# Patient Record
Sex: MALE | Race: BLACK OR AFRICAN AMERICAN | NOT HISPANIC OR LATINO | Employment: OTHER | URBAN - METROPOLITAN AREA
[De-identification: names, ages, dates, MRNs, and addresses within clinical notes are randomized per-mention and may not be internally consistent; named-entity substitution may affect disease eponyms.]

---

## 2017-02-22 ENCOUNTER — OFFICE VISIT (OUTPATIENT)
Dept: INTERNAL MEDICINE | Facility: CLINIC | Age: 52
End: 2017-02-22
Payer: MEDICARE

## 2017-02-22 ENCOUNTER — LAB VISIT (OUTPATIENT)
Dept: LAB | Facility: HOSPITAL | Age: 52
End: 2017-02-22
Attending: FAMILY MEDICINE
Payer: MEDICARE

## 2017-02-22 VITALS
HEART RATE: 66 BPM | SYSTOLIC BLOOD PRESSURE: 130 MMHG | WEIGHT: 222.44 LBS | DIASTOLIC BLOOD PRESSURE: 80 MMHG | HEIGHT: 78 IN | TEMPERATURE: 96 F | BODY MASS INDEX: 25.74 KG/M2

## 2017-02-22 DIAGNOSIS — Z00.00 ANNUAL PHYSICAL EXAM: ICD-10-CM

## 2017-02-22 DIAGNOSIS — I10 ESSENTIAL HYPERTENSION: ICD-10-CM

## 2017-02-22 DIAGNOSIS — G89.29 CHRONIC MIDLINE LOW BACK PAIN WITH SCIATICA, SCIATICA LATERALITY UNSPECIFIED: ICD-10-CM

## 2017-02-22 DIAGNOSIS — M54.40 CHRONIC MIDLINE LOW BACK PAIN WITH SCIATICA, SCIATICA LATERALITY UNSPECIFIED: ICD-10-CM

## 2017-02-22 DIAGNOSIS — E78.00 HYPERCHOLESTEREMIA: ICD-10-CM

## 2017-02-22 LAB
ALBUMIN SERPL BCP-MCNC: 3.8 G/DL
ALP SERPL-CCNC: 88 U/L
ALT SERPL W/O P-5'-P-CCNC: 29 U/L
ANION GAP SERPL CALC-SCNC: 7 MMOL/L
AST SERPL-CCNC: 23 U/L
BASOPHILS # BLD AUTO: 0.02 K/UL
BASOPHILS NFR BLD: 0.3 %
BILIRUB SERPL-MCNC: 0.4 MG/DL
BUN SERPL-MCNC: 20 MG/DL
CALCIUM SERPL-MCNC: 9.4 MG/DL
CHLORIDE SERPL-SCNC: 106 MMOL/L
CHOLEST/HDLC SERPL: 6.4 {RATIO}
CO2 SERPL-SCNC: 26 MMOL/L
COMPLEXED PSA SERPL-MCNC: 0.77 NG/ML
CREAT SERPL-MCNC: 1.4 MG/DL
DIFFERENTIAL METHOD: ABNORMAL
EOSINOPHIL # BLD AUTO: 0.1 K/UL
EOSINOPHIL NFR BLD: 1.9 %
ERYTHROCYTE [DISTWIDTH] IN BLOOD BY AUTOMATED COUNT: 12.7 %
EST. GFR  (AFRICAN AMERICAN): >60 ML/MIN/1.73 M^2
EST. GFR  (NON AFRICAN AMERICAN): 58.2 ML/MIN/1.73 M^2
GLUCOSE SERPL-MCNC: 106 MG/DL
HCT VFR BLD AUTO: 44.7 %
HDL/CHOLESTEROL RATIO: 15.5 %
HDLC SERPL-MCNC: 290 MG/DL
HDLC SERPL-MCNC: 45 MG/DL
HGB BLD-MCNC: 14.9 G/DL
LDLC SERPL CALC-MCNC: 205.6 MG/DL
LYMPHOCYTES # BLD AUTO: 2.3 K/UL
LYMPHOCYTES NFR BLD: 31.3 %
MCH RBC QN AUTO: 32.5 PG
MCHC RBC AUTO-ENTMCNC: 33.3 %
MCV RBC AUTO: 97 FL
MONOCYTES # BLD AUTO: 0.7 K/UL
MONOCYTES NFR BLD: 10 %
NEUTROPHILS # BLD AUTO: 4.1 K/UL
NEUTROPHILS NFR BLD: 56.4 %
NONHDLC SERPL-MCNC: 245 MG/DL
PLATELET # BLD AUTO: 181 K/UL
PMV BLD AUTO: 11 FL
POTASSIUM SERPL-SCNC: 4.9 MMOL/L
PROT SERPL-MCNC: 7.7 G/DL
RBC # BLD AUTO: 4.59 M/UL
SODIUM SERPL-SCNC: 139 MMOL/L
TRIGL SERPL-MCNC: 197 MG/DL
WBC # BLD AUTO: 7.28 K/UL

## 2017-02-22 PROCEDURE — 80061 LIPID PANEL: CPT

## 2017-02-22 PROCEDURE — 3079F DIAST BP 80-89 MM HG: CPT | Mod: S$GLB,,, | Performed by: FAMILY MEDICINE

## 2017-02-22 PROCEDURE — 36415 COLL VENOUS BLD VENIPUNCTURE: CPT | Mod: PO

## 2017-02-22 PROCEDURE — 3075F SYST BP GE 130 - 139MM HG: CPT | Mod: S$GLB,,, | Performed by: FAMILY MEDICINE

## 2017-02-22 PROCEDURE — 99999 PR PBB SHADOW E&M-EST. PATIENT-LVL III: CPT | Mod: PBBFAC,,, | Performed by: FAMILY MEDICINE

## 2017-02-22 PROCEDURE — 80053 COMPREHEN METABOLIC PANEL: CPT

## 2017-02-22 PROCEDURE — 99499 UNLISTED E&M SERVICE: CPT | Mod: S$GLB,,, | Performed by: FAMILY MEDICINE

## 2017-02-22 PROCEDURE — 99396 PREV VISIT EST AGE 40-64: CPT | Mod: S$GLB,,, | Performed by: FAMILY MEDICINE

## 2017-02-22 PROCEDURE — 85025 COMPLETE CBC W/AUTO DIFF WBC: CPT

## 2017-02-22 PROCEDURE — 84153 ASSAY OF PSA TOTAL: CPT

## 2017-02-22 RX ORDER — AMLODIPINE BESYLATE 10 MG/1
TABLET ORAL
Qty: 90 TABLET | Refills: 2 | Status: SHIPPED | OUTPATIENT
Start: 2017-02-22 | End: 2017-06-30 | Stop reason: SDUPTHER

## 2017-02-22 RX ORDER — ATORVASTATIN CALCIUM 80 MG/1
80 TABLET, FILM COATED ORAL DAILY
Qty: 90 TABLET | Refills: 2 | Status: SHIPPED | OUTPATIENT
Start: 2017-02-22 | End: 2017-06-30 | Stop reason: SDUPTHER

## 2017-02-22 RX ORDER — HYDROCHLOROTHIAZIDE 12.5 MG/1
25 CAPSULE ORAL 2 TIMES DAILY
Qty: 180 CAPSULE | Refills: 2 | Status: SHIPPED | OUTPATIENT
Start: 2017-02-22 | End: 2017-10-13 | Stop reason: SDUPTHER

## 2017-02-22 RX ORDER — METOPROLOL TARTRATE 50 MG/1
50 TABLET ORAL ONCE
Qty: 90 TABLET | Refills: 2 | Status: SHIPPED | OUTPATIENT
Start: 2017-02-22 | End: 2018-06-13 | Stop reason: SDUPTHER

## 2017-02-22 RX ORDER — TRAMADOL HYDROCHLORIDE 50 MG/1
50 TABLET ORAL EVERY 6 HOURS PRN
Qty: 120 TABLET | Refills: 0 | Status: SHIPPED | OUTPATIENT
Start: 2017-02-22 | End: 2017-06-30 | Stop reason: SDUPTHER

## 2017-02-22 NOTE — MR AVS SNAPSHOT
[facility]  [address] [facility] [address] [address] [place] [address]  Phone: [phone]  Fax: [phone]                  Papa Pope   2017 9:40 AM   Office Visit    Description:  Male : 1965   Provider:  Lenny Jernigan MD   Department:  [facility] - Internal Medicine           Reason for Visit     Medication Refill           Diagnoses this Visit        Comments    Annual physical exam     Will do CBC, CMP and lipid panel with PSA    Essential hypertension     Will Continue with the Norvasc 10 mg and HCTZ with metoprolol    Chronic midline low back pain with sciatica, sciatica laterality unspecified     Will get MRI for [facility]. Likely need to go to Pain Management    Hypercholesteremia     Will continue with the Lipitor           To Do List           Future Appointments        Provider Department Dept Phone    2017 9:40 AM Lenny Jernigan MD [facility] [phone]    2017 11:00 AM LAB, SAME DAY [place] [facility] [phone]      Goals (5 Years of Data)     None      Follow-Up and Disposition     Return in about 6 months (around 2017).       These Medications        Disp Refills Start End    amlodipine (NORVASC) 10 MG tablet 90 tablet 2 2017     TK 1 T PO D.    [facility] [address] [place] [address] & [facility] Ph #: [phone]       atorvastatin (LIPITOR) 80 MG tablet 90 tablet 2 2017     Take 1 tablet (80 mg total) by mouth once daily. - Oral    [facility] [address] [place] [address] AT [facility] Ph #: [phone]       hydrochlorothiazide (MICROZIDE) 12.5 mg capsule 180 capsule 2 2017     Take 2 capsules (25 mg total) by mouth 2 (two) times daily. - Oral    Pharmacy: [facility] [address] [place] [address]  [facility] #: [phone]       metoprolol tartrate (LOPRESSOR) 50 MG tablet 90 tablet 2 2/22/2017 2/22/2017    Take 1 tablet (50 mg total) by mouth once. - Oral    Pharmacy: [facility] [address] [id number] [facility] AT [facility] Ph #: [phone]       tramadol (ULTRAM) 50 mg tablet 120 tablet 0 2/22/2017     Take 1 tablet (50 mg total) by mouth every 6 (six) hours as needed for Pain. - Oral    Pharmacy: [facility] [address] [place] [address] AT [facility] Ph #: [phone]         [facility] On Call     [facility] On Call Nurse Care Line - 24/7 Assistance  Registered nurses in the [facility] provide clinical advisement, health education, appointment booking, and other advisory services.  Call for this free service at [phone].             Medications           CHANGE how you are taking these medications     Start Taking Instead of    atorvastatin (LIPITOR) 80 MG tablet atorvastatin (LIPITOR) 80 MG tablet    Dosage:  Take 1 tablet (80 mg total) by mouth once daily. Dosage:  Take 80 mg by mouth once daily.    Reason for Change:  Reorder     hydrochlorothiazide (MICROZIDE) 12.5 mg capsule hydrochlorothiazide (MICROZIDE) 12.5 mg capsule    Dosage:  Take 2 capsules (25 mg total) by mouth 2 (two) times daily. Dosage:  Take 25 mg by mouth.    Reason for Change:  Reorder     metoprolol tartrate (LOPRESSOR) 50 MG tablet metoprolol tartrate (LOPRESSOR) 50 MG tablet    Dosage:  Take 1 tablet (50 mg total) by mouth once. Dosage:  Take 50 mg by mouth once.     Reason for Change:  Reorder            Verify that the below list of medications is an accurate representation of the medications you are currently taking.  If none reported, the list may be blank. If incorrect, please contact your healthcare provider. Carry this list with you in case of emergency.           Current  "Medications     amlodipine (NORVASC) 10 MG tablet TK 1 T PO D.    atorvastatin (LIPITOR) 80 MG tablet Take 1 tablet (80 mg total) by mouth once daily.    gabapentin (NEURONTIN) 600 MG tablet Take 600 mg by mouth.    hydrochlorothiazide (MICROZIDE) 12.5 mg capsule Take 2 capsules (25 mg total) by mouth 2 (two) times daily.    hydrocodone-acetaminophen 7.5-325mg (NORCO) 7.5-325 mg per tablet Take 1 tablet by mouth once.    metoprolol tartrate (LOPRESSOR) 50 MG tablet Take 1 tablet (50 mg total) by mouth once.    tramadol (ULTRAM) 50 mg tablet Take 1 tablet (50 mg total) by mouth every 6 (six) hours as needed for Pain.           Clinical Reference Information           Your Vitals Were     BP Pulse Temp    130/80 (BP Location: Right arm, Patient Position: Sitting, BP Method: Manual) 66 96.4 °F (35.8 °C) (Tympanic)    Height Weight BMI    6' 6" (1.981 m) 100.9 kg (222 lb 7.1 oz) 25.71 kg/m2      Blood Pressure          Most Recent Value    BP  130/80      Allergies as of 2/22/2017     No Known Allergies      Immunizations Administered on Date of Encounter - 2/22/2017     None      Orders Placed During Today's Visit     Future Labs/Procedures Expected by Expires    CBC auto differential  2/22/2017 4/23/2018    Comprehensive metabolic panel  2/22/2017 4/23/2018    Lipid panel  2/22/2017 2/22/2018    PSA, Screening  2/22/2017 4/23/2018      [facility] Sign-Up     Activating your [facility] account is as easy as 1-2-3!     1) Visit [email or web address], select Sign Up Now, enter this activation code and your date of birth, then select Next.  [email or web address]  Expires: 4/8/2017  9:09 AM      2) Create a username and password to use when you visit [facility] in the future and select a security question in case you lose your password and select Next.    3) Enter your e-mail address and click Sign Up!    Additional Information  If you have questions, please e-mail [email or web address] or call [phone] to talk to our [facility] " staff. Remember, [facility] is NOT to be used for urgent needs. For medical emergencies, dial 911.         Smoking Cessation     If you would like to quit smoking:   You may be eligible for free services if you are a [place] resident and started smoking cigarettes before September 1, 1988.  Call the Smoking Cessation Trust ([facility]) toll free at ([phone] or [phone].   Call 1-800-QUIT-NOW if you do not meet the above criteria.            Language Assistance Services     ATTENTION: Language assistance services are available, free of charge. Please call [phone].      ATENCIÓN: Si habla español, tiene a preciado disposición servicios gratuitos de asistencia lingüística. Llame al [phone].     CHÚ Ý: N?u b?n nói Ti?ng Vi?t, có các d?ch v? h? tr? ngôn ng? mi?n phí dành cho b?n. G?i s? [phone].         [facility] - [facility] complies with applicable Federal civil rights laws and does not discriminate on the basis of race, color, national origin, age, disability, or sex.

## 2017-02-22 NOTE — PROGRESS NOTES
Subjective:       Patient ID: Papa Pope is a 51 y.o. male.    Chief Complaint: Medication Refill    HPI Comments: Annual Exam:      Pt is a 51 year old who has HTN, Hyperlipidemia and chronic pain due to back. Pt is stable BP today. Pt had MRI of his back in [place]    Medication Refill   This is a chronic problem. The current episode started more than 1 year ago. The problem has been waxing and waning. Pertinent negatives include no change in bowel habit, headaches, myalgias, nausea, urinary symptoms or vertigo. Nothing aggravates the symptoms. He has tried nothing for the symptoms. The treatment provided moderate relief.     Review of Systems   Gastrointestinal: Negative for change in bowel habit and nausea.   Musculoskeletal: Negative for myalgias.   Neurological: Negative for vertigo and headaches.       Objective:      Physical Exam   Constitutional: He is oriented to person, place, and time. He appears well-developed and well-nourished.   Cardiovascular: Normal rate and regular rhythm.    Pulmonary/Chest: Effort normal and breath sounds normal.   Abdominal: Soft. Bowel sounds are normal.   Musculoskeletal:        Lumbar back: He exhibits decreased range of motion, pain and spasm.   Neurological: He is alert and oriented to person, place, and time.   Skin: Skin is warm and dry.   Psychiatric: He has a normal mood and affect. His behavior is normal.       Assessment:       1. Annual physical exam    2. Essential hypertension    3. Chronic midline low back pain with sciatica, sciatica laterality unspecified    4. Hypercholesteremia        Plan:       Annual physical exam  Comments:  Will do CBC, CMP and lipid panel with PSA  Orders:  -     CBC auto differential; Future; Expected date: 2/22/17  -     Comprehensive metabolic panel; Future; Expected date: 2/22/17  -     Lipid panel; Future; Expected date: 2/22/17  -     PSA, Screening; Future; Expected date: 2/22/17    Essential hypertension  Comments:  Will Continue  with the Norvasc 10 mg and HCTZ with metoprolol    Chronic midline low back pain with sciatica, sciatica laterality unspecified  Comments:  Will get MRI for [facility]. Likely need to go to Pain Management    Hypercholesteremia  Comments:  Will continue with the Lipitor    Other orders  -     amlodipine (NORVASC) 10 MG tablet; TK 1 T PO D.  Dispense: 90 tablet; Refill: 2  -     atorvastatin (LIPITOR) 80 MG tablet; Take 1 tablet (80 mg total) by mouth once daily.  Dispense: 90 tablet; Refill: 2  -     hydrochlorothiazide (MICROZIDE) 12.5 mg capsule; Take 2 capsules (25 mg total) by mouth 2 (two) times daily.  Dispense: 180 capsule; Refill: 2  -     metoprolol tartrate (LOPRESSOR) 50 MG tablet; Take 1 tablet (50 mg total) by mouth once.  Dispense: 90 tablet; Refill: 2  -     tramadol (ULTRAM) 50 mg tablet; Take 1 tablet (50 mg total) by mouth every 6 (six) hours as needed for Pain.  Dispense: 120 tablet; Refill: 0

## 2017-02-24 RX ORDER — EZETIMIBE 10 MG/1
10 TABLET ORAL DAILY
Qty: 90 TABLET | Refills: 3 | Status: SHIPPED | OUTPATIENT
Start: 2017-02-24 | End: 2017-06-30 | Stop reason: SDUPTHER

## 2017-03-14 ENCOUNTER — TELEPHONE (OUTPATIENT)
Dept: INTERNAL MEDICINE | Facility: CLINIC | Age: 52
End: 2017-03-14

## 2017-03-14 NOTE — TELEPHONE ENCOUNTER
----- Message from Jennifer Blackmon sent at 3/14/2017  1:08 PM CDT -----  Contact: self [phone] or [phone]  States that he is returning call. States that he had his MRI done at the [facility] clinic on [place]. Please call back at [phone] or [phone]//thank you acc

## 2017-05-29 PROBLEM — Z00.00 ANNUAL PHYSICAL EXAM: Status: RESOLVED | Noted: 2017-02-22 | Resolved: 2017-05-29

## 2017-06-30 ENCOUNTER — LAB VISIT (OUTPATIENT)
Dept: LAB | Facility: HOSPITAL | Age: 52
End: 2017-06-30
Attending: FAMILY MEDICINE
Payer: MEDICARE

## 2017-06-30 ENCOUNTER — OFFICE VISIT (OUTPATIENT)
Dept: INTERNAL MEDICINE | Facility: CLINIC | Age: 52
End: 2017-06-30
Payer: MEDICARE

## 2017-06-30 VITALS
HEIGHT: 78 IN | BODY MASS INDEX: 25.48 KG/M2 | SYSTOLIC BLOOD PRESSURE: 130 MMHG | WEIGHT: 220.25 LBS | TEMPERATURE: 96 F | DIASTOLIC BLOOD PRESSURE: 88 MMHG | HEART RATE: 80 BPM

## 2017-06-30 DIAGNOSIS — E78.00 HYPERCHOLESTEREMIA: ICD-10-CM

## 2017-06-30 DIAGNOSIS — I10 ESSENTIAL HYPERTENSION: Primary | ICD-10-CM

## 2017-06-30 DIAGNOSIS — M54.40 CHRONIC MIDLINE LOW BACK PAIN WITH SCIATICA, SCIATICA LATERALITY UNSPECIFIED: ICD-10-CM

## 2017-06-30 DIAGNOSIS — I10 ESSENTIAL HYPERTENSION: ICD-10-CM

## 2017-06-30 DIAGNOSIS — G89.29 CHRONIC MIDLINE LOW BACK PAIN WITH SCIATICA, SCIATICA LATERALITY UNSPECIFIED: ICD-10-CM

## 2017-06-30 LAB
ALBUMIN SERPL BCP-MCNC: 3.6 G/DL
ALP SERPL-CCNC: 100 U/L
ALT SERPL W/O P-5'-P-CCNC: 14 U/L
ANION GAP SERPL CALC-SCNC: 8 MMOL/L
AST SERPL-CCNC: 15 U/L
BILIRUB SERPL-MCNC: 0.4 MG/DL
BUN SERPL-MCNC: 14 MG/DL
CALCIUM SERPL-MCNC: 9.3 MG/DL
CHLORIDE SERPL-SCNC: 104 MMOL/L
CHOLEST/HDLC SERPL: 7.2 {RATIO}
CO2 SERPL-SCNC: 26 MMOL/L
CREAT SERPL-MCNC: 1.3 MG/DL
EST. GFR  (AFRICAN AMERICAN): >60 ML/MIN/1.73 M^2
EST. GFR  (NON AFRICAN AMERICAN): >60 ML/MIN/1.73 M^2
GLUCOSE SERPL-MCNC: 111 MG/DL
HDL/CHOLESTEROL RATIO: 13.9 %
HDLC SERPL-MCNC: 267 MG/DL
HDLC SERPL-MCNC: 37 MG/DL
LDLC SERPL CALC-MCNC: 192.8 MG/DL
NONHDLC SERPL-MCNC: 230 MG/DL
POTASSIUM SERPL-SCNC: 4 MMOL/L
PROT SERPL-MCNC: 7.4 G/DL
SODIUM SERPL-SCNC: 138 MMOL/L
TRIGL SERPL-MCNC: 186 MG/DL

## 2017-06-30 PROCEDURE — 99214 OFFICE O/P EST MOD 30 MIN: CPT | Mod: S$GLB,,, | Performed by: FAMILY MEDICINE

## 2017-06-30 PROCEDURE — 36415 COLL VENOUS BLD VENIPUNCTURE: CPT | Mod: PO

## 2017-06-30 PROCEDURE — 80061 LIPID PANEL: CPT

## 2017-06-30 PROCEDURE — 99499 UNLISTED E&M SERVICE: CPT | Mod: S$GLB,,, | Performed by: FAMILY MEDICINE

## 2017-06-30 PROCEDURE — 99999 PR PBB SHADOW E&M-EST. PATIENT-LVL III: CPT | Mod: PBBFAC,,, | Performed by: FAMILY MEDICINE

## 2017-06-30 PROCEDURE — 80053 COMPREHEN METABOLIC PANEL: CPT

## 2017-06-30 RX ORDER — EZETIMIBE 10 MG/1
10 TABLET ORAL DAILY
Qty: 90 TABLET | Refills: 3 | Status: SHIPPED | OUTPATIENT
Start: 2017-06-30 | End: 2018-06-13 | Stop reason: SDUPTHER

## 2017-06-30 RX ORDER — GABAPENTIN 600 MG/1
600 TABLET ORAL 2 TIMES DAILY
Qty: 180 TABLET | Refills: 1 | Status: SHIPPED | OUTPATIENT
Start: 2017-06-30 | End: 2018-06-13 | Stop reason: SDUPTHER

## 2017-06-30 RX ORDER — TRAMADOL HYDROCHLORIDE 50 MG/1
50 TABLET ORAL EVERY 6 HOURS PRN
Qty: 120 TABLET | Refills: 0 | Status: SHIPPED | OUTPATIENT
Start: 2017-06-30 | End: 2017-07-28

## 2017-06-30 RX ORDER — ATORVASTATIN CALCIUM 80 MG/1
80 TABLET, FILM COATED ORAL DAILY
Qty: 90 TABLET | Refills: 2 | Status: SHIPPED | OUTPATIENT
Start: 2017-06-30 | End: 2017-10-13 | Stop reason: SDUPTHER

## 2017-06-30 RX ORDER — AMLODIPINE BESYLATE 10 MG/1
TABLET ORAL
Qty: 90 TABLET | Refills: 2 | Status: SHIPPED | OUTPATIENT
Start: 2017-06-30 | End: 2018-06-13 | Stop reason: SDUPTHER

## 2017-06-30 NOTE — PROGRESS NOTES
Subjective:       Patient ID: Papa Pope is a 52 y.o. male.    Chief Complaint: Follow-up and Medication Refill    HTN:      Pt is a 52 year old who has HTN and doing well. Pt is on amlodipine. Pt has no CP or SOB. Pt is reporting no visual changes or headache    Hypercholesterol:       Pt last Lipid was very high at 200. Pt is on lipitor and zetia.            Review of Systems   Constitutional: Negative.    HENT: Negative.    Respiratory: Negative.    Cardiovascular: Negative.    Gastrointestinal: Negative.    Skin: Negative.    Neurological: Negative.    Psychiatric/Behavioral: Negative.        Objective:      Physical Exam   Constitutional: He is oriented to person, place, and time. He appears well-developed and well-nourished.   Neck: Normal range of motion.   Cardiovascular: Normal rate and regular rhythm.    Pulmonary/Chest: Effort normal and breath sounds normal.   Abdominal: Soft. Bowel sounds are normal.   Neurological: He is oriented to person, place, and time.   Skin: Skin is dry.   Psychiatric: He has a normal mood and affect. His behavior is normal.       Assessment:       1. Essential hypertension    2. Hypercholesteremia    3. Chronic midline low back pain with sciatica, sciatica laterality unspecified        Plan:       Essential hypertension  Comments:  Pt BP is very well controlled and continue on the amlodipine and HCTZ  Orders:  -     Comprehensive metabolic panel; Future; Expected date: 06/30/2017    Hypercholesteremia  Comments:  Recheck his Lipid panel  Orders:  -     Lipid panel; Future; Expected date: 06/30/2017    Chronic midline low back pain with sciatica, sciatica laterality unspecified  Comments:  Will continue on the tramadol and Gabapentin but increase gabapentin to twice a day    Other orders  -     tramadol (ULTRAM) 50 mg tablet; Take 1 tablet (50 mg total) by mouth every 6 (six) hours as needed for Pain.  Dispense: 120 tablet; Refill: 0  -     amlodipine (NORVASC) 10 MG tablet;  TK 1 T PO D.  Dispense: 90 tablet; Refill: 2  -     ezetimibe (ZETIA) 10 mg tablet; Take 1 tablet (10 mg total) by mouth once daily.  Dispense: 90 tablet; Refill: 3  -     atorvastatin (LIPITOR) 80 MG tablet; Take 1 tablet (80 mg total) by mouth once daily.  Dispense: 90 tablet; Refill: 2  -     gabapentin (NEURONTIN) 600 MG tablet; Take 1 tablet (600 mg total) by mouth 2 (two) times daily.  Dispense: 180 tablet; Refill: 1

## 2017-07-26 ENCOUNTER — TELEPHONE (OUTPATIENT)
Dept: INTERNAL MEDICINE | Facility: CLINIC | Age: 52
End: 2017-07-26

## 2017-07-26 RX ORDER — SODIUM, POTASSIUM,MAG SULFATES 17.5-3.13G
SOLUTION, RECONSTITUTED, ORAL ORAL
Qty: 354 ML | Refills: 0 | Status: ON HOLD | OUTPATIENT
Start: 2017-07-26 | End: 2017-08-22 | Stop reason: HOSPADM

## 2017-07-26 NOTE — TELEPHONE ENCOUNTER
----- Message from Batsheva Glynn sent at 7/26/2017  1:49 PM CDT -----  Contact: Pt  Pt is returning the nurse call. Pls call pt back a [phone].

## 2017-07-28 ENCOUNTER — OFFICE VISIT (OUTPATIENT)
Dept: INTERNAL MEDICINE | Facility: CLINIC | Age: 52
End: 2017-07-28
Payer: MEDICARE

## 2017-07-28 ENCOUNTER — TELEPHONE (OUTPATIENT)
Dept: INTERNAL MEDICINE | Facility: CLINIC | Age: 52
End: 2017-07-28

## 2017-07-28 ENCOUNTER — HOSPITAL ENCOUNTER (OUTPATIENT)
Dept: RADIOLOGY | Facility: HOSPITAL | Age: 52
Discharge: HOME | End: 2017-07-28
Attending: FAMILY MEDICINE
Payer: MEDICARE

## 2017-07-28 VITALS
HEART RATE: 86 BPM | WEIGHT: 219.81 LBS | TEMPERATURE: 97 F | DIASTOLIC BLOOD PRESSURE: 82 MMHG | SYSTOLIC BLOOD PRESSURE: 136 MMHG | HEIGHT: 78 IN | BODY MASS INDEX: 25.43 KG/M2

## 2017-07-28 DIAGNOSIS — M54.50 CHRONIC BILATERAL LOW BACK PAIN WITHOUT SCIATICA: ICD-10-CM

## 2017-07-28 DIAGNOSIS — G89.29 CHRONIC BILATERAL LOW BACK PAIN WITHOUT SCIATICA: ICD-10-CM

## 2017-07-28 DIAGNOSIS — E78.00 HYPERCHOLESTEREMIA: Primary | ICD-10-CM

## 2017-07-28 PROCEDURE — 72040 X-RAY EXAM NECK SPINE 2-3 VW: CPT | Mod: 26,,, | Performed by: RADIOLOGY

## 2017-07-28 PROCEDURE — 99499 UNLISTED E&M SERVICE: CPT | Mod: S$GLB,,, | Performed by: FAMILY MEDICINE

## 2017-07-28 PROCEDURE — 72040 X-RAY EXAM NECK SPINE 2-3 VW: CPT | Mod: TC,PO

## 2017-07-28 PROCEDURE — 99214 OFFICE O/P EST MOD 30 MIN: CPT | Mod: S$GLB,,, | Performed by: FAMILY MEDICINE

## 2017-07-28 PROCEDURE — 72114 X-RAY EXAM L-S SPINE BENDING: CPT | Mod: 26,,, | Performed by: RADIOLOGY

## 2017-07-28 PROCEDURE — 99999 PR PBB SHADOW E&M-EST. PATIENT-LVL III: CPT | Mod: PBBFAC,,, | Performed by: FAMILY MEDICINE

## 2017-07-28 PROCEDURE — 72114 X-RAY EXAM L-S SPINE BENDING: CPT | Mod: TC,PO

## 2017-07-28 RX ORDER — CYCLOBENZAPRINE HCL 10 MG
10 TABLET ORAL NIGHTLY
Qty: 30 TABLET | Refills: 1 | Status: SHIPPED | OUTPATIENT
Start: 2017-07-28 | End: 2017-08-07

## 2017-07-28 RX ORDER — METHOCARBAMOL 750 MG/1
750 TABLET, FILM COATED ORAL 3 TIMES DAILY
Qty: 90 TABLET | Refills: 1 | Status: SHIPPED | OUTPATIENT
Start: 2017-07-28 | End: 2017-08-07

## 2017-07-28 NOTE — PROGRESS NOTES
Subjective:       Patient ID: Papa Pope is a 52 y.o. male.    Chief Complaint: Hyperlipidemia    Lower back pain:      Pt is a 52 year with lower back and shoulder pain. Pt reports this has been going on for 3 months. Pt reports that not had any trauma.       Hyperlipidemia   This is a chronic problem. The current episode started more than 1 year ago. The problem is controlled. Recent lipid tests were reviewed and are variable. Exacerbating diseases include diabetes. There are no known factors aggravating his hyperlipidemia. Associated symptoms include leg pain. Pertinent negatives include no chest pain or focal sensory loss. He is currently on no antihyperlipidemic treatment. The current treatment provides no improvement of lipids. There are no compliance problems.      Review of Systems   Constitutional: Negative.    Respiratory: Negative.    Cardiovascular: Negative.  Negative for chest pain.   Genitourinary: Negative.    Musculoskeletal: Positive for back pain.   Neurological: Negative.    Hematological: Negative.    Psychiatric/Behavioral: Negative.        Objective:      Physical Exam   Constitutional: He is oriented to person, place, and time. He appears well-developed and well-nourished.   Neck: Normal range of motion.   Cardiovascular: Normal rate and regular rhythm.  Exam reveals no friction rub.    Pulmonary/Chest: Effort normal and breath sounds normal. He has no wheezes.   Abdominal: Soft. Bowel sounds are normal.   Musculoskeletal:        Cervical back: He exhibits decreased range of motion and spasm.        Lumbar back: He exhibits decreased range of motion, pain and spasm.   Neurological: He is alert and oriented to person, place, and time. He displays normal reflexes.   Skin: Skin is warm and dry.   Psychiatric: He has a normal mood and affect. His behavior is normal.       Assessment:       1. Hypercholesteremia    2. Chronic bilateral low back pain without sciatica        Plan:        Hypercholesteremia  Comments:  Will restart on zetia.  Orders:  -     CK; Future; Expected date: 07/28/2017    Chronic bilateral low back pain without sciatica  Comments:  Will get a xray of lumbar and cervical and start on muscle relaxer.   Orders:  -     X-Ray Cervical Spine AP And Lateral; Future; Expected date: 07/28/2017  -     X-Ray Lumbar Complete With Flex And Ext; Future; Expected date: 07/28/2017    Other orders  -     methocarbamol (ROBAXIN) 750 MG Tab; Take 1 tablet (750 mg total) by mouth 3 (three) times daily.  Dispense: 90 tablet; Refill: 1  -     cyclobenzaprine (FLEXERIL) 10 MG tablet; Take 1 tablet (10 mg total) by mouth every evening.  Dispense: 30 tablet; Refill: 1

## 2017-07-30 ENCOUNTER — PATIENT MESSAGE (OUTPATIENT)
Dept: INTERNAL MEDICINE | Facility: CLINIC | Age: 52
End: 2017-07-30

## 2017-07-31 ENCOUNTER — TELEPHONE (OUTPATIENT)
Dept: PHARMACY | Facility: CLINIC | Age: 52
End: 2017-07-31

## 2017-07-31 NOTE — TELEPHONE ENCOUNTER
Spoke with patient about referral.  Patient stated in the insurance is not covering the medication.  I called the pharmacy and the medication covered and the patient co-pays 47.00.  I explained to the patient the insurance is covering the medication.

## 2017-08-04 DIAGNOSIS — M54.40 CHRONIC MIDLINE LOW BACK PAIN WITH SCIATICA, SCIATICA LATERALITY UNSPECIFIED: Primary | ICD-10-CM

## 2017-08-04 DIAGNOSIS — M54.2 NECK PAIN: ICD-10-CM

## 2017-08-04 DIAGNOSIS — G89.29 CHRONIC MIDLINE LOW BACK PAIN WITH SCIATICA, SCIATICA LATERALITY UNSPECIFIED: Primary | ICD-10-CM

## 2017-08-22 ENCOUNTER — SURGERY (OUTPATIENT)
Age: 52
End: 2017-08-22

## 2017-08-22 ENCOUNTER — ANESTHESIA (OUTPATIENT)
Dept: ENDOSCOPY | Facility: HOSPITAL | Age: 52
End: 2017-08-22
Payer: MEDICARE

## 2017-08-22 ENCOUNTER — ANESTHESIA EVENT (OUTPATIENT)
Dept: ENDOSCOPY | Facility: HOSPITAL | Age: 52
End: 2017-08-22
Payer: MEDICARE

## 2017-08-22 VITALS — RESPIRATION RATE: 10 BRPM

## 2017-08-22 PROBLEM — Z12.12 SCREENING FOR COLORECTAL CANCER: Status: ACTIVE | Noted: 2017-08-22

## 2017-08-22 PROBLEM — Z12.11 SCREENING FOR COLORECTAL CANCER: Status: ACTIVE | Noted: 2017-08-22

## 2017-08-22 PROCEDURE — 63600175 PHARM REV CODE 636 W HCPCS: Performed by: NURSE ANESTHETIST, CERTIFIED REGISTERED

## 2017-08-22 PROCEDURE — 25000003 PHARM REV CODE 250: Performed by: NURSE ANESTHETIST, CERTIFIED REGISTERED

## 2017-08-22 RX ORDER — PROPOFOL 10 MG/ML
VIAL (ML) INTRAVENOUS
Status: DISCONTINUED | OUTPATIENT
Start: 2017-08-22 | End: 2017-08-22

## 2017-08-22 RX ORDER — LIDOCAINE HYDROCHLORIDE 20 MG/ML
INJECTION, SOLUTION EPIDURAL; INFILTRATION; INTRACAUDAL; PERINEURAL
Status: DISCONTINUED | OUTPATIENT
Start: 2017-08-22 | End: 2017-08-22

## 2017-08-22 RX ORDER — SODIUM CHLORIDE, SODIUM LACTATE, POTASSIUM CHLORIDE, CALCIUM CHLORIDE 600; 310; 30; 20 MG/100ML; MG/100ML; MG/100ML; MG/100ML
INJECTION, SOLUTION INTRAVENOUS CONTINUOUS PRN
Status: DISCONTINUED | OUTPATIENT
Start: 2017-08-22 | End: 2017-08-22

## 2017-08-22 RX ADMIN — PROPOFOL 60 MG: 10 INJECTION, EMULSION INTRAVENOUS at 10:08

## 2017-08-22 RX ADMIN — LIDOCAINE HYDROCHLORIDE 40 MG: 20 INJECTION, SOLUTION EPIDURAL; INFILTRATION; INTRACAUDAL; PERINEURAL at 09:08

## 2017-08-22 RX ADMIN — PROPOFOL 60 MG: 10 INJECTION, EMULSION INTRAVENOUS at 09:08

## 2017-08-22 RX ADMIN — PROPOFOL 40 MG: 10 INJECTION, EMULSION INTRAVENOUS at 10:08

## 2017-08-22 RX ADMIN — SODIUM CHLORIDE, SODIUM LACTATE, POTASSIUM CHLORIDE, AND CALCIUM CHLORIDE: 600; 310; 30; 20 INJECTION, SOLUTION INTRAVENOUS at 09:08

## 2017-08-22 RX ADMIN — PROPOFOL 30 MG: 10 INJECTION, EMULSION INTRAVENOUS at 10:08

## 2017-08-22 NOTE — ANESTHESIA RELEASE NOTE
"Anesthesia Release from PACU Note    Patient: Papa Pope    Procedure(s) Performed: Procedure(s) (LRB):  COLONOSCOPY (N/A)    Anesthesia type: MAC    Post pain: Adequate analgesia    Post assessment: no apparent anesthetic complications and tolerated procedure well    Last Vitals:   Visit Vitals  /76 (BP Location: Left arm, Patient Position: Lying)   Pulse 76   Temp 36.8 °C (98.3 °F) (Oral)   Resp 17   Ht 6' 6" (1.981 m)   Wt 97.5 kg (215 lb)   SpO2 100%   BMI 24.85 kg/m²       Post vital signs: stable    Level of consciousness: awake, alert  and oriented    Nausea/Vomiting: no nausea/no vomiting    Complications: none    Airway Patency: patent    Respiratory: unassisted, spontaneous ventilation, room air    Cardiovascular: stable and blood pressure at baseline    Hydration: euvolemic  "

## 2017-08-22 NOTE — TRANSFER OF CARE
"Anesthesia Transfer of Care Note    Patient: Papa Pope    Procedure(s) Performed: Procedure(s) (LRB):  COLONOSCOPY (N/A)    Patient location: GI    Anesthesia Type: MAC    Transport from OR: Transported from OR on room air with adequate spontaneous ventilation    Post pain: adequate analgesia    Post assessment: no apparent anesthetic complications and tolerated procedure well    Post vital signs: stable    Level of consciousness: awake, alert and oriented    Nausea/Vomiting: no nausea/vomiting    Complications: none    Transfer of care protocol was followed      Last vitals:   Visit Vitals  /76 (BP Location: Left arm, Patient Position: Lying)   Pulse 76   Temp 36.8 °C (98.3 °F) (Oral)   Resp 17   Ht 6' 6" (1.981 m)   Wt 97.5 kg (215 lb)   SpO2 100%   BMI 24.85 kg/m²     "

## 2017-08-22 NOTE — ANESTHESIA POSTPROCEDURE EVALUATION
"Anesthesia Post Evaluation    Patient: Papa Pope    Procedure(s) Performed: Procedure(s) (LRB):  COLONOSCOPY (N/A)    Final Anesthesia Type: MAC  Patient location during evaluation: GI PACU  Patient participation: Yes- Able to Participate  Level of consciousness: awake and alert and oriented  Post-procedure vital signs: reviewed and stable  Pain management: adequate  Airway patency: patent  PONV status at discharge: No PONV  Anesthetic complications: no      Cardiovascular status: hemodynamically stable  Respiratory status: unassisted, spontaneous ventilation and room air  Hydration status: euvolemic  Follow-up not needed.        Visit Vitals  /76 (BP Location: Left arm, Patient Position: Lying)   Pulse 76   Temp 36.8 °C (98.3 °F) (Oral)   Resp 17   Ht 6' 6" (1.981 m)   Wt 97.5 kg (215 lb)   SpO2 100%   BMI 24.85 kg/m²       Pain/Laith Score: Pain Assessment Performed: Yes (8/22/2017  9:18 AM)      "
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
## 2017-10-16 RX ORDER — ATORVASTATIN CALCIUM 80 MG/1
80 TABLET, FILM COATED ORAL DAILY
Qty: 90 TABLET | Refills: 2 | Status: SHIPPED | OUTPATIENT
Start: 2017-10-16 | End: 2018-06-13 | Stop reason: SDUPTHER

## 2017-10-16 RX ORDER — HYDROCHLOROTHIAZIDE 12.5 MG/1
25 CAPSULE ORAL 2 TIMES DAILY
Qty: 180 CAPSULE | Refills: 2 | Status: SHIPPED | OUTPATIENT
Start: 2017-10-16 | End: 2017-12-23 | Stop reason: SDUPTHER

## 2017-12-25 RX ORDER — HYDROCHLOROTHIAZIDE 12.5 MG/1
CAPSULE ORAL
Qty: 360 CAPSULE | Refills: 2 | Status: SHIPPED | OUTPATIENT
Start: 2017-12-25 | End: 2018-06-13 | Stop reason: SDUPTHER

## 2018-05-31 ENCOUNTER — PES CALL (OUTPATIENT)
Dept: ADMINISTRATIVE | Facility: CLINIC | Age: 53
End: 2018-05-31

## 2018-06-13 ENCOUNTER — OFFICE VISIT (OUTPATIENT)
Dept: INTERNAL MEDICINE | Facility: CLINIC | Age: 53
End: 2018-06-13
Payer: MEDICARE

## 2018-06-13 ENCOUNTER — LAB VISIT (OUTPATIENT)
Dept: LAB | Facility: HOSPITAL | Age: 53
End: 2018-06-13
Attending: FAMILY MEDICINE
Payer: MEDICARE

## 2018-06-13 DIAGNOSIS — Z00.00 ANNUAL PHYSICAL EXAM: Primary | ICD-10-CM

## 2018-06-13 DIAGNOSIS — Z00.00 ANNUAL PHYSICAL EXAM: ICD-10-CM

## 2018-06-13 PROCEDURE — 80061 LIPID PANEL: CPT

## 2018-06-13 PROCEDURE — 84153 ASSAY OF PSA TOTAL: CPT

## 2018-06-13 PROCEDURE — 85025 COMPLETE CBC W/AUTO DIFF WBC: CPT

## 2018-06-13 PROCEDURE — 80053 COMPREHEN METABOLIC PANEL: CPT

## 2018-06-13 PROCEDURE — 36415 COLL VENOUS BLD VENIPUNCTURE: CPT | Mod: PO

## 2018-06-13 PROCEDURE — 99999 PR PBB SHADOW E&M-EST. PATIENT-LVL III: CPT | Mod: PBBFAC,,, | Performed by: FAMILY MEDICINE

## 2018-06-13 PROCEDURE — 3080F DIAST BP >= 90 MM HG: CPT | Mod: CPTII,S$GLB,, | Performed by: FAMILY MEDICINE

## 2018-06-13 PROCEDURE — 3077F SYST BP >= 140 MM HG: CPT | Mod: CPTII,S$GLB,, | Performed by: FAMILY MEDICINE

## 2018-06-13 PROCEDURE — 99396 PREV VISIT EST AGE 40-64: CPT | Mod: S$GLB,,, | Performed by: FAMILY MEDICINE

## 2018-06-13 PROCEDURE — 99499 UNLISTED E&M SERVICE: CPT | Mod: HCNC,S$GLB,, | Performed by: FAMILY MEDICINE

## 2018-06-13 RX ORDER — ATORVASTATIN CALCIUM 80 MG/1
80 TABLET, FILM COATED ORAL DAILY
Qty: 90 TABLET | Refills: 2 | Status: SHIPPED | OUTPATIENT
Start: 2018-06-13 | End: 2018-06-28 | Stop reason: SDUPTHER

## 2018-06-13 RX ORDER — AMLODIPINE BESYLATE 10 MG/1
TABLET ORAL
Qty: 90 TABLET | Refills: 2 | Status: SHIPPED | OUTPATIENT
Start: 2018-06-13 | End: 2018-06-28 | Stop reason: SDUPTHER

## 2018-06-13 RX ORDER — MONTELUKAST SODIUM 10 MG/1
10 TABLET ORAL NIGHTLY
Qty: 90 TABLET | Refills: 1 | Status: SHIPPED | OUTPATIENT
Start: 2018-06-13 | End: 2018-06-28 | Stop reason: SDUPTHER

## 2018-06-13 RX ORDER — GABAPENTIN 600 MG/1
600 TABLET ORAL 2 TIMES DAILY
Qty: 180 TABLET | Refills: 1 | Status: SHIPPED | OUTPATIENT
Start: 2018-06-13 | End: 2018-06-28 | Stop reason: SDUPTHER

## 2018-06-13 RX ORDER — EZETIMIBE 10 MG/1
10 TABLET ORAL DAILY
Qty: 90 TABLET | Refills: 3 | Status: SHIPPED | OUTPATIENT
Start: 2018-06-13 | End: 2018-06-28 | Stop reason: SDUPTHER

## 2018-06-13 RX ORDER — METOPROLOL TARTRATE 50 MG/1
50 TABLET ORAL ONCE
Qty: 90 TABLET | Refills: 2 | Status: SHIPPED | OUTPATIENT
Start: 2018-06-13 | End: 2018-06-28 | Stop reason: SDUPTHER

## 2018-06-13 RX ORDER — HYDROCHLOROTHIAZIDE 12.5 MG/1
25 CAPSULE ORAL 2 TIMES DAILY
Qty: 360 CAPSULE | Refills: 2 | Status: SHIPPED | OUTPATIENT
Start: 2018-06-13 | End: 2018-06-28 | Stop reason: SDUPTHER

## 2018-06-13 NOTE — PROGRESS NOTES
Subjective:       Patient ID: Papa Pope is a 53 y.o. male.    Chief Complaint: Annual Exam    Annual Exam:      Pt is a 53 year old with HTN and Hyperlipidema. Conditions are controlled. Pt is up-to-date on his wellness exams      Review of Systems   Constitutional: Negative.    HENT: Negative.    Respiratory: Negative.    Cardiovascular: Negative.    Gastrointestinal: Negative.    Genitourinary: Negative.    Musculoskeletal: Negative.    Skin: Negative.    Neurological: Negative.    Psychiatric/Behavioral: Negative.        Objective:      Physical Exam   Constitutional: He is oriented to person, place, and time. He appears well-developed and well-nourished.   HENT:   Head: Normocephalic.   Eyes: EOM are normal. Pupils are equal, round, and reactive to light.   Neck: Normal range of motion. Neck supple. No JVD present. No thyromegaly present.   Cardiovascular: Normal rate and regular rhythm.    Pulmonary/Chest: Effort normal and breath sounds normal.   Abdominal: Soft. Bowel sounds are normal.   Musculoskeletal: Normal range of motion.   Lymphadenopathy:     He has no cervical adenopathy.   Neurological: He is alert and oriented to person, place, and time. He has normal reflexes.   Skin: Skin is warm and dry.   Psychiatric: He has a normal mood and affect. His behavior is normal.       Assessment:       1. Annual physical exam        Plan:       Annual physical exam  Comments:  Will do CBC, CMP, lipid panel and PSA  Orders:  -     PSA, Screening; Future; Expected date: 06/13/2018  -     CBC auto differential; Future; Expected date: 06/13/2018  -     Comprehensive metabolic panel; Future; Expected date: 06/13/2018  -     Lipid panel; Future; Expected date: 06/13/2018    Other orders  -     amLODIPine (NORVASC) 10 MG tablet; TK 1 T PO D.  Dispense: 90 tablet; Refill: 2  -     atorvastatin (LIPITOR) 80 MG tablet; Take 1 tablet (80 mg total) by mouth once daily.  Dispense: 90 tablet; Refill: 2  -     ezetimibe  (ZETIA) 10 mg tablet; Take 1 tablet (10 mg total) by mouth once daily.  Dispense: 90 tablet; Refill: 3  -     gabapentin (NEURONTIN) 600 MG tablet; Take 1 tablet (600 mg total) by mouth 2 (two) times daily.  Dispense: 180 tablet; Refill: 1  -     hydroCHLOROthiazide (MICROZIDE) 12.5 mg capsule; Take 2 capsules (25 mg total) by mouth 2 (two) times daily.  Dispense: 360 capsule; Refill: 2  -     metoprolol tartrate (LOPRESSOR) 50 MG tablet; Take 1 tablet (50 mg total) by mouth once.  Dispense: 90 tablet; Refill: 2  -     montelukast (SINGULAIR) 10 mg tablet; Take 1 tablet (10 mg total) by mouth every evening.  Dispense: 90 tablet; Refill: 1

## 2018-06-14 LAB
ALBUMIN SERPL BCP-MCNC: 4.2 G/DL
ALP SERPL-CCNC: 116 U/L
ALT SERPL W/O P-5'-P-CCNC: 26 U/L
ANION GAP SERPL CALC-SCNC: 12 MMOL/L
AST SERPL-CCNC: 22 U/L
BASOPHILS # BLD AUTO: 0.03 K/UL
BASOPHILS NFR BLD: 0.5 %
BILIRUB SERPL-MCNC: 0.7 MG/DL
BUN SERPL-MCNC: 15 MG/DL
CALCIUM SERPL-MCNC: 10.3 MG/DL
CHLORIDE SERPL-SCNC: 103 MMOL/L
CHOLEST SERPL-MCNC: 245 MG/DL
CHOLEST/HDLC SERPL: 5.3 {RATIO}
CO2 SERPL-SCNC: 26 MMOL/L
COMPLEXED PSA SERPL-MCNC: 0.66 NG/ML
CREAT SERPL-MCNC: 1.2 MG/DL
DIFFERENTIAL METHOD: ABNORMAL
EOSINOPHIL # BLD AUTO: 0.1 K/UL
EOSINOPHIL NFR BLD: 2 %
ERYTHROCYTE [DISTWIDTH] IN BLOOD BY AUTOMATED COUNT: 12.6 %
EST. GFR  (AFRICAN AMERICAN): >60 ML/MIN/1.73 M^2
EST. GFR  (NON AFRICAN AMERICAN): >60 ML/MIN/1.73 M^2
GLUCOSE SERPL-MCNC: 66 MG/DL
HCT VFR BLD AUTO: 46.5 %
HDLC SERPL-MCNC: 46 MG/DL
HDLC SERPL: 18.8 %
HGB BLD-MCNC: 15.4 G/DL
IMM GRANULOCYTES # BLD AUTO: 0.01 K/UL
IMM GRANULOCYTES NFR BLD AUTO: 0.2 %
LDLC SERPL CALC-MCNC: 169.8 MG/DL
LYMPHOCYTES # BLD AUTO: 2.3 K/UL
LYMPHOCYTES NFR BLD: 36.1 %
MCH RBC QN AUTO: 32.4 PG
MCHC RBC AUTO-ENTMCNC: 33.1 G/DL
MCV RBC AUTO: 98 FL
MONOCYTES # BLD AUTO: 0.7 K/UL
MONOCYTES NFR BLD: 10.1 %
NEUTROPHILS # BLD AUTO: 3.3 K/UL
NEUTROPHILS NFR BLD: 51.1 %
NONHDLC SERPL-MCNC: 199 MG/DL
NRBC BLD-RTO: 0 /100 WBC
PLATELET # BLD AUTO: 154 K/UL
PMV BLD AUTO: 11.5 FL
POTASSIUM SERPL-SCNC: 4.6 MMOL/L
PROT SERPL-MCNC: 8.1 G/DL
RBC # BLD AUTO: 4.76 M/UL
SODIUM SERPL-SCNC: 141 MMOL/L
TRIGL SERPL-MCNC: 146 MG/DL
WBC # BLD AUTO: 6.46 K/UL

## 2018-06-27 ENCOUNTER — PATIENT MESSAGE (OUTPATIENT)
Dept: INTERNAL MEDICINE | Facility: CLINIC | Age: 53
End: 2018-06-27

## 2018-06-28 VITALS
WEIGHT: 222.25 LBS | DIASTOLIC BLOOD PRESSURE: 89 MMHG | TEMPERATURE: 98 F | HEART RATE: 78 BPM | SYSTOLIC BLOOD PRESSURE: 139 MMHG | BODY MASS INDEX: 25.71 KG/M2 | HEIGHT: 78 IN

## 2018-06-28 RX ORDER — MONTELUKAST SODIUM 10 MG/1
10 TABLET ORAL NIGHTLY
Qty: 90 TABLET | Refills: 1 | Status: SHIPPED | OUTPATIENT
Start: 2018-06-28 | End: 2018-07-28

## 2018-06-28 RX ORDER — AMLODIPINE BESYLATE 10 MG/1
TABLET ORAL
Qty: 90 TABLET | Refills: 2 | Status: SHIPPED | OUTPATIENT
Start: 2018-06-28 | End: 2019-06-12 | Stop reason: SDUPTHER

## 2018-06-28 RX ORDER — GABAPENTIN 600 MG/1
600 TABLET ORAL 2 TIMES DAILY
Qty: 180 TABLET | Refills: 1 | Status: SHIPPED | OUTPATIENT
Start: 2018-06-28 | End: 2019-06-12 | Stop reason: SDUPTHER

## 2018-06-28 RX ORDER — ATORVASTATIN CALCIUM 80 MG/1
80 TABLET, FILM COATED ORAL DAILY
Qty: 90 TABLET | Refills: 2 | Status: SHIPPED | OUTPATIENT
Start: 2018-06-28 | End: 2019-06-12 | Stop reason: SDUPTHER

## 2018-06-28 RX ORDER — HYDROCHLOROTHIAZIDE 12.5 MG/1
25 CAPSULE ORAL 2 TIMES DAILY
Qty: 360 CAPSULE | Refills: 2 | Status: SHIPPED | OUTPATIENT
Start: 2018-06-28 | End: 2019-06-12 | Stop reason: SDUPTHER

## 2018-06-28 RX ORDER — METOPROLOL TARTRATE 50 MG/1
50 TABLET ORAL ONCE
Qty: 90 TABLET | Refills: 2 | Status: SHIPPED | OUTPATIENT
Start: 2018-06-28 | End: 2019-06-12 | Stop reason: SDUPTHER

## 2018-06-28 RX ORDER — EZETIMIBE 10 MG/1
10 TABLET ORAL DAILY
Qty: 90 TABLET | Refills: 3 | Status: SHIPPED | OUTPATIENT
Start: 2018-06-28 | End: 2021-02-03 | Stop reason: SDUPTHER

## 2018-09-17 PROBLEM — Z00.00 ANNUAL PHYSICAL EXAM: Status: RESOLVED | Noted: 2017-02-22 | Resolved: 2018-09-17

## 2018-12-12 ENCOUNTER — OFFICE VISIT (OUTPATIENT)
Dept: INTERNAL MEDICINE | Facility: CLINIC | Age: 53
End: 2018-12-12
Payer: MEDICARE

## 2018-12-12 ENCOUNTER — LAB VISIT (OUTPATIENT)
Dept: LAB | Facility: HOSPITAL | Age: 53
End: 2018-12-12
Attending: FAMILY MEDICINE
Payer: MEDICARE

## 2018-12-12 ENCOUNTER — PATIENT MESSAGE (OUTPATIENT)
Dept: INTERNAL MEDICINE | Facility: CLINIC | Age: 53
End: 2018-12-12

## 2018-12-12 VITALS
WEIGHT: 224.88 LBS | HEART RATE: 95 BPM | TEMPERATURE: 98 F | BODY MASS INDEX: 26.02 KG/M2 | DIASTOLIC BLOOD PRESSURE: 89 MMHG | HEIGHT: 78 IN | SYSTOLIC BLOOD PRESSURE: 136 MMHG

## 2018-12-12 DIAGNOSIS — E78.00 HYPERCHOLESTEREMIA: ICD-10-CM

## 2018-12-12 DIAGNOSIS — M54.40 CHRONIC MIDLINE LOW BACK PAIN WITH SCIATICA, SCIATICA LATERALITY UNSPECIFIED: ICD-10-CM

## 2018-12-12 DIAGNOSIS — I10 ESSENTIAL HYPERTENSION: ICD-10-CM

## 2018-12-12 DIAGNOSIS — I10 ESSENTIAL HYPERTENSION: Primary | ICD-10-CM

## 2018-12-12 DIAGNOSIS — G89.29 CHRONIC MIDLINE LOW BACK PAIN WITH SCIATICA, SCIATICA LATERALITY UNSPECIFIED: ICD-10-CM

## 2018-12-12 LAB
ALBUMIN SERPL BCP-MCNC: 4 G/DL
ALP SERPL-CCNC: 97 U/L
ALT SERPL W/O P-5'-P-CCNC: 31 U/L
ANION GAP SERPL CALC-SCNC: 11 MMOL/L
AST SERPL-CCNC: 20 U/L
BILIRUB SERPL-MCNC: 0.6 MG/DL
BUN SERPL-MCNC: 14 MG/DL
CALCIUM SERPL-MCNC: 9.9 MG/DL
CHLORIDE SERPL-SCNC: 105 MMOL/L
CHOLEST SERPL-MCNC: 207 MG/DL
CHOLEST/HDLC SERPL: 4.2 {RATIO}
CO2 SERPL-SCNC: 25 MMOL/L
CREAT SERPL-MCNC: 1.1 MG/DL
EST. GFR  (AFRICAN AMERICAN): >60 ML/MIN/1.73 M^2
EST. GFR  (NON AFRICAN AMERICAN): >60 ML/MIN/1.73 M^2
GLUCOSE SERPL-MCNC: 86 MG/DL
HDLC SERPL-MCNC: 49 MG/DL
HDLC SERPL: 23.7 %
LDLC SERPL CALC-MCNC: 133.4 MG/DL
NONHDLC SERPL-MCNC: 158 MG/DL
POTASSIUM SERPL-SCNC: 4.2 MMOL/L
PROT SERPL-MCNC: 7.8 G/DL
SODIUM SERPL-SCNC: 141 MMOL/L
TRIGL SERPL-MCNC: 123 MG/DL

## 2018-12-12 PROCEDURE — 3075F SYST BP GE 130 - 139MM HG: CPT | Mod: CPTII,HCNC,S$GLB, | Performed by: FAMILY MEDICINE

## 2018-12-12 PROCEDURE — 36415 COLL VENOUS BLD VENIPUNCTURE: CPT | Mod: HCNC,PO

## 2018-12-12 PROCEDURE — 99214 OFFICE O/P EST MOD 30 MIN: CPT | Mod: HCNC,S$GLB,, | Performed by: FAMILY MEDICINE

## 2018-12-12 PROCEDURE — 80053 COMPREHEN METABOLIC PANEL: CPT | Mod: HCNC

## 2018-12-12 PROCEDURE — 3079F DIAST BP 80-89 MM HG: CPT | Mod: CPTII,HCNC,S$GLB, | Performed by: FAMILY MEDICINE

## 2018-12-12 PROCEDURE — 99999 PR PBB SHADOW E&M-EST. PATIENT-LVL III: CPT | Mod: PBBFAC,HCNC,, | Performed by: FAMILY MEDICINE

## 2018-12-12 PROCEDURE — 80061 LIPID PANEL: CPT | Mod: HCNC

## 2018-12-12 PROCEDURE — 3008F BODY MASS INDEX DOCD: CPT | Mod: CPTII,HCNC,S$GLB, | Performed by: FAMILY MEDICINE

## 2018-12-12 RX ORDER — PREDNISONE 5 MG/1
TABLET ORAL
Qty: 20 TABLET | Refills: 0 | Status: SHIPPED | OUTPATIENT
Start: 2018-12-12 | End: 2019-12-12 | Stop reason: SDUPTHER

## 2018-12-12 NOTE — PROGRESS NOTES
Subjective:       Patient ID: Papa Pope is a 53 y.o. male.    Chief Complaint: Follow-up    F/U:      Pt is a 53 year old who is here for follow-up on his cholesterol. Pt is already on lipitor and zetia. Last LDL was 169. Pt also complaining of some stiff joints with the could. He already had lower back issues but since the on set of cold weather has trip more stiff      Review of Systems   Constitutional: Negative.    Respiratory: Negative.    Cardiovascular: Negative.    Genitourinary: Negative.    Neurological: Negative.    Hematological: Negative.    Psychiatric/Behavioral: Negative.        Objective:      Physical Exam   Constitutional: He is oriented to person, place, and time. He appears well-developed and well-nourished.   Cardiovascular: Normal rate and regular rhythm.   Pulmonary/Chest: Effort normal and breath sounds normal. He has no wheezes. He has no rales.   Abdominal: Soft. Bowel sounds are normal.   Neurological: He is alert and oriented to person, place, and time.       Assessment:       1. Essential hypertension    2. Hypercholesteremia    3. Chronic midline low back pain with sciatica, sciatica laterality unspecified        Plan:       Essential hypertension  Comments:  Pt BP is well controlled  Orders:  -     Comprehensive metabolic panel; Future; Expected date: 12/12/2018    Hypercholesteremia  Comments:  Will recheck his lipid panel  Orders:  -     Lipid panel; Future; Expected date: 12/12/2018    Chronic midline low back pain with sciatica, sciatica laterality unspecified  Comments:  acute episode; will start on prednisone etienne    Other orders  -     predniSONE (DELTASONE) 5 MG tablet; Day 1-2: take 1 tablet every 6 hours  Day 3-4: take 1 tablet every 8 hours  Day 5-6: take 1 tablet every 12 hours  Day 7-8: take 1 table tin am  Dispense: 20 tablet; Refill: 0

## 2018-12-29 ENCOUNTER — PATIENT MESSAGE (OUTPATIENT)
Dept: INTERNAL MEDICINE | Facility: CLINIC | Age: 53
End: 2018-12-29

## 2019-04-30 RX ORDER — PREDNISONE 5 MG/1
TABLET ORAL
Qty: 20 TABLET | Refills: 0 | OUTPATIENT
Start: 2019-04-30

## 2019-06-12 ENCOUNTER — HOSPITAL ENCOUNTER (OUTPATIENT)
Dept: RADIOLOGY | Facility: HOSPITAL | Age: 54
Discharge: HOME | End: 2019-06-12
Attending: FAMILY MEDICINE
Payer: MEDICARE

## 2019-06-12 ENCOUNTER — OFFICE VISIT (OUTPATIENT)
Dept: INTERNAL MEDICINE | Facility: CLINIC | Age: 54
End: 2019-06-12
Payer: MEDICARE

## 2019-06-12 VITALS
DIASTOLIC BLOOD PRESSURE: 82 MMHG | SYSTOLIC BLOOD PRESSURE: 136 MMHG | HEART RATE: 93 BPM | OXYGEN SATURATION: 97 % | BODY MASS INDEX: 25.35 KG/M2 | TEMPERATURE: 98 F | WEIGHT: 219.38 LBS

## 2019-06-12 DIAGNOSIS — M54.40 CHRONIC MIDLINE LOW BACK PAIN WITH SCIATICA, SCIATICA LATERALITY UNSPECIFIED: Primary | ICD-10-CM

## 2019-06-12 DIAGNOSIS — G89.29 CHRONIC MIDLINE LOW BACK PAIN WITH SCIATICA, SCIATICA LATERALITY UNSPECIFIED: ICD-10-CM

## 2019-06-12 DIAGNOSIS — M50.30 DDD (DEGENERATIVE DISC DISEASE), CERVICAL: ICD-10-CM

## 2019-06-12 DIAGNOSIS — E78.00 HYPERCHOLESTEREMIA: ICD-10-CM

## 2019-06-12 DIAGNOSIS — I10 ESSENTIAL HYPERTENSION: ICD-10-CM

## 2019-06-12 DIAGNOSIS — G89.29 CHRONIC MIDLINE LOW BACK PAIN WITH SCIATICA, SCIATICA LATERALITY UNSPECIFIED: Primary | ICD-10-CM

## 2019-06-12 DIAGNOSIS — M54.40 CHRONIC MIDLINE LOW BACK PAIN WITH SCIATICA, SCIATICA LATERALITY UNSPECIFIED: ICD-10-CM

## 2019-06-12 PROCEDURE — 3079F DIAST BP 80-89 MM HG: CPT | Mod: HCNC,CPTII,S$GLB, | Performed by: FAMILY MEDICINE

## 2019-06-12 PROCEDURE — 3079F PR MOST RECENT DIASTOLIC BLOOD PRESSURE 80-89 MM HG: ICD-10-PCS | Mod: HCNC,CPTII,S$GLB, | Performed by: FAMILY MEDICINE

## 2019-06-12 PROCEDURE — 99214 OFFICE O/P EST MOD 30 MIN: CPT | Mod: HCNC,S$GLB,, | Performed by: FAMILY MEDICINE

## 2019-06-12 PROCEDURE — 99999 PR PBB SHADOW E&M-EST. PATIENT-LVL IV: CPT | Mod: PBBFAC,HCNC,, | Performed by: FAMILY MEDICINE

## 2019-06-12 PROCEDURE — 72040 X-RAY EXAM NECK SPINE 2-3 VW: CPT | Mod: TC,HCNC

## 2019-06-12 PROCEDURE — 3008F BODY MASS INDEX DOCD: CPT | Mod: HCNC,CPTII,S$GLB, | Performed by: FAMILY MEDICINE

## 2019-06-12 PROCEDURE — 72040 XR CERVICAL SPINE AP LATERAL: ICD-10-PCS | Mod: 26,HCNC,, | Performed by: RADIOLOGY

## 2019-06-12 PROCEDURE — 3075F SYST BP GE 130 - 139MM HG: CPT | Mod: HCNC,CPTII,S$GLB, | Performed by: FAMILY MEDICINE

## 2019-06-12 PROCEDURE — 3008F PR BODY MASS INDEX (BMI) DOCUMENTED: ICD-10-PCS | Mod: HCNC,CPTII,S$GLB, | Performed by: FAMILY MEDICINE

## 2019-06-12 PROCEDURE — 72110 XR LUMBAR SPINE COMPLETE 5 VIEW: ICD-10-PCS | Mod: 26,HCNC,, | Performed by: RADIOLOGY

## 2019-06-12 PROCEDURE — 72110 X-RAY EXAM L-2 SPINE 4/>VWS: CPT | Mod: TC,HCNC

## 2019-06-12 PROCEDURE — 72040 X-RAY EXAM NECK SPINE 2-3 VW: CPT | Mod: 26,HCNC,, | Performed by: RADIOLOGY

## 2019-06-12 PROCEDURE — 99999 PR PBB SHADOW E&M-EST. PATIENT-LVL IV: ICD-10-PCS | Mod: PBBFAC,HCNC,, | Performed by: FAMILY MEDICINE

## 2019-06-12 PROCEDURE — 72110 X-RAY EXAM L-2 SPINE 4/>VWS: CPT | Mod: 26,HCNC,, | Performed by: RADIOLOGY

## 2019-06-12 PROCEDURE — 3075F PR MOST RECENT SYSTOLIC BLOOD PRESS GE 130-139MM HG: ICD-10-PCS | Mod: HCNC,CPTII,S$GLB, | Performed by: FAMILY MEDICINE

## 2019-06-12 PROCEDURE — 99214 PR OFFICE/OUTPT VISIT, EST, LEVL IV, 30-39 MIN: ICD-10-PCS | Mod: HCNC,S$GLB,, | Performed by: FAMILY MEDICINE

## 2019-06-12 RX ORDER — GABAPENTIN 600 MG/1
600 TABLET ORAL 2 TIMES DAILY
Qty: 180 TABLET | Refills: 1 | Status: SHIPPED | OUTPATIENT
Start: 2019-06-12 | End: 2019-08-08

## 2019-06-12 RX ORDER — ATORVASTATIN CALCIUM 80 MG/1
80 TABLET, FILM COATED ORAL DAILY
Qty: 90 TABLET | Refills: 2 | Status: SHIPPED | OUTPATIENT
Start: 2019-06-12 | End: 2021-02-03 | Stop reason: SDUPTHER

## 2019-06-12 RX ORDER — METOPROLOL TARTRATE 50 MG/1
50 TABLET ORAL ONCE
Qty: 90 TABLET | Refills: 2 | Status: SHIPPED | OUTPATIENT
Start: 2019-06-12 | End: 2019-06-21

## 2019-06-12 RX ORDER — HYDROCHLOROTHIAZIDE 12.5 MG/1
25 CAPSULE ORAL 2 TIMES DAILY
Qty: 360 CAPSULE | Refills: 2 | Status: SHIPPED | OUTPATIENT
Start: 2019-06-12 | End: 2021-02-03 | Stop reason: SDUPTHER

## 2019-06-12 RX ORDER — AMLODIPINE BESYLATE 10 MG/1
TABLET ORAL
Qty: 90 TABLET | Refills: 2 | Status: SHIPPED | OUTPATIENT
Start: 2019-06-12 | End: 2020-06-30 | Stop reason: SDUPTHER

## 2019-06-12 NOTE — PROGRESS NOTES
Subjective:       Patient ID: Papa Pope is a 54 y.o. male.    Chief Complaint: Follow-up (6 month)    Pt is a 54 year who has chronic neck and lumbar pain.  Pt last xray done in 2017 showed     Review of Systems   Constitutional: Negative.    Respiratory: Negative.    Musculoskeletal: Positive for back pain and gait problem.   Psychiatric/Behavioral: Negative.        Objective:      Physical Exam   Constitutional: He is oriented to person, place, and time. He appears well-developed and well-nourished.   Eyes: Pupils are equal, round, and reactive to light.   Cardiovascular: Normal rate and regular rhythm. Exam reveals no friction rub.   No murmur heard.  Pulmonary/Chest: Effort normal and breath sounds normal. No stridor. He has no wheezes.   Abdominal: Soft. Bowel sounds are normal. There is no tenderness. There is no guarding.   Neurological: He is alert and oriented to person, place, and time. He displays normal reflexes. Coordination normal.   Skin: Skin is warm and dry.       Assessment:       1. Chronic midline low back pain with sciatica, sciatica laterality unspecified    2. DDD (degenerative disc disease), cervical    3. Hypercholesteremia    4. Essential hypertension        Plan:       Chronic midline low back pain with sciatica, sciatica laterality unspecified  -     X-Ray Lumbar Spine Complete 5 View; Future; Expected date: 06/12/2019  -     Ambulatory consult to Pain Clinic    DDD (degenerative disc disease), cervical  -     X-Ray Cervical Spine AP And Lateral; Future; Expected date: 06/12/2019  -     Ambulatory consult to Pain Clinic    Hypercholesteremia  -     Lipid panel; Future; Expected date: 06/12/2019    Essential hypertension    Other orders  -     amLODIPine (NORVASC) 10 MG tablet; TK 1 T PO D.  Dispense: 90 tablet; Refill: 2  -     atorvastatin (LIPITOR) 80 MG tablet; Take 1 tablet (80 mg total) by mouth once daily.  Dispense: 90 tablet; Refill: 2  -     gabapentin (NEURONTIN) 600 MG  tablet; Take 1 tablet (600 mg total) by mouth 2 (two) times daily.  Dispense: 180 tablet; Refill: 1  -     hydroCHLOROthiazide (MICROZIDE) 12.5 mg capsule; Take 2 capsules (25 mg total) by mouth 2 (two) times daily.  Dispense: 360 capsule; Refill: 2  -     metoprolol tartrate (LOPRESSOR) 50 MG tablet; Take 1 tablet (50 mg total) by mouth once. for 1 dose  Dispense: 90 tablet; Refill: 2

## 2019-06-14 ENCOUNTER — TELEPHONE (OUTPATIENT)
Dept: INTERNAL MEDICINE | Facility: CLINIC | Age: 54
End: 2019-06-14

## 2019-06-14 NOTE — TELEPHONE ENCOUNTER
----- Message from Jac Devries sent at 6/14/2019  1:53 PM CDT -----  Contact: self  Type:  Patient Returning Call    Who Called:pt  Who Left Message for Patient:reese  Does the patient know what this is regarding?:no  Would the patient rather a call back or a response via [facility]? Call back  Best Call Back Number:[phone]  Additional Information: none    Thanks,  Jac Devries

## 2019-06-14 NOTE — TELEPHONE ENCOUNTER
----- Message from Mainor Downs sent at 6/14/2019  9:30 AM CDT -----  Contact: Nikkie Pope (wife)-[phone]  .Type:  Patient Returning Call    Who Called:Nikkie Pope (wife)  Who Left Message for Patient:unsure   Does the patient know what this is regarding?:unsure   Would the patient rather a call back or a response via [facility]? Call back  Best Call Back Number:[phone]  Additional Information:    .

## 2019-06-14 NOTE — TELEPHONE ENCOUNTER
Patient's spouse informed me someone called her or 's phone, and they were confused about who called them and what call was about.    Scheduled patient's IPM appointment as per below:    Future Appointments   Date Time Provider Department Center   7/3/2019  1:20 PM Donnell Jordan MD [facility]  [place]   12/12/2019  9:00 AM Lenny Jernigan MD [facility]     Patient's spouse verbalized understanding of appointment date, time, and clinic location. She thanked me and ended call.

## 2019-06-17 ENCOUNTER — TELEPHONE (OUTPATIENT)
Dept: INTERNAL MEDICINE | Facility: CLINIC | Age: 54
End: 2019-06-17

## 2019-06-17 NOTE — TELEPHONE ENCOUNTER
I s/w in regards to finding out if he had outside cardiologist. Pt states he does not have a outside cardiologist. //BJ

## 2019-06-17 NOTE — TELEPHONE ENCOUNTER
----- Message from Janine Vogt sent at 6/17/2019  3:36 PM CDT -----  Contact: pt wife  Type:  Patient Returning Call    Who Called:pt wife  Who Left Message for Patient: nurse  Does the patient know what this is regarding?:   Would the patient rather a call back or a response via [facility]? call  Best Call Back Number: [phone] (home)   Additional Information:

## 2019-06-17 NOTE — TELEPHONE ENCOUNTER
----- Message from Lenny Jernigan MD sent at 6/17/2019  4:40 AM CDT -----  Can we find out of pt has outside cardiology

## 2019-06-20 ENCOUNTER — TELEPHONE (OUTPATIENT)
Dept: INTERNAL MEDICINE | Facility: CLINIC | Age: 54
End: 2019-06-20

## 2019-06-20 NOTE — TELEPHONE ENCOUNTER
I called pharmacy to inform them that we did receive their message and would send it over to Dr. Jernigan for review. //BJ

## 2019-06-20 NOTE — TELEPHONE ENCOUNTER
----- Message from Ginger Michel sent at 6/20/2019  4:18 PM CDT -----  Contact: Batsheva/Johnathan charlton/Obie Herman called to speak with the nurse; she is checking on the request for prescription clarification on Metoprolol. She can be contacted at [phone]. Order # [id number]    Thanks,  Ginger

## 2019-06-21 RX ORDER — METOPROLOL TARTRATE 50 MG/1
50 TABLET ORAL DAILY
Qty: 90 TABLET | Refills: 2 | Status: SHIPPED | OUTPATIENT
Start: 2019-06-21 | End: 2021-02-03 | Stop reason: SDUPTHER

## 2019-06-21 NOTE — TELEPHONE ENCOUNTER
I s/w [facility] in regards to metoprolol medication refill. I clarified the dose of the medication to the pharmacist. Pharmacist thanked me for calling to verify and ended call. //BJ

## 2019-06-26 ENCOUNTER — TELEPHONE (OUTPATIENT)
Dept: PAIN MEDICINE | Facility: CLINIC | Age: 54
End: 2019-06-26

## 2019-07-02 ENCOUNTER — PES CALL (OUTPATIENT)
Dept: ADMINISTRATIVE | Facility: CLINIC | Age: 54
End: 2019-07-02

## 2019-07-03 DIAGNOSIS — E78.00 HYPERCHOLESTEREMIA: Primary | ICD-10-CM

## 2019-07-03 NOTE — TELEPHONE ENCOUNTER
I s/w pt informing him that Dr. Jernigan would like for him to see some one in Endocrinology due to elevated lipid levels. I was able to assist pt with scheduling an appointment. Pt verbalized understanding of appointment date and time. Pt thanked me for the call and ended call. //BJ

## 2019-08-07 ENCOUNTER — TELEPHONE (OUTPATIENT)
Dept: PAIN MEDICINE | Facility: CLINIC | Age: 54
End: 2019-08-07

## 2019-08-08 ENCOUNTER — OFFICE VISIT (OUTPATIENT)
Dept: PAIN MEDICINE | Facility: CLINIC | Age: 54
End: 2019-08-08
Payer: MEDICARE

## 2019-08-08 VITALS
WEIGHT: 232 LBS | BODY MASS INDEX: 26.84 KG/M2 | DIASTOLIC BLOOD PRESSURE: 81 MMHG | SYSTOLIC BLOOD PRESSURE: 126 MMHG | HEART RATE: 78 BPM | RESPIRATION RATE: 18 BRPM | HEIGHT: 78 IN

## 2019-08-08 DIAGNOSIS — M47.816 LUMBAR SPONDYLOSIS: Primary | ICD-10-CM

## 2019-08-08 DIAGNOSIS — M54.16 LUMBAR RADICULOPATHY: ICD-10-CM

## 2019-08-08 DIAGNOSIS — M47.22 OSTEOARTHRITIS OF SPINE WITH RADICULOPATHY, CERVICAL REGION: ICD-10-CM

## 2019-08-08 PROCEDURE — 3008F PR BODY MASS INDEX (BMI) DOCUMENTED: ICD-10-PCS | Mod: HCNC,CPTII,S$GLB, | Performed by: PAIN MEDICINE

## 2019-08-08 PROCEDURE — 99999 PR PBB SHADOW E&M-EST. PATIENT-LVL IV: CPT | Mod: PBBFAC,HCNC,, | Performed by: PAIN MEDICINE

## 2019-08-08 PROCEDURE — 3079F DIAST BP 80-89 MM HG: CPT | Mod: HCNC,CPTII,S$GLB, | Performed by: PAIN MEDICINE

## 2019-08-08 PROCEDURE — 3008F BODY MASS INDEX DOCD: CPT | Mod: HCNC,CPTII,S$GLB, | Performed by: PAIN MEDICINE

## 2019-08-08 PROCEDURE — 99999 PR PBB SHADOW E&M-EST. PATIENT-LVL IV: ICD-10-PCS | Mod: PBBFAC,HCNC,, | Performed by: PAIN MEDICINE

## 2019-08-08 PROCEDURE — 3079F PR MOST RECENT DIASTOLIC BLOOD PRESSURE 80-89 MM HG: ICD-10-PCS | Mod: HCNC,CPTII,S$GLB, | Performed by: PAIN MEDICINE

## 2019-08-08 PROCEDURE — 3074F SYST BP LT 130 MM HG: CPT | Mod: HCNC,CPTII,S$GLB, | Performed by: PAIN MEDICINE

## 2019-08-08 PROCEDURE — 99204 OFFICE O/P NEW MOD 45 MIN: CPT | Mod: HCNC,S$GLB,, | Performed by: PAIN MEDICINE

## 2019-08-08 PROCEDURE — 3074F PR MOST RECENT SYSTOLIC BLOOD PRESSURE < 130 MM HG: ICD-10-PCS | Mod: HCNC,CPTII,S$GLB, | Performed by: PAIN MEDICINE

## 2019-08-08 PROCEDURE — 99204 PR OFFICE/OUTPT VISIT, NEW, LEVL IV, 45-59 MIN: ICD-10-PCS | Mod: HCNC,S$GLB,, | Performed by: PAIN MEDICINE

## 2019-08-08 RX ORDER — GABAPENTIN 600 MG/1
600 TABLET ORAL 3 TIMES DAILY
Qty: 90 TABLET | Refills: 0 | Status: SHIPPED | OUTPATIENT
Start: 2019-08-08 | End: 2019-08-08

## 2019-08-08 RX ORDER — GABAPENTIN 600 MG/1
600 TABLET ORAL 3 TIMES DAILY
Qty: 90 TABLET | Refills: 3 | Status: SHIPPED | OUTPATIENT
Start: 2019-08-08 | End: 2022-03-11 | Stop reason: SDUPTHER

## 2019-08-08 NOTE — PROGRESS NOTES
Chief Pain Complaint:  Chief Complaint   Patient presents with    Low-back Pain    Shoulder Pain     bilateral     History of Present Illness:   This patient is a 54 y.o. male who presents today complaining of the above noted pain/s. The patient describes the pain as follows.  Mr. Pope is a new patient to clinic with complaints of low back pain which radiates in his bilateral lower extremities in addition to neck pain which radiates in his bilateral upper extremities.  Currently he rates his pain as 10/10 which is constant throughout the day reports having symptoms that radiate down the posterior right leg into the bottom of the foot in the lateral aspect of foot in the S1 distribution in the same pattern in the left leg.  He reports having some weakness in his right lower extremity and reports having left arm numbness.  He finds that his symptoms are worse when he sits for long periods and is improved with activity.  He denies having had surgery, injections, physical therapy for his cervical and lumbar spines.  He reports mostly thing a throbbing sensation in his lower extremities and upper extremities. He denies having bowel and bladder difficulties.  He has been started on gabapentin taking 600 mg twice daily which he has found to be helpful.  There was no inciting event for these complaints.    Previous Therapy:  Medications:gabapentin  Injections:  None  Surgeries:  None   Physical Therapy:  None    Past Surgical History:   Procedure Laterality Date    COLONOSCOPY N/A 8/22/2017    Performed by Keo Cruz MD at [facility]    STOMACH SURGERY         Imaging / Labs / Studies (reviewed on 8/8/2019):    Results for orders placed during the hospital encounter of 06/12/19   X-Ray Lumbar Spine Complete 5 View    Narrative EXAMINATION:  XR LUMBAR SPINE COMPLETE 5 VIEW    CLINICAL HISTORY:  Low back pain, >6wks conservative tx, persistent-progressive sx, surgical candidate;Lumbago with sciatica, unspecified  side    TECHNIQUE:  AP, lateral, spot and bilateral oblique views of the lumbar spine were preformed.    COMPARISON:  07/28/2017    FINDINGS:  Vertebral body heights and alignment are within normal limits.  There is mild disc height loss at L4-5. posterior elements appear grossly intact.  No pars defects visualized.  No acute fractures or subluxations are demonstrated.  Metallic density again noted projecting within the soft tissues posteriorly at the level of L3-4, as seen on the lateral projection only.  No appreciable change from prior.      Impression Unchanged appearance of the lumbar spine as above.  No acute findings.     Results for orders placed during the hospital encounter of 07/28/17   X-Ray Lumbar Complete With Flex And Ext    Narrative Comparison: None     5 views with flexion and extension, total 7 views    Findings:    Vertebral height and alignment well maintained with multilevel marginal spurring and facet arthropathy.  Uniform loss of disc height at the L4-5 level.  No acute fracture or subluxation.  No pars defect.  Straightening of the normal curvature on the neutral view noted with no instability or subluxation on the flexion and extension views.  Pedicles and SI joints appear intact.  Numerous calcifications within the lower pelvis bilaterally, greater on the LEFT.  Vascular calcifications noted.    Metallic densities project over the posterior margin of the lower L-spine.  Nonvisualization on the AP view consistent with lateral location.    Impression        Mild spondylosis with most of prominent findings at L4-5 level.    No acute fracture or subluxation.    No instability on flexion and extension views.     Metallic foreign bodies consistent with the shrapnel noted as above.     Results for orders placed during the hospital encounter of 06/12/19   X-Ray Cervical Spine AP And Lateral    Narrative EXAMINATION:  XR CERVICAL SPINE AP LATERAL    CLINICAL HISTORY:  Other cervical disc  degeneration, unspecified cervical region    TECHNIQUE:  AP, lateral, and open mouth views of the cervical spine were performed.    COMPARISON:  07/28/2017    FINDINGS:  Vertebral body heights and alignment are within normal limits.  Multilevel spondylosis noted with mild disc height loss at C4-5 and C5-6. posterior elements appear intact without acute fractures or subluxations demonstrated.  Odontoid process appears intact.  Atlantoaxial articulations appear normal.  Prevertebral soft tissues are within normal limits.  Vascular calcifications again noted in the region of the left carotid.  Further evaluation could be obtained with Doppler ultrasound as clinically warranted.  No appreciable change from prior.      Impression As above.  No acute findings.     Review of Systems:  Review of Systems   Constitutional: Negative for fever.   Eyes: Negative for blurred vision.   Respiratory: Negative for cough and wheezing.    Cardiovascular: Negative for chest pain and orthopnea.   Gastrointestinal: Negative for constipation, diarrhea, nausea and vomiting.   Genitourinary: Negative for dysuria.   Musculoskeletal: Positive for back pain, joint pain and neck pain.   Skin: Negative for itching and rash.   Neurological: Positive for weakness.   Endo/Heme/Allergies: Does not bruise/bleed easily.       Physical Exam:  There were no vitals taken for this visit. (reviewed on 8/8/2019)\  General    Constitutional: He is oriented to person, place, and time. He appears well-developed and well-nourished.   HENT:   Head: Normocephalic and atraumatic.   Eyes: EOM are normal.   Neck: Neck supple.   Pulmonary/Chest: Effort normal.   Abdominal: He exhibits no distension.   Neurological: He is alert and oriented to person, place, and time. No cranial nerve deficit.   Psychiatric: He has a normal mood and affect.     General Musculoskeletal Exam   Gait: antalgic (Uses single-point cane for ambulation)     Back (L-Spine & T-Spine) / Neck  (C-Spine) Exam     Tenderness Right paramedian tenderness of the Lower L-Spine and Lower C-Spine. Left paramedian tenderness of the Lower L-Spine and Lower C-Spine.     Back (L-Spine & T-Spine) Range of Motion   Lateral bend right: normal   Lateral bend left: normal   Rotation right: normal   Rotation left: normal     Neck (C-Spine) Range of Motion   Flexion:     Normal  Extension: Normal  Right Lateral Bend: normal  Left Lateral Bend: normal  Right Rotation: normal  Left Rotation: normal    Spinal Sensation   Right Side Sensation  C-Spine Level: normal   L-Spine Level: normal  Left Side Sensation  C-Spine Level: normal  L-Spine Level: normal    Neck (C-Spine) Tests   Spurling's Test   Left:  positive  Right: negative    Comments:  Negative straight leg raise bilaterally; sensation intact to light touch in bilateral upper and lower extremities; minimal tenderness palpation over bilateral lower lumbar facets; negative tenderness to PSIS palpation      Muscle Strength   Right Upper Extremity   Biceps: 5/5/5   Deltoid:  5/5  Triceps:  5/5  Wrist extension: 5/5/5   Wrist flexion: 5/5/5   Left Upper Extremity  Biceps: 5/5/5   Deltoid:  5/5  Triceps:  5/5  Wrist extension: 5/5/5   Wrist flexion: 5/5/5   Right Lower Extremity   Hip Flexion: 5/5   Quadriceps:  5/5   Hamstrin/5   Anterior tibial:  5/5/5  Left Lower Extremity   Hip Flexion: 5/5   Quadriceps:  5/5   Hamstrin/5   Anterior tibial:  5/5/5     Reflexes     Left Side  Biceps:  2+  Triceps:  2+  Brachioradialis:  2+  Quadriceps:  2+  Achilles:  2+  Ankle Clonus:  absent    Right Side   Biceps:  2+  Triceps:  2+  Brachioradialis:  2+  Quadriceps:  2+  Achilles:  2+  Ankle Clonus:  absent      Assessment  Lumbar Radiculopathy  Cervical Radiculopathy    1. 54 y.o. year old patient with PMH of   Past Medical History:   Diagnosis Date    Hyperlipidemia     Hypertension       presenting with pain located cervical spine, bilateral shoulders, lumbar spine,  bilateral lower extremities  2. Pain Generators / Etiology: Cervical Radiculopathy, Cervical Spondylosis, Lumbar Radiculopathy and Lumbar Spondylosis  3. Failed Meds (E- Effective, NE- Not Effective):  Gabapentin-effective  4. Physical Therapy - None  5. Psychological comorbidities - None  6. Anticoagulants / Antiplatelets: None     PLAN:  1. Medications:  Will increase gabapentin 600 mg from twice daily dosing to 3 times daily dosing; prescription sent  2. PT - provide a referral for physical therapy  3. Psychological - none  4. Labs - obtain  none  5. Imaging - obtain  none; reviewed cervical and lumbar x-rays the patient today in clinic  6. Interventions - schedule bilateral S1 transforaminal epidural steroid injections; consider C7/T1 interlaminar epidural steroid injection in the future  7. Referrals - none  8. Records - none  9. Follow up visit - follow up in clinic 4 weeks after the injection  10. Patient Questions - answered all of the patient's questions regarding diagnosis, therapy, and treatment  11.  This condition does not require this patient to take time off of work    TONIE Jordan MD  Interventional Pain  [facility] [place]

## 2019-08-08 NOTE — H&P (VIEW-ONLY)
Chief Pain Complaint:  Chief Complaint   Patient presents with    Low-back Pain    Shoulder Pain     bilateral     History of Present Illness:   This patient is a 54 y.o. male who presents today complaining of the above noted pain/s. The patient describes the pain as follows.  Mr. Pope is a new patient to clinic with complaints of low back pain which radiates in his bilateral lower extremities in addition to neck pain which radiates in his bilateral upper extremities.  Currently he rates his pain as 10/10 which is constant throughout the day reports having symptoms that radiate down the posterior right leg into the bottom of the foot in the lateral aspect of foot in the S1 distribution in the same pattern in the left leg.  He reports having some weakness in his right lower extremity and reports having left arm numbness.  He finds that his symptoms are worse when he sits for long periods and is improved with activity.  He denies having had surgery, injections, physical therapy for his cervical and lumbar spines.  He reports mostly thing a throbbing sensation in his lower extremities and upper extremities. He denies having bowel and bladder difficulties.  He has been started on gabapentin taking 600 mg twice daily which he has found to be helpful.  There was no inciting event for these complaints.    Previous Therapy:  Medications:gabapentin  Injections:  None  Surgeries:  None   Physical Therapy:  None    Past Surgical History:   Procedure Laterality Date    COLONOSCOPY N/A 8/22/2017    Performed by Keo Cruz MD at [facility]O    STOMACH SURGERY         Imaging / Labs / Studies (reviewed on 8/8/2019):    Results for orders placed during the hospital encounter of 06/12/19   X-Ray Lumbar Spine Complete 5 View    Narrative EXAMINATION:  XR LUMBAR SPINE COMPLETE 5 VIEW    CLINICAL HISTORY:  Low back pain, >6wks conservative tx, persistent-progressive sx, surgical candidate;Lumbago with sciatica, unspecified  side    TECHNIQUE:  AP, lateral, spot and bilateral oblique views of the lumbar spine were preformed.    COMPARISON:  07/28/2017    FINDINGS:  Vertebral body heights and alignment are within normal limits.  There is mild disc height loss at L4-5. posterior elements appear grossly intact.  No pars defects visualized.  No acute fractures or subluxations are demonstrated.  Metallic density again noted projecting within the soft tissues posteriorly at the level of L3-4, as seen on the lateral projection only.  No appreciable change from prior.      Impression Unchanged appearance of the lumbar spine as above.  No acute findings.     Results for orders placed during the hospital encounter of 07/28/17   X-Ray Lumbar Complete With Flex And Ext    Narrative Comparison: None     5 views with flexion and extension, total 7 views    Findings:    Vertebral height and alignment well maintained with multilevel marginal spurring and facet arthropathy.  Uniform loss of disc height at the L4-5 level.  No acute fracture or subluxation.  No pars defect.  Straightening of the normal curvature on the neutral view noted with no instability or subluxation on the flexion and extension views.  Pedicles and SI joints appear intact.  Numerous calcifications within the lower pelvis bilaterally, greater on the LEFT.  Vascular calcifications noted.    Metallic densities project over the posterior margin of the lower L-spine.  Nonvisualization on the AP view consistent with lateral location.    Impression        Mild spondylosis with most of prominent findings at L4-5 level.    No acute fracture or subluxation.    No instability on flexion and extension views.     Metallic foreign bodies consistent with the shrapnel noted as above.     Results for orders placed during the hospital encounter of 06/12/19   X-Ray Cervical Spine AP And Lateral    Narrative EXAMINATION:  XR CERVICAL SPINE AP LATERAL    CLINICAL HISTORY:  Other cervical disc  degeneration, unspecified cervical region    TECHNIQUE:  AP, lateral, and open mouth views of the cervical spine were performed.    COMPARISON:  07/28/2017    FINDINGS:  Vertebral body heights and alignment are within normal limits.  Multilevel spondylosis noted with mild disc height loss at C4-5 and C5-6. posterior elements appear intact without acute fractures or subluxations demonstrated.  Odontoid process appears intact.  Atlantoaxial articulations appear normal.  Prevertebral soft tissues are within normal limits.  Vascular calcifications again noted in the region of the left carotid.  Further evaluation could be obtained with Doppler ultrasound as clinically warranted.  No appreciable change from prior.      Impression As above.  No acute findings.     Review of Systems:  Review of Systems   Constitutional: Negative for fever.   Eyes: Negative for blurred vision.   Respiratory: Negative for cough and wheezing.    Cardiovascular: Negative for chest pain and orthopnea.   Gastrointestinal: Negative for constipation, diarrhea, nausea and vomiting.   Genitourinary: Negative for dysuria.   Musculoskeletal: Positive for back pain, joint pain and neck pain.   Skin: Negative for itching and rash.   Neurological: Positive for weakness.   Endo/Heme/Allergies: Does not bruise/bleed easily.       Physical Exam:  There were no vitals taken for this visit. (reviewed on 8/8/2019)\  General    Constitutional: He is oriented to person, place, and time. He appears well-developed and well-nourished.   HENT:   Head: Normocephalic and atraumatic.   Eyes: EOM are normal.   Neck: Neck supple.   Pulmonary/Chest: Effort normal.   Abdominal: He exhibits no distension.   Neurological: He is alert and oriented to person, place, and time. No cranial nerve deficit.   Psychiatric: He has a normal mood and affect.     General Musculoskeletal Exam   Gait: antalgic (Uses single-point cane for ambulation)     Back (L-Spine & T-Spine) / Neck  (C-Spine) Exam     Tenderness Right paramedian tenderness of the Lower L-Spine and Lower C-Spine. Left paramedian tenderness of the Lower L-Spine and Lower C-Spine.     Back (L-Spine & T-Spine) Range of Motion   Lateral bend right: normal   Lateral bend left: normal   Rotation right: normal   Rotation left: normal     Neck (C-Spine) Range of Motion   Flexion:     Normal  Extension: Normal  Right Lateral Bend: normal  Left Lateral Bend: normal  Right Rotation: normal  Left Rotation: normal    Spinal Sensation   Right Side Sensation  C-Spine Level: normal   L-Spine Level: normal  Left Side Sensation  C-Spine Level: normal  L-Spine Level: normal    Neck (C-Spine) Tests   Spurling's Test   Left:  positive  Right: negative    Comments:  Negative straight leg raise bilaterally; sensation intact to light touch in bilateral upper and lower extremities; minimal tenderness palpation over bilateral lower lumbar facets; negative tenderness to PSIS palpation      Muscle Strength   Right Upper Extremity   Biceps: 5/5/5   Deltoid:  5/5  Triceps:  5/5  Wrist extension: 5/5/5   Wrist flexion: 5/5/5   Left Upper Extremity  Biceps: 5/5/5   Deltoid:  5/5  Triceps:  5/5  Wrist extension: 5/5/5   Wrist flexion: 5/5/5   Right Lower Extremity   Hip Flexion: 5/5   Quadriceps:  5/5   Hamstrin/5   Anterior tibial:  5/5/5  Left Lower Extremity   Hip Flexion: 5/5   Quadriceps:  5/5   Hamstrin/5   Anterior tibial:  5/5/5     Reflexes     Left Side  Biceps:  2+  Triceps:  2+  Brachioradialis:  2+  Quadriceps:  2+  Achilles:  2+  Ankle Clonus:  absent    Right Side   Biceps:  2+  Triceps:  2+  Brachioradialis:  2+  Quadriceps:  2+  Achilles:  2+  Ankle Clonus:  absent      Assessment  Lumbar Radiculopathy  Cervical Radiculopathy    1. 54 y.o. year old patient with PMH of   Past Medical History:   Diagnosis Date    Hyperlipidemia     Hypertension       presenting with pain located cervical spine, bilateral shoulders, lumbar spine,  bilateral lower extremities  2. Pain Generators / Etiology: Cervical Radiculopathy, Cervical Spondylosis, Lumbar Radiculopathy and Lumbar Spondylosis  3. Failed Meds (E- Effective, NE- Not Effective):  Gabapentin-effective  4. Physical Therapy - None  5. Psychological comorbidities - None  6. Anticoagulants / Antiplatelets: None     PLAN:  1. Medications:  Will increase gabapentin 600 mg from twice daily dosing to 3 times daily dosing; prescription sent  2. PT - provide a referral for physical therapy  3. Psychological - none  4. Labs - obtain  none  5. Imaging - obtain  none; reviewed cervical and lumbar x-rays the patient today in clinic  6. Interventions - schedule bilateral S1 transforaminal epidural steroid injections; consider C7/T1 interlaminar epidural steroid injection in the future  7. Referrals - none  8. Records - none  9. Follow up visit - follow up in clinic 4 weeks after the injection  10. Patient Questions - answered all of the patient's questions regarding diagnosis, therapy, and treatment  11.  This condition does not require this patient to take time off of work    TONIE Jordan MD  Interventional Pain  [facility] [place]

## 2019-08-08 NOTE — LETTER
August 8, 2019      Lenny Jernigan MD  [address]  [place] [address]           [facility]  [address] [place] [id number]  Phone: [phone]  Fax: [phone]          Patient: Papa Pope   MR Number: [id number]   YOB: 1965   Date of Visit: 8/8/2019       Dear Dr. Lenny Jernigan:    Thank you for referring Papa Pope to me for evaluation. Attached you will find relevant portions of my assessment and plan of care.    If you have questions, please do not hesitate to call me. I look forward to following Papa Pope along with you.    Sincerely,    Donnell Jordan MD    Enclosure  CC:  No Recipients    If you would like to receive this communication electronically, please contact [email or web address] or [phone] to request more information on "[employer] Link access.    For providers and/or their staff who would like to refer a patient to [facility], please contact us through our one-stop-shop provider referral line, [facility] , at [phone].    If you feel you have received this communication in error or would no longer like to receive these types of communications, please e-mail [email or web address]

## 2019-08-08 NOTE — PATIENT INSTRUCTIONS
-will increase gabapentin to 600 mg 3 times daily  -provide a referral physical therapy  -will schedule for bilateral S1 transforaminal epidural steroid injections  -follow up in clinic 4 weeks after the injections

## 2019-08-13 ENCOUNTER — HOSPITAL ENCOUNTER (OUTPATIENT)
Facility: HOSPITAL | Age: 54
Discharge: HOME | End: 2019-08-13
Attending: PAIN MEDICINE | Admitting: PAIN MEDICINE
Payer: MEDICARE

## 2019-08-13 VITALS
DIASTOLIC BLOOD PRESSURE: 83 MMHG | HEART RATE: 63 BPM | OXYGEN SATURATION: 100 % | SYSTOLIC BLOOD PRESSURE: 138 MMHG | BODY MASS INDEX: 25.15 KG/M2 | RESPIRATION RATE: 17 BRPM | TEMPERATURE: 98 F | HEIGHT: 78 IN | WEIGHT: 217.38 LBS

## 2019-08-13 DIAGNOSIS — M54.16 LUMBAR RADICULOPATHY: Primary | ICD-10-CM

## 2019-08-13 PROCEDURE — 64483 PR EPIDURAL INJ, ANES/STEROID, TRANSFORAMINAL, LUMB/SACR, SNGL LEVL: ICD-10-PCS | Mod: 50,HCNC,, | Performed by: PAIN MEDICINE

## 2019-08-13 PROCEDURE — 99152 MOD SED SAME PHYS/QHP 5/>YRS: CPT | Mod: HCNC,,, | Performed by: PAIN MEDICINE

## 2019-08-13 PROCEDURE — 64483 NJX AA&/STRD TFRM EPI L/S 1: CPT | Mod: 50,HCNC | Performed by: PAIN MEDICINE

## 2019-08-13 PROCEDURE — 64483 NJX AA&/STRD TFRM EPI L/S 1: CPT | Mod: 50,HCNC,, | Performed by: PAIN MEDICINE

## 2019-08-13 PROCEDURE — 63600175 PHARM REV CODE 636 W HCPCS: Mod: HCNC | Performed by: PAIN MEDICINE

## 2019-08-13 PROCEDURE — 99152 PR MOD CONSCIOUS SEDATION, SAME PHYS, 5+ YRS, FIRST 15 MIN: ICD-10-PCS | Mod: HCNC,,, | Performed by: PAIN MEDICINE

## 2019-08-13 PROCEDURE — 25000003 PHARM REV CODE 250: Mod: HCNC | Performed by: PAIN MEDICINE

## 2019-08-13 PROCEDURE — 25500020 PHARM REV CODE 255: Mod: HCNC | Performed by: PAIN MEDICINE

## 2019-08-13 RX ORDER — MIDAZOLAM HYDROCHLORIDE 1 MG/ML
INJECTION, SOLUTION INTRAMUSCULAR; INTRAVENOUS
Status: DISCONTINUED | OUTPATIENT
Start: 2019-08-13 | End: 2019-08-13 | Stop reason: HOSPADM

## 2019-08-13 RX ORDER — DEXAMETHASONE SODIUM PHOSPHATE 10 MG/ML
INJECTION INTRAMUSCULAR; INTRAVENOUS
Status: DISCONTINUED | OUTPATIENT
Start: 2019-08-13 | End: 2019-08-13 | Stop reason: HOSPADM

## 2019-08-13 RX ORDER — LIDOCAINE HYDROCHLORIDE 10 MG/ML
INJECTION, SOLUTION EPIDURAL; INFILTRATION; INTRACAUDAL; PERINEURAL
Status: DISCONTINUED | OUTPATIENT
Start: 2019-08-13 | End: 2019-08-13 | Stop reason: HOSPADM

## 2019-08-13 RX ORDER — FENTANYL CITRATE 50 UG/ML
INJECTION, SOLUTION INTRAMUSCULAR; INTRAVENOUS
Status: DISCONTINUED | OUTPATIENT
Start: 2019-08-13 | End: 2019-08-13 | Stop reason: HOSPADM

## 2019-08-13 NOTE — OP NOTE
Procedure: Sacral Transforaminal Epidural Steroid Injection under Fluorsocopic Guidance    Level: S1     Side: Bilateral    PROCEDURE DATE: 8/13/2019    Pre-operative Diagnosis: Sacral Radiculopathy  Post-operative Diagnosis: Sacral Radiculopathy    Provider: TONIE Jordan MD  Assistant(s): None    Anesthesia: Local, Sedation     >> 1 mg of VERSED    >> 25 mcg of FENTANYL     Indication: Low back pain with radiculopathy consistent with distribution of targeted nerve. Symptoms unresponsive to conservative treatments. Fluoroscopy was used to optimize visualization of needle placement and to maximize safety.     Procedure Description / Technique:  The patient was seen and identified in the preoperative area. Risks, benefits, complications, and alternatives were discussed with the patient. The patient agreed to proceed with the procedure and signed the consent. The site and side of the procedure was identified and marked. An IV was not placed for this procedure. The patient was taken to the procedural suite.    The patient was positioned in prone orientation on procedure table and a pillow was placed under the abdomen to reduce lumbar lordosis. A time out was performed prior to any intervention. The procedure, site, side, and allergies were stated and agreed to by all present. The lumbosacral area was widely prepped with ChloraPrep. The procedural site was draped in usual sterile fashion. Vital signs were closely monitored throughout this procedure. Conscious sedation was not used for this procedure.    The target area was visualized under fluoroscopy. The cephalocaudal angle of the fluoroscope was adjusted as to align the sacral end plate. The fluoroscopic arm was tilted and rotated cephalad and ipsilateral to bring the S1 dorsal foramen into view. A 25 gauge 3 inch spinal needle was directed towards the base of the foramen and advanced until OS was met. The needle was directed cephalad and the fluoroscope was  positioned in lateral view. The needle tip was advanced past the dorsal sacral border.  The fluoroscope was then positioned into AP orientation, gentle aspiration was negative for blood and CSF. Omnipaque 240 (1 to 2 mL) was injected under live fluoroscopy. Microbore tubing was used for injection. There was no pain or paresthesia on injection. The contrast clearly delineated the targeted nerve root on AP fluoroscopy. No vascular uptake was seen. A solution containing 2 mL of 1% PF Lidocaine and 2 mL of Dexamethasone (10 mg/mL) was mixed and 2 mL was injected slowly at each level targeted. There was minimal resistance on injection. No pain or paresthesia was elicited on injection. The stylet was replaced and the needle was withdrawn intact. This procedure was performed for each of the above indicated levels.     Description of Findings: Not applicable    Prosthetic devices, grafts, tissues, or devices implanted: None    Specimen Removed: No    Estimated Blood Loss: minimal    COMPLICATIONS: None    DISPOSITION / PLANS: The patient was transferred to the recovery area in a stable condition for observation. The patient was reexamined prior to discharge. There was no evidence of acute neurologic injury following the procedure.  Patient was discharged from the recovery room after meeting discharge criteria. Home discharge instructions were given to the patient by the staff.

## 2019-08-13 NOTE — DISCHARGE SUMMARY
[facility]  Short Stay  Discharge Summary    Admit Date: 8/13/2019    Discharge Date and Time: 8/13/2019 10:45 AM      Discharge Attending Physician: TONIE Jordan MD     Hospital Course (synopsis of major diagnoses, care, treatment, and services provided during the course of the hospital stay): Patient was admitted to Pre-op where informed consent was signed.  The patient was then taken to the procedure suite where the procedure was performed.  The patient was then return to the Pre-Op area and discharge was performed.     Final Diagnoses:    Principal Problem: <principal problem not specified>   Secondary Diagnoses:   Active Hospital Problems    Diagnosis  POA    Lumbar radiculopathy [M54.16]  Yes      Resolved Hospital Problems   No resolved problems to display.       Discharged Condition: good    Disposition: Home or Self Care    Follow up/Patient Instructions:    Medications:  Reconciled Home Medications:      Medication List      CONTINUE taking these medications    amLODIPine 10 MG tablet  Commonly known as:  NORVASC  TK 1 T PO D.     atorvastatin 80 MG tablet  Commonly known as:  LIPITOR  Take 1 tablet (80 mg total) by mouth once daily.     ezetimibe 10 mg tablet  Commonly known as:  ZETIA  Take 1 tablet (10 mg total) by mouth once daily.     gabapentin 600 MG tablet  Commonly known as:  NEURONTIN  Take 1 tablet (600 mg total) by mouth 3 (three) times daily. may cause drowsiness     hydroCHLOROthiazide 12.5 mg capsule  Commonly known as:  MICROZIDE  Take 2 capsules (25 mg total) by mouth 2 (two) times daily.     metoprolol tartrate 50 MG tablet  Commonly known as:  LOPRESSOR  Take 1 tablet (50 mg total) by mouth once daily.     predniSONE 5 MG tablet  Commonly known as:  DELTASONE  Day 1-2: take 1 tablet every 6 hours  Day 3-4: take 1 tablet every 8 hours  Day 5-6: take 1 tablet every 12 hours  Day 7-8: take 1 table tin am          Discharge Procedure Orders   Diet general     Call MD  for:  severe uncontrolled pain     Call MD for:  difficulty breathing, headache or visual disturbances     Call MD for:  redness, tenderness, or signs of infection (pain, swelling, redness, odor or green/yellow discharge around incision site)     Activity as tolerated

## 2019-08-13 NOTE — DISCHARGE INSTRUCTIONS

## 2019-08-13 NOTE — PLAN OF CARE
Discharge instructions reviewed, verbalized understanding, tolerating fluids well, no nausea or vomiting

## 2019-09-12 ENCOUNTER — OFFICE VISIT (OUTPATIENT)
Dept: PAIN MEDICINE | Facility: CLINIC | Age: 54
End: 2019-09-12
Payer: MEDICARE

## 2019-09-12 VITALS
SYSTOLIC BLOOD PRESSURE: 149 MMHG | HEIGHT: 78 IN | DIASTOLIC BLOOD PRESSURE: 85 MMHG | BODY MASS INDEX: 26.61 KG/M2 | HEART RATE: 78 BPM | RESPIRATION RATE: 18 BRPM | WEIGHT: 230 LBS

## 2019-09-12 DIAGNOSIS — M51.36 DDD (DEGENERATIVE DISC DISEASE), LUMBAR: ICD-10-CM

## 2019-09-12 DIAGNOSIS — M54.12 LEFT CERVICAL RADICULOPATHY: ICD-10-CM

## 2019-09-12 DIAGNOSIS — M47.816 LUMBAR SPONDYLOSIS: Primary | ICD-10-CM

## 2019-09-12 DIAGNOSIS — M54.16 LUMBAR RADICULOPATHY: ICD-10-CM

## 2019-09-12 PROCEDURE — 3077F SYST BP >= 140 MM HG: CPT | Mod: HCNC,CPTII,S$GLB, | Performed by: PHYSICIAN ASSISTANT

## 2019-09-12 PROCEDURE — 99214 OFFICE O/P EST MOD 30 MIN: CPT | Mod: HCNC,S$GLB,, | Performed by: PHYSICIAN ASSISTANT

## 2019-09-12 PROCEDURE — 99999 PR PBB SHADOW E&M-EST. PATIENT-LVL IV: ICD-10-PCS | Mod: PBBFAC,HCNC,, | Performed by: PHYSICIAN ASSISTANT

## 2019-09-12 PROCEDURE — 3079F PR MOST RECENT DIASTOLIC BLOOD PRESSURE 80-89 MM HG: ICD-10-PCS | Mod: HCNC,CPTII,S$GLB, | Performed by: PHYSICIAN ASSISTANT

## 2019-09-12 PROCEDURE — 3008F PR BODY MASS INDEX (BMI) DOCUMENTED: ICD-10-PCS | Mod: HCNC,CPTII,S$GLB, | Performed by: PHYSICIAN ASSISTANT

## 2019-09-12 PROCEDURE — 3079F DIAST BP 80-89 MM HG: CPT | Mod: HCNC,CPTII,S$GLB, | Performed by: PHYSICIAN ASSISTANT

## 2019-09-12 PROCEDURE — 3008F BODY MASS INDEX DOCD: CPT | Mod: HCNC,CPTII,S$GLB, | Performed by: PHYSICIAN ASSISTANT

## 2019-09-12 PROCEDURE — 3077F PR MOST RECENT SYSTOLIC BLOOD PRESSURE >= 140 MM HG: ICD-10-PCS | Mod: HCNC,CPTII,S$GLB, | Performed by: PHYSICIAN ASSISTANT

## 2019-09-12 PROCEDURE — 99999 PR PBB SHADOW E&M-EST. PATIENT-LVL IV: CPT | Mod: PBBFAC,HCNC,, | Performed by: PHYSICIAN ASSISTANT

## 2019-09-12 PROCEDURE — 99214 PR OFFICE/OUTPT VISIT, EST, LEVL IV, 30-39 MIN: ICD-10-PCS | Mod: HCNC,S$GLB,, | Performed by: PHYSICIAN ASSISTANT

## 2019-09-12 NOTE — PROGRESS NOTES
Chief Pain Complaint:  Low back pain with BLE radicular pain  Neck pain with left arm pain     Interval History: Patient was seen on 8/13/19. At that time he underwent bilateral S1 transforaminal epidural steroid injection.  The patient reports that he is/was better following the procedure.  he reports 30% pain relief.  The changes lasted 4 weeks so far.  The changes have continued through this visit.  He is mainly complaining today of neck pain going into left shoulder and proximal arm.     Initial History of Present Illness:   This patient is a 54 y.o. male who presents today complaining of the above noted pain/s. The patient describes the pain as follows.  Mr. Pope is a new patient to clinic with complaints of low back pain which radiates in his bilateral lower extremities in addition to neck pain which radiates in his bilateral upper extremities.  Currently he rates his pain as 10/10 which is constant throughout the day reports having symptoms that radiate down the posterior right leg into the bottom of the foot in the lateral aspect of foot in the S1 distribution in the same pattern in the left leg.  He reports having some weakness in his right lower extremity and reports having left arm numbness.  He finds that his symptoms are worse when he sits for long periods and is improved with activity.  He denies having had surgery, injections, physical therapy for his cervical and lumbar spines.  He reports mostly thing a throbbing sensation in his lower extremities and upper extremities. He denies having bowel and bladder difficulties.  He has been started on gabapentin taking 600 mg twice daily which he has found to be helpful.  There was no inciting event for these complaints.    Previous Therapy:  Medications:gabapentin  Injections:  bilateral S1 transforaminal epidural steroid injections on 8/13/19 with 30% pain relief  Surgeries:  None   Physical Therapy:  None    Past Surgical History:   Procedure Laterality  Date    COLONOSCOPY N/A 8/22/2017    Performed [facility] Keo Cruz [facility] at [facility]    Injection,steroid,epidural,transforaminal approach Bilateral 8/13/2019    Performed by Donnell Jordan [facility] at [facility]    STOMACH SURGERY         Imaging / Labs / Studies (reviewed on 9/12/2019):    Results for orders placed during the hospital encounter of 06/12/19   X-Ray Lumbar Spine Complete 5 View    Narrative TECHNIQUE:  AP, lateral, spot and bilateral oblique views of the lumbar spine were preformed.  COMPARISON:  07/28/2017  FINDINGS:  Vertebral body heights and alignment are within normal limits.  There is mild disc height loss at L4-5. posterior elements appear grossly intact.  No pars defects visualized.  No acute fractures or subluxations are demonstrated.  Metallic density again noted projecting within the soft tissues posteriorly at the level of L3-4, as seen on the lateral projection only.  No appreciable change from prior.    Impression Unchanged appearance of the lumbar spine as above.  No acute findings.     Results for orders placed during the hospital encounter of 07/28/17   X-Ray Lumbar Complete With Flex And Ext    Narrative Findings:  Vertebral height and alignment well maintained with multilevel marginal spurring and facet arthropathy.  Uniform loss of disc height at the L4-5 level.  No acute fracture or subluxation.  No pars defect.  Straightening of the normal curvature on the neutral view noted with no instability or subluxation on the flexion and extension views.  Pedicles and SI joints appear intact.  Numerous calcifications within the lower pelvis bilaterally, greater on the LEFT.  Vascular calcifications noted.  Metallic densities project over the posterior margin of the lower L-spine.  Nonvisualization on the AP view consistent with lateral location.    Impression Mild spondylosis with most of prominent findings at L4-5 level.  No acute fracture or subluxation.  No instability on flexion  "and extension views.   Metallic foreign bodies consistent with the shrapnel noted as above.     Results for orders placed during the hospital encounter of 06/12/19   X-Ray Cervical Spine AP And Lateral    Narrative TECHNIQUE:  AP, lateral, and open mouth views of the cervical spine were performed.  COMPARISON:  07/28/2017  FINDINGS:  Vertebral body heights and alignment are within normal limits.  Multilevel spondylosis noted with mild disc height loss at C4-5 and C5-6. posterior elements appear intact without acute fractures or subluxations demonstrated.  Odontoid process appears intact.  Atlantoaxial articulations appear normal.  Prevertebral soft tissues are within normal limits.  Vascular calcifications again noted in the region of the left carotid.  Further evaluation could be obtained with Doppler ultrasound as clinically warranted.  No appreciable change from prior.      Impression As above.  No acute findings.       Review of Systems:  Constitutional: Negative for fever.   Eyes: Negative for blurred vision.   Respiratory: Negative for cough and wheezing.    Cardiovascular: Negative for chest pain and orthopnea.   Gastrointestinal: Negative for constipation, diarrhea, nausea and vomiting.   Genitourinary: Negative for dysuria.   Musculoskeletal: Positive for back pain, joint pain and neck pain.   Skin: Negative for itching and rash.   Neurological: Positive for weakness.   Endo/Heme/Allergies: Does not bruise/bleed easily.         Physical Exam:  Vitals:  BP (!) 149/85 (BP Location: Right arm, Patient Position: Sitting, BP Method: Medium (Automatic))   Pulse 78   Resp 18   Ht 6' 6" (1.981 m)   Wt 104.3 kg (230 lb)   BMI 26.58 kg/m²   (reviewed on 9/12/2019)    General: alert and oriented, in no apparent distress.  Gait: antalgic gait.   Skin: no rashes, no discoloration, no obvious lesions  HEENT: normocephalic, atraumatic. Pupils equal and round.  Cardiovascular: no significant peripheral edema " present.  Respiratory: without use of accessory muscles of respiration.    Musculoskeletal - Lumbar Spine:  - Pain on flexion of lumbar spine: Present  - Pain on extension of lumbar spine: Present  - Lumbar facet loading: Absent   - TTP over the lumbar facet joints: Present  - TTP over the lumbar paraspinals: Present  - TTP over PSIS: Absent   - Straight Leg Raise: Negative    Musculoskeletal - Cervical Spine:  - Pain on flexion of cervical spine: Present  - Pain on extension of cervical spine: Present  - Cervical facet loading: Present on left  - TTP over the cervical facet joints: Present on left  - TTP over the cervical paraspinals: Present on left  - Spurling's: Negative    Neuro - Upper Extremities:  - BUE Strength:R/L: D: 5/5; B: 5/5; T: 5/5; WF: 5/5; WE: 5/5; IO: 5/5  - Extremity Reflexes: Brisk and symmetric throughout  - Sensory: Sensation to light touch intact bilaterally    Neuro - Lower Extremities:  - RLE Strength:     >> 5/5 strength with right hip flexion/ extension    >> 5/5 strength with right knee flexion/ extension    >> 5/5 strength in right ankle with plantar and dorsiflexion  - LLE Strength:     >> 5/5 strength with left hip flexion/ extension    >> 5/5 strength with knee flexion extension on the left     >> 5/5 strength in left ankle with plantar and dorsiflexion  - Extremity Reflexes: Brisk and symmetric throughout  - Sensory: Sensation to light touch intact bilaterally      Psych:  Mood and affect is appropriate        Assessment  1. 54 y.o. year old patient with PMH of   Past Medical History:   Diagnosis Date    Hyperlipidemia     Hypertension       presenting with pain located cervical spine, bilateral shoulders, lumbar spine, bilateral lower extremities  2. Pain Generators / Etiology: Cervical Radiculopathy, Cervical Spondylosis, Lumbar Radiculopathy and Lumbar Spondylosis  3. Failed Meds (E- Effective, NE- Not Effective):  Gabapentin-effective  4. Physical Therapy - None  5.  Psychological comorbidities - None  6. Anticoagulants / Antiplatelets: None     Plan:  1. Interventional:   - S/p bilateral S1 transforaminal epidural steroid injections on 8/13/19 with 30% pain relief.    - Schedule C7/T1 interlaminar SUKHJINDER. Patient is not taking prescription blood thinners or ASA. Stop all ASA products and fish oil.   - Consider 2nd lumbar SUKHJINDER.     2. Pharmacologic: Continue gabapentin 600 mg TID.     3. Rehabilitative: Encouraged regular exercise.  He will start physical therapy next week.     4. Diagnostic: consider ordering cervical and lumbar MRIs if limited relief from next injection.     5. Follow up: 3-4 weeks post-injection    - This condition does not require this patient to take time off of work.  - I discussed the risks, benefits, and alternatives to potential treatment options. All questions and concerns were fully addressed today in clinic. Dr. Jordan was consulted regarding the patient plan and agrees.

## 2019-09-12 NOTE — PATIENT INSTRUCTIONS
Pain Management Pre-Procedure Instructions  (also available in your [employer] account)    Patient Name:___Papa Pope____MRN: [id number] you are scheduled to have the following procedure:__ Epidural Steroid Injection  _with______L. Sagar Jordan MD on: September 24th at: [facility]    You will be contacted the day before your procedure to be given an arrival and procedure time                                                                                                            Day of Procedure   Ensure you have obtained arrival time from the Pain Management department  o We will call 48 hours in advance with your arrival time. Please check any voicemails you may have  o If you arrive past your scheduled procedure time, you may be asked to reschedule your procedure.   For your safety, ensure you have a  with you to remain present throughout your procedure   o If you arrive without a responsible adult to stay with you and drive you home, you may be asked to reschedule your procedure   Take all of your prescribed medications (exceptions noted below) with a small amount of water  o [] Nothing by mouth two (2) hours before your procedure.  It is ok to take your regular medications with a small sip of water.  o [x] Nothing by mouth after midnight the night before your procedure.  It is ok to take your regular medications with a small sip of water.     Wear loose, comfortable clothing    You may wear glasses, dentures, contact lenses and/or hearing aids. Please leave all valuable items at home.   Contact the Pain Management department at [phone] or via "[employer] if you are:  o Running a fever above 100 degrees  o Feel ill, have any type of infection, or are taking antibiotics now or have in the past 2 weeks  o Have had any outpatient procedures in the past 2 weeks (colonoscopy, major dental work, etc.)  o If you are allergic to iodine, IVP dye or shellfish.      Patients taking  anticoagulants:  Dr TONIE Jordan MD would like for you to stop Aspirin products 1 week prior to your procedure      Contact Information: [phone], ask to speak to the pain management department with any questions or concerns or send a message via [employer]

## 2019-10-28 ENCOUNTER — TELEPHONE (OUTPATIENT)
Dept: PAIN MEDICINE | Facility: CLINIC | Age: 54
End: 2019-10-28

## 2019-10-28 NOTE — TELEPHONE ENCOUNTER
----- Message from Olga Cotton MA sent at 10/25/2019 10:42 AM CDT -----  Will need to reschedule the procedure and f/u would like a call next week or two ... Thanks trena

## 2019-11-01 ENCOUNTER — HOSPITAL ENCOUNTER (OUTPATIENT)
Facility: HOSPITAL | Age: 54
Discharge: HOME | End: 2019-11-01
Attending: PAIN MEDICINE | Admitting: PAIN MEDICINE
Payer: MEDICARE

## 2019-11-01 VITALS
TEMPERATURE: 99 F | HEIGHT: 78 IN | OXYGEN SATURATION: 100 % | BODY MASS INDEX: 24.83 KG/M2 | HEART RATE: 59 BPM | SYSTOLIC BLOOD PRESSURE: 132 MMHG | WEIGHT: 214.63 LBS | RESPIRATION RATE: 18 BRPM | DIASTOLIC BLOOD PRESSURE: 88 MMHG

## 2019-11-01 DIAGNOSIS — M54.12 CERVICAL RADICULOPATHY: Primary | ICD-10-CM

## 2019-11-01 PROCEDURE — 99152 PR MOD CONSCIOUS SEDATION, SAME PHYS, 5+ YRS, FIRST 15 MIN: ICD-10-PCS | Mod: HCNC,,, | Performed by: PAIN MEDICINE

## 2019-11-01 PROCEDURE — 63600175 PHARM REV CODE 636 W HCPCS: Mod: HCNC | Performed by: PAIN MEDICINE

## 2019-11-01 PROCEDURE — 62321 NJX INTERLAMINAR CRV/THRC: CPT | Mod: HCNC,,, | Performed by: PAIN MEDICINE

## 2019-11-01 PROCEDURE — 62321 NJX INTERLAMINAR CRV/THRC: CPT | Mod: HCNC | Performed by: PAIN MEDICINE

## 2019-11-01 PROCEDURE — 99152 MOD SED SAME PHYS/QHP 5/>YRS: CPT | Mod: HCNC,,, | Performed by: PAIN MEDICINE

## 2019-11-01 PROCEDURE — 25500020 PHARM REV CODE 255: Mod: HCNC | Performed by: PAIN MEDICINE

## 2019-11-01 PROCEDURE — 62321 PR INJ CERV/THORAC, W/GUIDANCE: ICD-10-PCS | Mod: HCNC,,, | Performed by: PAIN MEDICINE

## 2019-11-01 PROCEDURE — 25000003 PHARM REV CODE 250: Mod: HCNC | Performed by: PAIN MEDICINE

## 2019-11-01 PROCEDURE — 62320 NJX INTERLAMINAR CRV/THRC: CPT | Mod: HCNC | Performed by: PAIN MEDICINE

## 2019-11-01 RX ORDER — DEXAMETHASONE SODIUM PHOSPHATE 10 MG/ML
INJECTION INTRAMUSCULAR; INTRAVENOUS
Status: DISCONTINUED | OUTPATIENT
Start: 2019-11-01 | End: 2019-11-01 | Stop reason: HOSPADM

## 2019-11-01 RX ORDER — FENTANYL CITRATE 50 UG/ML
INJECTION, SOLUTION INTRAMUSCULAR; INTRAVENOUS
Status: DISCONTINUED | OUTPATIENT
Start: 2019-11-01 | End: 2019-11-01 | Stop reason: HOSPADM

## 2019-11-01 RX ORDER — MIDAZOLAM HYDROCHLORIDE 1 MG/ML
INJECTION, SOLUTION INTRAMUSCULAR; INTRAVENOUS
Status: DISCONTINUED | OUTPATIENT
Start: 2019-11-01 | End: 2019-11-01 | Stop reason: HOSPADM

## 2019-11-01 RX ORDER — LIDOCAINE HYDROCHLORIDE 10 MG/ML
INJECTION, SOLUTION EPIDURAL; INFILTRATION; INTRACAUDAL; PERINEURAL
Status: DISCONTINUED | OUTPATIENT
Start: 2019-11-01 | End: 2019-11-01 | Stop reason: HOSPADM

## 2019-11-01 NOTE — DISCHARGE SUMMARY
[facility]  Short Stay  Discharge Summary    Admit Date: 11/1/2019    Discharge Date and Time: 11/1/2019 11:30 AM      Discharge Attending Physician: TONIE Jordan MD     Hospital Course (synopsis of major diagnoses, care, treatment, and services provided during the course of the hospital stay): Patient was admitted to Pre-op where informed consent was signed.  The patient was then taken to the procedure suite where the procedure was performed.  The patient was then return to the Pre-Op area and discharge was performed.     Final Diagnoses:    Principal Problem: <principal problem not specified>   Secondary Diagnoses: There are no hospital problems to display for this patient.      Discharged Condition: good    Disposition: Home or Self Care    Follow up/Patient Instructions:    Medications:  Reconciled Home Medications:      Medication List      CONTINUE taking these medications    amLODIPine 10 MG tablet  Commonly known as:  NORVASC  TK 1 T PO D.     atorvastatin 80 MG tablet  Commonly known as:  LIPITOR  Take 1 tablet (80 mg total) by mouth once daily.     ezetimibe 10 mg tablet  Commonly known as:  ZETIA  Take 1 tablet (10 mg total) by mouth once daily.     gabapentin 600 MG tablet  Commonly known as:  NEURONTIN  Take 1 tablet (600 mg total) by mouth 3 (three) times daily. may cause drowsiness     hydroCHLOROthiazide 12.5 mg capsule  Commonly known as:  MICROZIDE  Take 2 capsules (25 mg total) by mouth 2 (two) times daily.     metoprolol tartrate 50 MG tablet  Commonly known as:  LOPRESSOR  Take 1 tablet (50 mg total) by mouth once daily.        ASK your doctor about these medications    predniSONE 5 MG tablet  Commonly known as:  DELTASONE  Day 1-2: take 1 tablet every 6 hours  Day 3-4: take 1 tablet every 8 hours  Day 5-6: take 1 tablet every 12 hours  Day 7-8: take 1 table tin am          Discharge Procedure Orders   Diet general     Call MD for:  severe uncontrolled pain     Call MD for:   difficulty breathing, headache or visual disturbances     Call MD for:  redness, tenderness, or signs of infection (pain, swelling, redness, odor or green/yellow discharge around incision site)     Activity as tolerated

## 2019-11-01 NOTE — OP NOTE
PROCEDURE: Cervical epidural steroid injection under fluoroscopic guidance    LEVEL: C7/T1   SIDE: Midline     PROCEDURE DATE: 11/1/2019    PREOPERATIVE DIAGNOSIS: Cervical radiculopathy  POSTOPERATIVE DIAGNOSIS: Cervical radiculopathy    PROVIDER: TONIE Jordan MD  Assistant(s): None    ANESTHESIA: Local, IV Sedation    >> 1 mg of VERSED    >> 50 mcg of FENTANYL     INDICATION: The patient has neck pain and radiculopathy symptoms unresponsive to conservative treatments. Fluoroscopy was used to optimize visualization of needle placement and to maximize safety.        PROCEDURE DESCRIPTION / TECHNIQUE:   The patient was seen and identified in the preoperative area. Risks, benefits, complications, and alternatives were discussed with the patient. The patient agreed to proceed with the procedure and signed the consent. The site and side of the procedure was identified and marked. An IV was started.    The patient was taken to the procedural suite. The patient was positioned in prone orientation on procedure table. A time out was performed prior to any intervention. The procedure, site, side, and allergies were stated and agreed to by all present. The posterior cervical area was widely prepped with ChloraPrep. The procedural site was draped in usual sterile fashion. Vital signs were closely monitored throughout this procedure. Conscious sedation was used for this procedure to decrease patient anxiety.    Using anterior-posterior fluoroscopy, the above noted INTERLAMINAR SPACE was identified and the overlying subcutaneous tissues were infiltrated with 3-4 mL of PF 1% LIDOCAINE using a 1.5 inch 27 gauge needle. A 20-gauge Tuohy epidural needle was then introduced through the same puncture site and advanced toward the epidural space incrementally under fluoroscopic guidance. A Loss of Resistance technique was used as the epidural needle was advanced. Contralateral oblique imaging was used to help gauge needle depth as  the Tuohy was advanced. Once loss of resistance was realized and after negative aspiration of blood and spinal fluid, correct needle placement within the epidural space was verified with the injection of 0.5 to 1 mL of water soluble contrast dye (Omnipaque 240). Appropriate epidural contrast spread was seen on imaging. Again, after negative aspiration of blood and spinal fluid a 3 mL mixture containing 1 mL of Dexamethasone (10 mg/mL) and 1 mL of preservative free Normal Saline and 1ml of 1% preservative free lidocaine was injected. No pain or paresthesias were noted with injection. There was low resistance during the injection. Washout of epidurogram was seen on fluoroscopy following injection. The stylet was replaced and the Tuohy needle was withdrawn intact. The skin was cleansed, and bandages were applied.    Description of Findings: Not applicable    Prosthetic devices, grafts, tissues, or devices implanted: None    Specimen Removed: No    Estimated Blood Loss: minimal    COMPLICATIONS: None    DISPOSITION / PLANS: The patient was transferred to the recovery area in a stable condition for observation. The patient was reexamined prior to discharge. There was no evidence of acute neurologic injury following the procedure.  Patient was discharged from the recovery room after meeting discharge criteria. Home discharge instructions were given to the patient by the staff.

## 2019-11-01 NOTE — PLAN OF CARE
Pt aaa o x's 4, denies any pain/discomfort, what to expect during recovery discussed with pt and family at bedside. Pt and family verbalized understanding of all post op instructions. All questions and concerns addressed and answered, will continue to monitor pt until discharged.

## 2019-11-01 NOTE — H&P
Progress Notes        Chief Pain Complaint:  Low back pain with BLE radicular pain  Neck pain with left arm pain      Interval History: Patient was seen on 8/13/19. At that time he underwent bilateral S1 transforaminal epidural steroid injection.  The patient reports that he is/was better following the procedure.  he reports 30% pain relief.  The changes lasted 4 weeks so far.  The changes have continued through this visit.  He is mainly complaining today of neck pain going into left shoulder and proximal arm.      Initial History of Present Illness:   This patient is a 54 y.o. male who presents today complaining of the above noted pain/s. The patient describes the pain as follows.  Mr. Pope is a new patient to clinic with complaints of low back pain which radiates in his bilateral lower extremities in addition to neck pain which radiates in his bilateral upper extremities.  Currently he rates his pain as 10/10 which is constant throughout the day reports having symptoms that radiate down the posterior right leg into the bottom of the foot in the lateral aspect of foot in the S1 distribution in the same pattern in the left leg.  He reports having some weakness in his right lower extremity and reports having left arm numbness.  He finds that his symptoms are worse when he sits for long periods and is improved with activity.  He denies having had surgery, injections, physical therapy for his cervical and lumbar spines.  He reports mostly thing a throbbing sensation in his lower extremities and upper extremities. He denies having bowel and bladder difficulties.  He has been started on gabapentin taking 600 mg twice daily which he has found to be helpful.  There was no inciting event for these complaints.     Previous Therapy:  Medications:gabapentin  Injections:  bilateral S1 transforaminal epidural steroid injections on 8/13/19 with 30% pain relief  Surgeries:  None   Physical Therapy:  None           Past Surgical  History:   Procedure Laterality Date    COLONOSCOPY N/A 8/22/2017     Performed [facility] Keo [facility] at [facility]    Injection,steroid,epidural,transforaminal approach Bilateral 8/13/2019     Performed by Donnell Jordan [facility] at [facility]    STOMACH SURGERY             Imaging / Labs / Studies (reviewed on 9/12/2019):          Results for orders placed during the hospital encounter of 06/12/19   X-Ray Lumbar Spine Complete 5 View     Narrative TECHNIQUE:  AP, lateral, spot and bilateral oblique views of the lumbar spine were preformed.  COMPARISON:  07/28/2017  FINDINGS:  Vertebral body heights and alignment are within normal limits.  There is mild disc height loss at L4-5. posterior elements appear grossly intact.  No pars defects visualized.  No acute fractures or subluxations are demonstrated.  Metallic density again noted projecting within the soft tissues posteriorly at the level of L3-4, as seen on the lateral projection only.  No appreciable change from prior.     Impression Unchanged appearance of the lumbar spine as above.  No acute findings.           Results for orders placed during the hospital encounter of 07/28/17   X-Ray Lumbar Complete With Flex And Ext     Narrative Findings:  Vertebral height and alignment well maintained with multilevel marginal spurring and facet arthropathy.  Uniform loss of disc height at the L4-5 level.  No acute fracture or subluxation.  No pars defect.  Straightening of the normal curvature on the neutral view noted with no instability or subluxation on the flexion and extension views.  Pedicles and SI joints appear intact.  Numerous calcifications within the lower pelvis bilaterally, greater on the LEFT.  Vascular calcifications noted.  Metallic densities project over the posterior margin of the lower L-spine.  Nonvisualization on the AP view consistent with lateral location.     Impression Mild spondylosis with most of prominent findings at L4-5 level.  No acute  "fracture or subluxation.  No instability on flexion and extension views.   Metallic foreign bodies consistent with the shrapnel noted as above.           Results for orders placed during the hospital encounter of 06/12/19   X-Ray Cervical Spine AP And Lateral     Narrative TECHNIQUE:  AP, lateral, and open mouth views of the cervical spine were performed.  COMPARISON:  07/28/2017  FINDINGS:  Vertebral body heights and alignment are within normal limits.  Multilevel spondylosis noted with mild disc height loss at C4-5 and C5-6. posterior elements appear intact without acute fractures or subluxations demonstrated.  Odontoid process appears intact.  Atlantoaxial articulations appear normal.  Prevertebral soft tissues are within normal limits.  Vascular calcifications again noted in the region of the left carotid.  Further evaluation could be obtained with Doppler ultrasound as clinically warranted.  No appreciable change from prior.        Impression As above.  No acute findings.         Review of Systems:  Constitutional: Negative for fever.   Eyes: Negative for blurred vision.   Respiratory: Negative for cough and wheezing.    Cardiovascular: Negative for chest pain and orthopnea.   Gastrointestinal: Negative for constipation, diarrhea, nausea and vomiting.   Genitourinary: Negative for dysuria.   Musculoskeletal: Positive for back pain, joint pain and neck pain.   Skin: Negative for itching and rash.   Neurological: Positive for weakness.   Endo/Heme/Allergies: Does not bruise/bleed easily.            Physical Exam:  Vitals:  BP (!) 149/85 (BP Location: Right arm, Patient Position: Sitting, BP Method: Medium (Automatic))   Pulse 78   Resp 18   Ht 6' 6" (1.981 m)   Wt 104.3 kg (230 lb)   BMI 26.58 kg/m²   (reviewed on 9/12/2019)     General: alert and oriented, in no apparent distress.  Gait: antalgic gait.   Skin: no rashes, no discoloration, no obvious lesions  HEENT: normocephalic, atraumatic. Pupils equal " and round.  Cardiovascular: no significant peripheral edema present.  Respiratory: without use of accessory muscles of respiration.     Musculoskeletal - Lumbar Spine:  - Pain on flexion of lumbar spine: Present  - Pain on extension of lumbar spine: Present  - Lumbar facet loading: Absent   - TTP over the lumbar facet joints: Present  - TTP over the lumbar paraspinals: Present  - TTP over PSIS: Absent   - Straight Leg Raise: Negative     Musculoskeletal - Cervical Spine:  - Pain on flexion of cervical spine: Present  - Pain on extension of cervical spine: Present  - Cervical facet loading: Present on left  - TTP over the cervical facet joints: Present on left  - TTP over the cervical paraspinals: Present on left  - Spurling's: Negative     Neuro - Upper Extremities:  - BUE Strength:R/L: D: 5/5; B: 5/5; T: 5/5; WF: 5/5; WE: 5/5; IO: 5/5  - Extremity Reflexes: Brisk and symmetric throughout  - Sensory: Sensation to light touch intact bilaterally     Neuro - Lower Extremities:  - RLE Strength:     >> 5/5 strength with right hip flexion/ extension    >> 5/5 strength with right knee flexion/ extension    >> 5/5 strength in right ankle with plantar and dorsiflexion  - LLE Strength:     >> 5/5 strength with left hip flexion/ extension    >> 5/5 strength with knee flexion extension on the left     >> 5/5 strength in left ankle with plantar and dorsiflexion  - Extremity Reflexes: Brisk and symmetric throughout  - Sensory: Sensation to light touch intact bilaterally      Psych:  Mood and affect is appropriate           Assessment  1. 54 y.o. year old patient with PMH of        Past Medical History:   Diagnosis Date    Hyperlipidemia      Hypertension         presenting with pain located cervical spine, bilateral shoulders, lumbar spine, bilateral lower extremities  2. Pain Generators / Etiology: Cervical Radiculopathy, Cervical Spondylosis, Lumbar Radiculopathy and Lumbar Spondylosis  3. Failed Meds (E- Effective, NE- Not  Effective):  Gabapentin-effective  4. Physical Therapy - None  5. Psychological comorbidities - None  6. Anticoagulants / Antiplatelets: None     Plan:  1. Interventional:   - Schedule C7/T1 interlaminar SUKHJINDER. Patient is not taking prescription blood thinners or ASA. Stop all ASA products and fish oil.           TONIE Jordan MD  Interventional Pain Medicine  [facility] [place]

## 2019-11-01 NOTE — DISCHARGE INSTRUCTIONS

## 2019-11-01 NOTE — PLAN OF CARE
Pt discharged home, awake, alert, oriented x's 4, daughter's at bedside, pt denies any pain, no apparent distress noted. All questions and concerns addressed and answered, pt and daughters verbalizes understanding. Pt meets discharge criteria and is now being discharged to car via wheelchair.

## 2019-12-12 ENCOUNTER — OFFICE VISIT (OUTPATIENT)
Dept: INTERNAL MEDICINE | Facility: CLINIC | Age: 54
End: 2019-12-12
Payer: MEDICARE

## 2019-12-12 ENCOUNTER — LAB VISIT (OUTPATIENT)
Dept: LAB | Facility: HOSPITAL | Age: 54
End: 2019-12-12
Attending: FAMILY MEDICINE
Payer: MEDICARE

## 2019-12-12 VITALS
DIASTOLIC BLOOD PRESSURE: 84 MMHG | HEIGHT: 78 IN | SYSTOLIC BLOOD PRESSURE: 136 MMHG | BODY MASS INDEX: 25.25 KG/M2 | HEART RATE: 86 BPM | TEMPERATURE: 98 F | OXYGEN SATURATION: 98 % | WEIGHT: 218.25 LBS

## 2019-12-12 DIAGNOSIS — E78.00 HYPERCHOLESTEREMIA: ICD-10-CM

## 2019-12-12 DIAGNOSIS — I10 ESSENTIAL HYPERTENSION: ICD-10-CM

## 2019-12-12 DIAGNOSIS — I10 ESSENTIAL HYPERTENSION: Primary | ICD-10-CM

## 2019-12-12 DIAGNOSIS — M54.40 CHRONIC MIDLINE LOW BACK PAIN WITH SCIATICA, SCIATICA LATERALITY UNSPECIFIED: ICD-10-CM

## 2019-12-12 DIAGNOSIS — G89.29 CHRONIC MIDLINE LOW BACK PAIN WITH SCIATICA, SCIATICA LATERALITY UNSPECIFIED: ICD-10-CM

## 2019-12-12 LAB
ALBUMIN SERPL BCP-MCNC: 4.1 G/DL (ref 3.5–5.2)
ALP SERPL-CCNC: 94 U/L (ref 55–135)
ALT SERPL W/O P-5'-P-CCNC: 24 U/L (ref 10–44)
ANION GAP SERPL CALC-SCNC: 9 MMOL/L (ref 8–16)
AST SERPL-CCNC: 20 U/L (ref 10–40)
BILIRUB SERPL-MCNC: 0.4 MG/DL (ref 0.1–1)
BUN SERPL-MCNC: 18 MG/DL (ref 6–20)
CALCIUM SERPL-MCNC: 9.9 MG/DL (ref 8.7–10.5)
CHLORIDE SERPL-SCNC: 106 MMOL/L (ref 95–110)
CHOLEST SERPL-MCNC: 215 MG/DL (ref 120–199)
CHOLEST/HDLC SERPL: 3.9 {RATIO} (ref 2–5)
CO2 SERPL-SCNC: 26 MMOL/L (ref 23–29)
CREAT SERPL-MCNC: 1.5 MG/DL (ref 0.5–1.4)
EST. GFR  (AFRICAN AMERICAN): >60 ML/MIN/1.73 M^2
EST. GFR  (NON AFRICAN AMERICAN): 52.4 ML/MIN/1.73 M^2
GLUCOSE SERPL-MCNC: 99 MG/DL (ref 70–110)
HDLC SERPL-MCNC: 55 MG/DL (ref 40–75)
HDLC SERPL: 25.6 % (ref 20–50)
LDLC SERPL CALC-MCNC: 143 MG/DL (ref 63–159)
NONHDLC SERPL-MCNC: 160 MG/DL
POTASSIUM SERPL-SCNC: 5 MMOL/L (ref 3.5–5.1)
PROT SERPL-MCNC: 7.6 G/DL (ref 6–8.4)
SODIUM SERPL-SCNC: 141 MMOL/L (ref 136–145)
TRIGL SERPL-MCNC: 85 MG/DL (ref 30–150)

## 2019-12-12 PROCEDURE — 3075F SYST BP GE 130 - 139MM HG: CPT | Mod: HCNC,CPTII,S$GLB, | Performed by: FAMILY MEDICINE

## 2019-12-12 PROCEDURE — 80061 LIPID PANEL: CPT | Mod: HCNC

## 2019-12-12 PROCEDURE — 36415 COLL VENOUS BLD VENIPUNCTURE: CPT | Mod: HCNC

## 2019-12-12 PROCEDURE — 3008F BODY MASS INDEX DOCD: CPT | Mod: HCNC,CPTII,S$GLB, | Performed by: FAMILY MEDICINE

## 2019-12-12 PROCEDURE — 99499 UNLISTED E&M SERVICE: CPT | Mod: S$GLB,,, | Performed by: FAMILY MEDICINE

## 2019-12-12 PROCEDURE — 3008F PR BODY MASS INDEX (BMI) DOCUMENTED: ICD-10-PCS | Mod: HCNC,CPTII,S$GLB, | Performed by: FAMILY MEDICINE

## 2019-12-12 PROCEDURE — 99214 PR OFFICE/OUTPT VISIT, EST, LEVL IV, 30-39 MIN: ICD-10-PCS | Mod: HCNC,S$GLB,, | Performed by: FAMILY MEDICINE

## 2019-12-12 PROCEDURE — 99999 PR PBB SHADOW E&M-EST. PATIENT-LVL III: CPT | Mod: PBBFAC,HCNC,, | Performed by: FAMILY MEDICINE

## 2019-12-12 PROCEDURE — 3075F PR MOST RECENT SYSTOLIC BLOOD PRESS GE 130-139MM HG: ICD-10-PCS | Mod: HCNC,CPTII,S$GLB, | Performed by: FAMILY MEDICINE

## 2019-12-12 PROCEDURE — 80053 COMPREHEN METABOLIC PANEL: CPT | Mod: HCNC

## 2019-12-12 PROCEDURE — 99214 OFFICE O/P EST MOD 30 MIN: CPT | Mod: HCNC,S$GLB,, | Performed by: FAMILY MEDICINE

## 2019-12-12 PROCEDURE — 99999 PR PBB SHADOW E&M-EST. PATIENT-LVL III: ICD-10-PCS | Mod: PBBFAC,HCNC,, | Performed by: FAMILY MEDICINE

## 2019-12-12 PROCEDURE — 3079F PR MOST RECENT DIASTOLIC BLOOD PRESSURE 80-89 MM HG: ICD-10-PCS | Mod: HCNC,CPTII,S$GLB, | Performed by: FAMILY MEDICINE

## 2019-12-12 PROCEDURE — 99499 RISK ADDL DX/OHS AUDIT: ICD-10-PCS | Mod: S$GLB,,, | Performed by: FAMILY MEDICINE

## 2019-12-12 PROCEDURE — 3079F DIAST BP 80-89 MM HG: CPT | Mod: HCNC,CPTII,S$GLB, | Performed by: FAMILY MEDICINE

## 2019-12-12 RX ORDER — MELOXICAM 15 MG/1
15 TABLET ORAL DAILY
Qty: 90 TABLET | Refills: 1 | Status: SHIPPED | OUTPATIENT
Start: 2019-12-12 | End: 2019-12-12 | Stop reason: SDUPTHER

## 2019-12-12 RX ORDER — PREDNISONE 5 MG/1
TABLET ORAL
Qty: 20 TABLET | Refills: 0 | Status: SHIPPED | OUTPATIENT
Start: 2019-12-12 | End: 2021-08-04

## 2019-12-12 RX ORDER — MELOXICAM 15 MG/1
15 TABLET ORAL DAILY
Qty: 90 TABLET | Refills: 1 | Status: SHIPPED | OUTPATIENT
Start: 2019-12-12 | End: 2020-06-30 | Stop reason: SDUPTHER

## 2019-12-12 NOTE — PROGRESS NOTES
Subjective:       Patient ID: Papa Pope is a 54 y.o. male.    Chief Complaint: Follow-up (6 mon)    Pt is a 54 year old here for 6 month recheck. Pt cholesterol last time was out of control. Continue to have lower back pain but is seeing Interventional pain.    Review of Systems   Constitutional: Negative.    HENT: Negative.    Respiratory: Negative.    Cardiovascular: Negative.    Gastrointestinal: Negative.    Skin: Negative.    Neurological: Negative.    Psychiatric/Behavioral: Negative.        Objective:      Physical Exam   Constitutional: He is oriented to person, place, and time. He appears well-developed and well-nourished.   HENT:   Head: Normocephalic.   Eyes: Pupils are equal, round, and reactive to light. EOM are normal.   Neck: Normal range of motion. Neck supple. No JVD present. No thyromegaly present.   Cardiovascular: Normal rate and regular rhythm.   Pulmonary/Chest: Effort normal and breath sounds normal.   Abdominal: Soft. Bowel sounds are normal.   Musculoskeletal: Normal range of motion.   Lymphadenopathy:     He has no cervical adenopathy.   Neurological: He is alert and oriented to person, place, and time. He has normal reflexes.   Skin: Skin is warm and dry.   Psychiatric: He has a normal mood and affect. His behavior is normal.       Assessment:       1. Essential hypertension    2. Hypercholesteremia    3. Chronic midline low back pain with sciatica, sciatica laterality unspecified        Plan:       Essential hypertension  Comments:  Pt BP is well controlled  Orders:  -     Comprehensive metabolic panel; Future; Expected date: 12/12/2019  -     Lipid panel; Future; Expected date: 12/12/2019    Hypercholesteremia  Comments:  Will get a lipid panel as last lipid was elevated due to ran out of meds    Chronic midline low back pain with sciatica, sciatica laterality unspecified  Comments:  Will start pt on mobic    Other orders  -     predniSONE (DELTASONE) 5 MG tablet; Day 1-2: take 1  tablet every 6 hours  Day 3-4: take 1 tablet every 8 hours  Day 5-6: take 1 tablet every 12 hours  Day 7-8: take 1 table tin am  Dispense: 20 tablet; Refill: 0  -     Discontinue: meloxicam (MOBIC) 15 MG tablet; Take 1 tablet (15 mg total) by mouth once daily.  Dispense: 90 tablet; Refill: 1  -     meloxicam (MOBIC) 15 MG tablet; Take 1 tablet (15 mg total) by mouth once daily.  Dispense: 90 tablet; Refill: 1

## 2019-12-20 ENCOUNTER — TELEPHONE (OUTPATIENT)
Dept: INTERNAL MEDICINE | Facility: CLINIC | Age: 54
End: 2019-12-20

## 2019-12-20 NOTE — TELEPHONE ENCOUNTER
----- Message from Kim Bernardo sent at 12/19/2019  3:33 PM CST -----  PT IS REQUESTING A CALL BACK. PLEASE CALL AND ADVISE      THANK YOU

## 2020-06-25 ENCOUNTER — OFFICE VISIT (OUTPATIENT)
Dept: INTERNAL MEDICINE | Facility: CLINIC | Age: 55
End: 2020-06-25
Payer: MEDICARE

## 2020-06-25 VITALS
DIASTOLIC BLOOD PRESSURE: 74 MMHG | WEIGHT: 212.06 LBS | SYSTOLIC BLOOD PRESSURE: 132 MMHG | OXYGEN SATURATION: 98 % | HEIGHT: 78 IN | BODY MASS INDEX: 24.54 KG/M2 | TEMPERATURE: 97 F | HEART RATE: 83 BPM

## 2020-06-25 DIAGNOSIS — M54.16 LUMBAR RADICULOPATHY: ICD-10-CM

## 2020-06-25 DIAGNOSIS — G89.29 CHRONIC MIDLINE LOW BACK PAIN WITH RIGHT-SIDED SCIATICA: ICD-10-CM

## 2020-06-25 DIAGNOSIS — Z99.89 DEPENDENCE ON OTHER ENABLING MACHINES AND DEVICES: ICD-10-CM

## 2020-06-25 DIAGNOSIS — M54.41 CHRONIC MIDLINE LOW BACK PAIN WITH RIGHT-SIDED SCIATICA: ICD-10-CM

## 2020-06-25 DIAGNOSIS — Z86.010 HISTORY OF COLON POLYPS: ICD-10-CM

## 2020-06-25 DIAGNOSIS — R26.9 ABNORMALITY OF GAIT AND MOBILITY: ICD-10-CM

## 2020-06-25 DIAGNOSIS — I10 ESSENTIAL HYPERTENSION: ICD-10-CM

## 2020-06-25 DIAGNOSIS — Z00.00 ENCOUNTER FOR PREVENTIVE HEALTH EXAMINATION: Primary | ICD-10-CM

## 2020-06-25 DIAGNOSIS — M51.36 DDD (DEGENERATIVE DISC DISEASE), LUMBAR: ICD-10-CM

## 2020-06-25 DIAGNOSIS — E78.00 HYPERCHOLESTEREMIA: ICD-10-CM

## 2020-06-25 DIAGNOSIS — F17.200 SMOKER: ICD-10-CM

## 2020-06-25 DIAGNOSIS — I65.22 STENOSIS OF LEFT CAROTID ARTERY: ICD-10-CM

## 2020-06-25 PROBLEM — Z86.0100 HISTORY OF COLON POLYPS: Status: ACTIVE | Noted: 2020-06-25

## 2020-06-25 PROBLEM — Z12.11 SCREENING FOR COLORECTAL CANCER: Status: RESOLVED | Noted: 2017-08-22 | Resolved: 2020-06-25

## 2020-06-25 PROBLEM — Z12.12 SCREENING FOR COLORECTAL CANCER: Status: RESOLVED | Noted: 2017-08-22 | Resolved: 2020-06-25

## 2020-06-25 PROCEDURE — 3075F PR MOST RECENT SYSTOLIC BLOOD PRESS GE 130-139MM HG: ICD-10-PCS | Mod: HCNC,CPTII,S$GLB, | Performed by: PHYSICIAN ASSISTANT

## 2020-06-25 PROCEDURE — G0439 PPPS, SUBSEQ VISIT: HCPCS | Mod: HCNC,S$GLB,, | Performed by: PHYSICIAN ASSISTANT

## 2020-06-25 PROCEDURE — 99499 UNLISTED E&M SERVICE: CPT | Mod: S$GLB,,, | Performed by: PHYSICIAN ASSISTANT

## 2020-06-25 PROCEDURE — G0439 PR MEDICARE ANNUAL WELLNESS SUBSEQUENT VISIT: ICD-10-PCS | Mod: HCNC,S$GLB,, | Performed by: PHYSICIAN ASSISTANT

## 2020-06-25 PROCEDURE — 3078F DIAST BP <80 MM HG: CPT | Mod: HCNC,CPTII,S$GLB, | Performed by: PHYSICIAN ASSISTANT

## 2020-06-25 PROCEDURE — 3078F PR MOST RECENT DIASTOLIC BLOOD PRESSURE < 80 MM HG: ICD-10-PCS | Mod: HCNC,CPTII,S$GLB, | Performed by: PHYSICIAN ASSISTANT

## 2020-06-25 PROCEDURE — 99999 PR PBB SHADOW E&M-EST. PATIENT-LVL IV: ICD-10-PCS | Mod: PBBFAC,HCNC,, | Performed by: PHYSICIAN ASSISTANT

## 2020-06-25 PROCEDURE — 3075F SYST BP GE 130 - 139MM HG: CPT | Mod: HCNC,CPTII,S$GLB, | Performed by: PHYSICIAN ASSISTANT

## 2020-06-25 PROCEDURE — 99999 PR PBB SHADOW E&M-EST. PATIENT-LVL IV: CPT | Mod: PBBFAC,HCNC,, | Performed by: PHYSICIAN ASSISTANT

## 2020-06-25 PROCEDURE — 99499 RISK ADDL DX/OHS AUDIT: ICD-10-PCS | Mod: S$GLB,,, | Performed by: PHYSICIAN ASSISTANT

## 2020-06-25 NOTE — PROGRESS NOTES
"    I offered to discuss end of life issues, including information on how to make advance directives that the patient could use to name someone who would make medical decisions on their behalf if they became too ill to make themselves.    ___Patient declined  _X_Patient is interested, I provided paper work and offered to discuss.  Papa Pope presented for a  Medicare AWV and comprehensive Health Risk Assessment today. The following components were reviewed and updated:    · Medical history  · Family History  · Social history  · Allergies and Current Medications  · Health Risk Assessment  · Health Maintenance  · Care Team     ** See Completed Assessments for Annual Wellness Visit within the encounter summary.**       The following assessments were completed:  · Living Situation  · CAGE  · Depression Screening  · Timed Get Up and Go  · Whisper Test  · Cognitive Function Screening  · Nutrition Screening  · ADL Screening  · PAQ Screening    Vitals:    06/25/20 1400   BP: 132/74   BP Location: Left arm   Patient Position: Sitting   BP Method: Large (Manual)   Pulse: 83   Temp: 96.8 °F (36 °C)   TempSrc: Tympanic   SpO2: 98%   Weight: 96.2 kg (212 lb 1.3 oz)   Height: 6' 6" (1.981 m)     Body mass index is 24.51 kg/m².  Physical Exam  Vitals signs and nursing note reviewed.   Constitutional:       General: He is not in acute distress.     Appearance: He is well-developed. He is not diaphoretic.   HENT:      Head: Normocephalic and atraumatic.   Cardiovascular:      Rate and Rhythm: Normal rate and regular rhythm.      Heart sounds: Normal heart sounds. No murmur. No friction rub. No gallop.    Pulmonary:      Effort: Pulmonary effort is normal. No respiratory distress.      Breath sounds: Normal breath sounds. No wheezing or rales.   Skin:     General: Skin is warm.   Neurological:      Mental Status: He is alert and oriented to person, place, and time.   Psychiatric:         Behavior: Behavior normal.         Thought " Content: Thought content normal.         Judgment: Judgment normal.           Diagnoses and health risks identified today and associated recommendations/orders:    1. Encounter for preventive health examination  Health Maintenance Due   Topic Date Due    HIV Screening  05/04/1981    Aspirin/Antiplatelet Therapy  05/04/1984    Shingles Vaccine (1 of 2) 05/04/2016   -advised to discuss aspirin therapy and HIV screening at his upcoming appointment with PCP    2. Stenosis of left carotid artery  -noted when reviewing his records on x-ray cervical spine 6/12/2019  -not on aspirin bc patient stated that someone told him not to take it.   -advised to discuss with his pcp.   -stable on lipitor    3. History of colon polyps  -up to date on colonoscopy.   -colonoscopy 8/22/2017    4. Dependence on other enabling machines and devices  -Stable and controlled. Continue current treatment plan as previously prescribed with your PCP.     5. Abnormality of gait and mobility  -Stable and controlled. Continue current treatment plan as previously prescribed with your PCP.     6. Hypercholesteremia  Lab Results   Component Value Date    CHOL 215 (H) 12/12/2019    CHOL 272 (H) 06/12/2019    CHOL 207 (H) 12/12/2018     Lab Results   Component Value Date    HDL 55 12/12/2019    HDL 51 06/12/2019    HDL 49 12/12/2018     Lab Results   Component Value Date    LDLCALC 143.0 12/12/2019    LDLCALC 175.0 (H) 06/12/2019    LDLCALC 133.4 12/12/2018     Lab Results   Component Value Date    TRIG 85 12/12/2019    TRIG 230 (H) 06/12/2019    TRIG 123 12/12/2018     Lab Results   Component Value Date    CHOLHDL 25.6 12/12/2019    CHOLHDL 18.8 (L) 06/12/2019    CHOLHDL 23.7 12/12/2018   -Stable and controlled on lipitor and zetia. Continue current treatment plan as previously prescribed with your PCP.   -scheduled for follow up with pcp next month.       7. Essential hypertension  -Stable and controlled on amlodipine and hctz. Continue current  treatment plan as previously prescribed with your PCP.   -check on his own at home.     8. Chronic midline low back pain with right-sided sciatica  -Stable and controlled. Continue current treatment plan as previously prescribed with your pain management specialist  -x-ray lumbar spine 6/12/2019    9. Lumbar radiculopathy  -Stable and controlled on gabapentin. Continue current treatment plan as previously prescribed with your pain management specialist    10. DDD (degenerative disc disease), lumbar  -Stable and controlled with meloxicam. Continue current treatment plan as previously prescribed with your pain management specialist    11. Smoker  -advised to quit. Offered smoking cessation. Pt wishes to quit on his own.    Provided Papa with a 5-10 year written screening schedule and personal prevention plan. Recommendations were developed using the USPSTF age appropriate recommendations. Education, counseling, and referrals were provided as needed. After Visit Summary printed and given to patient which includes a list of additional screenings\tests needed.    Follow up if symptoms worsen or fail to improve.    Hina Mari PA-C

## 2020-06-25 NOTE — Clinical Note
Carotid artery plaques seen on xray years ago, added to diagnosis list  Pt reports pain with elevating his leg, which gets better with walking noted on his assessment today

## 2020-06-25 NOTE — PATIENT INSTRUCTIONS
Counseling and Referral of Other Preventative  (Italic type indicates deductible and co-insurance are waived)    Patient Name: Papa Pope  Today's Date: 6/25/2020    Health Maintenance       Date Due Completion Date    HIV Screening 05/04/1981 ---    Aspirin/Antiplatelet Therapy 05/04/1984 ---    Shingles Vaccine (1 of 2) 05/04/2016 ---    Influenza Vaccine (Season Ended) 09/01/2020 ---    Lipid Panel 12/12/2020 12/12/2019    High Dose Statin 06/25/2021 6/25/2020    TETANUS VACCINE 11/09/2021 11/9/2011    Colorectal Cancer Screening 08/22/2022 8/22/2017        No orders of the defined types were placed in this encounter.    The following information is provided to all patients.  This information is to help you find resources for any of the problems found today that may be affecting your health:                Living healthy guide: www.[facility].[place].gov      Understanding Diabetes: www.diabetes.org      Eating healthy: www.cdc.gov/healthyweight      [facility] home safety checklist: www.cdc.gov/steadi/patient.html      Agency on Aging: www.goea.[place].[facility]      Alcoholics anonymous (AA): www.aa.org      Physical Activity: www.jalil.nih.gov/fw2kcbr      Tobacco use: www.quitwithusla.org

## 2020-09-29 ENCOUNTER — PATIENT MESSAGE (OUTPATIENT)
Dept: OTHER | Facility: OTHER | Age: 55
End: 2020-09-29

## 2020-12-11 ENCOUNTER — PATIENT MESSAGE (OUTPATIENT)
Dept: OTHER | Facility: OTHER | Age: 55
End: 2020-12-11

## 2021-02-03 ENCOUNTER — OFFICE VISIT (OUTPATIENT)
Dept: INTERNAL MEDICINE | Facility: CLINIC | Age: 56
End: 2021-02-03
Payer: MEDICARE

## 2021-02-03 ENCOUNTER — HOSPITAL ENCOUNTER (OUTPATIENT)
Dept: RADIOLOGY | Facility: HOSPITAL | Age: 56
Discharge: HOME | End: 2021-02-03
Attending: FAMILY MEDICINE
Payer: MEDICARE

## 2021-02-03 VITALS
OXYGEN SATURATION: 98 % | BODY MASS INDEX: 25.32 KG/M2 | WEIGHT: 219.13 LBS | HEART RATE: 77 BPM | SYSTOLIC BLOOD PRESSURE: 110 MMHG | DIASTOLIC BLOOD PRESSURE: 72 MMHG | TEMPERATURE: 99 F

## 2021-02-03 DIAGNOSIS — E78.2 MIXED HYPERLIPIDEMIA: ICD-10-CM

## 2021-02-03 DIAGNOSIS — M54.9 DORSALGIA, UNSPECIFIED: ICD-10-CM

## 2021-02-03 DIAGNOSIS — Z12.5 ENCOUNTER FOR SCREENING FOR MALIGNANT NEOPLASM OF PROSTATE: ICD-10-CM

## 2021-02-03 DIAGNOSIS — M47.22 OSTEOARTHRITIS OF SPINE WITH RADICULOPATHY, CERVICAL REGION: ICD-10-CM

## 2021-02-03 DIAGNOSIS — M47.26 OSTEOARTHRITIS OF SPINE WITH RADICULOPATHY, LUMBAR REGION: ICD-10-CM

## 2021-02-03 DIAGNOSIS — F17.200 SMOKER: ICD-10-CM

## 2021-02-03 DIAGNOSIS — I65.22 STENOSIS OF LEFT CAROTID ARTERY: ICD-10-CM

## 2021-02-03 DIAGNOSIS — I10 ESSENTIAL HYPERTENSION: ICD-10-CM

## 2021-02-03 DIAGNOSIS — Z86.010 HISTORY OF COLON POLYPS: ICD-10-CM

## 2021-02-03 DIAGNOSIS — Z00.00 ROUTINE GENERAL MEDICAL EXAMINATION AT A HEALTH CARE FACILITY: Primary | ICD-10-CM

## 2021-02-03 PROCEDURE — 90686 IIV4 VACC NO PRSV 0.5 ML IM: CPT | Mod: S$GLB,,, | Performed by: FAMILY MEDICINE

## 2021-02-03 PROCEDURE — 99396 PR PREVENTIVE VISIT,EST,40-64: ICD-10-PCS | Mod: 25,S$GLB,, | Performed by: FAMILY MEDICINE

## 2021-02-03 PROCEDURE — 99999 PR PBB SHADOW E&M-EST. PATIENT-LVL V: ICD-10-PCS | Mod: PBBFAC,,, | Performed by: FAMILY MEDICINE

## 2021-02-03 PROCEDURE — 72110 XR LUMBAR SPINE COMPLETE 5 VIEW: ICD-10-PCS | Mod: 26,,, | Performed by: RADIOLOGY

## 2021-02-03 PROCEDURE — 72110 X-RAY EXAM L-2 SPINE 4/>VWS: CPT | Mod: 26,,, | Performed by: RADIOLOGY

## 2021-02-03 PROCEDURE — 1125F PR PAIN SEVERITY QUANTIFIED, PAIN PRESENT: ICD-10-PCS | Mod: S$GLB,,, | Performed by: FAMILY MEDICINE

## 2021-02-03 PROCEDURE — 3078F DIAST BP <80 MM HG: CPT | Mod: CPTII,S$GLB,, | Performed by: FAMILY MEDICINE

## 2021-02-03 PROCEDURE — 3078F PR MOST RECENT DIASTOLIC BLOOD PRESSURE < 80 MM HG: ICD-10-PCS | Mod: CPTII,S$GLB,, | Performed by: FAMILY MEDICINE

## 2021-02-03 PROCEDURE — 3008F BODY MASS INDEX DOCD: CPT | Mod: CPTII,S$GLB,, | Performed by: FAMILY MEDICINE

## 2021-02-03 PROCEDURE — 72050 X-RAY EXAM NECK SPINE 4/5VWS: CPT | Mod: 26,,, | Performed by: RADIOLOGY

## 2021-02-03 PROCEDURE — 72050 XR CERVICAL SPINE COMPLETE 5 VIEW: ICD-10-PCS | Mod: 26,,, | Performed by: RADIOLOGY

## 2021-02-03 PROCEDURE — 3008F PR BODY MASS INDEX (BMI) DOCUMENTED: ICD-10-PCS | Mod: CPTII,S$GLB,, | Performed by: FAMILY MEDICINE

## 2021-02-03 PROCEDURE — 99396 PREV VISIT EST AGE 40-64: CPT | Mod: 25,S$GLB,, | Performed by: FAMILY MEDICINE

## 2021-02-03 PROCEDURE — 1125F AMNT PAIN NOTED PAIN PRSNT: CPT | Mod: S$GLB,,, | Performed by: FAMILY MEDICINE

## 2021-02-03 PROCEDURE — 72050 X-RAY EXAM NECK SPINE 4/5VWS: CPT | Mod: TC

## 2021-02-03 PROCEDURE — 72110 X-RAY EXAM L-2 SPINE 4/>VWS: CPT | Mod: TC

## 2021-02-03 PROCEDURE — 99999 PR PBB SHADOW E&M-EST. PATIENT-LVL V: CPT | Mod: PBBFAC,,, | Performed by: FAMILY MEDICINE

## 2021-02-03 PROCEDURE — 90686 FLU VACCINE (QUAD) GREATER THAN OR EQUAL TO 3YO PRESERVATIVE FREE IM: ICD-10-PCS | Mod: S$GLB,,, | Performed by: FAMILY MEDICINE

## 2021-02-03 PROCEDURE — G0008 ADMIN INFLUENZA VIRUS VAC: HCPCS | Mod: S$GLB,,, | Performed by: FAMILY MEDICINE

## 2021-02-03 PROCEDURE — 3074F SYST BP LT 130 MM HG: CPT | Mod: CPTII,S$GLB,, | Performed by: FAMILY MEDICINE

## 2021-02-03 PROCEDURE — G0008 FLU VACCINE (QUAD) GREATER THAN OR EQUAL TO 3YO PRESERVATIVE FREE IM: ICD-10-PCS | Mod: S$GLB,,, | Performed by: FAMILY MEDICINE

## 2021-02-03 PROCEDURE — 3074F PR MOST RECENT SYSTOLIC BLOOD PRESSURE < 130 MM HG: ICD-10-PCS | Mod: CPTII,S$GLB,, | Performed by: FAMILY MEDICINE

## 2021-02-03 RX ORDER — AMLODIPINE BESYLATE 10 MG/1
TABLET ORAL
Qty: 90 TABLET | Refills: 2 | Status: SHIPPED | OUTPATIENT
Start: 2021-02-03 | End: 2022-01-24 | Stop reason: SDUPTHER

## 2021-02-03 RX ORDER — EZETIMIBE 10 MG/1
10 TABLET ORAL DAILY
Qty: 90 TABLET | Refills: 3 | Status: SHIPPED | OUTPATIENT
Start: 2021-02-03 | End: 2022-01-24 | Stop reason: SDUPTHER

## 2021-02-03 RX ORDER — HYDROCHLOROTHIAZIDE 12.5 MG/1
25 CAPSULE ORAL 2 TIMES DAILY
Qty: 360 CAPSULE | Refills: 2 | Status: SHIPPED | OUTPATIENT
Start: 2021-02-03 | End: 2021-02-03 | Stop reason: DRUGHIGH

## 2021-02-03 RX ORDER — ATORVASTATIN CALCIUM 80 MG/1
80 TABLET, FILM COATED ORAL DAILY
Qty: 90 TABLET | Refills: 2 | Status: SHIPPED | OUTPATIENT
Start: 2021-02-03 | End: 2021-02-05 | Stop reason: ALTCHOICE

## 2021-02-03 RX ORDER — METOPROLOL TARTRATE 50 MG/1
50 TABLET ORAL DAILY
Qty: 90 TABLET | Refills: 2 | Status: SHIPPED | OUTPATIENT
Start: 2021-02-03 | End: 2022-01-24 | Stop reason: SDUPTHER

## 2021-02-03 RX ORDER — MELOXICAM 15 MG/1
15 TABLET ORAL DAILY
Qty: 90 TABLET | Refills: 1 | Status: SHIPPED | OUTPATIENT
Start: 2021-02-03 | End: 2021-08-04 | Stop reason: SDUPTHER

## 2021-02-03 RX ORDER — HYDROCHLOROTHIAZIDE 25 MG/1
25 TABLET ORAL DAILY
Qty: 90 TABLET | Refills: 1 | Status: SHIPPED | OUTPATIENT
Start: 2021-02-03 | End: 2022-01-24 | Stop reason: SDUPTHER

## 2021-02-03 RX ORDER — METHOCARBAMOL 750 MG/1
750 TABLET, FILM COATED ORAL 3 TIMES DAILY PRN
Qty: 30 TABLET | Refills: 0 | Status: SHIPPED | OUTPATIENT
Start: 2021-02-03 | End: 2021-02-13

## 2021-02-04 ENCOUNTER — OFFICE VISIT (OUTPATIENT)
Dept: PAIN MEDICINE | Facility: CLINIC | Age: 56
End: 2021-02-04
Payer: MEDICARE

## 2021-02-04 VITALS
HEIGHT: 78 IN | SYSTOLIC BLOOD PRESSURE: 144 MMHG | RESPIRATION RATE: 17 BRPM | HEART RATE: 83 BPM | WEIGHT: 216 LBS | BODY MASS INDEX: 24.99 KG/M2 | DIASTOLIC BLOOD PRESSURE: 92 MMHG

## 2021-02-04 DIAGNOSIS — M47.26 OSTEOARTHRITIS OF SPINE WITH RADICULOPATHY, LUMBAR REGION: ICD-10-CM

## 2021-02-04 DIAGNOSIS — M48.00 CENTRAL STENOSIS OF SPINAL CANAL: ICD-10-CM

## 2021-02-04 DIAGNOSIS — M47.22 OSTEOARTHRITIS OF SPINE WITH RADICULOPATHY, CERVICAL REGION: ICD-10-CM

## 2021-02-04 DIAGNOSIS — M54.16 LUMBAR RADICULOPATHY: ICD-10-CM

## 2021-02-04 DIAGNOSIS — M47.816 LUMBAR SPONDYLOSIS: Primary | ICD-10-CM

## 2021-02-04 PROCEDURE — 99999 PR PBB SHADOW E&M-EST. PATIENT-LVL IV: ICD-10-PCS | Mod: PBBFAC,,, | Performed by: ANESTHESIOLOGY

## 2021-02-04 PROCEDURE — 99999 PR PBB SHADOW E&M-EST. PATIENT-LVL IV: CPT | Mod: PBBFAC,,, | Performed by: ANESTHESIOLOGY

## 2021-02-04 PROCEDURE — 3008F BODY MASS INDEX DOCD: CPT | Mod: CPTII,S$GLB,, | Performed by: ANESTHESIOLOGY

## 2021-02-04 PROCEDURE — 3080F PR MOST RECENT DIASTOLIC BLOOD PRESSURE >= 90 MM HG: ICD-10-PCS | Mod: CPTII,S$GLB,, | Performed by: ANESTHESIOLOGY

## 2021-02-04 PROCEDURE — 3077F SYST BP >= 140 MM HG: CPT | Mod: CPTII,S$GLB,, | Performed by: ANESTHESIOLOGY

## 2021-02-04 PROCEDURE — 1125F PR PAIN SEVERITY QUANTIFIED, PAIN PRESENT: ICD-10-PCS | Mod: S$GLB,,, | Performed by: ANESTHESIOLOGY

## 2021-02-04 PROCEDURE — 3080F DIAST BP >= 90 MM HG: CPT | Mod: CPTII,S$GLB,, | Performed by: ANESTHESIOLOGY

## 2021-02-04 PROCEDURE — 99213 PR OFFICE/OUTPT VISIT, EST, LEVL III, 20-29 MIN: ICD-10-PCS | Mod: S$GLB,,, | Performed by: ANESTHESIOLOGY

## 2021-02-04 PROCEDURE — 3077F PR MOST RECENT SYSTOLIC BLOOD PRESSURE >= 140 MM HG: ICD-10-PCS | Mod: CPTII,S$GLB,, | Performed by: ANESTHESIOLOGY

## 2021-02-04 PROCEDURE — 99213 OFFICE O/P EST LOW 20 MIN: CPT | Mod: S$GLB,,, | Performed by: ANESTHESIOLOGY

## 2021-02-04 PROCEDURE — 1125F AMNT PAIN NOTED PAIN PRSNT: CPT | Mod: S$GLB,,, | Performed by: ANESTHESIOLOGY

## 2021-02-04 PROCEDURE — 3008F PR BODY MASS INDEX (BMI) DOCUMENTED: ICD-10-PCS | Mod: CPTII,S$GLB,, | Performed by: ANESTHESIOLOGY

## 2021-02-05 ENCOUNTER — TELEPHONE (OUTPATIENT)
Dept: INTERNAL MEDICINE | Facility: CLINIC | Age: 56
End: 2021-02-05

## 2021-02-05 DIAGNOSIS — R73.01 ELEVATED FASTING GLUCOSE: ICD-10-CM

## 2021-02-05 DIAGNOSIS — E78.2 MIXED HYPERLIPIDEMIA: Primary | ICD-10-CM

## 2021-02-05 RX ORDER — ROSUVASTATIN CALCIUM 40 MG/1
40 TABLET, COATED ORAL NIGHTLY
Qty: 90 TABLET | Refills: 3 | Status: SHIPPED | OUTPATIENT
Start: 2021-02-05 | End: 2022-01-24 | Stop reason: SDUPTHER

## 2021-02-07 ENCOUNTER — PATIENT MESSAGE (OUTPATIENT)
Dept: INTERNAL MEDICINE | Facility: CLINIC | Age: 56
End: 2021-02-07

## 2021-02-08 ENCOUNTER — TELEPHONE (OUTPATIENT)
Dept: RADIOLOGY | Facility: HOSPITAL | Age: 56
End: 2021-02-08

## 2021-02-09 ENCOUNTER — HOSPITAL ENCOUNTER (OUTPATIENT)
Dept: RADIOLOGY | Facility: HOSPITAL | Age: 56
Discharge: HOME | End: 2021-02-09
Attending: FAMILY MEDICINE
Payer: MEDICARE

## 2021-02-09 DIAGNOSIS — I65.22 STENOSIS OF LEFT CAROTID ARTERY: ICD-10-CM

## 2021-02-09 PROCEDURE — 93880 EXTRACRANIAL BILAT STUDY: CPT | Mod: TC

## 2021-02-09 PROCEDURE — 93880 US CAROTID BILATERAL: ICD-10-PCS | Mod: 26,,, | Performed by: RADIOLOGY

## 2021-02-09 PROCEDURE — 93880 EXTRACRANIAL BILAT STUDY: CPT | Mod: 26,,, | Performed by: RADIOLOGY

## 2021-02-12 ENCOUNTER — TELEPHONE (OUTPATIENT)
Dept: INTERNAL MEDICINE | Facility: CLINIC | Age: 56
End: 2021-02-12

## 2021-02-17 ENCOUNTER — TELEPHONE (OUTPATIENT)
Dept: INTERNAL MEDICINE | Facility: CLINIC | Age: 56
End: 2021-02-17

## 2021-03-18 ENCOUNTER — IMMUNIZATION (OUTPATIENT)
Dept: PHARMACY | Facility: CLINIC | Age: 56
End: 2021-03-18
Payer: MEDICARE

## 2021-03-18 DIAGNOSIS — Z23 NEED FOR VACCINATION: Primary | ICD-10-CM

## 2021-04-15 ENCOUNTER — IMMUNIZATION (OUTPATIENT)
Dept: PHARMACY | Facility: CLINIC | Age: 56
End: 2021-04-15
Payer: MEDICARE

## 2021-04-15 DIAGNOSIS — Z23 NEED FOR VACCINATION: Primary | ICD-10-CM

## 2021-05-12 ENCOUNTER — PATIENT OUTREACH (OUTPATIENT)
Dept: DIABETES | Facility: CLINIC | Age: 56
End: 2021-05-12

## 2021-08-04 ENCOUNTER — OFFICE VISIT (OUTPATIENT)
Dept: INTERNAL MEDICINE | Facility: CLINIC | Age: 56
End: 2021-08-04
Payer: MEDICARE

## 2021-08-04 VITALS
BODY MASS INDEX: 24.51 KG/M2 | DIASTOLIC BLOOD PRESSURE: 80 MMHG | WEIGHT: 212.06 LBS | TEMPERATURE: 98 F | OXYGEN SATURATION: 98 % | SYSTOLIC BLOOD PRESSURE: 130 MMHG | HEART RATE: 65 BPM

## 2021-08-04 DIAGNOSIS — E78.5 HYPERLIPIDEMIA DUE TO TYPE 2 DIABETES MELLITUS: ICD-10-CM

## 2021-08-04 DIAGNOSIS — M62.838 CERVICAL PARASPINAL MUSCLE SPASM: Primary | ICD-10-CM

## 2021-08-04 DIAGNOSIS — E11.9 TYPE 2 DIABETES MELLITUS WITHOUT COMPLICATION, WITHOUT LONG-TERM CURRENT USE OF INSULIN: ICD-10-CM

## 2021-08-04 DIAGNOSIS — I15.2 HYPERTENSION ASSOCIATED WITH DIABETES: ICD-10-CM

## 2021-08-04 DIAGNOSIS — B35.1 ONYCHOMYCOSIS OF MULTIPLE TOENAILS WITH TYPE 2 DIABETES MELLITUS AND PERIPHERAL NEUROPATHY: ICD-10-CM

## 2021-08-04 DIAGNOSIS — M47.22 OSTEOARTHRITIS OF SPINE WITH RADICULOPATHY, CERVICAL REGION: ICD-10-CM

## 2021-08-04 DIAGNOSIS — E11.42 ONYCHOMYCOSIS OF MULTIPLE TOENAILS WITH TYPE 2 DIABETES MELLITUS AND PERIPHERAL NEUROPATHY: ICD-10-CM

## 2021-08-04 DIAGNOSIS — E11.69 HYPERLIPIDEMIA DUE TO TYPE 2 DIABETES MELLITUS: ICD-10-CM

## 2021-08-04 DIAGNOSIS — M51.36 DDD (DEGENERATIVE DISC DISEASE), LUMBAR: ICD-10-CM

## 2021-08-04 DIAGNOSIS — E11.69 ONYCHOMYCOSIS OF MULTIPLE TOENAILS WITH TYPE 2 DIABETES MELLITUS AND PERIPHERAL NEUROPATHY: ICD-10-CM

## 2021-08-04 DIAGNOSIS — E11.59 HYPERTENSION ASSOCIATED WITH DIABETES: ICD-10-CM

## 2021-08-04 DIAGNOSIS — Z86.010 HISTORY OF COLON POLYPS: ICD-10-CM

## 2021-08-04 DIAGNOSIS — F17.200 SMOKER: ICD-10-CM

## 2021-08-04 DIAGNOSIS — M47.26 OSTEOARTHRITIS OF SPINE WITH RADICULOPATHY, LUMBAR REGION: ICD-10-CM

## 2021-08-04 PROBLEM — I65.22 STENOSIS OF LEFT CAROTID ARTERY: Status: RESOLVED | Noted: 2020-06-25 | Resolved: 2021-08-04

## 2021-08-04 PROBLEM — M54.16 LUMBAR RADICULOPATHY: Status: RESOLVED | Noted: 2019-08-13 | Resolved: 2021-08-04

## 2021-08-04 LAB
ALBUMIN SERPL BCP-MCNC: 4.2 G/DL (ref 3.5–5.2)
ALBUMIN/CREAT UR: 45 UG/MG (ref 0–30)
ALP SERPL-CCNC: 81 U/L (ref 55–135)
ALT SERPL W/O P-5'-P-CCNC: 29 U/L (ref 10–44)
ANION GAP SERPL CALC-SCNC: 9 MMOL/L (ref 8–16)
AST SERPL-CCNC: 21 U/L (ref 10–40)
BILIRUB SERPL-MCNC: 0.6 MG/DL (ref 0.1–1)
BUN SERPL-MCNC: 16 MG/DL (ref 6–20)
CALCIUM SERPL-MCNC: 9.8 MG/DL (ref 8.7–10.5)
CHLORIDE SERPL-SCNC: 104 MMOL/L (ref 95–110)
CHOLEST SERPL-MCNC: 155 MG/DL (ref 120–199)
CHOLEST/HDLC SERPL: 3.4 {RATIO} (ref 2–5)
CO2 SERPL-SCNC: 25 MMOL/L (ref 23–29)
CREAT SERPL-MCNC: 1.3 MG/DL (ref 0.5–1.4)
CREAT UR-MCNC: 80 MG/DL (ref 23–375)
EST. GFR  (AFRICAN AMERICAN): >60 ML/MIN/1.73 M^2
EST. GFR  (NON AFRICAN AMERICAN): >60 ML/MIN/1.73 M^2
GLUCOSE SERPL-MCNC: 138 MG/DL (ref 70–110)
HDLC SERPL-MCNC: 45 MG/DL (ref 40–75)
HDLC SERPL: 29 % (ref 20–50)
LDLC SERPL CALC-MCNC: 89.4 MG/DL (ref 63–159)
MICROALBUMIN UR DL<=1MG/L-MCNC: 36 UG/ML
NONHDLC SERPL-MCNC: 110 MG/DL
POTASSIUM SERPL-SCNC: 4.1 MMOL/L (ref 3.5–5.1)
PROT SERPL-MCNC: 7.6 G/DL (ref 6–8.4)
SODIUM SERPL-SCNC: 138 MMOL/L (ref 136–145)
TRIGL SERPL-MCNC: 103 MG/DL (ref 30–150)
TSH SERPL DL<=0.005 MIU/L-ACNC: 2.19 UIU/ML (ref 0.4–4)

## 2021-08-04 PROCEDURE — 80053 COMPREHEN METABOLIC PANEL: CPT | Performed by: FAMILY MEDICINE

## 2021-08-04 PROCEDURE — 3079F DIAST BP 80-89 MM HG: CPT | Mod: CPTII,S$GLB,, | Performed by: FAMILY MEDICINE

## 2021-08-04 PROCEDURE — 1125F AMNT PAIN NOTED PAIN PRSNT: CPT | Mod: CPTII,S$GLB,, | Performed by: FAMILY MEDICINE

## 2021-08-04 PROCEDURE — 1159F PR MEDICATION LIST DOCUMENTED IN MEDICAL RECORD: ICD-10-PCS | Mod: CPTII,S$GLB,, | Performed by: FAMILY MEDICINE

## 2021-08-04 PROCEDURE — 99214 PR OFFICE/OUTPT VISIT, EST, LEVL IV, 30-39 MIN: ICD-10-PCS | Mod: S$GLB,,, | Performed by: FAMILY MEDICINE

## 2021-08-04 PROCEDURE — 1160F PR REVIEW ALL MEDS BY PRESCRIBER/CLIN PHARMACIST DOCUMENTED: ICD-10-PCS | Mod: CPTII,S$GLB,, | Performed by: FAMILY MEDICINE

## 2021-08-04 PROCEDURE — 3008F PR BODY MASS INDEX (BMI) DOCUMENTED: ICD-10-PCS | Mod: CPTII,S$GLB,, | Performed by: FAMILY MEDICINE

## 2021-08-04 PROCEDURE — 1160F RVW MEDS BY RX/DR IN RCRD: CPT | Mod: CPTII,S$GLB,, | Performed by: FAMILY MEDICINE

## 2021-08-04 PROCEDURE — 1125F PR PAIN SEVERITY QUANTIFIED, PAIN PRESENT: ICD-10-PCS | Mod: CPTII,S$GLB,, | Performed by: FAMILY MEDICINE

## 2021-08-04 PROCEDURE — 84443 ASSAY THYROID STIM HORMONE: CPT | Performed by: FAMILY MEDICINE

## 2021-08-04 PROCEDURE — 3075F PR MOST RECENT SYSTOLIC BLOOD PRESS GE 130-139MM HG: ICD-10-PCS | Mod: CPTII,S$GLB,, | Performed by: FAMILY MEDICINE

## 2021-08-04 PROCEDURE — 3051F HG A1C>EQUAL 7.0%<8.0%: CPT | Mod: CPTII,S$GLB,, | Performed by: FAMILY MEDICINE

## 2021-08-04 PROCEDURE — 99214 OFFICE O/P EST MOD 30 MIN: CPT | Mod: S$GLB,,, | Performed by: FAMILY MEDICINE

## 2021-08-04 PROCEDURE — 99999 PR PBB SHADOW E&M-EST. PATIENT-LVL IV: ICD-10-PCS | Mod: PBBFAC,,, | Performed by: FAMILY MEDICINE

## 2021-08-04 PROCEDURE — 3075F SYST BP GE 130 - 139MM HG: CPT | Mod: CPTII,S$GLB,, | Performed by: FAMILY MEDICINE

## 2021-08-04 PROCEDURE — 83036 HEMOGLOBIN GLYCOSYLATED A1C: CPT | Performed by: FAMILY MEDICINE

## 2021-08-04 PROCEDURE — 3079F PR MOST RECENT DIASTOLIC BLOOD PRESSURE 80-89 MM HG: ICD-10-PCS | Mod: CPTII,S$GLB,, | Performed by: FAMILY MEDICINE

## 2021-08-04 PROCEDURE — 1159F MED LIST DOCD IN RCRD: CPT | Mod: CPTII,S$GLB,, | Performed by: FAMILY MEDICINE

## 2021-08-04 PROCEDURE — 82043 UR ALBUMIN QUANTITATIVE: CPT | Performed by: FAMILY MEDICINE

## 2021-08-04 PROCEDURE — 3008F BODY MASS INDEX DOCD: CPT | Mod: CPTII,S$GLB,, | Performed by: FAMILY MEDICINE

## 2021-08-04 PROCEDURE — 99999 PR PBB SHADOW E&M-EST. PATIENT-LVL IV: CPT | Mod: PBBFAC,,, | Performed by: FAMILY MEDICINE

## 2021-08-04 PROCEDURE — 99499 UNLISTED E&M SERVICE: CPT | Mod: S$GLB,,, | Performed by: FAMILY MEDICINE

## 2021-08-04 PROCEDURE — 80061 LIPID PANEL: CPT | Performed by: FAMILY MEDICINE

## 2021-08-04 PROCEDURE — 3051F PR MOST RECENT HEMOGLOBIN A1C LEVEL 7.0 - < 8.0%: ICD-10-PCS | Mod: CPTII,S$GLB,, | Performed by: FAMILY MEDICINE

## 2021-08-04 PROCEDURE — 82570 ASSAY OF URINE CREATININE: CPT | Performed by: FAMILY MEDICINE

## 2021-08-04 PROCEDURE — 99499 RISK ADDL DX/OHS AUDIT: ICD-10-PCS | Mod: S$GLB,,, | Performed by: FAMILY MEDICINE

## 2021-08-04 RX ORDER — METHOCARBAMOL 750 MG/1
750 TABLET, FILM COATED ORAL 3 TIMES DAILY PRN
Qty: 30 TABLET | Refills: 0 | Status: SHIPPED | OUTPATIENT
Start: 2021-08-04 | End: 2021-08-14

## 2021-08-04 RX ORDER — NAPROXEN SODIUM 220 MG/1
81 TABLET, FILM COATED ORAL DAILY
Qty: 90 TABLET | Refills: 3 | Status: ON HOLD | OUTPATIENT
Start: 2021-08-04 | End: 2022-01-27 | Stop reason: HOSPADM

## 2021-08-04 RX ORDER — CICLOPIROX 80 MG/ML
SOLUTION TOPICAL NIGHTLY
Qty: 1 BOTTLE | Refills: 6 | Status: SHIPPED | OUTPATIENT
Start: 2021-08-04 | End: 2022-03-10

## 2021-08-04 RX ORDER — MELOXICAM 15 MG/1
15 TABLET ORAL DAILY
Qty: 90 TABLET | Refills: 1 | Status: SHIPPED | OUTPATIENT
Start: 2021-08-04 | End: 2022-01-26

## 2021-08-05 LAB
ESTIMATED AVG GLUCOSE: 157 MG/DL (ref 68–131)
HBA1C MFR BLD: 7.1 % (ref 4–5.6)

## 2021-08-09 ENCOUNTER — CLINICAL SUPPORT (OUTPATIENT)
Dept: REHABILITATION | Facility: HOSPITAL | Age: 56
End: 2021-08-09
Payer: MEDICARE

## 2021-08-09 DIAGNOSIS — M51.36 DDD (DEGENERATIVE DISC DISEASE), LUMBAR: ICD-10-CM

## 2021-08-09 DIAGNOSIS — M47.22 OSTEOARTHRITIS OF SPINE WITH RADICULOPATHY, CERVICAL REGION: ICD-10-CM

## 2021-08-09 DIAGNOSIS — R29.898 DECREASED STRENGTH OF TRUNK AND BACK: ICD-10-CM

## 2021-08-09 DIAGNOSIS — M62.838 CERVICAL PARASPINAL MUSCLE SPASM: ICD-10-CM

## 2021-08-09 PROCEDURE — 97110 THERAPEUTIC EXERCISES: CPT

## 2021-08-09 PROCEDURE — 97162 PT EVAL MOD COMPLEX 30 MIN: CPT

## 2021-08-17 ENCOUNTER — CLINICAL SUPPORT (OUTPATIENT)
Dept: REHABILITATION | Facility: HOSPITAL | Age: 56
End: 2021-08-17
Payer: MEDICARE

## 2021-08-17 DIAGNOSIS — R29.898 DECREASED STRENGTH OF TRUNK AND BACK: ICD-10-CM

## 2021-08-17 PROCEDURE — 97110 THERAPEUTIC EXERCISES: CPT

## 2021-08-24 ENCOUNTER — CLINICAL SUPPORT (OUTPATIENT)
Dept: REHABILITATION | Facility: HOSPITAL | Age: 56
End: 2021-08-24
Payer: MEDICARE

## 2021-08-24 DIAGNOSIS — R29.898 DECREASED STRENGTH OF TRUNK AND BACK: ICD-10-CM

## 2021-08-24 PROCEDURE — 97110 THERAPEUTIC EXERCISES: CPT

## 2021-09-07 ENCOUNTER — TELEPHONE (OUTPATIENT)
Dept: REHABILITATION | Facility: HOSPITAL | Age: 56
End: 2021-09-07

## 2021-09-10 ENCOUNTER — CLINICAL SUPPORT (OUTPATIENT)
Dept: REHABILITATION | Facility: HOSPITAL | Age: 56
End: 2021-09-10
Payer: MEDICARE

## 2021-09-10 DIAGNOSIS — R29.898 DECREASED STRENGTH OF TRUNK AND BACK: ICD-10-CM

## 2021-09-10 PROCEDURE — 97110 THERAPEUTIC EXERCISES: CPT

## 2021-12-30 ENCOUNTER — LAB VISIT (OUTPATIENT)
Dept: PRIMARY CARE CLINIC | Facility: OTHER | Age: 56
End: 2021-12-30
Attending: INTERNAL MEDICINE
Payer: MEDICARE

## 2021-12-30 DIAGNOSIS — Z20.822 ENCOUNTER FOR LABORATORY TESTING FOR COVID-19 VIRUS: ICD-10-CM

## 2021-12-30 PROCEDURE — U0003 INFECTIOUS AGENT DETECTION BY NUCLEIC ACID (DNA OR RNA); SEVERE ACUTE RESPIRATORY SYNDROME CORONAVIRUS 2 (SARS-COV-2) (CORONAVIRUS DISEASE [COVID-19]), AMPLIFIED PROBE TECHNIQUE, MAKING USE OF HIGH THROUGHPUT TECHNOLOGIES AS DESCRIBED BY CMS-2020-01-R: HCPCS | Mod: HCNC | Performed by: INTERNAL MEDICINE

## 2022-01-01 LAB
SARS-COV-2 RNA RESP QL NAA+PROBE: NOT DETECTED
SARS-COV-2- CYCLE NUMBER: NORMAL

## 2022-01-24 ENCOUNTER — PATIENT OUTREACH (OUTPATIENT)
Dept: ADMINISTRATIVE | Facility: OTHER | Age: 57
End: 2022-01-24
Payer: MEDICARE

## 2022-01-24 ENCOUNTER — PATIENT MESSAGE (OUTPATIENT)
Dept: ADMINISTRATIVE | Facility: OTHER | Age: 57
End: 2022-01-24
Payer: MEDICARE

## 2022-01-24 ENCOUNTER — OFFICE VISIT (OUTPATIENT)
Dept: INTERNAL MEDICINE | Facility: CLINIC | Age: 57
End: 2022-01-24
Payer: MEDICARE

## 2022-01-24 VITALS
BODY MASS INDEX: 22.78 KG/M2 | SYSTOLIC BLOOD PRESSURE: 122 MMHG | DIASTOLIC BLOOD PRESSURE: 76 MMHG | WEIGHT: 197.06 LBS | TEMPERATURE: 98 F | HEART RATE: 100 BPM | OXYGEN SATURATION: 98 %

## 2022-01-24 DIAGNOSIS — M51.36 DDD (DEGENERATIVE DISC DISEASE), LUMBAR: ICD-10-CM

## 2022-01-24 DIAGNOSIS — Z00.00 ROUTINE GENERAL MEDICAL EXAMINATION AT A HEALTH CARE FACILITY: Primary | ICD-10-CM

## 2022-01-24 DIAGNOSIS — Z12.5 ENCOUNTER FOR SCREENING FOR MALIGNANT NEOPLASM OF PROSTATE: ICD-10-CM

## 2022-01-24 DIAGNOSIS — E11.59 HYPERTENSION ASSOCIATED WITH DIABETES: ICD-10-CM

## 2022-01-24 DIAGNOSIS — F17.200 SMOKER: ICD-10-CM

## 2022-01-24 DIAGNOSIS — E11.69 HYPERLIPIDEMIA DUE TO TYPE 2 DIABETES MELLITUS: ICD-10-CM

## 2022-01-24 DIAGNOSIS — Z86.010 HISTORY OF COLON POLYPS: ICD-10-CM

## 2022-01-24 DIAGNOSIS — B35.1 ONYCHOMYCOSIS: ICD-10-CM

## 2022-01-24 DIAGNOSIS — E78.5 HYPERLIPIDEMIA DUE TO TYPE 2 DIABETES MELLITUS: ICD-10-CM

## 2022-01-24 DIAGNOSIS — I15.2 HYPERTENSION ASSOCIATED WITH DIABETES: ICD-10-CM

## 2022-01-24 DIAGNOSIS — L84 CALLUS OF FOOT: ICD-10-CM

## 2022-01-24 DIAGNOSIS — E11.9 TYPE 2 DIABETES MELLITUS WITHOUT COMPLICATION, WITHOUT LONG-TERM CURRENT USE OF INSULIN: ICD-10-CM

## 2022-01-24 LAB
ALBUMIN SERPL BCP-MCNC: 4 G/DL (ref 3.5–5.2)
ALP SERPL-CCNC: 124 U/L (ref 55–135)
ALT SERPL W/O P-5'-P-CCNC: 16 U/L (ref 10–44)
ANION GAP SERPL CALC-SCNC: 21 MMOL/L (ref 8–16)
AST SERPL-CCNC: 13 U/L (ref 10–40)
BASOPHILS # BLD AUTO: 0.05 K/UL (ref 0–0.2)
BASOPHILS NFR BLD: 0.6 % (ref 0–1.9)
BILIRUB SERPL-MCNC: 0.9 MG/DL (ref 0.1–1)
BUN SERPL-MCNC: 41 MG/DL (ref 6–20)
CALCIUM SERPL-MCNC: 10 MG/DL (ref 8.7–10.5)
CHLORIDE SERPL-SCNC: 90 MMOL/L (ref 95–110)
CHOLEST SERPL-MCNC: 151 MG/DL (ref 120–199)
CHOLEST/HDLC SERPL: 4.9 {RATIO} (ref 2–5)
CO2 SERPL-SCNC: 18 MMOL/L (ref 23–29)
COMPLEXED PSA SERPL-MCNC: 0.6 NG/ML (ref 0–4)
CREAT SERPL-MCNC: 2.2 MG/DL (ref 0.5–1.4)
DIFFERENTIAL METHOD: ABNORMAL
EOSINOPHIL # BLD AUTO: 0.1 K/UL (ref 0–0.5)
EOSINOPHIL NFR BLD: 0.9 % (ref 0–8)
ERYTHROCYTE [DISTWIDTH] IN BLOOD BY AUTOMATED COUNT: 11.4 % (ref 11.5–14.5)
EST. GFR  (AFRICAN AMERICAN): 37.6 ML/MIN/1.73 M^2
EST. GFR  (NON AFRICAN AMERICAN): 32.5 ML/MIN/1.73 M^2
GLUCOSE SERPL-MCNC: 763 MG/DL (ref 70–110)
HCT VFR BLD AUTO: 46.4 % (ref 40–54)
HDLC SERPL-MCNC: 31 MG/DL (ref 40–75)
HDLC SERPL: 20.5 % (ref 20–50)
HGB BLD-MCNC: 15.4 G/DL (ref 14–18)
IMM GRANULOCYTES # BLD AUTO: 0.02 K/UL (ref 0–0.04)
IMM GRANULOCYTES NFR BLD AUTO: 0.2 % (ref 0–0.5)
LDLC SERPL CALC-MCNC: 80.4 MG/DL (ref 63–159)
LYMPHOCYTES # BLD AUTO: 1.8 K/UL (ref 1–4.8)
LYMPHOCYTES NFR BLD: 20.5 % (ref 18–48)
MCH RBC QN AUTO: 31.6 PG (ref 27–31)
MCHC RBC AUTO-ENTMCNC: 33.2 G/DL (ref 32–36)
MCV RBC AUTO: 95 FL (ref 82–98)
MONOCYTES # BLD AUTO: 0.7 K/UL (ref 0.3–1)
MONOCYTES NFR BLD: 7.3 % (ref 4–15)
NEUTROPHILS # BLD AUTO: 6.3 K/UL (ref 1.8–7.7)
NEUTROPHILS NFR BLD: 70.5 % (ref 38–73)
NONHDLC SERPL-MCNC: 120 MG/DL
NRBC BLD-RTO: 0 /100 WBC
PLATELET # BLD AUTO: 190 K/UL (ref 150–450)
PMV BLD AUTO: 12.2 FL (ref 9.2–12.9)
POTASSIUM SERPL-SCNC: 4.7 MMOL/L (ref 3.5–5.1)
PROT SERPL-MCNC: 8 G/DL (ref 6–8.4)
RBC # BLD AUTO: 4.87 M/UL (ref 4.6–6.2)
SODIUM SERPL-SCNC: 129 MMOL/L (ref 136–145)
TRIGL SERPL-MCNC: 198 MG/DL (ref 30–150)
TSH SERPL DL<=0.005 MIU/L-ACNC: 2.17 UIU/ML (ref 0.4–4)
WBC # BLD AUTO: 8.93 K/UL (ref 3.9–12.7)

## 2022-01-24 PROCEDURE — 3008F PR BODY MASS INDEX (BMI) DOCUMENTED: ICD-10-PCS | Mod: HCNC,CPTII,S$GLB, | Performed by: FAMILY MEDICINE

## 2022-01-24 PROCEDURE — 84153 ASSAY OF PSA TOTAL: CPT | Mod: GA,HCNC | Performed by: FAMILY MEDICINE

## 2022-01-24 PROCEDURE — 3074F PR MOST RECENT SYSTOLIC BLOOD PRESSURE < 130 MM HG: ICD-10-PCS | Mod: HCNC,CPTII,S$GLB, | Performed by: FAMILY MEDICINE

## 2022-01-24 PROCEDURE — 85025 COMPLETE CBC W/AUTO DIFF WBC: CPT | Mod: HCNC | Performed by: FAMILY MEDICINE

## 2022-01-24 PROCEDURE — 1159F MED LIST DOCD IN RCRD: CPT | Mod: HCNC,CPTII,S$GLB, | Performed by: FAMILY MEDICINE

## 2022-01-24 PROCEDURE — 99999 PR PBB SHADOW E&M-EST. PATIENT-LVL III: CPT | Mod: PBBFAC,HCNC,, | Performed by: FAMILY MEDICINE

## 2022-01-24 PROCEDURE — 3051F PR MOST RECENT HEMOGLOBIN A1C LEVEL 7.0 - < 8.0%: ICD-10-PCS | Mod: HCNC,CPTII,S$GLB, | Performed by: FAMILY MEDICINE

## 2022-01-24 PROCEDURE — 84443 ASSAY THYROID STIM HORMONE: CPT | Mod: HCNC | Performed by: FAMILY MEDICINE

## 2022-01-24 PROCEDURE — 3008F BODY MASS INDEX DOCD: CPT | Mod: HCNC,CPTII,S$GLB, | Performed by: FAMILY MEDICINE

## 2022-01-24 PROCEDURE — 80061 LIPID PANEL: CPT | Mod: HCNC | Performed by: FAMILY MEDICINE

## 2022-01-24 PROCEDURE — 83036 HEMOGLOBIN GLYCOSYLATED A1C: CPT | Mod: HCNC | Performed by: FAMILY MEDICINE

## 2022-01-24 PROCEDURE — 99396 PR PREVENTIVE VISIT,EST,40-64: ICD-10-PCS | Mod: 25,HCNC,S$GLB, | Performed by: FAMILY MEDICINE

## 2022-01-24 PROCEDURE — 1159F PR MEDICATION LIST DOCUMENTED IN MEDICAL RECORD: ICD-10-PCS | Mod: HCNC,CPTII,S$GLB, | Performed by: FAMILY MEDICINE

## 2022-01-24 PROCEDURE — 1160F PR REVIEW ALL MEDS BY PRESCRIBER/CLIN PHARMACIST DOCUMENTED: ICD-10-PCS | Mod: HCNC,CPTII,S$GLB, | Performed by: FAMILY MEDICINE

## 2022-01-24 PROCEDURE — 99396 PREV VISIT EST AGE 40-64: CPT | Mod: 25,HCNC,S$GLB, | Performed by: FAMILY MEDICINE

## 2022-01-24 PROCEDURE — 3074F SYST BP LT 130 MM HG: CPT | Mod: HCNC,CPTII,S$GLB, | Performed by: FAMILY MEDICINE

## 2022-01-24 PROCEDURE — 3078F DIAST BP <80 MM HG: CPT | Mod: HCNC,CPTII,S$GLB, | Performed by: FAMILY MEDICINE

## 2022-01-24 PROCEDURE — 80053 COMPREHEN METABOLIC PANEL: CPT | Mod: HCNC | Performed by: FAMILY MEDICINE

## 2022-01-24 PROCEDURE — 3078F PR MOST RECENT DIASTOLIC BLOOD PRESSURE < 80 MM HG: ICD-10-PCS | Mod: HCNC,CPTII,S$GLB, | Performed by: FAMILY MEDICINE

## 2022-01-24 PROCEDURE — 3051F HG A1C>EQUAL 7.0%<8.0%: CPT | Mod: HCNC,CPTII,S$GLB, | Performed by: FAMILY MEDICINE

## 2022-01-24 PROCEDURE — 1160F RVW MEDS BY RX/DR IN RCRD: CPT | Mod: HCNC,CPTII,S$GLB, | Performed by: FAMILY MEDICINE

## 2022-01-24 PROCEDURE — 99999 PR PBB SHADOW E&M-EST. PATIENT-LVL III: ICD-10-PCS | Mod: PBBFAC,HCNC,, | Performed by: FAMILY MEDICINE

## 2022-01-24 RX ORDER — AMLODIPINE BESYLATE 10 MG/1
TABLET ORAL
Qty: 90 TABLET | Refills: 3 | Status: ON HOLD | OUTPATIENT
Start: 2022-01-24 | End: 2022-01-27 | Stop reason: HOSPADM

## 2022-01-24 RX ORDER — EZETIMIBE 10 MG/1
10 TABLET ORAL DAILY
Qty: 90 TABLET | Refills: 3 | Status: SHIPPED | OUTPATIENT
Start: 2022-01-24 | End: 2022-01-24 | Stop reason: SDUPTHER

## 2022-01-24 RX ORDER — ROSUVASTATIN CALCIUM 40 MG/1
40 TABLET, COATED ORAL NIGHTLY
Qty: 90 TABLET | Refills: 3 | Status: SHIPPED | OUTPATIENT
Start: 2022-01-24 | End: 2022-11-13

## 2022-01-24 RX ORDER — HYDROCHLOROTHIAZIDE 25 MG/1
25 TABLET ORAL DAILY
Qty: 90 TABLET | Refills: 3 | Status: ON HOLD | OUTPATIENT
Start: 2022-01-24 | End: 2022-01-27 | Stop reason: HOSPADM

## 2022-01-24 RX ORDER — EZETIMIBE 10 MG/1
10 TABLET ORAL DAILY
Qty: 90 TABLET | Refills: 3 | Status: SHIPPED | OUTPATIENT
Start: 2022-01-24 | End: 2022-11-23

## 2022-01-24 RX ORDER — AMLODIPINE BESYLATE 10 MG/1
TABLET ORAL
Qty: 90 TABLET | Refills: 3 | Status: SHIPPED | OUTPATIENT
Start: 2022-01-24 | End: 2022-01-24 | Stop reason: SDUPTHER

## 2022-01-24 RX ORDER — METFORMIN HYDROCHLORIDE 500 MG/1
500 TABLET ORAL 2 TIMES DAILY WITH MEALS
Qty: 180 TABLET | Refills: 3 | Status: SHIPPED | OUTPATIENT
Start: 2022-01-24 | End: 2022-01-25 | Stop reason: DRUGHIGH

## 2022-01-24 RX ORDER — METOPROLOL TARTRATE 50 MG/1
50 TABLET ORAL DAILY
Qty: 90 TABLET | Refills: 3 | Status: SHIPPED | OUTPATIENT
Start: 2022-01-24 | End: 2022-06-29 | Stop reason: SDUPTHER

## 2022-01-24 NOTE — PROGRESS NOTES
Subjective:       Patient ID: Papa Pope is a 56 y.o. male.    Chief Complaint: Annual Exam    56-year-old  male patient accompanied by his wife with Patient Active Problem List:     Hyperlipidemia due to type 2 diabetes mellitus     Hypertension associated with diabetes     DDD (degenerative disc disease), lumbar     History of colon polyps     Smoker     Type 2 diabetes mellitus without complication, without long-term current use of insulin     Decreased strength of trunk and back  Here for routine annual physicals.  Patient reports that he has been taking his medications regularly but has not been monitoring his blood glucose levels, has been having nocturia.  Denies any burning sensation with urination or urgency, chest pain or difficulty breathing with palpitations, tingling or numbness sensation to extremities.  Patient reports that he would like to quit smoking on his ownand has not smoked since past week.     Review of Systems   Constitutional: Negative for appetite change and fatigue.   Eyes: Negative for visual disturbance.   Respiratory: Negative for shortness of breath.    Cardiovascular: Negative for chest pain, palpitations and leg swelling.   Gastrointestinal: Negative for abdominal pain, nausea and vomiting.   Endocrine: Negative for polydipsia, polyphagia and polyuria.   Musculoskeletal: Negative for myalgias.   Skin: Negative for rash.   Neurological: Negative for weakness, numbness and headaches.   Psychiatric/Behavioral: Negative for sleep disturbance.         /76 (BP Location: Right arm, Patient Position: Sitting, BP Method: Large (Manual))   Pulse 100   Temp 97.6 °F (36.4 °C) (Tympanic)   Wt 89.4 kg (197 lb 1.5 oz)   SpO2 98%   BMI 22.78 kg/m²   Objective:      Physical Exam  Constitutional:       Appearance: He is well-developed.   HENT:      Head: Normocephalic and atraumatic.   Cardiovascular:      Rate and Rhythm: Normal rate and regular rhythm.      Pulses:            Dorsalis pedis pulses are 2+ on the right side and 2+ on the left side.      Heart sounds: Normal heart sounds. No murmur heard.      Pulmonary:      Effort: Pulmonary effort is normal.      Breath sounds: Normal breath sounds. No wheezing.   Abdominal:      General: Bowel sounds are normal.      Palpations: Abdomen is soft.      Tenderness: There is no abdominal tenderness.   Feet:      Right foot:      Protective Sensation: 10 sites tested. 10 sites sensed.      Skin integrity: Callus and dry skin present.      Toenail Condition: Right toenails are abnormally thick. Fungal disease present.     Left foot:      Protective Sensation: 10 sites tested. 10 sites sensed.      Skin integrity: Callus and dry skin present.      Toenail Condition: Left toenails are abnormally thick. Fungal disease present.  Skin:     General: Skin is warm and dry.      Findings: No rash.   Neurological:      Mental Status: He is alert and oriented to person, place, and time.   Psychiatric:         Mood and Affect: Mood normal.           Assessment/Plan:   1. Routine general medical examination at a health care facility  - CBC Auto Differential  - Comprehensive Metabolic Panel  - Lipid Panel  - TSH  - Hemoglobin A1C  - Microalbumin/Creatinine Ratio, Urine  - Urinalysis, Reflex to Urine Culture Urine, Clean Catch  - PSA, Screening  Vital signs stable today.  Clinical exam stable  Continue lifestyle modifications with low-fat and low-cholesterol diet and exercise 30 minutes daily      2. Hypertension associated with diabetes  - Comprehensive Metabolic Panel  - Lipid Panel  - TSH  - Urinalysis, Reflex to Urine Culture Urine, Clean Catch  - Hypertension Digital Medicine (HDMP) Enrollment Order  - hydroCHLOROthiazide (HYDRODIURIL) 25 MG tablet; Take 1 tablet (25 mg total) by mouth once daily.  Dispense: 90 tablet; Refill: 3  - metoprolol tartrate (LOPRESSOR) 50 MG tablet; Take 1 tablet (50 mg total) by mouth once daily.  Dispense: 90  tablet; Refill: 3  - amLODIPine (NORVASC) 10 MG tablet; TK 1 T PO D.  Dispense: 90 tablet; Refill: 3  Blood pressure is stable currently on amlodipine 10 mg, metoprolol 50 mg and hydrochlorothiazide 25 mg daily.   Will enroll in hypertension digital program  Restrict salt intake and eat low-fat and low-cholesterol diet and exercise 30 minutes daily    3. Hyperlipidemia due to type 2 diabetes mellitus  - Lipid Panel  - Lipids Digital Medicine (LDMP) Enrollment Order  - Lipids Digital Medicine (LDMP): Assign Onboarding Questionnaires  - rosuvastatin (CRESTOR) 40 MG Tab; Take 1 tablet (40 mg total) by mouth every evening.  Dispense: 90 tablet; Refill: 3  - ezetimibe (ZETIA) 10 mg tablet; Take 1 tablet (10 mg total) by mouth once daily.  Dispense: 90 tablet; Refill: 3  Currently taking Crestor 40 mg and Zetia 10 mg daily  Will enroll in hyperlipidemia digital program    4. Type 2 diabetes mellitus without complication, without long-term current use of insulin  - CBC Auto Differential  - Comprehensive Metabolic Panel  - Lipid Panel  - Hemoglobin A1C  - Microalbumin/Creatinine Ratio, Urine  - Diabetes Digital Medicine (DDMP) Enrollment Order  - Ambulatory referral/consult to Optometry; Future  - metFORMIN (GLUCOPHAGE) 500 MG tablet; Take 1 tablet (500 mg total) by mouth 2 (two) times daily with meals.  Dispense: 180 tablet; Refill: 3  Currently taking metformin 500 mg twice daily  Will check fasting labs  Patient due for eye and foot exam  Strict lifestyle changes recommended with 1800 ADA low-fat and low-cholesterol diet and exercise 30 minutes daily      5. DDD (degenerative disc disease), lumbar  Stable and asymptomatic    6. History of colon polyps    7. Smoker  Encouraged to quit smoking    8. Encounter for screening for malignant neoplasm of prostate   - PSA, Screening    9. Onychomycosis  - Ambulatory referral/consult to Podiatry; Future  10. Callus of foot  - Ambulatory referral/consult to Podiatry; Future   Will  refer to Podiatry secondary to fungal infection and bilateral foot callus    Flu shot given today

## 2022-01-25 ENCOUNTER — HOSPITAL ENCOUNTER (OUTPATIENT)
Facility: HOSPITAL | Age: 57
Discharge: HOME | End: 2022-01-27
Attending: EMERGENCY MEDICINE | Admitting: INTERNAL MEDICINE
Payer: MEDICARE

## 2022-01-25 ENCOUNTER — TELEPHONE (OUTPATIENT)
Dept: FAMILY MEDICINE | Facility: CLINIC | Age: 57
End: 2022-01-25
Payer: MEDICARE

## 2022-01-25 ENCOUNTER — TELEPHONE (OUTPATIENT)
Dept: DIABETES | Facility: CLINIC | Age: 57
End: 2022-01-25
Payer: MEDICARE

## 2022-01-25 ENCOUNTER — PATIENT MESSAGE (OUTPATIENT)
Dept: ADMINISTRATIVE | Facility: OTHER | Age: 57
End: 2022-01-25
Payer: MEDICARE

## 2022-01-25 ENCOUNTER — OFFICE VISIT (OUTPATIENT)
Dept: PODIATRY | Facility: CLINIC | Age: 57
End: 2022-01-25
Payer: MEDICARE

## 2022-01-25 VITALS
BODY MASS INDEX: 22.8 KG/M2 | HEIGHT: 78 IN | SYSTOLIC BLOOD PRESSURE: 122 MMHG | WEIGHT: 197.06 LBS | DIASTOLIC BLOOD PRESSURE: 76 MMHG | HEART RATE: 100 BPM

## 2022-01-25 DIAGNOSIS — E87.5 HYPERKALEMIA: ICD-10-CM

## 2022-01-25 DIAGNOSIS — B37.9 YEAST INFECTION: ICD-10-CM

## 2022-01-25 DIAGNOSIS — E11.9 TYPE 2 DIABETES MELLITUS WITHOUT COMPLICATION, WITHOUT LONG-TERM CURRENT USE OF INSULIN: ICD-10-CM

## 2022-01-25 DIAGNOSIS — R73.9 HYPERGLYCEMIA: ICD-10-CM

## 2022-01-25 DIAGNOSIS — I48.91 ATRIAL FIBRILLATION WITH RAPID VENTRICULAR RESPONSE: ICD-10-CM

## 2022-01-25 DIAGNOSIS — I48.91 ATRIAL FIBRILLATION: ICD-10-CM

## 2022-01-25 DIAGNOSIS — I48.91 ATRIAL FIBRILLATION WITH RVR: ICD-10-CM

## 2022-01-25 DIAGNOSIS — E11.9 COMPREHENSIVE DIABETIC FOOT EXAMINATION, TYPE 2 DM, ENCOUNTER FOR: Primary | ICD-10-CM

## 2022-01-25 DIAGNOSIS — E11.65 HYPERGLYCEMIA DUE TO DIABETES MELLITUS: Primary | ICD-10-CM

## 2022-01-25 DIAGNOSIS — B35.1 ONYCHOMYCOSIS: ICD-10-CM

## 2022-01-25 DIAGNOSIS — L84 CALLUS OF FOOT: ICD-10-CM

## 2022-01-25 DIAGNOSIS — B37.9 YEAST INFECTION: Primary | ICD-10-CM

## 2022-01-25 DIAGNOSIS — N17.9 AKI (ACUTE KIDNEY INJURY): ICD-10-CM

## 2022-01-25 PROBLEM — E11.01 HHNC (HYPERGLYCEMIC HYPEROSMOLAR NONKETOTIC COMA): Status: ACTIVE | Noted: 2022-01-25

## 2022-01-25 PROBLEM — E11.00 HYPEROSMOLAR HYPERGLYCEMIC STATE (HHS): Status: ACTIVE | Noted: 2022-01-25

## 2022-01-25 LAB
ALBUMIN SERPL BCP-MCNC: 4.5 G/DL (ref 3.5–5.2)
ALBUMIN/CREAT UR: 148.8 UG/MG (ref 0–30)
ALLENS TEST: ABNORMAL
ALP SERPL-CCNC: 139 U/L (ref 55–135)
ALT SERPL W/O P-5'-P-CCNC: 21 U/L (ref 10–44)
ANION GAP SERPL CALC-SCNC: 15 MMOL/L (ref 8–16)
ANION GAP SERPL CALC-SCNC: 18 MMOL/L (ref 8–16)
APTT BLDCRRT: 25.6 SEC (ref 21–32)
AST SERPL-CCNC: 14 U/L (ref 10–40)
B-OH-BUTYR BLD STRIP-SCNC: 1.7 MMOL/L (ref 0–0.5)
BACTERIA #/AREA URNS HPF: ABNORMAL /HPF
BACTERIA #/AREA URNS HPF: ABNORMAL /HPF
BASOPHILS # BLD AUTO: 0.04 K/UL (ref 0–0.2)
BASOPHILS NFR BLD: 0.5 % (ref 0–1.9)
BILIRUB SERPL-MCNC: 0.7 MG/DL (ref 0.1–1)
BILIRUB UR QL STRIP: NEGATIVE
BILIRUB UR QL STRIP: NEGATIVE
BNP SERPL-MCNC: 47 PG/ML (ref 0–99)
BUN SERPL-MCNC: 43 MG/DL (ref 6–20)
BUN SERPL-MCNC: 49 MG/DL (ref 6–20)
CALCIUM SERPL-MCNC: 10.7 MG/DL (ref 8.7–10.5)
CALCIUM SERPL-MCNC: 8.5 MG/DL (ref 8.7–10.5)
CHLORIDE SERPL-SCNC: 100 MMOL/L (ref 95–110)
CHLORIDE SERPL-SCNC: 88 MMOL/L (ref 95–110)
CLARITY UR: CLEAR
CLARITY UR: CLEAR
CO2 SERPL-SCNC: 23 MMOL/L (ref 23–29)
CO2 SERPL-SCNC: 25 MMOL/L (ref 23–29)
COLOR UR: YELLOW
COLOR UR: YELLOW
CREAT SERPL-MCNC: 1.9 MG/DL (ref 0.5–1.4)
CREAT SERPL-MCNC: 2.7 MG/DL (ref 0.5–1.4)
CREAT UR-MCNC: 43 MG/DL (ref 23–375)
DELSYS: ABNORMAL
DIFFERENTIAL METHOD: ABNORMAL
EOSINOPHIL # BLD AUTO: 0.1 K/UL (ref 0–0.5)
EOSINOPHIL NFR BLD: 1 % (ref 0–8)
ERYTHROCYTE [DISTWIDTH] IN BLOOD BY AUTOMATED COUNT: 11.3 % (ref 11.5–14.5)
EST. GFR  (AFRICAN AMERICAN): 29 ML/MIN/1.73 M^2
EST. GFR  (AFRICAN AMERICAN): 45 ML/MIN/1.73 M^2
EST. GFR  (NON AFRICAN AMERICAN): 25 ML/MIN/1.73 M^2
EST. GFR  (NON AFRICAN AMERICAN): 39 ML/MIN/1.73 M^2
ESTIMATED AVG GLUCOSE: 280 MG/DL (ref 68–131)
GLUCOSE SERPL-MCNC: 493 MG/DL (ref 70–110)
GLUCOSE SERPL-MCNC: 739 MG/DL (ref 70–110)
GLUCOSE UR QL STRIP: ABNORMAL
GLUCOSE UR QL STRIP: ABNORMAL
HBA1C MFR BLD: 11.4 % (ref 4–5.6)
HCO3 UR-SCNC: 28.8 MMOL/L (ref 24–28)
HCT VFR BLD AUTO: 48.7 % (ref 40–54)
HGB BLD-MCNC: 16.8 G/DL (ref 14–18)
HGB UR QL STRIP: ABNORMAL
HGB UR QL STRIP: ABNORMAL
IMM GRANULOCYTES # BLD AUTO: 0.02 K/UL (ref 0–0.04)
IMM GRANULOCYTES NFR BLD AUTO: 0.3 % (ref 0–0.5)
INR PPP: 1 (ref 0.8–1.2)
KETONES UR QL STRIP: ABNORMAL
KETONES UR QL STRIP: ABNORMAL
LEUKOCYTE ESTERASE UR QL STRIP: NEGATIVE
LEUKOCYTE ESTERASE UR QL STRIP: NEGATIVE
LYMPHOCYTES # BLD AUTO: 2 K/UL (ref 1–4.8)
LYMPHOCYTES NFR BLD: 27.6 % (ref 18–48)
MCH RBC QN AUTO: 31.9 PG (ref 27–31)
MCHC RBC AUTO-ENTMCNC: 34.5 G/DL (ref 32–36)
MCV RBC AUTO: 92 FL (ref 82–98)
MICROALBUMIN UR DL<=1MG/L-MCNC: 64 UG/ML
MICROSCOPIC COMMENT: ABNORMAL
MICROSCOPIC COMMENT: ABNORMAL
MODE: ABNORMAL
MONOCYTES # BLD AUTO: 0.6 K/UL (ref 0.3–1)
MONOCYTES NFR BLD: 8.3 % (ref 4–15)
NEUTROPHILS # BLD AUTO: 4.6 K/UL (ref 1.8–7.7)
NEUTROPHILS NFR BLD: 62.3 % (ref 38–73)
NITRITE UR QL STRIP: NEGATIVE
NITRITE UR QL STRIP: POSITIVE
NRBC BLD-RTO: 0 /100 WBC
PCO2 BLDA: 49.3 MMHG (ref 35–45)
PH SMN: 7.37 [PH] (ref 7.35–7.45)
PH UR STRIP: 6 [PH] (ref 5–8)
PH UR STRIP: 6 [PH] (ref 5–8)
PLATELET # BLD AUTO: 205 K/UL (ref 150–450)
PMV BLD AUTO: 11.7 FL (ref 9.2–12.9)
PO2 BLDA: 35 MMHG (ref 40–60)
POC BE: 4 MMOL/L
POC SATURATED O2: 65 % (ref 95–100)
POCT GLUCOSE: 482 MG/DL (ref 70–110)
POCT GLUCOSE: >500 MG/DL (ref 70–110)
POTASSIUM SERPL-SCNC: 4.2 MMOL/L (ref 3.5–5.1)
POTASSIUM SERPL-SCNC: 6.1 MMOL/L (ref 3.5–5.1)
PROT SERPL-MCNC: 8.2 G/DL (ref 6–8.4)
PROT UR QL STRIP: NEGATIVE
PROT UR QL STRIP: NEGATIVE
PROTHROMBIN TIME: 11 SEC (ref 9–12.5)
RBC # BLD AUTO: 5.27 M/UL (ref 4.6–6.2)
RBC #/AREA URNS HPF: 2 /HPF (ref 0–4)
RBC #/AREA URNS HPF: 5 /HPF (ref 0–4)
SAMPLE: ABNORMAL
SARS-COV-2 RDRP RESP QL NAA+PROBE: NEGATIVE
SITE: ABNORMAL
SODIUM SERPL-SCNC: 131 MMOL/L (ref 136–145)
SODIUM SERPL-SCNC: 138 MMOL/L (ref 136–145)
SP GR UR STRIP: 1.01 (ref 1–1.03)
SP GR UR STRIP: 1.01 (ref 1–1.03)
TROPONIN I SERPL DL<=0.01 NG/ML-MCNC: 0.01 NG/ML (ref 0–0.03)
URN SPEC COLLECT METH UR: ABNORMAL
URN SPEC COLLECT METH UR: ABNORMAL
UROBILINOGEN UR STRIP-ACNC: NEGATIVE EU/DL
WBC # BLD AUTO: 7.36 K/UL (ref 3.9–12.7)
WBC #/AREA URNS HPF: 2 /HPF (ref 0–5)
WBC #/AREA URNS HPF: 5 /HPF (ref 0–5)
YEAST URNS QL MICRO: ABNORMAL
YEAST URNS QL MICRO: ABNORMAL

## 2022-01-25 PROCEDURE — 63600175 PHARM REV CODE 636 W HCPCS: Mod: HCNC | Performed by: NURSE PRACTITIONER

## 2022-01-25 PROCEDURE — 3008F BODY MASS INDEX DOCD: CPT | Mod: HCNC,CPTII,S$GLB, | Performed by: PODIATRIST

## 2022-01-25 PROCEDURE — 93010 EKG 12-LEAD: ICD-10-PCS | Mod: HCNC,,, | Performed by: INTERNAL MEDICINE

## 2022-01-25 PROCEDURE — 99900035 HC TECH TIME PER 15 MIN (STAT): Mod: HCNC

## 2022-01-25 PROCEDURE — 82803 BLOOD GASES ANY COMBINATION: CPT | Mod: HCNC

## 2022-01-25 PROCEDURE — G0378 HOSPITAL OBSERVATION PER HR: HCPCS | Mod: HCNC

## 2022-01-25 PROCEDURE — 25000003 PHARM REV CODE 250: Mod: HCNC | Performed by: NURSE PRACTITIONER

## 2022-01-25 PROCEDURE — 99291 CRITICAL CARE FIRST HOUR: CPT | Mod: 25,HCNC

## 2022-01-25 PROCEDURE — C9399 UNCLASSIFIED DRUGS OR BIOLOG: HCPCS | Mod: HCNC | Performed by: NURSE PRACTITIONER

## 2022-01-25 PROCEDURE — 99999 PR PBB SHADOW E&M-EST. PATIENT-LVL III: CPT | Mod: PBBFAC,HCNC,, | Performed by: PODIATRIST

## 2022-01-25 PROCEDURE — 82570 ASSAY OF URINE CREATININE: CPT | Mod: HCNC | Performed by: FAMILY MEDICINE

## 2022-01-25 PROCEDURE — 96372 THER/PROPH/DIAG INJ SC/IM: CPT | Mod: HCNC | Performed by: NURSE PRACTITIONER

## 2022-01-25 PROCEDURE — 63600175 PHARM REV CODE 636 W HCPCS: Mod: HCNC | Performed by: EMERGENCY MEDICINE

## 2022-01-25 PROCEDURE — 84484 ASSAY OF TROPONIN QUANT: CPT | Mod: HCNC | Performed by: EMERGENCY MEDICINE

## 2022-01-25 PROCEDURE — 3046F HEMOGLOBIN A1C LEVEL >9.0%: CPT | Mod: HCNC,CPTII,S$GLB, | Performed by: PODIATRIST

## 2022-01-25 PROCEDURE — U0002 COVID-19 LAB TEST NON-CDC: HCPCS | Mod: HCNC | Performed by: EMERGENCY MEDICINE

## 2022-01-25 PROCEDURE — 93010 ELECTROCARDIOGRAM REPORT: CPT | Mod: HCNC,,, | Performed by: INTERNAL MEDICINE

## 2022-01-25 PROCEDURE — 85610 PROTHROMBIN TIME: CPT | Mod: HCNC | Performed by: EMERGENCY MEDICINE

## 2022-01-25 PROCEDURE — 1159F PR MEDICATION LIST DOCUMENTED IN MEDICAL RECORD: ICD-10-PCS | Mod: HCNC,CPTII,S$GLB, | Performed by: PODIATRIST

## 2022-01-25 PROCEDURE — 25000003 PHARM REV CODE 250: Mod: HCNC | Performed by: EMERGENCY MEDICINE

## 2022-01-25 PROCEDURE — 99999 PR PBB SHADOW E&M-EST. PATIENT-LVL III: ICD-10-PCS | Mod: PBBFAC,HCNC,, | Performed by: PODIATRIST

## 2022-01-25 PROCEDURE — 85025 COMPLETE CBC W/AUTO DIFF WBC: CPT | Mod: HCNC | Performed by: REGISTERED NURSE

## 2022-01-25 PROCEDURE — 96376 TX/PRO/DX INJ SAME DRUG ADON: CPT | Mod: HCNC

## 2022-01-25 PROCEDURE — 1160F RVW MEDS BY RX/DR IN RCRD: CPT | Mod: HCNC,CPTII,S$GLB, | Performed by: PODIATRIST

## 2022-01-25 PROCEDURE — 82962 GLUCOSE BLOOD TEST: CPT | Mod: HCNC

## 2022-01-25 PROCEDURE — 96372 THER/PROPH/DIAG INJ SC/IM: CPT | Mod: 59,HCNC

## 2022-01-25 PROCEDURE — 96361 HYDRATE IV INFUSION ADD-ON: CPT | Mod: HCNC

## 2022-01-25 PROCEDURE — 96366 THER/PROPH/DIAG IV INF ADDON: CPT | Mod: HCNC

## 2022-01-25 PROCEDURE — 3008F PR BODY MASS INDEX (BMI) DOCUMENTED: ICD-10-PCS | Mod: HCNC,CPTII,S$GLB, | Performed by: PODIATRIST

## 2022-01-25 PROCEDURE — 3078F DIAST BP <80 MM HG: CPT | Mod: HCNC,CPTII,S$GLB, | Performed by: PODIATRIST

## 2022-01-25 PROCEDURE — 82043 UR ALBUMIN QUANTITATIVE: CPT | Mod: HCNC | Performed by: FAMILY MEDICINE

## 2022-01-25 PROCEDURE — 3074F SYST BP LT 130 MM HG: CPT | Mod: HCNC,CPTII,S$GLB, | Performed by: PODIATRIST

## 2022-01-25 PROCEDURE — 3078F PR MOST RECENT DIASTOLIC BLOOD PRESSURE < 80 MM HG: ICD-10-PCS | Mod: HCNC,CPTII,S$GLB, | Performed by: PODIATRIST

## 2022-01-25 PROCEDURE — 3046F PR MOST RECENT HEMOGLOBIN A1C LEVEL > 9.0%: ICD-10-PCS | Mod: HCNC,CPTII,S$GLB, | Performed by: PODIATRIST

## 2022-01-25 PROCEDURE — 93005 ELECTROCARDIOGRAM TRACING: CPT | Mod: HCNC

## 2022-01-25 PROCEDURE — 1160F PR REVIEW ALL MEDS BY PRESCRIBER/CLIN PHARMACIST DOCUMENTED: ICD-10-PCS | Mod: HCNC,CPTII,S$GLB, | Performed by: PODIATRIST

## 2022-01-25 PROCEDURE — 96375 TX/PRO/DX INJ NEW DRUG ADDON: CPT | Mod: HCNC

## 2022-01-25 PROCEDURE — 81000 URINALYSIS NONAUTO W/SCOPE: CPT | Mod: 91,HCNC | Performed by: EMERGENCY MEDICINE

## 2022-01-25 PROCEDURE — 1159F MED LIST DOCD IN RCRD: CPT | Mod: HCNC,CPTII,S$GLB, | Performed by: PODIATRIST

## 2022-01-25 PROCEDURE — 99203 OFFICE O/P NEW LOW 30 MIN: CPT | Mod: HCNC,S$GLB,, | Performed by: PODIATRIST

## 2022-01-25 PROCEDURE — 3074F PR MOST RECENT SYSTOLIC BLOOD PRESSURE < 130 MM HG: ICD-10-PCS | Mod: HCNC,CPTII,S$GLB, | Performed by: PODIATRIST

## 2022-01-25 PROCEDURE — 96365 THER/PROPH/DIAG IV INF INIT: CPT | Mod: HCNC

## 2022-01-25 PROCEDURE — 81000 URINALYSIS NONAUTO W/SCOPE: CPT | Mod: HCNC | Performed by: FAMILY MEDICINE

## 2022-01-25 PROCEDURE — 83880 ASSAY OF NATRIURETIC PEPTIDE: CPT | Mod: HCNC | Performed by: EMERGENCY MEDICINE

## 2022-01-25 PROCEDURE — 82010 KETONE BODYS QUAN: CPT | Mod: HCNC | Performed by: REGISTERED NURSE

## 2022-01-25 PROCEDURE — 85730 THROMBOPLASTIN TIME PARTIAL: CPT | Mod: HCNC | Performed by: EMERGENCY MEDICINE

## 2022-01-25 PROCEDURE — 80053 COMPREHEN METABOLIC PANEL: CPT | Mod: HCNC | Performed by: REGISTERED NURSE

## 2022-01-25 PROCEDURE — 80048 BASIC METABOLIC PNL TOTAL CA: CPT | Mod: HCNC | Performed by: EMERGENCY MEDICINE

## 2022-01-25 PROCEDURE — 99203 PR OFFICE/OUTPT VISIT, NEW, LEVL III, 30-44 MIN: ICD-10-PCS | Mod: HCNC,S$GLB,, | Performed by: PODIATRIST

## 2022-01-25 RX ORDER — DILTIAZEM HCL 1 MG/ML
INJECTION, SOLUTION INTRAVENOUS
Status: DISPENSED
Start: 2022-01-25 | End: 2022-01-26

## 2022-01-25 RX ORDER — DILTIAZEM HYDROCHLORIDE 5 MG/ML
10 INJECTION INTRAVENOUS
Status: COMPLETED | OUTPATIENT
Start: 2022-01-25 | End: 2022-01-25

## 2022-01-25 RX ORDER — METFORMIN HYDROCHLORIDE 1000 MG/1
1000 TABLET ORAL 2 TIMES DAILY WITH MEALS
Qty: 180 TABLET | Refills: 3 | Status: ON HOLD | OUTPATIENT
Start: 2022-01-25 | End: 2022-01-27 | Stop reason: HOSPADM

## 2022-01-25 RX ORDER — NAPROXEN SODIUM 220 MG/1
81 TABLET, FILM COATED ORAL DAILY
Status: DISCONTINUED | OUTPATIENT
Start: 2022-01-26 | End: 2022-01-27

## 2022-01-25 RX ORDER — GLUCAGON 1 MG
1 KIT INJECTION
Status: DISCONTINUED | OUTPATIENT
Start: 2022-01-25 | End: 2022-01-27 | Stop reason: HOSPADM

## 2022-01-25 RX ORDER — EZETIMIBE 10 MG/1
10 TABLET ORAL DAILY
Status: DISCONTINUED | OUTPATIENT
Start: 2022-01-26 | End: 2022-01-27 | Stop reason: HOSPADM

## 2022-01-25 RX ORDER — DILTIAZEM HCL/D5W 125 MG/125
10 PLASTIC BAG, INJECTION (ML) INTRAVENOUS CONTINUOUS
Status: DISCONTINUED | OUTPATIENT
Start: 2022-01-25 | End: 2022-01-25

## 2022-01-25 RX ORDER — DILTIAZEM HCL/D5W 125 MG/125
5 PLASTIC BAG, INJECTION (ML) INTRAVENOUS CONTINUOUS
Status: DISCONTINUED | OUTPATIENT
Start: 2022-01-25 | End: 2022-01-26

## 2022-01-25 RX ORDER — METOPROLOL TARTRATE 1 MG/ML
5 INJECTION, SOLUTION INTRAVENOUS
Status: COMPLETED | OUTPATIENT
Start: 2022-01-25 | End: 2022-01-25

## 2022-01-25 RX ORDER — HEPARIN SODIUM,PORCINE/D5W 25000/250
0-40 INTRAVENOUS SOLUTION INTRAVENOUS CONTINUOUS
Status: DISCONTINUED | OUTPATIENT
Start: 2022-01-25 | End: 2022-01-26

## 2022-01-25 RX ORDER — DILTIAZEM HYDROCHLORIDE 5 MG/ML
10 INJECTION INTRAVENOUS
Status: ACTIVE | OUTPATIENT
Start: 2022-01-25 | End: 2022-01-26

## 2022-01-25 RX ORDER — DILTIAZEM HYDROCHLORIDE 30 MG/1
90 TABLET, FILM COATED ORAL EVERY 12 HOURS
Status: DISCONTINUED | OUTPATIENT
Start: 2022-01-25 | End: 2022-01-25

## 2022-01-25 RX ORDER — ATORVASTATIN CALCIUM 10 MG/1
20 TABLET, FILM COATED ORAL NIGHTLY
Status: DISCONTINUED | OUTPATIENT
Start: 2022-01-25 | End: 2022-01-27 | Stop reason: HOSPADM

## 2022-01-25 RX ORDER — IBUPROFEN 200 MG
24 TABLET ORAL
Status: DISCONTINUED | OUTPATIENT
Start: 2022-01-25 | End: 2022-01-27 | Stop reason: HOSPADM

## 2022-01-25 RX ORDER — HEPARIN SODIUM,PORCINE/D5W 25000/250
0-40 INTRAVENOUS SOLUTION INTRAVENOUS CONTINUOUS
Status: DISCONTINUED | OUTPATIENT
Start: 2022-01-25 | End: 2022-01-25

## 2022-01-25 RX ORDER — IBUPROFEN 200 MG
16 TABLET ORAL
Status: DISCONTINUED | OUTPATIENT
Start: 2022-01-25 | End: 2022-01-27 | Stop reason: HOSPADM

## 2022-01-25 RX ORDER — SODIUM CHLORIDE 9 MG/ML
INJECTION, SOLUTION INTRAVENOUS CONTINUOUS
Status: DISCONTINUED | OUTPATIENT
Start: 2022-01-25 | End: 2022-01-27 | Stop reason: HOSPADM

## 2022-01-25 RX ORDER — FLUCONAZOLE 150 MG/1
150 TABLET ORAL DAILY
Qty: 1 TABLET | Refills: 0 | Status: ON HOLD | OUTPATIENT
Start: 2022-01-25 | End: 2022-01-27

## 2022-01-25 RX ORDER — INSULIN ASPART 100 [IU]/ML
1-10 INJECTION, SOLUTION INTRAVENOUS; SUBCUTANEOUS
Status: DISCONTINUED | OUTPATIENT
Start: 2022-01-25 | End: 2022-01-27 | Stop reason: HOSPADM

## 2022-01-25 RX ORDER — INSULIN ASPART 100 [IU]/ML
10 INJECTION, SOLUTION INTRAVENOUS; SUBCUTANEOUS ONCE
Status: COMPLETED | OUTPATIENT
Start: 2022-01-25 | End: 2022-01-25

## 2022-01-25 RX ADMIN — SODIUM CHLORIDE: 0.9 INJECTION, SOLUTION INTRAVENOUS at 10:01

## 2022-01-25 RX ADMIN — INSULIN ASPART 5 UNITS: 100 INJECTION, SOLUTION INTRAVENOUS; SUBCUTANEOUS at 10:01

## 2022-01-25 RX ADMIN — SODIUM CHLORIDE: 0.9 INJECTION, SOLUTION INTRAVENOUS at 05:01

## 2022-01-25 RX ADMIN — DILTIAZEM HYDROCHLORIDE 10 MG: 5 INJECTION INTRAVENOUS at 05:01

## 2022-01-25 RX ADMIN — Medication 5 MG/HR: at 06:01

## 2022-01-25 RX ADMIN — METOROPROLOL TARTRATE 5 MG: 5 INJECTION, SOLUTION INTRAVENOUS at 06:01

## 2022-01-25 RX ADMIN — HEPARIN SODIUM 12 UNITS/KG/HR: 5000 INJECTION, SOLUTION INTRAVENOUS; SUBCUTANEOUS at 06:01

## 2022-01-25 RX ADMIN — SODIUM ZIRCONIUM CYCLOSILICATE 10 G: 5 POWDER, FOR SUSPENSION ORAL at 05:01

## 2022-01-25 RX ADMIN — INSULIN ASPART 10 UNITS: 100 INJECTION, SOLUTION INTRAVENOUS; SUBCUTANEOUS at 10:01

## 2022-01-25 RX ADMIN — INSULIN HUMAN 10 UNITS: 100 INJECTION, SOLUTION PARENTERAL at 03:01

## 2022-01-25 RX ADMIN — SODIUM CHLORIDE 1000 ML: 0.9 INJECTION, SOLUTION INTRAVENOUS at 05:01

## 2022-01-25 RX ADMIN — INSULIN DETEMIR 10 UNITS: 100 INJECTION, SOLUTION SUBCUTANEOUS at 08:01

## 2022-01-25 RX ADMIN — SODIUM CHLORIDE 1000 ML: 0.9 INJECTION, SOLUTION INTRAVENOUS at 02:01

## 2022-01-25 RX ADMIN — ATORVASTATIN CALCIUM 20 MG: 10 TABLET, FILM COATED ORAL at 08:01

## 2022-01-25 NOTE — SUBJECTIVE & OBJECTIVE
Past Medical History:   Diagnosis Date    Acute renal failure 1/25/2022    Arthritis     Atrial fibrillation with RVR 1/25/2022    Back pain     Hyperlipidemia     Hypertension     Pneumonia     Polyneuropathy     Tobacco dependence     Type 2 diabetes mellitus without complication, without long-term current use of insulin 8/4/2021       Past Surgical History:   Procedure Laterality Date    COLONOSCOPY N/A 8/22/2017    Procedure: COLONOSCOPY;  Surgeon: Keo Cruz MD;  Location: [facility] ENDO;  Service: Endoscopy;  Laterality: N/A;    EPIDURAL STEROID INJECTION INTO CERVICAL SPINE N/A 11/1/2019    Procedure: C7/T1 IL SUKHJINDER;  Surgeon: Donnell Jordan MD;  Location: [facility] PAIN MGT;  Service: Pain Management;  Laterality: N/A;    STOMACH SURGERY      TRANSFORAMINAL EPIDURAL INJECTION OF STEROID Bilateral 8/13/2019    Procedure: Injection,steroid,epidural,transforaminal approach;  Surgeon: Donnell Jordan MD;  Location: [facility] PAIN MGT;  Service: Pain Management;  Laterality: Bilateral;       Review of patient's allergies indicates:  No Known Allergies    No current facility-administered medications on file prior to encounter.     Current Outpatient Medications on File Prior to Encounter   Medication Sig    amLODIPine (NORVASC) 10 MG tablet TK 1 T PO D.    aspirin 81 MG Chew Take 1 tablet (81 mg total) by mouth once daily.    ciclopirox (PENLAC) 8 % Soln Apply topically nightly.    ezetimibe (ZETIA) 10 mg tablet Take 1 tablet (10 mg total) by mouth once daily.    fluconazole (DIFLUCAN) 150 MG Tab Take 1 tablet (150 mg total) by mouth once daily. for 1 day    gabapentin (NEURONTIN) 600 MG tablet Take 1 tablet (600 mg total) by mouth 3 (three) times daily. may cause drowsiness    hydroCHLOROthiazide (HYDRODIURIL) 25 MG tablet Take 1 tablet (25 mg total) by mouth once daily.    meloxicam (MOBIC) 15 MG tablet Take 1 tablet (15 mg total) by mouth once daily.    metFORMIN (GLUCOPHAGE) 1000 MG tablet  Take 1 tablet (1,000 mg total) by mouth 2 (two) times daily with meals.    metoprolol tartrate (LOPRESSOR) 50 MG tablet Take 1 tablet (50 mg total) by mouth once daily.    rosuvastatin (CRESTOR) 40 MG Tab Take 1 tablet (40 mg total) by mouth every evening.    [DISCONTINUED] metFORMIN (GLUCOPHAGE) 500 MG tablet Take 1 tablet (500 mg total) by mouth 2 (two) times daily with meals.     Family History     Problem Relation (Age of Onset)    Hypertension Father        Tobacco Use    Smoking status: Current Some Day Smoker     Packs/day: 0.50     Types: Cigarettes    Smokeless tobacco: Never Used   Substance and Sexual Activity    Alcohol use: Yes     Alcohol/week: 12.0 standard drinks     Types: 12 Cans of beer per week     Comment: weekends only    Drug use: No    Sexual activity: Yes     Review of Systems   Constitutional: Negative for chills, diaphoresis, fatigue and fever.   HENT: Negative for drooling, ear pain, rhinorrhea and sore throat.    Eyes: Negative.    Respiratory: Negative for cough, shortness of breath and wheezing.    Cardiovascular: Negative for palpitations and leg swelling.   Gastrointestinal: Negative for abdominal pain, constipation, diarrhea and nausea.   Endocrine: Negative.         Elevated glucose     Genitourinary: Negative for dysuria, hematuria and urgency.   Musculoskeletal: Negative.    Skin: Negative for color change and wound.   Allergic/Immunologic: Negative.    Neurological: Negative for dizziness, syncope and speech difficulty.   Hematological: Negative.    Psychiatric/Behavioral: Negative.      Objective:     Vital Signs (Most Recent):  Temp: 97.9 °F (36.6 °C) (01/25/22 1240)  Pulse: 90 (01/25/22 1533)  Resp: 17 (01/25/22 1533)  BP: 136/87 (01/25/22 1533)  SpO2: 100 % (01/25/22 1533) Vital Signs (24h Range):  Temp:  [97.9 °F (36.6 °C)] 97.9 °F (36.6 °C)  Pulse:  [] 90  Resp:  [17-18] 17  SpO2:  [96 %-100 %] 100 %  BP: (109-136)/(74-87) 136/87     Weight: 88.5 kg (195  lb)  Body mass index is 22.53 kg/m².    Physical Exam  Vitals and nursing note reviewed.   Constitutional:       General: He is not in acute distress.     Appearance: He is well-developed. He is ill-appearing.   HENT:      Head: Normocephalic and atraumatic.   Cardiovascular:      Rate and Rhythm: Normal rate and regular rhythm.      Heart sounds: Normal heart sounds.   Pulmonary:      Effort: Pulmonary effort is normal. No respiratory distress.      Breath sounds: Normal breath sounds.   Abdominal:      General: Bowel sounds are normal. There is no distension.      Palpations: Abdomen is soft.      Tenderness: There is no abdominal tenderness.   Musculoskeletal:         General: Normal range of motion.      Cervical back: Normal range of motion and neck supple. No edema.   Skin:     General: Skin is dry.   Neurological:      Mental Status: He is alert and oriented to person, place, and time.             Significant Labs:   All pertinent labs within the past 24 hours have been reviewed.  CBC:   Recent Labs   Lab 01/24/22  0948 01/25/22  1345   WBC 8.93 7.36   HGB 15.4 16.8   HCT 46.4 48.7    205     CMP:   Recent Labs   Lab 01/24/22  0948 01/25/22  1345   * 131*   K 4.7 6.1*   CL 90* 88*   CO2 18* 25   * 739*   BUN 41* 49*   CREATININE 2.2* 2.7*   CALCIUM 10.0 10.7*   PROT 8.0 8.2   ALBUMIN 4.0 4.5   BILITOT 0.9 0.7   ALKPHOS 124 139*   AST 13 14   ALT 16 21   ANIONGAP 21* 18*   EGFRNONAA 32.5* 25*       Significant Imaging:  Imaging Results          X-Ray Chest 1 View (Final result)  Result time 01/25/22 15:19:19    Final result by CATHI Menendez Sr., MD (01/25/22 15:19:19)                 Impression:      Normal study.      Electronically signed by: Jorge Menendez MD  Date:    01/25/2022  Time:    15:19             Narrative:    EXAMINATION:  XR CHEST 1 VIEW    CLINICAL HISTORY:  Hyperglycemia, unspecified    COMPARISON:  None    FINDINGS:  The size of the heart is normal. The lungs are  clear. There is no pneumothorax.  The costophrenic angles are sharp.

## 2022-01-25 NOTE — ED PROVIDER NOTES
SCRIBE #1 NOTE: I, Eren Zheng, am scribing for, and in the presence of, Norris Benitez MD. I have scribed the entire note.      History      Chief Complaint   Patient presents with    Hyperglycemia     Cbg >500 for 1 week. Not insulin dependent . Takes metformin        Review of patient's allergies indicates:  No Known Allergies     HPI   HPI    1/25/2022, 2:52 PM   History obtained from the patient      History of Present Illness: Papa Pope is a 56 y.o. male patient with a PMHx of HTN, HLD, DM who presents to the Emergency Department for hyperglycemia, onset 1 week PTA. Pt states that his CBG has been >500 for the past week. Symptoms are constant and moderate in severity. No mitigating or exacerbating factors reported. Associated sxs include palpitations and fatigue. Patient denies any fever, chills, n/v/d, SOB, CP, weakness, numbness, dizziness, headache, and all other sxs at this time. Pt is currently on Metformin, and states that he is compliant with his medication regimen. No further complaints or concerns at this time.     Arrival mode: Personal vehicle    PCP: Prachi Castano MD       Past Medical History:  Past Medical History:   Diagnosis Date    Acute renal failure 1/25/2022    Arthritis     Atrial fibrillation with RVR 1/25/2022    Back pain     Hyperlipidemia     Hypertension     Pneumonia     Polyneuropathy     Tobacco dependence     Type 2 diabetes mellitus without complication, without long-term current use of insulin 8/4/2021       Past Surgical History:  Past Surgical History:   Procedure Laterality Date    COLONOSCOPY N/A 8/22/2017    Procedure: COLONOSCOPY;  Surgeon: Keo Cruz MD;  Location: [facility] ENDO;  Service: Endoscopy;  Laterality: N/A;    EPIDURAL STEROID INJECTION INTO CERVICAL SPINE N/A 11/1/2019    Procedure: C7/T1 IL SUKHJINDER;  Surgeon: Donnell Jordan MD;  Location: [facility] PAIN [facility];  Service: Pain Management;  Laterality: N/A;    STOMACH SURGERY       TRANSFORAMINAL EPIDURAL INJECTION OF STEROID Bilateral 8/13/2019    Procedure: Injection,steroid,epidural,transforaminal approach;  Surgeon: Donnell Jordan MD;  Location: [facility];  Service: Pain Management;  Laterality: Bilateral;         Family History:  Family History   Problem Relation Age of Onset    Hypertension Father        Social History:  Social History     Tobacco Use    Smoking status: Current Some Day Smoker     Packs/day: 0.50     Types: Cigarettes    Smokeless tobacco: Never Used   Substance and Sexual Activity    Alcohol use: Yes     Alcohol/week: 12.0 standard drinks     Types: 12 Cans of beer per week     Comment: weekends only    Drug use: No    Sexual activity: Yes       ROS   Review of Systems   Constitutional: Positive for fatigue. Negative for chills and fever.   HENT: Negative for sore throat.    Respiratory: Negative for shortness of breath.    Cardiovascular: Positive for palpitations. Negative for chest pain.   Gastrointestinal: Negative for diarrhea, nausea and vomiting.   Genitourinary: Negative for dysuria.   Musculoskeletal: Negative for back pain.   Skin: Negative for rash.   Neurological: Negative for dizziness, weakness, light-headedness, numbness and headaches.   Hematological: Does not bruise/bleed easily.   All other systems reviewed and are negative.    Physical Exam      Initial Vitals [01/25/22 1240]   BP Pulse Resp Temp SpO2   109/74 83 18 97.9 °F (36.6 °C) 96 %      MAP       --          Physical Exam  Nursing Notes and Vital Signs Reviewed.  Constitutional: Patient is in no acute distress. Well-developed and well-nourished.  Head: Atraumatic. Normocephalic.  Eyes: PERRL. EOM intact. Conjunctivae are not pale. No scleral icterus.  ENT: Mucous membranes are moist. Oropharynx is clear and symmetric.    Neck: Supple. Full ROM. No lymphadenopathy.  Cardiovascular: Tachycardic. Irregularly irregular rhythm. No murmurs, rubs, or gallops. Distal pulses are 2+ and  symmetric.  Pulmonary/Chest: No respiratory distress. Clear to auscultation bilaterally. No wheezing or rales.  Abdominal: Soft and non-distended.  There is no tenderness.  No rebound, guarding, or rigidity.   Musculoskeletal: Moves all extremities. No obvious deformities. No edema.  Skin: Warm and dry.  Neurological:  Alert, awake, and appropriate.  Normal speech.  No acute focal neurological deficits are appreciated.  Psychiatric: Normal affect. Good eye contact. Appropriate in content.    ED Course    Critical Care    Date/Time: 1/25/2022 4:25 PM  Performed by: Norris Benitez MD  Authorized by: Norris Benitez MD   Direct patient critical care time: 15 minutes  Additional history critical care time: 5 minutes  Ordering / reviewing critical care time: 10 minutes  Documentation critical care time: 10 minutes  Consulting other physicians critical care time: 5 minutes  Total critical care time (exclusive of procedural time) : 45 minutes  Critical care time was exclusive of separately billable procedures and treating other patients and teaching time.  Critical care was necessary to treat or prevent imminent or life-threatening deterioration of the following conditions: cardiac failure and metabolic crisis.  Critical care was time spent personally by me on the following activities: blood draw for specimens, development of treatment plan with patient or surrogate, discussions with consultants, interpretation of cardiac output measurements, evaluation of patient's response to treatment, examination of patient, obtaining history from patient or surrogate, ordering and performing treatments and interventions, ordering and review of laboratory studies, ordering and review of radiographic studies, pulse oximetry, re-evaluation of patient's condition and review of old charts.        ED Vital Signs:  Vitals:    01/25/22 1240 01/25/22 1533 01/25/22 1734 01/25/22 1754   BP: 109/74 136/87 (!) 153/87    Pulse:  83 90 88 (!) 164   Resp: 18 17 20    Temp: 97.9 °F (36.6 °C)      TempSrc: Oral      SpO2: 96% 100% 100%    Weight: 88.5 kg (195 lb)       01/25/22 1829 01/25/22 1839 01/25/22 1937   BP: 132/76 124/85    Pulse:  (!) 148 (!) 133   Resp:  17    Temp:   98.1 °F (36.7 °C)   TempSrc:   Oral   SpO2:  98%    Weight:          Abnormal Lab Results:  Labs Reviewed   CBC W/ AUTO DIFFERENTIAL - Abnormal; Notable for the following components:       Result Value    MCH 31.9 (*)     RDW 11.3 (*)     All other components within normal limits   COMPREHENSIVE METABOLIC PANEL - Abnormal; Notable for the following components:    Sodium 131 (*)     Potassium 6.1 (*)     Chloride 88 (*)     Glucose 739 (*)     BUN 49 (*)     Creatinine 2.7 (*)     Calcium 10.7 (*)     Alkaline Phosphatase 139 (*)     Anion Gap 18 (*)     eGFR if  29 (*)     eGFR if non  25 (*)     All other components within normal limits    Narrative:      GLUCOSE  critical result(s) called and verbal readback obtained from   CHRIS VILLEGAS RN@[email or web address] by [facility] 01/25/2022 15:05   BETA - HYDROXYBUTYRATE, SERUM - Abnormal; Notable for the following components:    Beta-Hydroxybutyrate 1.7 (*)     All other components within normal limits   URINALYSIS, REFLEX TO URINE CULTURE - Abnormal; Notable for the following components:    Glucose, UA 3+ (*)     Ketones, UA Trace (*)     Occult Blood UA Trace (*)     Nitrite, UA Positive (*)     All other components within normal limits    Narrative:     Specimen Source->Urine   BASIC METABOLIC PANEL - Abnormal; Notable for the following components:    Glucose 493 (*)     BUN 43 (*)     Creatinine 1.9 (*)     Calcium 8.5 (*)     eGFR if  45 (*)     eGFR if non  39 (*)     All other components within normal limits    Narrative:       glu critical result(s) called and verbal readback obtained from   miguel nicholas rn by ALINA 01/25/2022 18:19   URINALYSIS MICROSCOPIC - Abnormal;  Notable for the following components:    Yeast, UA Rare (*)     All other components within normal limits    Narrative:     Specimen Source->Urine   POCT GLUCOSE - Abnormal; Notable for the following components:    POCT Glucose >500 (*)     All other components within normal limits   ISTAT PROCEDURE - Abnormal; Notable for the following components:    POC PCO2 49.3 (*)     POC PO2 35 (*)     POC HCO3 28.8 (*)     POC SATURATED O2 65 (*)     All other components within normal limits   POCT GLUCOSE - Abnormal; Notable for the following components:    POCT Glucose 482 (*)     All other components within normal limits   B-TYPE NATRIURETIC PEPTIDE   TROPONIN I   PROTIME-INR   APTT   SARS-COV-2 RNA AMPLIFICATION, QUAL   POCT GLUCOSE MONITORING CONTINUOUS   POCT GLUCOSE MONITORING CONTINUOUS        All Lab Results:  Results for orders placed or performed during the hospital encounter of 01/25/22   CBC auto differential   Result Value Ref Range    WBC 7.36 3.90 - 12.70 K/uL    RBC 5.27 4.60 - 6.20 M/uL    Hemoglobin 16.8 14.0 - 18.0 g/dL    Hematocrit 48.7 40.0 - 54.0 %    MCV 92 82 - 98 fL    MCH 31.9 (H) 27.0 - 31.0 pg    MCHC 34.5 32.0 - 36.0 g/dL    RDW 11.3 (L) 11.5 - 14.5 %    Platelets 205 150 - 450 K/uL    MPV 11.7 9.2 - 12.9 fL    Immature Granulocytes 0.3 0.0 - 0.5 %    Gran # (ANC) 4.6 1.8 - 7.7 K/uL    Immature Grans (Abs) 0.02 0.00 - 0.04 K/uL    Lymph # 2.0 1.0 - 4.8 K/uL    Mono # 0.6 0.3 - 1.0 K/uL    Eos # 0.1 0.0 - 0.5 K/uL    Baso # 0.04 0.00 - 0.20 K/uL    nRBC 0 0 /100 WBC    Gran % 62.3 38.0 - 73.0 %    Lymph % 27.6 18.0 - 48.0 %    Mono % 8.3 4.0 - 15.0 %    Eosinophil % 1.0 0.0 - 8.0 %    Basophil % 0.5 0.0 - 1.9 %    Differential Method Automated    Comprehensive metabolic panel   Result Value Ref Range    Sodium 131 (L) 136 - 145 mmol/L    Potassium 6.1 (H) 3.5 - 5.1 mmol/L    Chloride 88 (L) 95 - 110 mmol/L    CO2 25 23 - 29 mmol/L    Glucose 739 (HH) 70 - 110 mg/dL    BUN 49 (H) 6 - 20 mg/dL     Creatinine 2.7 (H) 0.5 - 1.4 mg/dL    Calcium 10.7 (H) 8.7 - 10.5 mg/dL    Total Protein 8.2 6.0 - 8.4 g/dL    Albumin 4.5 3.5 - 5.2 g/dL    Total Bilirubin 0.7 0.1 - 1.0 mg/dL    Alkaline Phosphatase 139 (H) 55 - 135 U/L    AST 14 10 - 40 U/L    ALT 21 10 - 44 U/L    Anion Gap 18 (H) 8 - 16 mmol/L    eGFR if African American 29 (A) >60 mL/min/1.73 m^2    eGFR if non African American 25 (A) >60 mL/min/1.73 m^2   Beta - Hydroxybutyrate, Serum   Result Value Ref Range    Beta-Hydroxybutyrate 1.7 (H) 0.0 - 0.5 mmol/L   Brain natriuretic peptide   Result Value Ref Range    BNP 47 0 - 99 pg/mL   Troponin I   Result Value Ref Range    Troponin I 0.008 0.000 - 0.026 ng/mL   Urinalysis, Reflex to Urine Culture Urine, Clean Catch    Specimen: Urine   Result Value Ref Range    Specimen UA Urine, Clean Catch     Color, UA Yellow Yellow, Straw, Annette    Appearance, UA Clear Clear    pH, UA 6.0 5.0 - 8.0    Specific Gravity, UA 1.010 1.005 - 1.030    Protein, UA Negative Negative    Glucose, UA 3+ (A) Negative    Ketones, UA Trace (A) Negative    Bilirubin (UA) Negative Negative    Occult Blood UA Trace (A) Negative    Nitrite, UA Positive (A) Negative    Urobilinogen, UA Negative <2.0 EU/dL    Leukocytes, UA Negative Negative   Protime-INR   Result Value Ref Range    Prothrombin Time 11.0 9.0 - 12.5 sec    INR 1.0 0.8 - 1.2   APTT   Result Value Ref Range    aPTT 25.6 21.0 - 32.0 sec   COVID-19 Rapid Screening   Result Value Ref Range    SARS-CoV-2 RNA, Amplification, Qual Negative Negative   Basic metabolic panel   Result Value Ref Range    Sodium 138 136 - 145 mmol/L    Potassium 4.2 3.5 - 5.1 mmol/L    Chloride 100 95 - 110 mmol/L    CO2 23 23 - 29 mmol/L    Glucose 493 (HH) 70 - 110 mg/dL    BUN 43 (H) 6 - 20 mg/dL    Creatinine 1.9 (H) 0.5 - 1.4 mg/dL    Calcium 8.5 (L) 8.7 - 10.5 mg/dL    Anion Gap 15 8 - 16 mmol/L    eGFR if African American 45 (A) >60 mL/min/1.73 m^2    eGFR if non  39 (A) >60  mL/min/1.73 m^2   Urinalysis Microscopic   Result Value Ref Range    RBC, UA 2 0 - 4 /hpf    WBC, UA 2 0 - 5 /hpf    Bacteria Rare None-Occ /hpf    Yeast, UA Rare (A) None    Microscopic Comment SEE COMMENT    POCT glucose   Result Value Ref Range    POCT Glucose >500 (H) 70 - 110 mg/dL   ISTAT PROCEDURE   Result Value Ref Range    POC PH 7.374 7.35 - 7.45    POC PCO2 49.3 (H) 35 - 45 mmHg    POC PO2 35 (L) 40 - 60 mmHg    POC HCO3 28.8 (H) 24 - 28 mmol/L    POC BE 4 -2 to 2 mmol/L    POC SATURATED O2 65 (L) 95 - 100 %    Sample VENOUS     Site Other     Allens Test N/A     DelSys Room Air     Mode SPONT    POCT glucose   Result Value Ref Range    POCT Glucose 482 (HH) 70 - 110 mg/dL     Imaging Results:  Imaging Results          X-Ray Chest 1 View (Final result)  Result time 01/25/22 15:19:19    Final result by CATHI Menendez Sr., MD (01/25/22 15:19:19)                 Impression:      Normal study.      Electronically signed by: Jorge Menendez MD  Date:    01/25/2022  Time:    15:19             Narrative:    EXAMINATION:  XR CHEST 1 VIEW    CLINICAL HISTORY:  Hyperglycemia, unspecified    COMPARISON:  None    FINDINGS:  The size of the heart is normal. The lungs are clear. There is no pneumothorax.  The costophrenic angles are sharp.                               The EKG was ordered, reviewed, and independently interpreted by the ED provider.  Interpretation time: 14:54  Rate: 153 BPM  Rhythm: Atrial fibrillation with rapid ventricular response  Interpretation: Nonspecific T wave abnormality. No STEMI.           The Emergency Provider reviewed the vital signs and test results, which are outlined above.    ED Discussion     3:12 PM: Discussed pt's case with Dr. Almaguer (Cardiology) who recommends starting the pt on Eliquis and PO Cardizem 80 mg BID, as well as outpatient Cardiology follow up.    4:23 PM: Discussed case with Shannan Reynolds NP (Hospital Medicine). Dr. Lassiter agrees with current care and  management of pt and accepts admission.   Admitting Service: Hospital Medicine   Admitting Physician: Dr. Lassiter  Admit to: Obs Tele    4:24 PM: Re-evaluated pt. I have discussed test results, shared treatment plan, and the need for admission with patient and family at bedside. Pt and family express understanding at this time and agree with all information. All questions answered. Pt and family have no further questions or concerns at this time. Pt is ready for admit.         ED Medication(s):  Medications   diltiaZEM injection 10 mg (10 mg Intravenous Not Given 1/25/22 1459)   0.9%  NaCl infusion ( Intravenous New Bag 1/25/22 1729)   glucose chewable tablet 16 g (has no administration in time range)   glucose chewable tablet 24 g (has no administration in time range)   dextrose 50% injection 12.5 g (has no administration in time range)   dextrose 50% injection 25 g (has no administration in time range)   glucagon (human recombinant) injection 1 mg (has no administration in time range)   insulin aspart U-100 pen 1-10 Units (has no administration in time range)   insulin detemir U-100 pen 10 Units (has no administration in time range)   heparin 25,000 units in dextrose 5% 250 mL (100 units/mL) infusion LOW INTENSITY nomogram - [facility] (12 Units/kg/hr × 88.5 kg (Adjusted) Intravenous Handoff 1/25/22 1859)   heparin 25,000 units in dextrose 5% (100 units/ml) IV bolus from bag - ADDITIONAL PRN BOLUS - 60 units/kg (has no administration in time range)   heparin 25,000 units in dextrose 5% (100 units/ml) IV bolus from bag - ADDITIONAL PRN BOLUS - 30 units/kg (has no administration in time range)   aspirin chewable tablet 81 mg (has no administration in time range)   ezetimibe tablet 10 mg (has no administration in time range)   atorvastatin tablet 20 mg (has no administration in time range)   diltiaZEM 125 mg in dextrose 5% 125 mL infusion (non-titrating) (10 mg/hr Intravenous Rate/Dose Change 1/25/22 1840)   sodium chloride  0.9% bolus 1,000 mL (0 mLs Intravenous Stopped 1/25/22 1652)   insulin regular injection 10 Units (10 Units Intravenous Given 1/25/22 1500)   sodium zirconium cyclosilicate packet 10 g (10 g Oral Given 1/25/22 1728)   sodium chloride 0.9% bolus 1,000 mL (0 mLs Intravenous Stopped 1/25/22 1829)   diltiaZEM injection 10 mg (10 mg Intravenous Given 1/25/22 1742)   heparin 25,000 units in dextrose 5% (100 units/ml) IV bolus from bag INITIAL BOLUS (5,310 Units Intravenous Bolus from Bag 1/25/22 1842)   metoprolol injection 5 mg (5 mg Intravenous Given 1/25/22 1829)     New Prescriptions    No medications on file           Medical Decision Making    Medical Decision Making:   Clinical Tests:   Lab Tests: Ordered and Reviewed  Radiological Study: Ordered and Reviewed  Medical Tests: Ordered and Reviewed           Scribe Attestation:   Scribe #1: I performed the above scribed service and the documentation accurately describes the services I performed. I attest to the accuracy of the note.    Attending:   Physician Attestation Statement for Scribe #1: I, Norris Benitez MD, personally performed the services described in this documentation, as scribed by Eren Zheng, in my presence, and it is both accurate and complete.          Clinical Impression       ICD-10-CM ICD-9-CM   1. Hyperglycemia due to diabetes mellitus  E11.65 250.02   2. Hyperglycemia  R73.9 790.29   3. Atrial fibrillation with rapid ventricular response  I48.91 427.31   4. JESSICA (acute kidney injury)  N17.9 584.9   5. Hyperkalemia  E87.5 276.7   6. Atrial fibrillation  I48.91 427.31       Disposition:   Disposition: Placed in Observation  Condition: Fair         Norris Benitez MD  01/25/22 1731       Norris Benitez MD  01/25/22 1950

## 2022-01-25 NOTE — ASSESSMENT & PLAN NOTE
Likely related to stress from above and renal failure  Cardizem IV and converted to NSR  Cardiology recommends 90mg every 12 hours  Echocardiogram has been ordered  Recent TSH wnl

## 2022-01-25 NOTE — HPI
Papa Pope is a 56 year old male with history of DM, tobacco abuse, hypertension, and hyperlipidemia who presents to ED for further for elevated glucose that was noted on routine labs by PCP. Patient states his glucose has been <500 for almost a week. Admits to associated fatigue and weakness. He only takes metformin for his glucose. Tries to monitor his intake of carbohydrates and sugary foods. HgbA1c 8/2021 was 7.1.     Labs in ED reflect marked hypoglycemia with renal insufficiency and electrolyte abnormality. Serum bicarbonate wnl but has anion gap. S/p insulin IV and fluid resuscitation. Repeat BMP pending. EKG revealed atrial fibrillation with RVR converted to NSR with Cardizem IV x 1. Hospital medicine has been consulted for admission.

## 2022-01-25 NOTE — H&P
[facility] - Emergency Dept.  [facility]  History & Physical    Patient Name: Papa Pope  MRN: [id number]  Patient Class: OP- Observation  Admission Date: 1/25/2022  Attending Physician: Norris Benitez,*   Primary Care Provider: Prachi Castano MD      Patient information was obtained from patient and ER records.     Subjective:     Principal Problem:Hyperosmolar hyperglycemic state (HHS)    Chief Complaint:   Chief Complaint   Patient presents with    Hyperglycemia     Cbg >500 for 1 week. Not insulin dependent . Takes metformin         HPI: Papa Pope is a 56 year old male with history of DM, tobacco abuse, hypertension, and hyperlipidemia who presents to ED for further for elevated glucose that was noted on routine labs by PCP. Patient states his glucose has been <500 for almost a week. Admits to associated fatigue and weakness. He only takes metformin for his glucose. Tries to monitor his intake of carbohydrates and sugary foods. HgbA1c 8/2021 was 7.1.     Labs in ED reflect marked hypoglycemia with renal insufficiency and electrolyte abnormality. Serum bicarbonate wnl but has anion gap. S/p insulin IV and fluid resuscitation. Repeat BMP pending. EKG revealed atrial fibrillation with RVR converted to NSR with Cardizem IV x 1. Hospital medicine has been consulted for admission.       Past Medical History:   Diagnosis Date    Acute renal failure 1/25/2022    Arthritis     Atrial fibrillation with RVR 1/25/2022    Back pain     Hyperlipidemia     Hypertension     Pneumonia     Polyneuropathy     Tobacco dependence     Type 2 diabetes mellitus without complication, without long-term current use of insulin 8/4/2021       Past Surgical History:   Procedure Laterality Date    COLONOSCOPY N/A 8/22/2017    Procedure: COLONOSCOPY;  Surgeon: Keo Cruz MD;  Location: [facility];  Service: Endoscopy;  Laterality: N/A;    EPIDURAL STEROID INJECTION INTO CERVICAL SPINE N/A 11/1/2019     Procedure: C7/T1 IL SUKHJINDER;  Surgeon: Donnell Jordna MD;  Location: [facility];  Service: Pain Management;  Laterality: N/A;    STOMACH SURGERY      TRANSFORAMINAL EPIDURAL INJECTION OF STEROID Bilateral 8/13/2019    Procedure: Injection,steroid,epidural,transforaminal approach;  Surgeon: Donnell Jordan MD;  Location: [facility];  Service: Pain Management;  Laterality: Bilateral;       Review of patient's allergies indicates:  No Known Allergies    No current facility-administered medications on file prior to encounter.     Current Outpatient Medications on File Prior to Encounter   Medication Sig    amLODIPine (NORVASC) 10 MG tablet TK 1 T PO D.    aspirin 81 MG Chew Take 1 tablet (81 mg total) by mouth once daily.    ciclopirox (PENLAC) 8 % Soln Apply topically nightly.    ezetimibe (ZETIA) 10 mg tablet Take 1 tablet (10 mg total) by mouth once daily.    fluconazole (DIFLUCAN) 150 MG Tab Take 1 tablet (150 mg total) by mouth once daily. for 1 day    gabapentin (NEURONTIN) 600 MG tablet Take 1 tablet (600 mg total) by mouth 3 (three) times daily. may cause drowsiness    hydroCHLOROthiazide (HYDRODIURIL) 25 MG tablet Take 1 tablet (25 mg total) by mouth once daily.    meloxicam (MOBIC) 15 MG tablet Take 1 tablet (15 mg total) by mouth once daily.    metFORMIN (GLUCOPHAGE) 1000 MG tablet Take 1 tablet (1,000 mg total) by mouth 2 (two) times daily with meals.    metoprolol tartrate (LOPRESSOR) 50 MG tablet Take 1 tablet (50 mg total) by mouth once daily.    rosuvastatin (CRESTOR) 40 MG Tab Take 1 tablet (40 mg total) by mouth every evening.    [DISCONTINUED] metFORMIN (GLUCOPHAGE) 500 MG tablet Take 1 tablet (500 mg total) by mouth 2 (two) times daily with meals.     Family History     Problem Relation (Age of Onset)    Hypertension Father        Tobacco Use    Smoking status: Current Some Day Smoker     Packs/day: 0.50     Types: Cigarettes    Smokeless tobacco: Never Used   Substance and  Sexual Activity    Alcohol use: Yes     Alcohol/week: 12.0 standard drinks     Types: 12 Cans of beer per week     Comment: weekends only    Drug use: No    Sexual activity: Yes     Review of Systems   Constitutional: Negative for chills, diaphoresis, fatigue and fever.   HENT: Negative for drooling, ear pain, rhinorrhea and sore throat.    Eyes: Negative.    Respiratory: Negative for cough, shortness of breath and wheezing.    Cardiovascular: Negative for palpitations and leg swelling.   Gastrointestinal: Negative for abdominal pain, constipation, diarrhea and nausea.   Endocrine: Negative.         Elevated glucose     Genitourinary: Negative for dysuria, hematuria and urgency.   Musculoskeletal: Negative.    Skin: Negative for color change and wound.   Allergic/Immunologic: Negative.    Neurological: Negative for dizziness, syncope and speech difficulty.   Hematological: Negative.    Psychiatric/Behavioral: Negative.      Objective:     Vital Signs (Most Recent):  Temp: 97.9 °F (36.6 °C) (01/25/22 1240)  Pulse: 90 (01/25/22 1533)  Resp: 17 (01/25/22 1533)  BP: 136/87 (01/25/22 1533)  SpO2: 100 % (01/25/22 1533) Vital Signs (24h Range):  Temp:  [97.9 °F (36.6 °C)] 97.9 °F (36.6 °C)  Pulse:  [] 90  Resp:  [17-18] 17  SpO2:  [96 %-100 %] 100 %  BP: (109-136)/(74-87) 136/87     Weight: 88.5 kg (195 lb)  Body mass index is 22.53 kg/m².    Physical Exam  Vitals and nursing note reviewed.   Constitutional:       General: He is not in acute distress.     Appearance: He is well-developed. He is ill-appearing.   HENT:      Head: Normocephalic and atraumatic.   Cardiovascular:      Rate and Rhythm: Normal rate and regular rhythm.      Heart sounds: Normal heart sounds.   Pulmonary:      Effort: Pulmonary effort is normal. No respiratory distress.      Breath sounds: Normal breath sounds.   Abdominal:      General: Bowel sounds are normal. There is no distension.      Palpations: Abdomen is soft.      Tenderness:  There is no abdominal tenderness.   Musculoskeletal:         General: Normal range of motion.      Cervical back: Normal range of motion and neck supple. No edema.   Skin:     General: Skin is dry.   Neurological:      Mental Status: He is alert and oriented to person, place, and time.             Significant Labs:   All pertinent labs within the past 24 hours have been reviewed.  CBC:   Recent Labs   Lab 01/24/22  0948 01/25/22  1345   WBC 8.93 7.36   HGB 15.4 16.8   HCT 46.4 48.7    205     CMP:   Recent Labs   Lab 01/24/22  0948 01/25/22  1345   * 131*   K 4.7 6.1*   CL 90* 88*   CO2 18* 25   * 739*   BUN 41* 49*   CREATININE 2.2* 2.7*   CALCIUM 10.0 10.7*   PROT 8.0 8.2   ALBUMIN 4.0 4.5   BILITOT 0.9 0.7   ALKPHOS 124 139*   AST 13 14   ALT 16 21   ANIONGAP 21* 18*   EGFRNONAA 32.5* 25*       Significant Imaging:  Imaging Results          X-Ray Chest 1 View (Final result)  Result time 01/25/22 15:19:19    Final result by CATHI Menendez Sr., MD (01/25/22 15:19:19)                 Impression:      Normal study.      Electronically signed by: Jorge Menendez MD  Date:    01/25/2022  Time:    15:19             Narrative:    EXAMINATION:  XR CHEST 1 VIEW    CLINICAL HISTORY:  Hyperglycemia, unspecified    COMPARISON:  None    FINDINGS:  The size of the heart is normal. The lungs are clear. There is no pneumothorax.  The costophrenic angles are sharp.                                  Assessment/Plan:     * Hyperosmolar hyperglycemic state (HHS)  Hemoglobin A1C   Date Value Ref Range Status   01/24/2022 11.4 (H) 4.0 - 5.6 % Final     Comment:     ADA Screening Guidelines:  5.7-6.4%  Consistent with prediabetes  >or=6.5%  Consistent with diabetes    High levels of fetal hemoglobin interfere with the HbA1C  assay. Heterozygous hemoglobin variants (HbS, HgC, etc)do  not significantly interfere with this assay.   However, presence of multiple variants may affect accuracy.     08/04/2021 7.1 (H) 4.0 -  5.6 % Final     Comment:     ADA Screening Guidelines:  5.7-6.4%  Consistent with prediabetes  >or=6.5%  Consistent with diabetes    High levels of fetal hemoglobin interfere with the HbA1C  assay. Heterozygous hemoglobin variants (HbS, HgC, etc)do  not significantly interfere with this assay.   However, presence of multiple variants may affect accuracy.     02/09/2021 7.6 (H) 4.0 - 5.6 % Final     Comment:     ADA Screening Guidelines:  5.7-6.4%  Consistent with prediabetes  >or=6.5%  Consistent with diabetes    High levels of fetal hemoglobin interfere with the HbA1C  assay. Heterozygous hemoglobin variants (HbS, HgC, etc)do  not significantly interfere with this assay.   However, presence of multiple variants may affect accuracy.         Glucose >700. Serum bicarbonate wnl. S/p 10 units insulin and fluid resuscitation. Glucose now <500.   Start moderate insulin sliding scale  Levemir 10 units night  Diabetic educator and dietary consult  IVFs  Pseudohyponatremia noted  Repeat BMP now.     Atrial fibrillation with RVR  Likely related to stress from above and renal failure  Cardizem IV and converted to NSR  Cardiology recommends 90mg every 12 hours  Echocardiogram has been ordered  Recent TSH wnl    Hyperkalemia  Expected to improve with insulin IV  lokelma also given  Repeat BMP pending      Acute renal failure  --related to IVVD from hyperglycemia  --IVF's  --monitor response    Smoker  --counseled on tobacco cessation 3-4 minutes    Hypertension associated with diabetes  --hold amlopidine since patient is now on Cardizem  --HCTZ on hold due to renal failure      Hyperlipidemia due to type 2 diabetes mellitus  Resume STATIN      VTE Risk Mitigation (From admission, onward)         Ordered     heparin 25,000 units in dextrose 5% (100 units/ml) IV bolus from bag - ADDITIONAL PRN BOLUS - 60 units/kg  As needed (PRN)        Question:  Heparin Infusion Adjustment (DO NOT MODIFY ANSWER)  Answer:   [email or web address]\epic\Images\Pharmacy\HeparinInfusions\heparin LOW INTENSITY nomogram for [facility] [id number]    01/25/22 1730     heparin 25,000 units in dextrose 5% (100 units/ml) IV bolus from bag - ADDITIONAL PRN BOLUS - 30 units/kg  As needed (PRN)        Question:  Heparin Infusion Adjustment (DO NOT MODIFY ANSWER)  Answer:  [email or web address]\epic\Images\Pharmacy\HeparinInfusions\heparin LOW INTENSITY nomogram for [facility] [id number]    01/25/22 1730     heparin 25,000 units in dextrose 5% (100 units/ml) IV bolus from bag INITIAL BOLUS  Once        Question:  Heparin Infusion Adjustment (DO NOT MODIFY ANSWER)  Answer:  [email or web address]\epic\Images\Pharmacy\HeparinInfusions\heparin LOW INTENSITY nomogram for [facility] [id number]    01/25/22 1730     heparin 25,000 units in dextrose 5% 250 mL (100 units/mL) infusion LOW INTENSITY nomogram - [facility]  Continuous        Question Answer Comment   Heparin Infusion Adjustment (DO NOT MODIFY ANSWER) \\[email or web address]\epic\Images\Pharmacy\HeparinInfusions\heparin LOW INTENSITY nomogram for [facility] [id number]    Begin at (in units/kg/hr) 12        01/25/22 1730              Shannan Reynolds NP  Department of Hospital Medicine   [facility] - Emergency Dept.    Addendum: Converted back to atrial fibrillation with RVR. Will give additional dose of Cardizem IV and monitor response.

## 2022-01-25 NOTE — PROGRESS NOTES
Subjective:     Patient ID: Papa Pope is a 56 y.o. male.    Chief Complaint: Callouses and Diabetic Foot Exam (Pt c/o BL callous pain 7/10, diabetic pt wears tennis shoes w/ socks, PCP Dr. Castano  last seen 1-)    Papa is a 56 y.o. male who presents to the clinic upon referral from Dr. Castano  for evaluation and treatment of diabetic feet. Papa has a past medical history of Arthritis, Back pain, Hyperlipidemia, Hypertension, Pneumonia, Polyneuropathy, Tobacco dependence, and Type 2 diabetes mellitus without complication, without long-term current use of insulin (8/4/2021). Patient relates no major problem with feet. Only complaints today consist of dry skin and callus.    PCP: Prachi Castano MD    Date Last Seen by PCP: 01/24/2022    Current shoe gear: Tennis shoes    Hemoglobin A1C   Date Value Ref Range Status   01/24/2022 11.4 (H) 4.0 - 5.6 % Final     Comment:     ADA Screening Guidelines:  5.7-6.4%  Consistent with prediabetes  >or=6.5%  Consistent with diabetes    High levels of fetal hemoglobin interfere with the HbA1C  assay. Heterozygous hemoglobin variants (HbS, HgC, etc)do  not significantly interfere with this assay.   However, presence of multiple variants may affect accuracy.     08/04/2021 7.1 (H) 4.0 - 5.6 % Final     Comment:     ADA Screening Guidelines:  5.7-6.4%  Consistent with prediabetes  >or=6.5%  Consistent with diabetes    High levels of fetal hemoglobin interfere with the HbA1C  assay. Heterozygous hemoglobin variants (HbS, HgC, etc)do  not significantly interfere with this assay.   However, presence of multiple variants may affect accuracy.     02/09/2021 7.6 (H) 4.0 - 5.6 % Final     Comment:     ADA Screening Guidelines:  5.7-6.4%  Consistent with prediabetes  >or=6.5%  Consistent with diabetes    High levels of fetal hemoglobin interfere with the HbA1C  assay. Heterozygous hemoglobin variants (HbS, HgC, etc)do  not significantly interfere with this assay.   However, presence  of multiple variants may affect accuracy.                  Patient Active Problem List   Diagnosis    Hyperlipidemia due to type 2 diabetes mellitus    Hypertension associated with diabetes    DDD (degenerative disc disease), lumbar    History of colon polyps    Smoker    Type 2 diabetes mellitus without complication, without long-term current use of insulin    Decreased strength of trunk and back       Medication List with Changes/Refills   Current Medications    AMLODIPINE (NORVASC) 10 MG TABLET    TK 1 T PO D.    ASPIRIN 81 MG CHEW    Take 1 tablet (81 mg total) by mouth once daily.    CICLOPIROX (PENLAC) 8 % SOLN    Apply topically nightly.    EZETIMIBE (ZETIA) 10 MG TABLET    Take 1 tablet (10 mg total) by mouth once daily.    GABAPENTIN (NEURONTIN) 600 MG TABLET    Take 1 tablet (600 mg total) by mouth 3 (three) times daily. may cause drowsiness    HYDROCHLOROTHIAZIDE (HYDRODIURIL) 25 MG TABLET    Take 1 tablet (25 mg total) by mouth once daily.    MELOXICAM (MOBIC) 15 MG TABLET    Take 1 tablet (15 mg total) by mouth once daily.    METFORMIN (GLUCOPHAGE) 500 MG TABLET    Take 1 tablet (500 mg total) by mouth 2 (two) times daily with meals.    METOPROLOL TARTRATE (LOPRESSOR) 50 MG TABLET    Take 1 tablet (50 mg total) by mouth once daily.    ROSUVASTATIN (CRESTOR) 40 MG TAB    Take 1 tablet (40 mg total) by mouth every evening.       Review of patient's allergies indicates:  No Known Allergies    Past Surgical History:   Procedure Laterality Date    COLONOSCOPY N/A 8/22/2017    Procedure: COLONOSCOPY;  Surgeon: Keo Cruz MD;  Location: [facility];  Service: Endoscopy;  Laterality: N/A;    EPIDURAL STEROID INJECTION INTO CERVICAL SPINE N/A 11/1/2019    Procedure: C7/T1 IL SUKHJINDER;  Surgeon: Donnell Jordan MD;  Location: [facility];  Service: Pain Management;  Laterality: N/A;    STOMACH SURGERY      TRANSFORAMINAL EPIDURAL INJECTION OF STEROID Bilateral 8/13/2019    Procedure:  Injection,steroid,epidural,transforaminal approach;  Surgeon: Donnell Jordan MD;  Location: [facility];  Service: Pain Management;  Laterality: Bilateral;       Family History   Problem Relation Age of Onset    Hypertension Father        Social History     Socioeconomic History    Marital status:    Tobacco Use    Smoking status: Current Some Day Smoker     Packs/day: 0.50     Types: Cigarettes    Smokeless tobacco: Never Used   Substance and Sexual Activity    Alcohol use: Yes     Alcohol/week: 12.0 standard drinks     Types: 12 Cans of beer per week     Comment: weekends only    Drug use: No    Sexual activity: Yes       There were no vitals filed for this visit.    Hemoglobin A1C   Date Value Ref Range Status   01/24/2022 11.4 (H) 4.0 - 5.6 % Final     Comment:     ADA Screening Guidelines:  5.7-6.4%  Consistent with prediabetes  >or=6.5%  Consistent with diabetes    High levels of fetal hemoglobin interfere with the HbA1C  assay. Heterozygous hemoglobin variants (HbS, HgC, etc)do  not significantly interfere with this assay.   However, presence of multiple variants may affect accuracy.     08/04/2021 7.1 (H) 4.0 - 5.6 % Final     Comment:     ADA Screening Guidelines:  5.7-6.4%  Consistent with prediabetes  >or=6.5%  Consistent with diabetes    High levels of fetal hemoglobin interfere with the HbA1C  assay. Heterozygous hemoglobin variants (HbS, HgC, etc)do  not significantly interfere with this assay.   However, presence of multiple variants may affect accuracy.     02/09/2021 7.6 (H) 4.0 - 5.6 % Final     Comment:     ADA Screening Guidelines:  5.7-6.4%  Consistent with prediabetes  >or=6.5%  Consistent with diabetes    High levels of fetal hemoglobin interfere with the HbA1C  assay. Heterozygous hemoglobin variants (HbS, HgC, etc)do  not significantly interfere with this assay.   However, presence of multiple variants may affect accuracy.         Review of Systems   Constitutional:  Negative for chills and fever.   Respiratory: Negative for shortness of breath.    Cardiovascular: Negative for chest pain, palpitations, orthopnea, claudication and leg swelling.   Gastrointestinal: Negative for diarrhea, nausea and vomiting.   Musculoskeletal: Negative for joint pain.   Skin: Negative for rash.   Neurological: Positive for tingling.   Psychiatric/Behavioral: Negative.              Objective:   PHYSICAL EXAM: Apperance: Alert and orient in no distress,well developed, and with good attention to grooming and body habits  Patient presents ambulating in tennis shoes.   LOWER EXTREMITY EXAM:  VASCULAR: Dorsalis pedis pulses 2/4 bilateral and Posterior Tibial pulses 2/4 bilateral. Capillary fill time <4 seconds bilateral. No edema observed bilateral. Varicosities absent bilateral. Skin temperature of the lower extremities is warm to warm, proximal to distal. Hair growth WNL bilateral.  DERMATOLOGICAL: No skin rashes, subcutaneous nodules, lesions, or ulcers observed bilateral. Nails 1,2,3,4,5 bilateral elongated, thickened, and discolored with subungual debris. Mild hyperkeratotic tissue noted to bilateral medial hallux.  Webspaces 1,2,3,4 bilateral clean, dry and without evidence of break in skin integrity.   NEUROLOGICAL: Light touch, sharp-dull, proprioception all present and equal bilaterally.  Vibratory sensation diminished at left hallux, intact at right hallux. Protective sensation intact at all 10 sites as tested with a [place]-Ronald 5.07 monofilament.   MUSCULOSKELETAL: Muscle strength is 5/5 for foot inverters, everters, plantarflexors, and dorsiflexors. Muscle tone is normal. Mild bunions noted bilateral.       Assessment:   Comprehensive diabetic foot examination, type 2 DM, encounter for    Onychomycosis  -     Ambulatory referral/consult to Podiatry    Callus of foot  -     Ambulatory referral/consult to Podiatry          Plan:   Comprehensive diabetic foot examination, type 2 DM,  encounter for    Onychomycosis  -     Ambulatory referral/consult to Podiatry    Callus of foot  -     Ambulatory referral/consult to Podiatry      I counseled the patient on his conditions, regarding findings of my examination, my impressions, and usual treatment plan.   Greater than 50% of this visit spent on counseling and coordination of care.  Greater than 15 minutes of a 20 minute appointment spent on education about the diabetic foot, neuropathy, and prevention of limb loss.  Shoe inspection. Diabetic Foot Education. Patient reminded of the importance of good nutrition and blood sugar control to help prevent podiatric complications of diabetes. Patient instructed on proper foot hygeine. We discussed wearing proper shoe gear, daily foot inspections, never walking without protective shoe gear, never putting sharp instruments to feet.    The patient and I reviewed the types of shoes he should be wearing, my recommendation includes generally the best time of the day for a shoe fitting is the afternoon, shoes with a wide toe box, very good cushion, and tennis shoes with removable inner soles.The patient and I reviewed my recommendations for over-the-counter orthotic inserts.   Patient  will continue to monitor the areas daily, inspect feet, wear protective shoe gear when ambulatory, moisturizer to maintain skin integrity. Patient reminded of the importance of good nutrition and blood sugar control to help prevent podiatric complications of diabetes.  Patient to return 12 months or sooner if needed.             Kali Zambrano DPM  [facility]

## 2022-01-25 NOTE — FIRST PROVIDER EVALUATION
Medical screening exam completed.  I have conducted a focused provider triage encounter, findings are as follows:    Brief history of present illness:  Hyperglycemia    There were no vitals filed for this visit.    Pertinent physical exam:  NAD    Brief workup plan:  Labs and further eval    Preliminary workup initiated; this workup will be continued and followed by the physician or advanced practice provider that is assigned to the patient when roomed.

## 2022-01-25 NOTE — TELEPHONE ENCOUNTER
CANDIE. Yesterday evening I received call from lab yesterday pm regarding glucose 763. I called patient without answer and left message for patient to call back through answer service regarding abnormal lab. Thanks.

## 2022-01-25 NOTE — ASSESSMENT & PLAN NOTE
Hemoglobin A1C   Date Value Ref Range Status   01/24/2022 11.4 (H) 4.0 - 5.6 % Final     Comment:     ADA Screening Guidelines:  5.7-6.4%  Consistent with prediabetes  >or=6.5%  Consistent with diabetes    High levels of fetal hemoglobin interfere with the HbA1C  assay. Heterozygous hemoglobin variants (HbS, HgC, etc)do  not significantly interfere with this assay.   However, presence of multiple variants may affect accuracy.     08/04/2021 7.1 (H) 4.0 - 5.6 % Final     Comment:     ADA Screening Guidelines:  5.7-6.4%  Consistent with prediabetes  >or=6.5%  Consistent with diabetes    High levels of fetal hemoglobin interfere with the HbA1C  assay. Heterozygous hemoglobin variants (HbS, HgC, etc)do  not significantly interfere with this assay.   However, presence of multiple variants may affect accuracy.     02/09/2021 7.6 (H) 4.0 - 5.6 % Final     Comment:     ADA Screening Guidelines:  5.7-6.4%  Consistent with prediabetes  >or=6.5%  Consistent with diabetes    High levels of fetal hemoglobin interfere with the HbA1C  assay. Heterozygous hemoglobin variants (HbS, HgC, etc)do  not significantly interfere with this assay.   However, presence of multiple variants may affect accuracy.         Glucose >700. Serum bicarbonate wnl. S/p 10 units insulin and fluid resuscitation. Glucose now <500.   Start moderate insulin sliding scale  Levemir 10 units night  Diabetic educator and dietary consult  IVFs  Pseudohyponatremia noted  Repeat BMP now.

## 2022-01-26 LAB
AORTIC ROOT ANNULUS: 3.57 CM
APTT BLDCRRT: 42.4 SEC (ref 21–32)
APTT BLDCRRT: 83.7 SEC (ref 21–32)
ASCENDING AORTA: 3.07 CM
AV INDEX (PROSTH): 0.64
AV MEAN GRADIENT: 5 MMHG
AV PEAK GRADIENT: 8 MMHG
AV VALVE AREA: 3.77 CM2
AV VELOCITY RATIO: 0.7
BASOPHILS # BLD AUTO: 0.03 K/UL (ref 0–0.2)
BASOPHILS NFR BLD: 0.4 % (ref 0–1.9)
BSA FOR ECHO PROCEDURE: 2.21 M2
CV ECHO LV RWT: 0.66 CM
DIFFERENTIAL METHOD: ABNORMAL
DOP CALC AO PEAK VEL: 1.37 M/S
DOP CALC AO VTI: 27.4 CM
DOP CALC LVOT AREA: 5.9 CM2
DOP CALC LVOT DIAMETER: 2.75 CM
DOP CALC LVOT PEAK VEL: 0.96 M/S
DOP CALC LVOT STROKE VOLUME: 103.3 CM3
DOP CALC MV VTI: 16.3 CM
DOP CALC RVOT PEAK VEL: 0.73 M/S
DOP CALC RVOT VTI: 13.4 CM
DOP CALCLVOT PEAK VEL VTI: 17.4 CM
E WAVE DECELERATION TIME: 448.2 MSEC
E/A RATIO: 0.91
E/E' RATIO: 4.67 M/S
ECHO EF ESTIMATED: 50 %
ECHO LV POSTERIOR WALL: 1.5 CM (ref 0.6–1.1)
EJECTION FRACTION: 50 %
EOSINOPHIL # BLD AUTO: 0.1 K/UL (ref 0–0.5)
EOSINOPHIL NFR BLD: 1.9 % (ref 0–8)
ERYTHROCYTE [DISTWIDTH] IN BLOOD BY AUTOMATED COUNT: 11.2 % (ref 11.5–14.5)
FRACTIONAL SHORTENING: 25 % (ref 28–44)
HCT VFR BLD AUTO: 40.9 % (ref 40–54)
HGB BLD-MCNC: 14.2 G/DL (ref 14–18)
IMM GRANULOCYTES # BLD AUTO: 0.02 K/UL (ref 0–0.04)
IMM GRANULOCYTES NFR BLD AUTO: 0.3 % (ref 0–0.5)
INTERVENTRICULAR SEPTUM: 1.62 CM (ref 0.6–1.1)
IVC DIAMETER: 1.25 CM
IVRT: 69.2 MSEC
LA MAJOR: 3.58 CM
LA MINOR: 3.78 CM
LA WIDTH: 3.9 CM
LEFT ATRIUM SIZE: 3.06 CM
LEFT ATRIUM VOLUME INDEX: 16.7 ML/M2
LEFT ATRIUM VOLUME: 37.3 CM3
LEFT INTERNAL DIMENSION IN SYSTOLE: 3.39 CM (ref 2.1–4)
LEFT VENTRICLE DIASTOLIC VOLUME INDEX: 42.09 ML/M2
LEFT VENTRICLE DIASTOLIC VOLUME: 94.29 ML
LEFT VENTRICLE MASS INDEX: 132 G/M2
LEFT VENTRICLE SYSTOLIC VOLUME INDEX: 21 ML/M2
LEFT VENTRICLE SYSTOLIC VOLUME: 47.1 ML
LEFT VENTRICULAR INTERNAL DIMENSION IN DIASTOLE: 4.54 CM (ref 3.5–6)
LEFT VENTRICULAR MASS: 296.69 G
LV LATERAL E/E' RATIO: 3.5 M/S
LV SEPTAL E/E' RATIO: 7 M/S
LVOT MG: 2.15 MMHG
LVOT MV: 0.69 CM/S
LYMPHOCYTES # BLD AUTO: 2.9 K/UL (ref 1–4.8)
LYMPHOCYTES NFR BLD: 40.2 % (ref 18–48)
MCH RBC QN AUTO: 32.1 PG (ref 27–31)
MCHC RBC AUTO-ENTMCNC: 34.7 G/DL (ref 32–36)
MCV RBC AUTO: 92 FL (ref 82–98)
MONOCYTES # BLD AUTO: 0.7 K/UL (ref 0.3–1)
MONOCYTES NFR BLD: 9.9 % (ref 4–15)
MV MEAN GRADIENT: 0 MMHG
MV PEAK A VEL: 0.46 M/S
MV PEAK E VEL: 0.42 M/S
MV PEAK GRADIENT: 1 MMHG
MV STENOSIS PRESSURE HALF TIME: 129.98 MS
MV VALVE AREA BY CONTINUITY EQUATION: 6.34 CM2
MV VALVE AREA P 1/2 METHOD: 1.69 CM2
NEUTROPHILS # BLD AUTO: 3.4 K/UL (ref 1.8–7.7)
NEUTROPHILS NFR BLD: 47.3 % (ref 38–73)
NRBC BLD-RTO: 0 /100 WBC
PISA TR MAX VEL: 2.37 M/S
PLATELET # BLD AUTO: 146 K/UL (ref 150–450)
PMV BLD AUTO: 12.2 FL (ref 9.2–12.9)
POCT GLUCOSE: 172 MG/DL (ref 70–110)
POCT GLUCOSE: 228 MG/DL (ref 70–110)
POCT GLUCOSE: 324 MG/DL (ref 70–110)
POCT GLUCOSE: 355 MG/DL (ref 70–110)
POCT GLUCOSE: 357 MG/DL (ref 70–110)
POCT GLUCOSE: 481 MG/DL (ref 70–110)
PV MEAN GRADIENT: 1.44 MMHG
RA MAJOR: 4.03 CM
RBC # BLD AUTO: 4.43 M/UL (ref 4.6–6.2)
TDI LATERAL: 0.12 M/S
TDI SEPTAL: 0.06 M/S
TDI: 0.09 M/S
TR MAX PG: 22 MMHG
TRICUSPID ANNULAR PLANE SYSTOLIC EXCURSION: 2.3 CM
WBC # BLD AUTO: 7.19 K/UL (ref 3.9–12.7)

## 2022-01-26 PROCEDURE — 63600175 PHARM REV CODE 636 W HCPCS: Mod: HCNC | Performed by: NURSE PRACTITIONER

## 2022-01-26 PROCEDURE — 96366 THER/PROPH/DIAG IV INF ADDON: CPT | Mod: HCNC

## 2022-01-26 PROCEDURE — 36415 COLL VENOUS BLD VENIPUNCTURE: CPT | Mod: HCNC | Performed by: EMERGENCY MEDICINE

## 2022-01-26 PROCEDURE — G0378 HOSPITAL OBSERVATION PER HR: HCPCS | Mod: HCNC

## 2022-01-26 PROCEDURE — 82962 GLUCOSE BLOOD TEST: CPT | Mod: HCNC

## 2022-01-26 PROCEDURE — 85025 COMPLETE CBC W/AUTO DIFF WBC: CPT | Mod: HCNC | Performed by: EMERGENCY MEDICINE

## 2022-01-26 PROCEDURE — 85730 THROMBOPLASTIN TIME PARTIAL: CPT | Mod: HCNC | Performed by: INTERNAL MEDICINE

## 2022-01-26 PROCEDURE — 25000003 PHARM REV CODE 250: Mod: HCNC | Performed by: NURSE PRACTITIONER

## 2022-01-26 PROCEDURE — 96372 THER/PROPH/DIAG INJ SC/IM: CPT | Mod: 59,HCNC

## 2022-01-26 PROCEDURE — 36415 COLL VENOUS BLD VENIPUNCTURE: CPT | Mod: HCNC | Performed by: INTERNAL MEDICINE

## 2022-01-26 PROCEDURE — 25000003 PHARM REV CODE 250: Mod: HCNC | Performed by: EMERGENCY MEDICINE

## 2022-01-26 PROCEDURE — 96372 THER/PROPH/DIAG INJ SC/IM: CPT | Mod: 59

## 2022-01-26 PROCEDURE — 85730 THROMBOPLASTIN TIME PARTIAL: CPT | Mod: 91,HCNC | Performed by: INTERNAL MEDICINE

## 2022-01-26 PROCEDURE — 25000003 PHARM REV CODE 250: Mod: HCNC | Performed by: INTERNAL MEDICINE

## 2022-01-26 PROCEDURE — 96376 TX/PRO/DX INJ SAME DRUG ADON: CPT | Mod: HCNC,59

## 2022-01-26 PROCEDURE — 96372 THER/PROPH/DIAG INJ SC/IM: CPT | Mod: HCNC | Performed by: NURSE PRACTITIONER

## 2022-01-26 PROCEDURE — 96361 HYDRATE IV INFUSION ADD-ON: CPT

## 2022-01-26 RX ORDER — DILTIAZEM HYDROCHLORIDE 30 MG/1
30 TABLET, FILM COATED ORAL EVERY 6 HOURS
Status: DISCONTINUED | OUTPATIENT
Start: 2022-01-26 | End: 2022-01-27

## 2022-01-26 RX ORDER — INSULIN ASPART 100 [IU]/ML
10 INJECTION, SOLUTION INTRAVENOUS; SUBCUTANEOUS ONCE
Status: COMPLETED | OUTPATIENT
Start: 2022-01-26 | End: 2022-01-26

## 2022-01-26 RX ADMIN — SODIUM CHLORIDE: 0.9 INJECTION, SOLUTION INTRAVENOUS at 09:01

## 2022-01-26 RX ADMIN — ATORVASTATIN CALCIUM 20 MG: 10 TABLET, FILM COATED ORAL at 09:01

## 2022-01-26 RX ADMIN — SODIUM CHLORIDE: 0.9 INJECTION, SOLUTION INTRAVENOUS at 01:01

## 2022-01-26 RX ADMIN — INSULIN ASPART 10 UNITS: 100 INJECTION, SOLUTION INTRAVENOUS; SUBCUTANEOUS at 04:01

## 2022-01-26 RX ADMIN — INSULIN ASPART 4 UNITS: 100 INJECTION, SOLUTION INTRAVENOUS; SUBCUTANEOUS at 09:01

## 2022-01-26 RX ADMIN — Medication 10 MG/HR: at 04:01

## 2022-01-26 RX ADMIN — EZETIMIBE 10 MG: 10 TABLET ORAL at 08:01

## 2022-01-26 RX ADMIN — DILTIAZEM HYDROCHLORIDE 30 MG: 30 TABLET, FILM COATED ORAL at 11:01

## 2022-01-26 RX ADMIN — DILTIAZEM HYDROCHLORIDE 30 MG: 30 TABLET, FILM COATED ORAL at 02:01

## 2022-01-26 RX ADMIN — APIXABAN 5 MG: 2.5 TABLET, FILM COATED ORAL at 03:01

## 2022-01-26 RX ADMIN — SODIUM CHLORIDE: 0.9 INJECTION, SOLUTION INTRAVENOUS at 04:01

## 2022-01-26 RX ADMIN — INSULIN ASPART 10 UNITS: 100 INJECTION, SOLUTION INTRAVENOUS; SUBCUTANEOUS at 12:01

## 2022-01-26 RX ADMIN — INSULIN ASPART 4 UNITS: 100 INJECTION, SOLUTION INTRAVENOUS; SUBCUTANEOUS at 05:01

## 2022-01-26 RX ADMIN — ASPIRIN 81 MG CHEWABLE TABLET 81 MG: 81 TABLET CHEWABLE at 08:01

## 2022-01-26 NOTE — ASSESSMENT & PLAN NOTE
Hemoglobin A1C   Date Value Ref Range Status   01/24/2022 11.4 (H) 4.0 - 5.6 % Final     Comment:     ADA Screening Guidelines:  5.7-6.4%  Consistent with prediabetes  >or=6.5%  Consistent with diabetes    High levels of fetal hemoglobin interfere with the HbA1C  assay. Heterozygous hemoglobin variants (HbS, HgC, etc)do  not significantly interfere with this assay.   However, presence of multiple variants may affect accuracy.     08/04/2021 7.1 (H) 4.0 - 5.6 % Final     Comment:     ADA Screening Guidelines:  5.7-6.4%  Consistent with prediabetes  >or=6.5%  Consistent with diabetes    High levels of fetal hemoglobin interfere with the HbA1C  assay. Heterozygous hemoglobin variants (HbS, HgC, etc)do  not significantly interfere with this assay.   However, presence of multiple variants may affect accuracy.     02/09/2021 7.6 (H) 4.0 - 5.6 % Final     Comment:     ADA Screening Guidelines:  5.7-6.4%  Consistent with prediabetes  >or=6.5%  Consistent with diabetes    High levels of fetal hemoglobin interfere with the HbA1C  assay. Heterozygous hemoglobin variants (HbS, HgC, etc)do  not significantly interfere with this assay.   However, presence of multiple variants may affect accuracy.         Glucose >700. Serum bicarbonate wnl. S/p 10 units insulin and fluid resuscitation. Glucose now <500.   Start moderate insulin sliding scale  Levemir 10 units night  Diabetic educator and dietary consult  IVFs  Pseudohyponatremia noted  Repeat BMP now.     1/26  Cont IVF

## 2022-01-26 NOTE — ASSESSMENT & PLAN NOTE
A1c 5 months ago 7.1  This admission 11.2  Insulin therapy from now  RN to teach insulin injection

## 2022-01-26 NOTE — PHARMACY MED REC
"Admission Medication History     The home medication history was taken by Horacio Quijano.    You may go to "Admission" then "Reconcile Home Medications" tabs to review and/or act upon these items.      The home medication list has been updated by the Pharmacy department.    Please read ALL comments highlighted in yellow.    Please address this information as you see fit.     Feel free to contact us if you have any questions or require assistance.        Medications listed below were obtained from: Patient/family, Analytic software- [employer] and Medical records  (Not in a hospital admission)      Potential issues to be addressed PRIOR TO DISCHARGE  Prescriptions could not be filled prior to admission: (fluconazole 150 mg tablet)    Horacio Quijano, CPhT  [facility] [phone]      Current Outpatient Medications on File Prior to Encounter   Medication Sig Dispense Refill Last Dose    ciclopirox (PENLAC) 8 % Soln Apply topically nightly. 1 Bottle 6 Past Month at Unknown time    amLODIPine (NORVASC) 10 MG tablet TK 1 T PO D. (Patient taking differently: Take 10 mg by mouth once daily.) 90 tablet 3 1/23/2022    aspirin 81 MG Chew Take 1 tablet (81 mg total) by mouth once daily. 90 tablet 3 1/23/2022    ezetimibe (ZETIA) 10 mg tablet Take 1 tablet (10 mg total) by mouth once daily. 90 tablet 3 1/23/2022    fluconazole (DIFLUCAN) 150 MG Tab Take 1 tablet (150 mg total) by mouth once daily. for 1 day 1 tablet 0     gabapentin (NEURONTIN) 600 MG tablet Take 1 tablet (600 mg total) by mouth 3 (three) times daily. may cause drowsiness 90 tablet 3 1/23/2022    hydroCHLOROthiazide (HYDRODIURIL) 25 MG tablet Take 1 tablet (25 mg total) by mouth once daily. 90 tablet 3 1/23/2022    metFORMIN (GLUCOPHAGE) 1000 MG tablet Take 1 tablet (1,000 mg total) by mouth 2 (two) times daily with meals. 180 tablet 3 1/23/2022    metoprolol tartrate (LOPRESSOR) 50 MG tablet Take 1 tablet (50 mg total) by mouth once daily. 90 tablet 3 " 1/23/2022    rosuvastatin (CRESTOR) 40 MG Tab Take 1 tablet (40 mg total) by mouth every evening. 90 tablet 3 1/23/2022    [DISCONTINUED] meloxicam (MOBIC) 15 MG tablet Take 1 tablet (15 mg total) by mouth once daily. 90 tablet 1                            .

## 2022-01-26 NOTE — CONSULTS
Diabetes Education Consult     Pt. In ED at this time, with wife at bedside.Pt. Dx with type 2 DM recently and prescribed metformin 1000 mg BID. Pt reports presented to hospital with high blood sugars. Spoke with pt about what it means to be a type 2 diabetic, s&s of hypo and hyperglycemia and tx for both. Pt was able to identify sx of hyper that he experienced leading up to hospitalization. We discussed pts current diet and modifications to current diet that will help with managing his diabetes, such as switching to a diet soft drink and portion control.  Informed pt that the dietitian will come and go over diabetic diet as well. We discussed how metformin works and how insulin works. Went over how to use the insulin pen and pt and wife performed return demonstration. We discussed injection sites and  the importance of site rotation. Pt has a strong support system and seemed to have a good understanding of information provided today. Pt left with a packet with information discussed.

## 2022-01-26 NOTE — PLAN OF CARE
AAOx4. Pt spouse @ bedside. Pt remained free from fall and injury. No sign of any distress noted. Safety precautions alert. Pt asked to call for assistance if needed. IV access clean dry and intact infusing IV fuids. Chart checked reviewed. VSS. Will continue to monitor on going.

## 2022-01-26 NOTE — PROGRESS NOTES
[facility] - Emergency Dept.  Hospital Medicine  Progress Note    Patient Name: Papa Pope  MRN: [id number]  Patient Class: OP- Observation   Admission Date: 1/25/2022  Length of Stay: 0 days  Attending Physician: Farhan Lassiter MD  Primary Care Provider: Prachi Castano MD        Subjective:     Principal Problem:Hyperosmolar hyperglycemic state (HHS)        HPI:  Papa Pope is a 56 year old male with history of DM, tobacco abuse, hypertension, and hyperlipidemia who presents to ED for further for elevated glucose that was noted on routine labs by PCP. Patient states his glucose has been <500 for almost a week. Admits to associated fatigue and weakness. He only takes metformin for his glucose. Tries to monitor his intake of carbohydrates and sugary foods. HgbA1c 8/2021 was 7.1.     Labs in ED reflect marked hypoglycemia with renal insufficiency and electrolyte abnormality. Serum bicarbonate wnl but has anion gap. S/p insulin IV and fluid resuscitation. Repeat BMP pending. EKG revealed atrial fibrillation with RVR converted to NSR with Cardizem IV x 1. Hospital medicine has been consulted for admission.       Overview/Hospital Course:  1/26 seen in the ER still on Cardizem gtt 5, Heparin gtt, HR 61-65 whilst there. Patient comfortable on room air      Interval History: no overnight events    Review of Systems   Constitutional: Negative for fatigue and fever.   HENT: Negative for nosebleeds and sinus pressure.    Eyes: Negative for photophobia.   Respiratory: Negative for cough, shortness of breath, wheezing and stridor.    Cardiovascular: Negative for chest pain and leg swelling.   Genitourinary: Negative for frequency, hematuria and urgency.   Musculoskeletal: Negative for arthralgias, back pain and joint swelling.   Allergic/Immunologic: Negative for immunocompromised state.   Neurological: Negative for tremors, syncope, speech difficulty, light-headedness and headaches.   Hematological: Negative for adenopathy.    Psychiatric/Behavioral: Negative for agitation, behavioral problems and dysphoric mood.     Objective:     Vital Signs (Most Recent):  Temp: 98.1 °F (36.7 °C) (01/26/22 0300)  Pulse: 60 (01/26/22 0700)  Resp: 16 (01/26/22 0700)  BP: 120/74 (01/26/22 0700)  SpO2: 99 % (01/26/22 0700) Vital Signs (24h Range):  Temp:  [98.1 °F (36.7 °C)-98.2 °F (36.8 °C)] 98.1 °F (36.7 °C)  Pulse:  [] 60  Resp:  [16-22] 16  SpO2:  [98 %-100 %] 99 %  BP: (118-153)/(74-87) 120/74     Weight: 88.9 kg (196 lb)  Body mass index is 22.65 kg/m².    Intake/Output Summary (Last 24 hours) at 1/26/2022 1402  Last data filed at 1/26/2022 0827  Gross per 24 hour   Intake 3640.8 ml   Output 1150 ml   Net 2490.8 ml      Physical Exam  Vitals reviewed.   Constitutional:       Appearance: Normal appearance.   HENT:      Head: Normocephalic and atraumatic.   Eyes:      Extraocular Movements: Extraocular movements intact.      Pupils: Pupils are equal, round, and reactive to light.   Cardiovascular:      Rate and Rhythm: Normal rate. Rhythm irregular.      Pulses: Normal pulses.      Heart sounds: No murmur heard.      Pulmonary:      Effort: Pulmonary effort is normal. No respiratory distress.      Breath sounds: No wheezing.   Abdominal:      General: Abdomen is flat. Bowel sounds are normal.      Palpations: Abdomen is soft.   Musculoskeletal:         General: Normal range of motion.      Cervical back: Normal range of motion.      Right lower leg: No edema.      Left lower leg: No edema.   Skin:     General: Skin is warm and dry.      Capillary Refill: Capillary refill takes less than 2 seconds.   Neurological:      General: No focal deficit present.      Mental Status: He is alert and oriented to person, place, and time.   Psychiatric:         Mood and Affect: Mood normal.         Behavior: Behavior normal.         Significant Labs:   BMP:   Recent Labs   Lab 01/25/22  1736   *      K 4.2      CO2 23   BUN 43*   CREATININE  1.9*   CALCIUM 8.5*     CBC:   Recent Labs   Lab 01/25/22  1345 01/26/22  0507   WBC 7.36 7.19   HGB 16.8 14.2   HCT 48.7 40.9    146*     CMP:   Recent Labs   Lab 01/25/22  1345 01/25/22  1736   * 138   K 6.1* 4.2   CL 88* 100   CO2 25 23   * 493*   BUN 49* 43*   CREATININE 2.7* 1.9*   CALCIUM 10.7* 8.5*   PROT 8.2  --    ALBUMIN 4.5  --    BILITOT 0.7  --    ALKPHOS 139*  --    AST 14  --    ALT 21  --    ANIONGAP 18* 15   EGFRNONAA 25* 39*     Coagulation:   Recent Labs   Lab 01/25/22  1506 01/26/22  0050 01/26/22  0933   INR 1.0  --   --    APTT 25.6   < > 42.4*    < > = values in this interval not displayed.       Significant Imaging: I have reviewed all pertinent imaging results/findings within the past 24 hours.      Assessment/Plan:      * Hyperosmolar hyperglycemic state (HHS)  Hemoglobin A1C   Date Value Ref Range Status   01/24/2022 11.4 (H) 4.0 - 5.6 % Final     Comment:     ADA Screening Guidelines:  5.7-6.4%  Consistent with prediabetes  >or=6.5%  Consistent with diabetes    High levels of fetal hemoglobin interfere with the HbA1C  assay. Heterozygous hemoglobin variants (HbS, HgC, etc)do  not significantly interfere with this assay.   However, presence of multiple variants may affect accuracy.     08/04/2021 7.1 (H) 4.0 - 5.6 % Final     Comment:     ADA Screening Guidelines:  5.7-6.4%  Consistent with prediabetes  >or=6.5%  Consistent with diabetes    High levels of fetal hemoglobin interfere with the HbA1C  assay. Heterozygous hemoglobin variants (HbS, HgC, etc)do  not significantly interfere with this assay.   However, presence of multiple variants may affect accuracy.     02/09/2021 7.6 (H) 4.0 - 5.6 % Final     Comment:     ADA Screening Guidelines:  5.7-6.4%  Consistent with prediabetes  >or=6.5%  Consistent with diabetes    High levels of fetal hemoglobin interfere with the HbA1C  assay. Heterozygous hemoglobin variants (HbS, HgC, etc)do  not significantly interfere with  this assay.   However, presence of multiple variants may affect accuracy.         Glucose >700. Serum bicarbonate wnl. S/p 10 units insulin and fluid resuscitation. Glucose now <500.   Start moderate insulin sliding scale  Levemir 10 units night  Diabetic educator and dietary consult  IVFs  Pseudohyponatremia noted  Repeat BMP now.     1/26  Cont IVF    Hyperkalemia  Expected to improve with insulin IV  lokelma also given  Repeat BMP pending    1/26  Resolved      Acute renal failure  --related to IVVD from hyperglycemia  --IVF's  --monitor response    1/26  Last know sCr 5 months ago 1.3  Cont IVF  Avoid nephrotoxins    Atrial fibrillation with RVR  Likely related to stress from above and renal failure  Cardizem IV and converted to NSR  Cardiology recommends 90mg every 12 hours  Echocardiogram has been ordered  Recent TSH wnl      1/26  Cardizem 30 mg PO Q6hr  D/C Cardizem gtt  Eliquis 5 mg PO BID  CHADVASc 2  Can d/c Aspirin      Type 2 diabetes mellitus, without long-term current use of insulin    A1c 5 months ago 7.1  This admission 11.2  Insulin therapy from now  RN to teach insulin injection    Smoker  --counseled on tobacco cessation 3-4 minutes    Hypertension associated with diabetes  --hold amlopidine since patient is now on Cardizem  --HCTZ on hold due to renal failure      1/26  Cont Cardizem for now  Patient on MTP at home (may resume this for rate and BP control instead)    Hyperlipidemia due to type 2 diabetes mellitus  Resume STATIN      VTE Risk Mitigation (From admission, onward)         Ordered     apixaban tablet 5 mg  2 times daily         01/26/22 1400                Discharge Planning   MELISSA:      Code Status: Not on file   Is the patient medically ready for discharge?:     Reason for patient still in hospital (select all that apply): Laboratory test and Treatment                     Farhan Lassiter MD  Department of Hospital Medicine   [facility] - Emergency Dept.

## 2022-01-26 NOTE — ASSESSMENT & PLAN NOTE
Likely related to stress from above and renal failure  Cardizem IV and converted to NSR  Cardiology recommends 90mg every 12 hours  Echocardiogram has been ordered  Recent TSH wnl      1/26  Cardizem 30 mg PO Q6hr  D/C Cardizem gtt  Eliquis 5 mg PO BID  CHADVASc 2  Can d/c Aspirin

## 2022-01-26 NOTE — ASSESSMENT & PLAN NOTE
--related to IVVD from hyperglycemia  --IVF's  --monitor response    1/26  Last know sCr 5 months ago 1.3  Cont IVF  Avoid nephrotoxins

## 2022-01-26 NOTE — SUBJECTIVE & OBJECTIVE
Interval History: no overnight events    Review of Systems   Constitutional: Negative for fatigue and fever.   HENT: Negative for nosebleeds and sinus pressure.    Eyes: Negative for photophobia.   Respiratory: Negative for cough, shortness of breath, wheezing and stridor.    Cardiovascular: Negative for chest pain and leg swelling.   Genitourinary: Negative for frequency, hematuria and urgency.   Musculoskeletal: Negative for arthralgias, back pain and joint swelling.   Allergic/Immunologic: Negative for immunocompromised state.   Neurological: Negative for tremors, syncope, speech difficulty, light-headedness and headaches.   Hematological: Negative for adenopathy.   Psychiatric/Behavioral: Negative for agitation, behavioral problems and dysphoric mood.     Objective:     Vital Signs (Most Recent):  Temp: 98.1 °F (36.7 °C) (01/26/22 0300)  Pulse: 60 (01/26/22 0700)  Resp: 16 (01/26/22 0700)  BP: 120/74 (01/26/22 0700)  SpO2: 99 % (01/26/22 0700) Vital Signs (24h Range):  Temp:  [98.1 °F (36.7 °C)-98.2 °F (36.8 °C)] 98.1 °F (36.7 °C)  Pulse:  [] 60  Resp:  [16-22] 16  SpO2:  [98 %-100 %] 99 %  BP: (118-153)/(74-87) 120/74     Weight: 88.9 kg (196 lb)  Body mass index is 22.65 kg/m².    Intake/Output Summary (Last 24 hours) at 1/26/2022 1402  Last data filed at 1/26/2022 0827  Gross per 24 hour   Intake 3640.8 ml   Output 1150 ml   Net 2490.8 ml      Physical Exam  Vitals reviewed.   Constitutional:       Appearance: Normal appearance.   HENT:      Head: Normocephalic and atraumatic.   Eyes:      Extraocular Movements: Extraocular movements intact.      Pupils: Pupils are equal, round, and reactive to light.   Cardiovascular:      Rate and Rhythm: Normal rate. Rhythm irregular.      Pulses: Normal pulses.      Heart sounds: No murmur heard.      Pulmonary:      Effort: Pulmonary effort is normal. No respiratory distress.      Breath sounds: No wheezing.   Abdominal:      General: Abdomen is flat. Bowel sounds  are normal.      Palpations: Abdomen is soft.   Musculoskeletal:         General: Normal range of motion.      Cervical back: Normal range of motion.      Right lower leg: No edema.      Left lower leg: No edema.   Skin:     General: Skin is warm and dry.      Capillary Refill: Capillary refill takes less than 2 seconds.   Neurological:      General: No focal deficit present.      Mental Status: He is alert and oriented to person, place, and time.   Psychiatric:         Mood and Affect: Mood normal.         Behavior: Behavior normal.         Significant Labs:   BMP:   Recent Labs   Lab 01/25/22  1736   *      K 4.2      CO2 23   BUN 43*   CREATININE 1.9*   CALCIUM 8.5*     CBC:   Recent Labs   Lab 01/25/22  1345 01/26/22  0507   WBC 7.36 7.19   HGB 16.8 14.2   HCT 48.7 40.9    146*     CMP:   Recent Labs   Lab 01/25/22  1345 01/25/22  1736   * 138   K 6.1* 4.2   CL 88* 100   CO2 25 23   * 493*   BUN 49* 43*   CREATININE 2.7* 1.9*   CALCIUM 10.7* 8.5*   PROT 8.2  --    ALBUMIN 4.5  --    BILITOT 0.7  --    ALKPHOS 139*  --    AST 14  --    ALT 21  --    ANIONGAP 18* 15   EGFRNONAA 25* 39*     Coagulation:   Recent Labs   Lab 01/25/22  1506 01/26/22  0050 01/26/22  0933   INR 1.0  --   --    APTT 25.6   < > 42.4*    < > = values in this interval not displayed.       Significant Imaging: I have reviewed all pertinent imaging results/findings within the past 24 hours.

## 2022-01-26 NOTE — HOSPITAL COURSE
1/26 seen in the ER still on Cardizem gtt 5, Heparin gtt, HR 61-65 whilst there. Patient comfortable on room air    1/27 Patient resumed on his MTP 50 mg PO BID, tolerated Eliquis, got diabetic teaching, has demonstrated back insulin injection, This MD spent about 20 mins in room with him and daughter explaining the reason for Insulin on discharge, the types of insulin, what to look out for.  We will send on Levemir 18 U BID, Moderate SSI TID PRN (no nightly humalog for now), instructed to have short follow up with PCP. Daughter explains that his finger sticks transmit to PCP office automatically, this is good. He says he has Glucometer, strips, lancets at home (was given in his PCP this past Friday)  Patient examined and is stable for discharge.

## 2022-01-26 NOTE — CONSULTS
Food & Nutrition  Education    Diet Education: Diabetes Nutrition Education   Learners: Patient       Nutrition Education provided with handouts:   Diabetes and Diet (English) View Edit Remove   Diabetes Exchange Diet (English) View Edit Remove   Diabetic Meal Planning (English) View Edit Remove     Comments:  Patient in ER. Education will print upon d/c. Will continue to monitor.     Please Re-consult as needed    Thanks!    Alysha Whiet RD,LDN

## 2022-01-26 NOTE — PLAN OF CARE
Problem: Adult Inpatient Plan of Care  Goal: Plan of Care Review  Outcome: Ongoing, Progressing  Goal: Patient-Specific Goal (Individualized)  Outcome: Ongoing, Progressing  Goal: Absence of Hospital-Acquired Illness or Injury  Outcome: Ongoing, Progressing  Goal: Optimal Comfort and Wellbeing  Outcome: Ongoing, Progressing  Goal: Readiness for Transition of Care  Outcome: Ongoing, Progressing     Problem: Fall Injury Risk  Goal: Absence of Fall and Fall-Related Injury  Outcome: Ongoing, Progressing     Problem: Hyperglycemia  Goal: Blood Glucose Level Within Targeted Range  Outcome: Ongoing, Progressing     Problem: Dysrhythmia  Goal: Normalized Cardiac Rhythm  Outcome: Ongoing, Progressing    Pt's BG tredning down after additonal SQ insulin given, converted over to NSR but continued on cardizem gtt at this time. Heparin gtt adjusted per nomogram. Will continued to monitor.

## 2022-01-26 NOTE — ASSESSMENT & PLAN NOTE
--hold amlopidine since patient is now on Cardizem  --HCTZ on hold due to renal failure      1/26  Cont Cardizem for now  Patient on MTP at home (may resume this for rate and BP control instead)

## 2022-01-27 ENCOUNTER — TELEPHONE (OUTPATIENT)
Dept: INTERNAL MEDICINE | Facility: CLINIC | Age: 57
End: 2022-01-27
Payer: MEDICARE

## 2022-01-27 ENCOUNTER — TELEPHONE (OUTPATIENT)
Dept: CARDIOLOGY | Facility: CLINIC | Age: 57
End: 2022-01-27
Payer: MEDICARE

## 2022-01-27 VITALS
WEIGHT: 208.13 LBS | DIASTOLIC BLOOD PRESSURE: 79 MMHG | OXYGEN SATURATION: 100 % | SYSTOLIC BLOOD PRESSURE: 127 MMHG | TEMPERATURE: 97 F | BODY MASS INDEX: 24.08 KG/M2 | RESPIRATION RATE: 18 BRPM | HEIGHT: 78 IN | HEART RATE: 65 BPM

## 2022-01-27 LAB
ANION GAP SERPL CALC-SCNC: 10 MMOL/L (ref 8–16)
BUN SERPL-MCNC: 16 MG/DL (ref 6–20)
CALCIUM SERPL-MCNC: 7.8 MG/DL (ref 8.7–10.5)
CHLORIDE SERPL-SCNC: 103 MMOL/L (ref 95–110)
CO2 SERPL-SCNC: 22 MMOL/L (ref 23–29)
CREAT SERPL-MCNC: 1.1 MG/DL (ref 0.5–1.4)
EST. GFR  (AFRICAN AMERICAN): >60 ML/MIN/1.73 M^2
EST. GFR  (NON AFRICAN AMERICAN): >60 ML/MIN/1.73 M^2
GLUCOSE SERPL-MCNC: 246 MG/DL (ref 70–110)
POCT GLUCOSE: 232 MG/DL (ref 70–110)
POCT GLUCOSE: 354 MG/DL (ref 70–110)
POTASSIUM SERPL-SCNC: 3.7 MMOL/L (ref 3.5–5.1)
SODIUM SERPL-SCNC: 135 MMOL/L (ref 136–145)

## 2022-01-27 PROCEDURE — 96361 HYDRATE IV INFUSION ADD-ON: CPT

## 2022-01-27 PROCEDURE — 25000003 PHARM REV CODE 250: Mod: HCNC | Performed by: INTERNAL MEDICINE

## 2022-01-27 PROCEDURE — 96372 THER/PROPH/DIAG INJ SC/IM: CPT | Mod: 59

## 2022-01-27 PROCEDURE — 25000003 PHARM REV CODE 250: Mod: HCNC | Performed by: NURSE PRACTITIONER

## 2022-01-27 PROCEDURE — 36415 COLL VENOUS BLD VENIPUNCTURE: CPT | Mod: HCNC | Performed by: INTERNAL MEDICINE

## 2022-01-27 PROCEDURE — 80048 BASIC METABOLIC PNL TOTAL CA: CPT | Mod: HCNC | Performed by: INTERNAL MEDICINE

## 2022-01-27 PROCEDURE — G0378 HOSPITAL OBSERVATION PER HR: HCPCS | Mod: HCNC

## 2022-01-27 RX ORDER — PEN NEEDLE, DIABETIC 30 GX3/16"
1 NEEDLE, DISPOSABLE MISCELLANEOUS 4 TIMES DAILY
Qty: 100 EACH | Refills: 0 | Status: SHIPPED | OUTPATIENT
Start: 2022-01-27

## 2022-01-27 RX ORDER — METOPROLOL TARTRATE 50 MG/1
50 TABLET ORAL DAILY
Status: DISCONTINUED | OUTPATIENT
Start: 2022-01-27 | End: 2022-01-27 | Stop reason: HOSPADM

## 2022-01-27 RX ORDER — INSULIN ASPART 100 [IU]/ML
1-10 INJECTION, SOLUTION INTRAVENOUS; SUBCUTANEOUS
Qty: 15 ML | Refills: 0 | Status: SHIPPED | OUTPATIENT
Start: 2022-01-27 | End: 2022-03-30 | Stop reason: SDUPTHER

## 2022-01-27 RX ORDER — FLUCONAZOLE 150 MG/1
150 TABLET ORAL DAILY
Qty: 1 TABLET | Refills: 0 | Status: SHIPPED | OUTPATIENT
Start: 2022-01-27 | End: 2022-01-28

## 2022-01-27 RX ADMIN — APIXABAN 5 MG: 2.5 TABLET, FILM COATED ORAL at 09:01

## 2022-01-27 RX ADMIN — INSULIN ASPART 10 UNITS: 100 INJECTION, SOLUTION INTRAVENOUS; SUBCUTANEOUS at 11:01

## 2022-01-27 RX ADMIN — SODIUM CHLORIDE: 0.9 INJECTION, SOLUTION INTRAVENOUS at 06:01

## 2022-01-27 RX ADMIN — INSULIN ASPART 4 UNITS: 100 INJECTION, SOLUTION INTRAVENOUS; SUBCUTANEOUS at 06:01

## 2022-01-27 RX ADMIN — ASPIRIN 81 MG CHEWABLE TABLET 81 MG: 81 TABLET CHEWABLE at 09:01

## 2022-01-27 RX ADMIN — DILTIAZEM HYDROCHLORIDE 30 MG: 30 TABLET, FILM COATED ORAL at 06:01

## 2022-01-27 RX ADMIN — EZETIMIBE 10 MG: 10 TABLET ORAL at 09:01

## 2022-01-27 RX ADMIN — METOPROLOL TARTRATE 50 MG: 50 TABLET, FILM COATED ORAL at 11:01

## 2022-01-27 NOTE — PLAN OF CARE
AAOx4. Pt family @ bedside. Blood glucose monitoring. Pt remained free from fall and injury. No sign of any distress noted. Safety precautions alert. Pt asked to call for assistance if needed. IV access clean dry and intact infusing . Chart checked reviewed. VSS. Will continue to monitor on going.

## 2022-01-27 NOTE — DISCHARGE INSTRUCTIONS
1. Check your sugar at least 3 times daily before each meal, this will determine how much of the short acting insulin you are to inject.  You MUST always have your food ready to eat before injecting the short acting insulin (Novolog or Humalog)    Use this scale for the short acting insulin:    Blood Glucose                             Pre-meal                  151-200                                        2 units                      201-250                                        4 units                       251-300                                        6 units                        301-350                                        8 units                       >350                                             10 units                         2. The Levemir insulin is a Long acting insulin and is two times daily      3. Your blood pressure has been normal on just your Metoprolol, therefore hold your Hydrochlorothiazide and Amlodipine for now.  Check your pressure at least once daily around the same time of the day, keep records of time and date and show your PCP, they'll decide if you are to resume the other blood pressure medicines. However if your systolic blood pressure (top number) is greater than 140 mmHG even after taking your Metoprolol dose, you may resume your Amlodipine      4. You have Atrial Fibrillation, irregular heart rhythm that can cause fast heart rate and strokes, we are sending you on blood thinner called Eliquis, he careful of falls, head trauma, or banging your joints on any surface. If you every bang your head come to the ER to get checked out

## 2022-01-27 NOTE — TELEPHONE ENCOUNTER
appt made 2/2        ----- Message from Tonio Jean RN sent at 1/27/2022  1:58 PM CST -----  Regarding: Appointment Needed  Hello,    Can you please schedule patient a hospital f/u appointment within 1 week for new onset AFIB and call patient with appointment date and time?    Thanks

## 2022-01-27 NOTE — PLAN OF CARE
Problem: Adult Inpatient Plan of Care  Goal: Plan of Care Review  Outcome: Ongoing, Progressing  Goal: Patient-Specific Goal (Individualized)  Outcome: Ongoing, Progressing  Goal: Absence of Hospital-Acquired Illness or Injury  Outcome: Ongoing, Progressing  Goal: Optimal Comfort and Wellbeing  Outcome: Ongoing, Progressing  Goal: Readiness for Transition of Care  Outcome: Ongoing, Progressing     Problem: Fall Injury Risk  Goal: Absence of Fall and Fall-Related Injury  Outcome: Ongoing, Progressing     Problem: Hyperglycemia  Goal: Blood Glucose Level Within Targeted Range  Outcome: Ongoing, Progressing     Problem: Dysrhythmia  Goal: Normalized Cardiac Rhythm  Outcome: Ongoing, Progressing     Problem: Diabetes Comorbidity  Goal: Blood Glucose Level Within Targeted Range  Outcome: Ongoing, Progressing     Problem: Fluid and Electrolyte Imbalance (Acute Kidney Injury/Impairment)  Goal: Fluid and Electrolyte Balance  Outcome: Ongoing, Progressing     Problem: Oral Intake Inadequate (Acute Kidney Injury/Impairment)  Goal: Optimal Nutrition Intake  Outcome: Ongoing, Progressing     Problem: Renal Function Impairment (Acute Kidney Injury/Impairment)  Goal: Effective Renal Function  Outcome: Ongoing, Progressing

## 2022-01-27 NOTE — DISCHARGE SUMMARY
[facility]  Discharge Summary      Patient Name: Papa Pope  MRN: [id number]  Patient Class: OP- Observation  Admission Date: 1/25/2022  Hospital Length of Stay: 0 days  Discharge Date and Time:  01/27/2022 12:55 PM  Attending Physician: Farhan Lassiter MD   Discharging Provider: Farhan Lassiter MD  Primary Care Provider: Prachi Castano MD      HPI:   Papa Pope is a 56 year old male with history of DM, tobacco abuse, hypertension, and hyperlipidemia who presents to ED for further for elevated glucose that was noted on routine labs by PCP. Patient states his glucose has been <500 for almost a week. Admits to associated fatigue and weakness. He only takes metformin for his glucose. Tries to monitor his intake of carbohydrates and sugary foods. HgbA1c 8/2021 was 7.1.     Labs in ED reflect marked hypoglycemia with renal insufficiency and electrolyte abnormality. Serum bicarbonate wnl but has anion gap. S/p insulin IV and fluid resuscitation. Repeat BMP pending. EKG revealed atrial fibrillation with RVR converted to NSR with Cardizem IV x 1. Hospital medicine has been consulted for admission.       * No surgery found *      Hospital Course:   1/26 seen in the ER still on Cardizem gtt 5, Heparin gtt, HR 61-65 whilst there. Patient comfortable on room air    1/27 Patient resumed on his MTP 50 mg PO BID, tolerated Eliquis, got diabetic teaching, has demonstrated back insulin injection, This MD spent about 20 mins in room with him and daughter explaining the reason for Insulin on discharge, the types of insulin, what to look out for.  We will send on Levemir 18 U BID, Moderate SSI TID PRN (no nightly humalog for now), instructed to have short follow up with PCP. Daughter explains that his finger sticks transmit to PCP office automatically, this is good. He says he has Glucometer, strips, lancets at home (was given in his PCP this past Friday)  Patient examined and is stable for  discharge.       Goals of Care Treatment Preferences:         Consults:   Consults (From admission, onward)        Status Ordering Provider     Inpatient consult to Diabetes educator  Once        Provider:  (Not yet assigned)    Completed MARY RAINEY     Inpatient consult to Registered Dietitian/Nutritionist  Once        Provider:  (Not yet assigned)    Completed MARY RAINEY          No new Assessment & Plan notes have been filed under this hospital service since the last note was generated.  Service: Hospital Medicine    Final Active Diagnoses:    Diagnosis Date Noted POA    PRINCIPAL PROBLEM:  Hyperosmolar hyperglycemic state (HHS) [E11.00, E11.65] 01/25/2022 Unknown    Atrial fibrillation with RVR [I48.91] 01/25/2022 Yes    Acute renal failure [N17.9] 01/25/2022 Unknown    Hyperkalemia [E87.5] 01/25/2022 Yes    Type 2 diabetes mellitus, without long-term current use of insulin [E11.9] 08/04/2021 Unknown    Smoker [F17.200] 06/25/2020 Yes    Hyperlipidemia due to type 2 diabetes mellitus [E11.69, E78.5] 10/14/2016 Yes    Hypertension associated with diabetes [E11.59, I15.2] 10/14/2016 Yes      Problems Resolved During this Admission:       Discharged Condition: stable    Disposition: Home or Self Care    Follow Up:   Follow-up Information     Prachi Castano MD In 5 days.    Specialty: Family Medicine  Contact information:  [address]  [place] [address]  [phone]             Denny Cunningham Md, MD In 1 week.    Specialties: Cardiology, Internal Medicine  Why: New Onset Afib  Contact information:  [address]  [place] [address]  [phone]                       Patient Instructions:      Diet diabetic     Diet Cardiac       Significant Diagnostic Studies: n/a    Pending Diagnostic Studies:     None         Medications:  Reconciled Home Medications:      Medication List      START taking these medications    apixaban 5 mg Tab  Commonly known as: ELIQUIS  Take 1 tablet  "(5 mg total) by mouth 2 (two) times daily.     insulin aspart U-100 100 unit/mL (3 mL) Inpn pen  Commonly known as: NovoLOG  Inject 1-10 Units into the skin before meals as needed (Hyperglycemia).     insulin detemir U-100 100 unit/mL (3 mL) Inpn pen  Commonly known as: Levemir FLEXTOUCH  Inject 18 Units into the skin 2 (two) times daily.     pen needle, diabetic 32 gauge x 5/32" Ndle  Commonly known as: BD CHRISTIANO 2ND GEN PEN NEEDLE  1 each by Misc.(Non-Drug; Combo Route) route 4 (four) times daily.        CONTINUE taking these medications    ciclopirox 8 % Soln  Commonly known as: PENLAC  Apply topically nightly.     ezetimibe 10 mg tablet  Commonly known as: ZETIA  Take 1 tablet (10 mg total) by mouth once daily.     fluconazole 150 MG Tab  Commonly known as: DIFLUCAN  Take 1 tablet (150 mg total) by mouth once daily. for 1 day     gabapentin 600 MG tablet  Commonly known as: NEURONTIN  Take 1 tablet (600 mg total) by mouth 3 (three) times daily. may cause drowsiness     metoprolol tartrate 50 MG tablet  Commonly known as: LOPRESSOR  Take 1 tablet (50 mg total) by mouth once daily.     rosuvastatin 40 MG Tab  Commonly known as: CRESTOR  Take 1 tablet (40 mg total) by mouth every evening.        STOP taking these medications    amLODIPine 10 MG tablet  Commonly known as: NORVASC     aspirin 81 MG Chew     hydroCHLOROthiazide 25 MG tablet  Commonly known as: HYDRODIURIL     metFORMIN 1000 MG tablet  Commonly known as: GLUCOPHAGE            Indwelling Lines/Drains at time of discharge:   Lines/Drains/Airways     None                 Time spent on the discharge of patient: 30 minutes         Farhan Lassiter MD  Department of Hospital Medicine  [facility]  "

## 2022-01-27 NOTE — NURSING
Diabetes follow up  Met with patient and wife at bedside  Reviewed info on Type 2 diabetes care and management  Reviewed info on target glucose, A1C info, hyper/hypoglycemia  They feel confident with insulin pen use.  Teaching completed unless further educational needs are identified

## 2022-01-27 NOTE — TELEPHONE ENCOUNTER
----- Message from Tonio Jean RN sent at 1/27/2022  1:58 PM CST -----  Regarding: Appointment Needed  Hello,    Can you please schedule patient a hospital f/u appointment within 5 days and call patient with appointment date and time?    Thanks

## 2022-01-27 NOTE — PLAN OF CARE
Patient remains free of injury. AAOx4. Discharge instructions reviewed, patient verbalizes understanding. Denies pain or discomfort at this time. Messages sent to Dora for f/u appointments- staff to call patient with appointment date and time. Patient states no further questions regarding diabetes education. Bedside rx delivered. Chart check complete. Pt to discharge home with family.

## 2022-01-27 NOTE — PLAN OF CARE
[facility]  Discharge Final Note    Primary Care Provider: Prachi Castano MD    Expected Discharge Date: 1/27/2022    Final Discharge Note (most recent)     Final Note - 01/27/22 1438        Final Note    Assessment Type Final Discharge Note     Anticipated Discharge Disposition Home or Self Care     Hospital Resources/Appts/Education Provided Provided patient/caregiver with written discharge plan information;Appointments scheduled and added to AVS        Post-Acute Status    Discharge Delays None known at this time                 Important Message from Medicare             Contact Info     Prachi Castano MD   Specialty: Family Medicine   Relationship: PCP - General    [address]  [place] [address]   Phone: [phone]       Next Steps: Follow up in 5 day(s)    Denny Cunningham Md, MD   Specialty: Cardiology, Internal Medicine    [address]  [place] [address]   Phone: [phone]       Next Steps: Follow up in 1 week(s)    Instructions: New Onset Afib

## 2022-01-27 NOTE — PLAN OF CARE
[facility]  Initial Discharge Assessment       Primary Care Provider: Prachi Castano MD    Admission Diagnosis: Atrial fibrillation [I48.91]  Hyperkalemia [E87.5]  Hyperglycemia [R73.9]  Atrial fibrillation with rapid ventricular response [I48.91]  JESSICA (acute kidney injury) [N17.9]  Hyperglycemia due to diabetes mellitus [E11.65]    Admission Date: 1/25/2022  Expected Discharge Date:     Discharge Barriers Identified: None    Payor: HUMANA MANAGED MEDICARE / Plan: HUMANA TOTAL CARE ADVANTAGE / Product Type: Medicare Advantage /     Extended Emergency Contact Information  Primary Emergency Contact: Nikkie Pope  Address: [address] [address] [place]  Mobile Phone: [phone]  Relation: Spouse    Discharge Plan A: Home with family  Discharge Plan B: Home with family      [facility] [place] [address]  [address] [facility] [address]  Phone: [phone] Fax: [phone]  My chart; Active  Swer spoke with pt's spouse for initial assessment. Swer introduced self and explained role of discharge planner. Pt lives with spouse and needed assistance with ADLs prior to admission. Pt's spouse/family is help at home and will provide transportation at discharge. Pt uses a cane. Pt does not have a problem obtaining medication and spouse/family provides transportation to medical appointments. Pt does not have an advanced directive at this time. SWer provided a transitional care folder, information on advanced directives, information on pharmacy bedside delivery, and discharge planning begins on admission with contact information for any needs/questions.  Needs to be determined based on hospital progress.     Initial Assessment (most recent)     Adult Discharge Assessment - 01/27/22 1000        Discharge Assessment    Assessment Type Discharge Planning Assessment     Confirmed/corrected address, phone number and insurance Yes      Confirmed Demographics Correct on Facesheet     Source of Information family     If unable to respond/provide information was family/caregiver contacted? Yes     Contact Name/Number Nikkie Pope (spouse) [phone]     Does patient/caregiver understand observation status Yes     Communicated MELISSA with patient/caregiver Date not available/Unable to determine     Reason For Admission Hyperosmolar Hyperglycemic state (HHS)     Lives With spouse     Facility Arrived From: home     Do you expect to return to your current living situation? Yes     Do you have help at home or someone to help you manage your care at home? Yes     Who are your caregiver(s) and their phone number(s)? family     Prior to hospitilization cognitive status: Alert/Oriented     Current cognitive status: Alert/Oriented     Walking or Climbing Stairs Difficulty ambulation difficulty, requires equipment     Dressing/Bathing Difficulty none     Home Accessibility wheelchair accessible     Home Layout Able to live on 1st floor     Equipment Currently Used at Home cane, straight     Readmission within 30 days? No     Patient currently being followed by outpatient case management? No     Do you currently have service(s) that help you manage your care at home? No     Do you take prescription medications? Yes     Do you have prescription coverage? Yes     Do you have any problems affording any of your prescribed medications? No     Is the patient taking medications as prescribed? yes     Who is going to help you get home at discharge? spouse     How do you get to doctors appointments? family or friend will provide     Are you on dialysis? No     Discharge Plan A Home with family     Discharge Plan B Home with family     DME Needed Upon Discharge  none     Discharge Plan discussed with: Spouse/sig other     Discharge Barriers Identified None

## 2022-01-30 ENCOUNTER — PATIENT MESSAGE (OUTPATIENT)
Dept: INTERNAL MEDICINE | Facility: CLINIC | Age: 57
End: 2022-01-30
Payer: MEDICARE

## 2022-01-31 PROCEDURE — 99453 PR REMOTE MONITR, PHYSIOL PARAM, INITIAL: ICD-10-PCS | Mod: S$GLB,,, | Performed by: FAMILY MEDICINE

## 2022-01-31 PROCEDURE — 99453 REM MNTR PHYSIOL PARAM SETUP: CPT | Mod: S$GLB,,, | Performed by: FAMILY MEDICINE

## 2022-02-02 ENCOUNTER — OFFICE VISIT (OUTPATIENT)
Dept: CARDIOLOGY | Facility: CLINIC | Age: 57
End: 2022-02-02
Payer: MEDICARE

## 2022-02-02 VITALS
SYSTOLIC BLOOD PRESSURE: 134 MMHG | RESPIRATION RATE: 16 BRPM | HEIGHT: 78 IN | WEIGHT: 201.94 LBS | HEART RATE: 74 BPM | OXYGEN SATURATION: 97 % | BODY MASS INDEX: 23.36 KG/M2 | DIASTOLIC BLOOD PRESSURE: 92 MMHG

## 2022-02-02 DIAGNOSIS — R06.09 DOE (DYSPNEA ON EXERTION): ICD-10-CM

## 2022-02-02 DIAGNOSIS — E11.69 HYPERLIPIDEMIA DUE TO TYPE 2 DIABETES MELLITUS: ICD-10-CM

## 2022-02-02 DIAGNOSIS — Z82.49 FAMILY HISTORY OF CORONARY ARTERY DISEASE: ICD-10-CM

## 2022-02-02 DIAGNOSIS — M51.36 DDD (DEGENERATIVE DISC DISEASE), LUMBAR: ICD-10-CM

## 2022-02-02 DIAGNOSIS — E78.5 HYPERLIPIDEMIA DUE TO TYPE 2 DIABETES MELLITUS: ICD-10-CM

## 2022-02-02 DIAGNOSIS — R07.89 OTHER CHEST PAIN: ICD-10-CM

## 2022-02-02 DIAGNOSIS — F17.200 SMOKER: ICD-10-CM

## 2022-02-02 DIAGNOSIS — R00.2 PALPITATIONS: ICD-10-CM

## 2022-02-02 DIAGNOSIS — I48.91 NEW ONSET A-FIB: Primary | ICD-10-CM

## 2022-02-02 PROCEDURE — 1159F MED LIST DOCD IN RCRD: CPT | Mod: HCNC,CPTII,S$GLB, | Performed by: INTERNAL MEDICINE

## 2022-02-02 PROCEDURE — 1160F PR REVIEW ALL MEDS BY PRESCRIBER/CLIN PHARMACIST DOCUMENTED: ICD-10-PCS | Mod: HCNC,CPTII,S$GLB, | Performed by: INTERNAL MEDICINE

## 2022-02-02 PROCEDURE — 3060F PR POS MICROALBUMINURIA RESULT DOCUMENTED/REVIEW: ICD-10-PCS | Mod: HCNC,CPTII,S$GLB, | Performed by: INTERNAL MEDICINE

## 2022-02-02 PROCEDURE — 99205 OFFICE O/P NEW HI 60 MIN: CPT | Mod: HCNC,S$GLB,, | Performed by: INTERNAL MEDICINE

## 2022-02-02 PROCEDURE — 3046F PR MOST RECENT HEMOGLOBIN A1C LEVEL > 9.0%: ICD-10-PCS | Mod: HCNC,CPTII,S$GLB, | Performed by: INTERNAL MEDICINE

## 2022-02-02 PROCEDURE — 3080F DIAST BP >= 90 MM HG: CPT | Mod: HCNC,CPTII,S$GLB, | Performed by: INTERNAL MEDICINE

## 2022-02-02 PROCEDURE — 3075F SYST BP GE 130 - 139MM HG: CPT | Mod: HCNC,CPTII,S$GLB, | Performed by: INTERNAL MEDICINE

## 2022-02-02 PROCEDURE — 1159F PR MEDICATION LIST DOCUMENTED IN MEDICAL RECORD: ICD-10-PCS | Mod: HCNC,CPTII,S$GLB, | Performed by: INTERNAL MEDICINE

## 2022-02-02 PROCEDURE — 3075F PR MOST RECENT SYSTOLIC BLOOD PRESS GE 130-139MM HG: ICD-10-PCS | Mod: HCNC,CPTII,S$GLB, | Performed by: INTERNAL MEDICINE

## 2022-02-02 PROCEDURE — 99999 PR PBB SHADOW E&M-EST. PATIENT-LVL III: CPT | Mod: PBBFAC,HCNC,, | Performed by: INTERNAL MEDICINE

## 2022-02-02 PROCEDURE — 3066F PR DOCUMENTATION OF TREATMENT FOR NEPHROPATHY: ICD-10-PCS | Mod: HCNC,CPTII,S$GLB, | Performed by: INTERNAL MEDICINE

## 2022-02-02 PROCEDURE — 1160F RVW MEDS BY RX/DR IN RCRD: CPT | Mod: HCNC,CPTII,S$GLB, | Performed by: INTERNAL MEDICINE

## 2022-02-02 PROCEDURE — 3046F HEMOGLOBIN A1C LEVEL >9.0%: CPT | Mod: HCNC,CPTII,S$GLB, | Performed by: INTERNAL MEDICINE

## 2022-02-02 PROCEDURE — 3080F PR MOST RECENT DIASTOLIC BLOOD PRESSURE >= 90 MM HG: ICD-10-PCS | Mod: HCNC,CPTII,S$GLB, | Performed by: INTERNAL MEDICINE

## 2022-02-02 PROCEDURE — 99205 PR OFFICE/OUTPT VISIT, NEW, LEVL V, 60-74 MIN: ICD-10-PCS | Mod: HCNC,S$GLB,, | Performed by: INTERNAL MEDICINE

## 2022-02-02 PROCEDURE — 3008F PR BODY MASS INDEX (BMI) DOCUMENTED: ICD-10-PCS | Mod: HCNC,CPTII,S$GLB, | Performed by: INTERNAL MEDICINE

## 2022-02-02 PROCEDURE — 99999 PR PBB SHADOW E&M-EST. PATIENT-LVL III: ICD-10-PCS | Mod: PBBFAC,HCNC,, | Performed by: INTERNAL MEDICINE

## 2022-02-02 PROCEDURE — 3060F POS MICROALBUMINURIA REV: CPT | Mod: HCNC,CPTII,S$GLB, | Performed by: INTERNAL MEDICINE

## 2022-02-02 PROCEDURE — 3066F NEPHROPATHY DOC TX: CPT | Mod: HCNC,CPTII,S$GLB, | Performed by: INTERNAL MEDICINE

## 2022-02-02 PROCEDURE — 3008F BODY MASS INDEX DOCD: CPT | Mod: HCNC,CPTII,S$GLB, | Performed by: INTERNAL MEDICINE

## 2022-02-02 NOTE — PROGRESS NOTES
Subjective:   Patient ID:  Papa Pope is a 56 y.o. male who presents for cardiac consult of No chief complaint on file.      HPI  The patient came in today for cardiac consult of No chief complaint on file.    Papa Pope is a 56 y.o. male pt with recent AF diagnosed, HTN, H LD, DM2 -h/o HHS, DDD, tobacco abuse presents for CV eval of Afib.     2/2/22  Hosp MD summary:   Papa Pope is a 56 year old male with history of DM, tobacco abuse, hypertension, and hyperlipidemia who presents to ED for further for elevated glucose that was noted on routine labs by PCP. Patient states his glucose has been <500 for almost a week. Admits to associated fatigue and weakness. He only takes metformin for his glucose. Tries to monitor his intake of carbohydrates and sugary foods. HgbA1c 8/2021 was 7.1.   Labs in ED reflect marked hypoglycemia with renal insufficiency and electrolyte abnormality. Serum bicarbonate wnl but has anion gap. S/p insulin IV and fluid resuscitation. Repeat BMP pending. EKG revealed atrial fibrillation with RVR converted to NSR with Cardizem IV x 1. Hospital medicine has been consulted for admission.   Hospital Course:   1/26 seen in the ER still on Cardizem gtt 5, Heparin gtt, HR 61-65 whilst there. Patient comfortable on room air  1/27 Patient resumed on his MTP 50 mg PO BID, tolerated Eliquis, got diabetic teaching, has demonstrated back insulin injection, This MD spent about 20 mins in room with him and daughter explaining the reason for Insulin on discharge, the types of insulin, what to look out for.  We will send on Levemir 18 U BID, Moderate SSI TID PRN (no nightly humalog for now), instructed to have short follow up with PCP. Daughter explains that his finger sticks transmit to PCP office automatically, this is good. He says he has Glucometer, strips, lancets at home (was given in his PCP this past Friday)  Patient examined and is stable for discharge    Pt here for initial CV eval after recent  Afib episode. Is taking Lopresor and Eliquis. BP today is elevated 130s/90s. HR 70s. ECHO with normal bi V function, LVH. He used to drink heavily but has quit. He is quitting/quit smoking.     Patient feels no leg swelling, no PND,  no dizziness, no syncope, no CNS symptoms.    Patient has fairly good exercise tolerance.    Patient is compliant with medications.    FH - had CVD,  in 80s    Results for orders placed during the hospital encounter of 22    Echo    Interpretation Summary  · Concentric hypertrophy and low normal systolic function.  · The estimated ejection fraction is 50%.  · Normal left ventricular diastolic function.  · With normal right ventricular systolic function.      Past Medical History:   Diagnosis Date    Acute renal failure 2022    Arthritis     Atrial fibrillation with RVR 2022    Back pain     Hyperlipidemia     Hypertension     Pneumonia     Polyneuropathy     Tobacco dependence     Type 2 diabetes mellitus without complication, without long-term current use of insulin 2021       Past Surgical History:   Procedure Laterality Date    COLONOSCOPY N/A 2017    Procedure: COLONOSCOPY;  Surgeon: Keo Cruz MD;  Location: [facility];  Service: Endoscopy;  Laterality: N/A;    EPIDURAL STEROID INJECTION INTO CERVICAL SPINE N/A 2019    Procedure: C7/T1 IL SUKHJINDER;  Surgeon: Donnell Jordan MD;  Location: [facility];  Service: Pain Management;  Laterality: N/A;    STOMACH SURGERY      TRANSFORAMINAL EPIDURAL INJECTION OF STEROID Bilateral 2019    Procedure: Injection,steroid,epidural,transforaminal approach;  Surgeon: Donnell Jordan MD;  Location: [facility] MGT;  Service: Pain Management;  Laterality: Bilateral;       Social History     Tobacco Use    Smoking status: Current Some Day Smoker     Packs/day: 0.50     Types: Cigarettes    Smokeless tobacco: Never Used   Substance Use Topics    Alcohol use: Yes     Alcohol/week: 12.0  "standard drinks     Types: 12 Cans of beer per week     Comment: weekends only    Drug use: No       Family History   Problem Relation Age of Onset    Hypertension Father        Patient's Medications   New Prescriptions    No medications on file   Previous Medications    CICLOPIROX (PENLAC) 8 % SOLN    Apply topically nightly.    EZETIMIBE (ZETIA) 10 MG TABLET    Take 1 tablet (10 mg total) by mouth once daily.    GABAPENTIN (NEURONTIN) 600 MG TABLET    Take 1 tablet (600 mg total) by mouth 3 (three) times daily. may cause drowsiness    INSULIN ASPART U-100 (NOVOLOG) 100 UNIT/ML (3 ML) INPN PEN    Inject 1-10 Units into the skin before meals as needed (Hyperglycemia).    INSULIN DETEMIR U-100 (LEVEMIR FLEXTOUCH) 100 UNIT/ML (3 ML) SUBQ INPN PEN    Inject 18 Units into the skin 2 (two) times daily.    METOPROLOL TARTRATE (LOPRESSOR) 50 MG TABLET    Take 1 tablet (50 mg total) by mouth once daily.    PEN NEEDLE, DIABETIC (BD CHRISTIANO 2ND GEN PEN NEEDLE) 32 GAUGE X 5/32" NDLE    Use five times daily    ROSUVASTATIN (CRESTOR) 40 MG TAB    Take 1 tablet (40 mg total) by mouth every evening.   Modified Medications    Modified Medication Previous Medication    APIXABAN (ELIQUIS) 5 MG TAB apixaban (ELIQUIS) 5 mg Tab       Take 1 tablet (5 mg total) by mouth 2 (two) times daily.    Take 1 tablet (5 mg total) by mouth 2 (two) times daily.   Discontinued Medications    No medications on file       Review of Systems   Constitutional: Positive for malaise/fatigue.   HENT: Negative.    Eyes: Negative.    Respiratory: Positive for shortness of breath.    Cardiovascular: Positive for chest pain and palpitations.   Gastrointestinal: Negative.    Genitourinary: Negative.    Musculoskeletal: Positive for back pain and joint pain.   Skin: Negative.    Neurological: Negative.    Endo/Heme/Allergies: Negative.    Psychiatric/Behavioral: Negative.    All 12 systems otherwise negative.      Wt Readings from Last 3 Encounters:   02/02/22 91.6 " "kg (201 lb 15.1 oz)   01/27/22 94.4 kg (208 lb 1.8 oz)   01/25/22 89.4 kg (197 lb 1.5 oz)     Temp Readings from Last 3 Encounters:   01/27/22 97.3 °F (36.3 °C) (Oral)   01/24/22 97.6 °F (36.4 °C) (Tympanic)   08/04/21 97.6 °F (36.4 °C) (Tympanic)     BP Readings from Last 3 Encounters:   02/02/22 (!) 134/92   01/27/22 127/79   01/25/22 122/76     Pulse Readings from Last 3 Encounters:   02/02/22 74   01/27/22 65   01/25/22 100       BP (!) 134/92 (BP Location: Right arm, Patient Position: Sitting, BP Method: Medium (Manual))   Pulse 74   Resp 16   Ht 6' 6" (1.981 m)   Wt 91.6 kg (201 lb 15.1 oz)   SpO2 97%   BMI 23.34 kg/m²     Objective:   Physical Exam  Vitals and nursing note reviewed.   Constitutional:       General: He is not in acute distress.     Appearance: He is well-developed and well-nourished. He is not diaphoretic.   HENT:      Head: Normocephalic and atraumatic.      Nose: Nose normal.      Mouth/Throat:      Mouth: Oropharynx is clear and moist.   Eyes:      General: No scleral icterus.     Extraocular Movements: EOM normal.      Conjunctiva/sclera: Conjunctivae normal.   Neck:      Thyroid: No thyromegaly.      Vascular: No JVD.   Cardiovascular:      Rate and Rhythm: Normal rate and regular rhythm.      Heart sounds: S1 normal and S2 normal. No murmur heard.  No friction rub. No gallop. No S3 or S4 sounds.    Pulmonary:      Effort: Pulmonary effort is normal. No respiratory distress.      Breath sounds: Normal breath sounds. No stridor. No wheezing or rales.   Chest:      Chest wall: No tenderness.   Abdominal:      General: Bowel sounds are normal. There is no distension.      Palpations: Abdomen is soft. There is no mass.      Tenderness: There is no abdominal tenderness. There is no rebound.   Genitourinary:     Comments: Deferred  Musculoskeletal:         General: No tenderness, deformity or edema. Normal range of motion.      Cervical back: Normal range of motion and neck supple. "   Lymphadenopathy:      Cervical: No cervical adenopathy.   Skin:     General: Skin is warm and dry.      Coloration: Skin is not pale.      Findings: No erythema or rash.   Neurological:      Mental Status: He is alert and oriented to person, place, and time.      Motor: No abnormal muscle tone.      Coordination: Coordination normal.   Psychiatric:         Mood and Affect: Mood and affect normal.         Behavior: Behavior normal.         Thought Content: Thought content normal.         Judgment: Judgment normal.         Lab Results   Component Value Date     (L) 01/27/2022    K 3.7 01/27/2022     01/27/2022    CO2 22 (L) 01/27/2022    BUN 16 01/27/2022    CREATININE 1.1 01/27/2022     (H) 01/27/2022    HGBA1C 11.4 (H) 01/24/2022    AST 14 01/25/2022    ALT 21 01/25/2022    ALBUMIN 4.5 01/25/2022    PROT 8.2 01/25/2022    BILITOT 0.7 01/25/2022    WBC 7.19 01/26/2022    HGB 14.2 01/26/2022    HCT 40.9 01/26/2022    MCV 92 01/26/2022     (L) 01/26/2022    INR 1.0 01/25/2022    TSH 2.172 01/24/2022    CHOL 151 01/24/2022    HDL 31 (L) 01/24/2022    LDLCALC 80.4 01/24/2022    TRIG 198 (H) 01/24/2022    BNP 47 01/25/2022     Assessment:      1. New onset a-fib    2. Hyperlipidemia due to type 2 diabetes mellitus    3. Smoker    4. DDD (degenerative disc disease), lumbar    5. ROBERSON (dyspnea on exertion)    6. Palpitations    7. Family history of coronary artery disease    8. Other chest pain        Plan:   1. Recently diagnosed Afib  - cont Eliquis and BB  - order pharm nuclear stress test, pt cannot walk on treadmill due to back pain  - order Holter 48 hr    2. HLD  - cont statin    3. Tobacco abuse  - needs cessation    4. DDD, lumbar  - cont tx per PCP    Thank you for allowing me to participate in this patient's care. Please do not hesitate to contact me with any questions or concerns. Consult note has been forwarded to the referral physician.

## 2022-02-03 ENCOUNTER — PATIENT OUTREACH (OUTPATIENT)
Dept: ADMINISTRATIVE | Facility: HOSPITAL | Age: 57
End: 2022-02-03
Payer: MEDICARE

## 2022-02-07 ENCOUNTER — OFFICE VISIT (OUTPATIENT)
Dept: INTERNAL MEDICINE | Facility: CLINIC | Age: 57
End: 2022-02-07
Payer: MEDICARE

## 2022-02-07 ENCOUNTER — LAB VISIT (OUTPATIENT)
Dept: LAB | Facility: HOSPITAL | Age: 57
End: 2022-02-07
Attending: PHYSICIAN ASSISTANT
Payer: MEDICARE

## 2022-02-07 VITALS
OXYGEN SATURATION: 96 % | HEIGHT: 78 IN | BODY MASS INDEX: 23.6 KG/M2 | DIASTOLIC BLOOD PRESSURE: 78 MMHG | SYSTOLIC BLOOD PRESSURE: 102 MMHG | TEMPERATURE: 99 F | WEIGHT: 203.94 LBS | HEART RATE: 78 BPM

## 2022-02-07 DIAGNOSIS — H53.8 BLURRED VISION, BILATERAL: ICD-10-CM

## 2022-02-07 DIAGNOSIS — E78.5 HYPERLIPIDEMIA DUE TO TYPE 2 DIABETES MELLITUS: ICD-10-CM

## 2022-02-07 DIAGNOSIS — N17.9 ACUTE RENAL FAILURE, UNSPECIFIED ACUTE RENAL FAILURE TYPE: ICD-10-CM

## 2022-02-07 DIAGNOSIS — E11.65 TYPE 2 DIABETES MELLITUS WITH HYPERGLYCEMIA, WITHOUT LONG-TERM CURRENT USE OF INSULIN: ICD-10-CM

## 2022-02-07 DIAGNOSIS — E11.59 HYPERTENSION ASSOCIATED WITH DIABETES: ICD-10-CM

## 2022-02-07 DIAGNOSIS — E11.65 TYPE 2 DIABETES MELLITUS WITH HYPERGLYCEMIA, WITHOUT LONG-TERM CURRENT USE OF INSULIN: Primary | ICD-10-CM

## 2022-02-07 DIAGNOSIS — F17.200 SMOKER: ICD-10-CM

## 2022-02-07 DIAGNOSIS — I48.91 NEW ONSET A-FIB: ICD-10-CM

## 2022-02-07 DIAGNOSIS — I15.2 HYPERTENSION ASSOCIATED WITH DIABETES: ICD-10-CM

## 2022-02-07 DIAGNOSIS — E87.5 HYPERKALEMIA: ICD-10-CM

## 2022-02-07 DIAGNOSIS — E11.69 HYPERLIPIDEMIA DUE TO TYPE 2 DIABETES MELLITUS: ICD-10-CM

## 2022-02-07 LAB
ALBUMIN SERPL BCP-MCNC: 3.8 G/DL (ref 3.5–5.2)
ALP SERPL-CCNC: 96 U/L (ref 55–135)
ALT SERPL W/O P-5'-P-CCNC: 35 U/L (ref 10–44)
ANION GAP SERPL CALC-SCNC: 8 MMOL/L (ref 8–16)
AST SERPL-CCNC: 21 U/L (ref 10–40)
BASOPHILS # BLD AUTO: 0.03 K/UL (ref 0–0.2)
BASOPHILS NFR BLD: 0.5 % (ref 0–1.9)
BILIRUB SERPL-MCNC: 0.4 MG/DL (ref 0.1–1)
BUN SERPL-MCNC: 26 MG/DL (ref 6–20)
CALCIUM SERPL-MCNC: 9.9 MG/DL (ref 8.7–10.5)
CHLORIDE SERPL-SCNC: 99 MMOL/L (ref 95–110)
CO2 SERPL-SCNC: 30 MMOL/L (ref 23–29)
CREAT SERPL-MCNC: 1.4 MG/DL (ref 0.5–1.4)
DIFFERENTIAL METHOD: ABNORMAL
EOSINOPHIL # BLD AUTO: 0.1 K/UL (ref 0–0.5)
EOSINOPHIL NFR BLD: 1.9 % (ref 0–8)
ERYTHROCYTE [DISTWIDTH] IN BLOOD BY AUTOMATED COUNT: 11.8 % (ref 11.5–14.5)
EST. GFR  (AFRICAN AMERICAN): >60 ML/MIN/1.73 M^2
EST. GFR  (NON AFRICAN AMERICAN): 56 ML/MIN/1.73 M^2
GLUCOSE SERPL-MCNC: 339 MG/DL (ref 70–110)
HCT VFR BLD AUTO: 40 % (ref 40–54)
HGB BLD-MCNC: 13.2 G/DL (ref 14–18)
IMM GRANULOCYTES # BLD AUTO: 0.01 K/UL (ref 0–0.04)
IMM GRANULOCYTES NFR BLD AUTO: 0.2 % (ref 0–0.5)
LYMPHOCYTES # BLD AUTO: 2.1 K/UL (ref 1–4.8)
LYMPHOCYTES NFR BLD: 33 % (ref 18–48)
MCH RBC QN AUTO: 31.7 PG (ref 27–31)
MCHC RBC AUTO-ENTMCNC: 33 G/DL (ref 32–36)
MCV RBC AUTO: 96 FL (ref 82–98)
MONOCYTES # BLD AUTO: 0.5 K/UL (ref 0.3–1)
MONOCYTES NFR BLD: 8.5 % (ref 4–15)
NEUTROPHILS # BLD AUTO: 3.5 K/UL (ref 1.8–7.7)
NEUTROPHILS NFR BLD: 55.9 % (ref 38–73)
NRBC BLD-RTO: 0 /100 WBC
PLATELET # BLD AUTO: 190 K/UL (ref 150–450)
PMV BLD AUTO: 9.9 FL (ref 9.2–12.9)
POTASSIUM SERPL-SCNC: 5.1 MMOL/L (ref 3.5–5.1)
PROT SERPL-MCNC: 7.7 G/DL (ref 6–8.4)
RBC # BLD AUTO: 4.16 M/UL (ref 4.6–6.2)
SODIUM SERPL-SCNC: 137 MMOL/L (ref 136–145)
WBC # BLD AUTO: 6.25 K/UL (ref 3.9–12.7)

## 2022-02-07 PROCEDURE — 3078F PR MOST RECENT DIASTOLIC BLOOD PRESSURE < 80 MM HG: ICD-10-PCS | Mod: HCNC,CPTII,S$GLB, | Performed by: PHYSICIAN ASSISTANT

## 2022-02-07 PROCEDURE — 99499 RISK ADDL DX/OHS AUDIT: ICD-10-PCS | Mod: HCNC,S$GLB,, | Performed by: PHYSICIAN ASSISTANT

## 2022-02-07 PROCEDURE — 3046F PR MOST RECENT HEMOGLOBIN A1C LEVEL > 9.0%: ICD-10-PCS | Mod: HCNC,CPTII,S$GLB, | Performed by: PHYSICIAN ASSISTANT

## 2022-02-07 PROCEDURE — 85025 COMPLETE CBC W/AUTO DIFF WBC: CPT | Mod: HCNC | Performed by: PHYSICIAN ASSISTANT

## 2022-02-07 PROCEDURE — 3066F NEPHROPATHY DOC TX: CPT | Mod: HCNC,CPTII,S$GLB, | Performed by: PHYSICIAN ASSISTANT

## 2022-02-07 PROCEDURE — 99499 UNLISTED E&M SERVICE: CPT | Mod: HCNC,S$GLB,, | Performed by: PHYSICIAN ASSISTANT

## 2022-02-07 PROCEDURE — 3066F PR DOCUMENTATION OF TREATMENT FOR NEPHROPATHY: ICD-10-PCS | Mod: HCNC,CPTII,S$GLB, | Performed by: PHYSICIAN ASSISTANT

## 2022-02-07 PROCEDURE — 99999 PR PBB SHADOW E&M-EST. PATIENT-LVL V: ICD-10-PCS | Mod: PBBFAC,HCNC,, | Performed by: PHYSICIAN ASSISTANT

## 2022-02-07 PROCEDURE — 3046F HEMOGLOBIN A1C LEVEL >9.0%: CPT | Mod: HCNC,CPTII,S$GLB, | Performed by: PHYSICIAN ASSISTANT

## 2022-02-07 PROCEDURE — 3074F PR MOST RECENT SYSTOLIC BLOOD PRESSURE < 130 MM HG: ICD-10-PCS | Mod: HCNC,CPTII,S$GLB, | Performed by: PHYSICIAN ASSISTANT

## 2022-02-07 PROCEDURE — 80053 COMPREHEN METABOLIC PANEL: CPT | Mod: HCNC | Performed by: PHYSICIAN ASSISTANT

## 2022-02-07 PROCEDURE — 99214 OFFICE O/P EST MOD 30 MIN: CPT | Mod: HCNC,S$GLB,, | Performed by: PHYSICIAN ASSISTANT

## 2022-02-07 PROCEDURE — 3008F PR BODY MASS INDEX (BMI) DOCUMENTED: ICD-10-PCS | Mod: HCNC,CPTII,S$GLB, | Performed by: PHYSICIAN ASSISTANT

## 2022-02-07 PROCEDURE — 99999 PR PBB SHADOW E&M-EST. PATIENT-LVL V: CPT | Mod: PBBFAC,HCNC,, | Performed by: PHYSICIAN ASSISTANT

## 2022-02-07 PROCEDURE — 3060F POS MICROALBUMINURIA REV: CPT | Mod: HCNC,CPTII,S$GLB, | Performed by: PHYSICIAN ASSISTANT

## 2022-02-07 PROCEDURE — 99214 PR OFFICE/OUTPT VISIT, EST, LEVL IV, 30-39 MIN: ICD-10-PCS | Mod: HCNC,S$GLB,, | Performed by: PHYSICIAN ASSISTANT

## 2022-02-07 PROCEDURE — 3008F BODY MASS INDEX DOCD: CPT | Mod: HCNC,CPTII,S$GLB, | Performed by: PHYSICIAN ASSISTANT

## 2022-02-07 PROCEDURE — 3078F DIAST BP <80 MM HG: CPT | Mod: HCNC,CPTII,S$GLB, | Performed by: PHYSICIAN ASSISTANT

## 2022-02-07 PROCEDURE — 3060F PR POS MICROALBUMINURIA RESULT DOCUMENTED/REVIEW: ICD-10-PCS | Mod: HCNC,CPTII,S$GLB, | Performed by: PHYSICIAN ASSISTANT

## 2022-02-07 PROCEDURE — 3074F SYST BP LT 130 MM HG: CPT | Mod: HCNC,CPTII,S$GLB, | Performed by: PHYSICIAN ASSISTANT

## 2022-02-07 NOTE — PROGRESS NOTES
Subjective:      Patient ID: Papa Pope is a 56 y.o. male.    Chief Complaint: Hospital Follow Up    Transitional Care Note    Family and/or Caretaker present at visit?  No.  Diagnostic tests reviewed/disposition: No diagnosic tests pending after this hospitalization.  Disease/illness education: completed  Home health/community services discussion/referrals: Patient does not have home health established from hospital visit.  They do not need home health.  If needed, we will set up home health for the patient.   Establishment or re-establishment of referral orders for community resources: No other necessary community resources.   Discussion with other health care providers: No discussion with other health care providers necessary.     Diabetes  He presents for his follow-up diabetic visit. He has type 2 diabetes mellitus. His disease course has been worsening. There are no hypoglycemic associated symptoms. Pertinent negatives for hypoglycemia include no confusion, dizziness, headaches, nervousness/anxiousness, pallor or tremors. Associated symptoms include blurred vision. Pertinent negatives for diabetes include no chest pain, no fatigue, no polydipsia, no polyuria and no weakness. There are no hypoglycemic complications. Diabetic complications include heart disease and nephropathy. Current diabetic treatment includes insulin injections. He is compliant with treatment all of the time.    Has had some blurred vision/visual changes recently. Has not seen ophthalmology.     Admit 1/25/-1/27 for hyperglycemia. Fatigue and weakness. Was only on metformin at the time. HgbA1c 8/2021 was 7.1.      Labs in ED reflect marked hypoglycemia with renal insufficiency and electrolyte abnormality. Serum bicarbonate wnl but has anion gap. S/p insulin IV and fluid resuscitation.    EKG revealed atrial fibrillation with RVR converted to NSR with Cardizem IV x 1.    Consulted diabetes education team while in the hospital.   Has  "followed up with cardiology regarding new onset A. Fib. Heart rate and rhythm have been stable since leaving the hospital.     Started on:   START taking these medications    apixaban 5 mg Tab  Commonly known as: ELIQUIS  Take 1 tablet (5 mg total) by mouth 2 (two) times daily.      insulin aspart U-100 100 unit/mL (3 mL) Inpn pen  Commonly known as: NovoLOG  Inject 1-10 Units into the skin before meals as needed (Hyperglycemia).      insulin detemir U-100 100 unit/mL (3 mL) Inpn pen  Commonly known as: Levemir FLEXTOUCH  Inject 18 Units into the skin 2 (two) times daily.     Quit smoking as of 1/25.     D/c amlodipine and metformin while in hospital.     Today feels fine. No complaints. Glucose running 200s. Glucose running 200s since leaving hospital.          Patient Active Problem List   Diagnosis    Hyperlipidemia due to type 2 diabetes mellitus    Hypertension associated with diabetes    DDD (degenerative disc disease), lumbar    History of colon polyps    Smoker    Type 2 diabetes mellitus, without long-term current use of insulin    Decreased strength of trunk and back    Hyperosmolar hyperglycemic state (HHS)    New onset a-fib    Acute renal failure    Hyperkalemia       Current Outpatient Medications:     apixaban (ELIQUIS) 5 mg Tab, Take 1 tablet (5 mg total) by mouth 2 (two) times daily., Disp: 180 tablet, Rfl: 1    ciclopirox (PENLAC) 8 % Soln, Apply topically nightly., Disp: 1 Bottle, Rfl: 6    ezetimibe (ZETIA) 10 mg tablet, Take 1 tablet (10 mg total) by mouth once daily., Disp: 90 tablet, Rfl: 3    insulin aspart U-100 (NOVOLOG) 100 unit/mL (3 mL) InPn pen, Inject 1-10 Units into the skin before meals as needed (Hyperglycemia)., Disp: 15 mL, Rfl: 0    metoprolol tartrate (LOPRESSOR) 50 MG tablet, Take 1 tablet (50 mg total) by mouth once daily., Disp: 90 tablet, Rfl: 3    pen needle, diabetic (BD CHRISTIANO 2ND GEN PEN NEEDLE) 32 gauge x 5/32" Ndle, Use five times daily, Disp: 100 each, " Rfl: 0    rosuvastatin (CRESTOR) 40 MG Tab, Take 1 tablet (40 mg total) by mouth every evening., Disp: 90 tablet, Rfl: 3    blood sugar diagnostic Strp, Use to check blood sugar twice a day, Disp: 100 each, Rfl: 6    dulaglutide (TRULICITY) 0.75 mg/0.5 mL pen injector, Inject 0.75 mg into the skin every 7 days., Disp: 4 pen, Rfl: 1    gabapentin (NEURONTIN) 600 MG tablet, Take 1 tablet (600 mg total) by mouth 3 (three) times daily. may cause drowsiness, Disp: 90 tablet, Rfl: 3    insulin detemir U-100 (LEVEMIR FLEXTOUCH) 100 unit/mL (3 mL) SubQ InPn pen, Inject 20 Units into the skin 2 (two) times daily., Disp: 15 mL, Rfl: 0    TRUE METRIX AIR GLUCOSE METER kit, USE AS DIRECTED, Disp: 1 each, Rfl: 0    TRUEPLUS LANCETS 33 gauge Misc, USE  TO  CHECK  BLOOD  SUGAR TWICE DAILY, Disp: 200 each, Rfl: 0  Health Maintenance Due   Topic Date Due    Eye Exam  Never done    Shingles Vaccine (1 of 2) Never done    Influenza Vaccine (1) 09/01/2021    COVID-19 Vaccine (3 - Booster for Moderna series) 10/15/2021    TETANUS VACCINE  11/09/2021       Review of Systems   Constitutional: Negative for activity change, appetite change, chills, diaphoresis, fatigue, fever and unexpected weight change.   HENT: Negative.  Negative for congestion, hearing loss, postnasal drip, rhinorrhea, sore throat, trouble swallowing and voice change.    Eyes: Positive for blurred vision and visual disturbance. Negative for photophobia, pain, discharge, redness and itching.   Respiratory: Negative.  Negative for cough, choking, chest tightness and shortness of breath.    Cardiovascular: Negative for chest pain, palpitations and leg swelling.   Gastrointestinal: Negative for abdominal distention, abdominal pain, blood in stool, constipation, diarrhea, nausea and vomiting.   Endocrine: Negative for cold intolerance, heat intolerance, polydipsia and polyuria.   Genitourinary: Negative.  Negative for difficulty urinating and frequency.  "  Musculoskeletal: Negative for arthralgias, back pain, gait problem, joint swelling and myalgias.   Skin: Negative for color change, pallor, rash and wound.   Neurological: Negative for dizziness, tremors, weakness, light-headedness, numbness and headaches.   Hematological: Negative for adenopathy.   Psychiatric/Behavioral: Negative for behavioral problems, confusion, self-injury, sleep disturbance and suicidal ideas. The patient is not nervous/anxious.      Objective:   /78 (BP Location: Right arm, BP Method: Small (Automatic))   Pulse 78   Temp 98.5 °F (36.9 °C) (Tympanic)   Ht 6' 6" (1.981 m)   Wt 92.5 kg (203 lb 14.8 oz)   SpO2 96%   BMI 23.57 kg/m²     Physical Exam  Vitals reviewed.   Constitutional:       General: He is not in acute distress.     Appearance: Normal appearance. He is well-developed and well-nourished. He is not ill-appearing or diaphoretic.   HENT:      Head: Normocephalic and atraumatic.      Right Ear: External ear normal.      Left Ear: External ear normal.      Nose: Nose normal.      Mouth/Throat:      Mouth: Oropharynx is clear and moist.   Eyes:      Extraocular Movements: EOM normal.      Conjunctiva/sclera: Conjunctivae normal.      Pupils: Pupils are equal, round, and reactive to light.   Cardiovascular:      Rate and Rhythm: Normal rate and regular rhythm.      Heart sounds: Normal heart sounds. No murmur heard.  No friction rub. No gallop.    Pulmonary:      Effort: Pulmonary effort is normal. No respiratory distress.      Breath sounds: Normal breath sounds. No wheezing or rales.   Chest:      Chest wall: No tenderness.   Abdominal:      General: There is no distension.      Palpations: Abdomen is soft.      Tenderness: There is no abdominal tenderness.   Musculoskeletal:         General: Normal range of motion.      Cervical back: Normal range of motion and neck supple.   Lymphadenopathy:      Cervical: No cervical adenopathy.   Skin:     General: Skin is warm and " dry.   Neurological:      Mental Status: He is alert and oriented to person, place, and time.   Psychiatric:         Mood and Affect: Mood and affect and mood normal.         Behavior: Behavior normal.         Thought Content: Thought content normal.         Judgment: Judgment normal.         Assessment:     1. Type 2 diabetes mellitus with hyperglycemia, without long-term current use of insulin    2. Acute renal failure, unspecified acute renal failure type    3. New onset a-fib    4. Hyperkalemia    5. Hypertension associated with diabetes    6. Hyperlipidemia due to type 2 diabetes mellitus    7. Smoker    8. Blurred vision, bilateral      Plan:   Type 2 diabetes mellitus with hyperglycemia, without long-term current use of insulin  -     CBC Auto Differential; Future; Expected date: 02/07/2022  -     Comprehensive Metabolic Panel; Future  -     Ambulatory referral/consult to Diabetic Advanced Practice Providers (Medical Management); Future; Expected date: 02/14/2022  -     blood sugar diagnostic Strp; Use to check blood sugar twice a day  Dispense: 100 each; Refill: 6  -     Ambulatory referral/consult to Diabetes Education; Future; Expected date: 02/14/2022  -     Ambulatory referral/consult to Optometry; Future; Expected date: 02/14/2022    Acute renal failure, unspecified acute renal failure type    New onset a-fib    Hyperkalemia    Hypertension associated with diabetes    Hyperlipidemia due to type 2 diabetes mellitus    Smoker    Blurred vision, bilateral  -     Ambulatory referral/consult to Optometry; Future; Expected date: 02/14/2022        Follow up in about 2 weeks (around 2/21/2022), or if symptoms worsen or fail to improve.

## 2022-02-09 ENCOUNTER — PATIENT MESSAGE (OUTPATIENT)
Dept: ADMINISTRATIVE | Facility: HOSPITAL | Age: 57
End: 2022-02-09
Payer: MEDICARE

## 2022-02-09 ENCOUNTER — PATIENT OUTREACH (OUTPATIENT)
Dept: ADMINISTRATIVE | Facility: HOSPITAL | Age: 57
End: 2022-02-09
Payer: MEDICARE

## 2022-02-09 NOTE — PROGRESS NOTES
Working Dm Report:       Pt had elevated A1c in Jan 22. Repeat due in April 2022. Sent message to schedule

## 2022-02-14 NOTE — PROGRESS NOTES
PCP: Prachi Castano MD    Subjective:     Chief Complaint: Diabetes Consult    HPI: 56 y.o.  male for diabetes consult.   Type II diabetes since August 2021.  Comorbidities: HTN, HLD and Atrial Fibrillation   Diabetes complications: with diabetic chronic kidney disease  Voices recent hospital admission HHS January 1/25   Denies having hypoglycemia.   Endorses diabetes related symptoms: Changes in Vision, blurry vision 1 month ago.  Had polydipsia and polyuria initially.  Resolved once started on insulin.    Blood glucose monitoring via fingerstick: 3 x/day   Enrolled in digital diabetes medicine.  Per patient's BG log, over the last 2 weeks   Fasting BG range 181-267 average 180-190   AC lunch 152, 265   AC dinner 104-338 average      Activity: Sedentary- walking 30 mins, 2x day, 3x/wk started 3 weeks ago  Diet:3 meals/day;infrequent snacks/day.    Medication compliance all of the time, diet compliance most of the time.   To be enrolled in diabetes education classes.     Previous regimen: Metformin x 1 month    Past failed treatment: N/A    Current DM Medications:   - Novolog units tid ac meals per sliding scale average 4-6 units   - Levemir 18 units bid    GLP-1 Therapy Initiation Considerations:   Family history of pancreatic cancer in first-degree relative: no  Personal history of pancreatitis: no  Family history of MTC/MEN/endocrine tumors: no  Personal history of Gastroparesis: no  Past use of GLP-1 Therapy with adverse effects: no  Past Bariatric Surgery: no  Personal history of Diabetic Retinopathy: no  Most Recent GFR: >60 completed on 2/7/2022  Need for Cardiovascular Risk Reduction: yes  Need for Potential Weight Loss: yes    Allergies  Review of patient's allergies indicates:  No Known Allergies      Labs Reviewed:  His most recent A1C is:  Lab Results   Component Value Date    HGBA1C 11.4 (H) 01/24/2022    HGBA1C 7.1 (H) 08/04/2021    HGBA1C 7.6 (H) 02/09/2021     His most recent BMP  is:  Lab Results   Component Value Date     02/07/2022    K 5.1 02/07/2022    CL 99 02/07/2022    CO2 30 (H) 02/07/2022    BUN 26 (H) 02/07/2022    CREATININE 1.4 02/07/2022    CALCIUM 9.9 02/07/2022    ANIONGAP 8 02/07/2022    ESTGFRAFRICA >60 02/07/2022    EGFRNONAA 56 (A) 02/07/2022     No results found for: CPEPTIDE  No results found for: GLUTAMICACID    There is no height or weight on file to calculate BMI.    Wt Readings from Last 3 Encounters:   02/02/22 1124 91.6 kg (201 lb 15.1 oz)   01/27/22 0434 94.4 kg (208 lb 1.8 oz)   01/26/22 0700 88.9 kg (196 lb)   01/25/22 2100 89.3 kg (196 lb 13.9 oz)   01/25/22 1240 88.5 kg (195 lb)   01/25/22 0849 89.4 kg (197 lb 1.5 oz)      His blood sugar in clinic today is: 198    No results found for: POCGLU    Diabetes Management Status  Statin: Taking  ACE/ARB: Not taking    Screening or Prevention Patient's value Goal Complete/Controlled?   HgA1C Testing and Control   Lab Results   Component Value Date    HGBA1C 11.4 (H) 01/24/2022      Annually/Less than 8% No   Lipid profile : 01/24/2022 Annually Yes   LDL control Lab Results   Component Value Date    LDLCALC 80.4 01/24/2022    Annually/Less than 100 mg/dl  Yes   Nephropathy screening Lab Results   Component Value Date    MICALBCREAT 148.8 (H) 01/25/2022     Lab Results   Component Value Date    PROTEINUA Negative 01/25/2022    Annually Yes   Blood pressure BP Readings from Last 1 Encounters:   02/02/22 (!) 134/92    Less than 140/90 Yes   Dilated retinal exam Most Recent Eye Exam Date: Not Found Annually Yes    Foot exam   : 01/25/2022 Annually Yes     Social History     Socioeconomic History    Marital status:    Tobacco Use    Smoking status: Current Some Day Smoker     Packs/day: 0.50     Types: Cigarettes    Smokeless tobacco: Never Used   Substance and Sexual Activity    Alcohol use: Yes     Alcohol/week: 12.0 standard drinks     Types: 12 Cans of beer per week     Comment: weekends only    Drug  use: No    Sexual activity: Yes     Past Medical History:   Diagnosis Date    Acute renal failure 1/25/2022    Arthritis     Atrial fibrillation with RVR 1/25/2022    Back pain     Hyperlipidemia     Hypertension     Pneumonia     Polyneuropathy     Tobacco dependence     Type 2 diabetes mellitus without complication, without long-term current use of insulin 8/4/2021     Review of Systems   Constitutional: Negative for activity change, appetite change, fatigue and unexpected weight change.   HENT: Negative for dental problem and trouble swallowing.    Eyes: Positive for visual disturbance (blurry vision).   Respiratory: Negative for chest tightness and shortness of breath.    Cardiovascular: Negative for chest pain, palpitations and leg swelling.   Gastrointestinal: Negative for abdominal pain, constipation, diarrhea, nausea and vomiting.   Endocrine: Negative for polydipsia, polyphagia and polyuria.   Genitourinary: Negative for difficulty urinating, dysuria, frequency and urgency.        Negative yeast infection   Musculoskeletal: Positive for gait problem (ambulates with cane). Negative for myalgias and neck pain.   Integumentary:  Negative for rash and wound.   Allergic/Immunologic: Negative.    Neurological: Negative for dizziness, syncope, weakness, numbness and headaches.   Hematological: Negative.    Psychiatric/Behavioral: Negative for confusion and sleep disturbance.   All other systems reviewed and are negative.     Objective:      Physical Exam  Vitals reviewed.   Constitutional:       Appearance: Normal appearance.   HENT:      Head: Normocephalic and atraumatic.   Eyes:      General: Vision grossly intact.      Extraocular Movements: Extraocular movements intact.      Pupils: Pupils are equal, round, and reactive to light.   Neck:      Thyroid: No thyroid mass or thyroid tenderness.   Cardiovascular:      Rate and Rhythm: Normal rate and regular rhythm.      Pulses: Normal pulses.            Radial pulses are 2+ on the right side and 2+ on the left side.        Dorsalis pedis pulses are 2+ on the right side and 2+ on the left side.      Heart sounds: Normal heart sounds.   Pulmonary:      Effort: Pulmonary effort is normal.      Breath sounds: Normal breath sounds.   Abdominal:      General: Bowel sounds are normal.      Palpations: Abdomen is soft.   Musculoskeletal:         General: Normal range of motion.      Cervical back: Normal range of motion and neck supple.      Right lower leg: No edema.      Left lower leg: No edema.      Right foot: No deformity or bunion.      Left foot: No deformity or bunion.   Feet:      Right foot:      Protective Sensation: 6 sites tested. 6 sites sensed.      Skin integrity: Skin integrity normal. No ulcer, skin breakdown, callus or dry skin.      Toenail Condition: Right toenails are abnormally thick and long.      Left foot:      Protective Sensation: 6 sites tested. 6 sites sensed.      Skin integrity: Skin integrity normal. No ulcer, skin breakdown, callus or dry skin.      Toenail Condition: Left toenails are abnormally thick and long.      Comments: Pinprick exam completed with 10-g monofilament. Vibration sensation intact. Fungal disease on all toes.  Lymphadenopathy:      Cervical: No cervical adenopathy.   Skin:     General: Skin is warm and dry.      Findings: No rash or wound.      Comments: Negative for acanthosis nigricans. Well-healed SQ injection sites.   Neurological:      Mental Status: He is alert and oriented to person, place, and time.      Coordination: Coordination is intact.      Gait: Gait is intact.   Psychiatric:         Attention and Perception: Attention normal.         Mood and Affect: Mood normal.         Speech: Speech normal.         Behavior: Behavior normal. Behavior is cooperative.         Thought Content: Thought content normal.         Cognition and Memory: Cognition normal.         Assessment / Plan:     Diagnoses and all orders  for this visit:    Type 2 diabetes mellitus with hyperglycemia, without long-term current use of insulin  -     Ambulatory referral/consult to Diabetic Advanced Practice Providers (Medical Management)  -     POCT Glucose, Hand-Held Device  -     Ambulatory referral/consult to Optometry; Future  -     Ambulatory referral/consult to Diabetes Education; Future  -     dulaglutide (TRULICITY) 0.75 mg/0.5 mL pen injector; Inject 0.75 mg into the skin every 7 days.  -     Hemoglobin A1C; Future  -     Ambulatory referral/consult to Podiatry; Future  -     insulin detemir U-100 (LEVEMIR FLEXTOUCH) 100 unit/mL (3 mL) SubQ InPn pen; Inject 20 Units into the skin 2 (two) times daily.    Hypertension associated with diabetes    Hyperlipidemia due to type 2 diabetes mellitus    New onset a-fib    Additional Plan Details:  - Condition: uncontrolled, most recent A1c of 11.4% was completed 3 weeks ago.   - Monitor blood glucose:  3-4x daily.Goals reviewed. Maintain BG log. Continue digital diabetes medicine enrollment. Pt would benefit from CGM use. Sample Stacy 2 in clinic today to monitor BG readings. Discussed with pt and he is agreeable to using CGM. Will order Stacy 2 CGM supplies.  - CGM note: Patient is testing 4 times per day. Patient is injecting meal time insulin 3 times daily and is self adjusting insulin based on blood glucose reading. Patient requires CGM for blood sugar monitoring due to frequent insulin dose changes. Patient reports severe glucose excursions.  - Hypoglycemia protocol: Reviewed and risk discussed.  Notify if BG readings below 80. Protocol printout provided.   - Diet: Limit carbohydrate intake 30-45 grams/meal, 3x daily and 15 grams/snack , limit of two daily.   - Activity: Recommend at least 150 minutes of exercise in a week.  - BP and LDL: Recommend lifestyle modifications and follow up with PCP for management.   - Medication Changes:    - Continue Novolog three times a day before each meal. Use  sliding scale previously provided.   - Increase Levemir 20 units twice a day   - Begin taking Trulicity 0.75 mg once a week    - Lab results: A1C discussed.  - Health Maintenance standards addressed today: Foot Exam - completed today and documented in physical exam with feedback to patient about proper diabetic foot care and findings, Eye Exam - will be completed within [facility] system and scheduled today, Diabetic Care Specialist appointment to be scheduled today and A1c to be scheduled.   - Risks of uncontrolled DM explained. Importance of routine foot and eye exams reviewed. Scheduled as appropriate.  -The patient was explained the above plan and given opportunity to ask questions.  He understands, chooses and consents to this plan and accepts all the risks, which include but are not limited to the risks mentioned above.   - Labs ordered as above.  - CDE Referral: Scheduled  - Nurse visit: Deferred  - Follow up: 2 weeks to CGM data download and interpretation and determine insulin dose adjustment.       Ankita Haywood, JENNIE-C  [facility]    A total of 60 minutes was spent in face to face time, of which over 50 % was spent in counseling patient on disease process, complications, treatment, and side effects of medications.

## 2022-02-15 ENCOUNTER — OFFICE VISIT (OUTPATIENT)
Dept: DIABETES | Facility: CLINIC | Age: 57
End: 2022-02-15
Payer: MEDICARE

## 2022-02-15 VITALS
HEIGHT: 78 IN | BODY MASS INDEX: 23.7 KG/M2 | WEIGHT: 204.81 LBS | DIASTOLIC BLOOD PRESSURE: 76 MMHG | SYSTOLIC BLOOD PRESSURE: 126 MMHG | HEART RATE: 64 BPM

## 2022-02-15 DIAGNOSIS — E11.65 TYPE 2 DIABETES MELLITUS WITH HYPERGLYCEMIA, WITHOUT LONG-TERM CURRENT USE OF INSULIN: Primary | ICD-10-CM

## 2022-02-15 DIAGNOSIS — E11.69 HYPERLIPIDEMIA DUE TO TYPE 2 DIABETES MELLITUS: ICD-10-CM

## 2022-02-15 DIAGNOSIS — I15.2 HYPERTENSION ASSOCIATED WITH DIABETES: ICD-10-CM

## 2022-02-15 DIAGNOSIS — I48.91 NEW ONSET A-FIB: ICD-10-CM

## 2022-02-15 DIAGNOSIS — E11.59 HYPERTENSION ASSOCIATED WITH DIABETES: ICD-10-CM

## 2022-02-15 DIAGNOSIS — E78.5 HYPERLIPIDEMIA DUE TO TYPE 2 DIABETES MELLITUS: ICD-10-CM

## 2022-02-15 LAB — GLUCOSE SERPL-MCNC: 198 MG/DL (ref 70–110)

## 2022-02-15 PROCEDURE — 1159F PR MEDICATION LIST DOCUMENTED IN MEDICAL RECORD: ICD-10-PCS | Mod: HCNC,CPTII,S$GLB, | Performed by: NURSE PRACTITIONER

## 2022-02-15 PROCEDURE — 1159F MED LIST DOCD IN RCRD: CPT | Mod: HCNC,CPTII,S$GLB, | Performed by: NURSE PRACTITIONER

## 2022-02-15 PROCEDURE — 3066F NEPHROPATHY DOC TX: CPT | Mod: HCNC,CPTII,S$GLB, | Performed by: NURSE PRACTITIONER

## 2022-02-15 PROCEDURE — 99205 OFFICE O/P NEW HI 60 MIN: CPT | Mod: HCNC,S$GLB,, | Performed by: NURSE PRACTITIONER

## 2022-02-15 PROCEDURE — 3066F PR DOCUMENTATION OF TREATMENT FOR NEPHROPATHY: ICD-10-PCS | Mod: HCNC,CPTII,S$GLB, | Performed by: NURSE PRACTITIONER

## 2022-02-15 PROCEDURE — 99205 PR OFFICE/OUTPT VISIT, NEW, LEVL V, 60-74 MIN: ICD-10-PCS | Mod: HCNC,S$GLB,, | Performed by: NURSE PRACTITIONER

## 2022-02-15 PROCEDURE — 3060F PR POS MICROALBUMINURIA RESULT DOCUMENTED/REVIEW: ICD-10-PCS | Mod: HCNC,CPTII,S$GLB, | Performed by: NURSE PRACTITIONER

## 2022-02-15 PROCEDURE — 1160F PR REVIEW ALL MEDS BY PRESCRIBER/CLIN PHARMACIST DOCUMENTED: ICD-10-PCS | Mod: HCNC,CPTII,S$GLB, | Performed by: NURSE PRACTITIONER

## 2022-02-15 PROCEDURE — 3046F PR MOST RECENT HEMOGLOBIN A1C LEVEL > 9.0%: ICD-10-PCS | Mod: HCNC,CPTII,S$GLB, | Performed by: NURSE PRACTITIONER

## 2022-02-15 PROCEDURE — 3078F DIAST BP <80 MM HG: CPT | Mod: HCNC,CPTII,S$GLB, | Performed by: NURSE PRACTITIONER

## 2022-02-15 PROCEDURE — 82962 GLUCOSE BLOOD TEST: CPT | Mod: HCNC,S$GLB,, | Performed by: NURSE PRACTITIONER

## 2022-02-15 PROCEDURE — 82962 POCT GLUCOSE, HAND-HELD DEVICE: ICD-10-PCS | Mod: HCNC,S$GLB,, | Performed by: NURSE PRACTITIONER

## 2022-02-15 PROCEDURE — 3008F PR BODY MASS INDEX (BMI) DOCUMENTED: ICD-10-PCS | Mod: HCNC,CPTII,S$GLB, | Performed by: NURSE PRACTITIONER

## 2022-02-15 PROCEDURE — 3074F PR MOST RECENT SYSTOLIC BLOOD PRESSURE < 130 MM HG: ICD-10-PCS | Mod: HCNC,CPTII,S$GLB, | Performed by: NURSE PRACTITIONER

## 2022-02-15 PROCEDURE — 3046F HEMOGLOBIN A1C LEVEL >9.0%: CPT | Mod: HCNC,CPTII,S$GLB, | Performed by: NURSE PRACTITIONER

## 2022-02-15 PROCEDURE — 3078F PR MOST RECENT DIASTOLIC BLOOD PRESSURE < 80 MM HG: ICD-10-PCS | Mod: HCNC,CPTII,S$GLB, | Performed by: NURSE PRACTITIONER

## 2022-02-15 PROCEDURE — 3060F POS MICROALBUMINURIA REV: CPT | Mod: HCNC,CPTII,S$GLB, | Performed by: NURSE PRACTITIONER

## 2022-02-15 PROCEDURE — 1160F RVW MEDS BY RX/DR IN RCRD: CPT | Mod: HCNC,CPTII,S$GLB, | Performed by: NURSE PRACTITIONER

## 2022-02-15 PROCEDURE — 3008F BODY MASS INDEX DOCD: CPT | Mod: HCNC,CPTII,S$GLB, | Performed by: NURSE PRACTITIONER

## 2022-02-15 PROCEDURE — 3074F SYST BP LT 130 MM HG: CPT | Mod: HCNC,CPTII,S$GLB, | Performed by: NURSE PRACTITIONER

## 2022-02-15 PROCEDURE — 99999 PR PBB SHADOW E&M-EST. PATIENT-LVL V: ICD-10-PCS | Mod: PBBFAC,HCNC,, | Performed by: NURSE PRACTITIONER

## 2022-02-15 PROCEDURE — 99999 PR PBB SHADOW E&M-EST. PATIENT-LVL V: CPT | Mod: PBBFAC,HCNC,, | Performed by: NURSE PRACTITIONER

## 2022-02-15 RX ORDER — DULAGLUTIDE 0.75 MG/.5ML
0.75 INJECTION, SOLUTION SUBCUTANEOUS
Qty: 4 PEN | Refills: 1 | Status: SHIPPED | OUTPATIENT
Start: 2022-02-15 | End: 2022-04-03 | Stop reason: SDUPTHER

## 2022-02-15 NOTE — PATIENT INSTRUCTIONS
Medication Regimen/Changes:   - Continue Novolog three times a day before each meal. Use sliding scale previously provided.  - Increase Levemir 20 units twice a day  - Begin taking Trulicity 0.75 mg once a week    Patient Instructions:   Carbohydrate Count: 30-45 grams/meal and 15 grams/snack with limit of 2 snacks per day.   Exercise: Goal is 150 minutes or more per week.   Bring meter and blood sugar log to each appointment.     Goals for Blood Sugar:   Fastin-130 mg/dl   2 hours after meal:  mg/dl   Before Bed: 100-150 mg/dl   If Blood sugar is below 70 mg/dl, DO NOT take diabetes medication. Eat first and then recheck blood sugar in 20 minutes.   Foods to eat if blood sugar is below 70 mg/dl  o 1/2 glass of orange juice  o  1/2 regular cola can  o  3-4 hard candies  o  3-4 small glucose tabs.    Foods to eat if blood sugar is below 50 mg/dl  o 1 glass of orange juice  o 1 regular cola can  o 8 hard candies  o 8 small glucose tabs.    If you have repeated eating/blood sugar recheck process 2 times and blood sugar still below 70 mg/dl, call 911.

## 2022-02-15 NOTE — NURSING
..Patient received Trulicity teaching:    TRULICITY Single-Use Pen is a disposable, prefilled delivery device that is ready-to-use. Each Pen contains  one weekly dose for one-time use only.    When you press the green Injection Button, the Pen will automatically insert the needle into your skin, inject the  medicine, and retracts the needle after the injection is complete.    Steps:   Choose injection site. Clean site with alcohol swap.  Remove grey base. Place pen firm against site and unlock.   Press green button, hear the first click. Medication will inject into skin.   The second click heard will remove the needle. Count 10 seconds before removing pen from site.     Patient was taught 2 demonstrations and completed a return demo to staff. He displayed and verbalized understanding of Trulicity injection. Deny having questions for staff.

## 2022-02-17 PROCEDURE — 99454 REM MNTR PHYSIOL PARAM 16-30: CPT | Mod: S$GLB,,, | Performed by: FAMILY MEDICINE

## 2022-02-17 PROCEDURE — 99454 PR REMOTE MNTR, PHYS PARAM, INITIAL, EA 30 DAYS: ICD-10-PCS | Mod: S$GLB,,, | Performed by: FAMILY MEDICINE

## 2022-02-22 ENCOUNTER — HOSPITAL ENCOUNTER (OUTPATIENT)
Dept: RADIOLOGY | Facility: HOSPITAL | Age: 57
Discharge: HOME | End: 2022-02-22
Attending: INTERNAL MEDICINE
Payer: MEDICARE

## 2022-02-22 ENCOUNTER — HOSPITAL ENCOUNTER (OUTPATIENT)
Dept: CARDIOLOGY | Facility: HOSPITAL | Age: 57
Discharge: HOME | End: 2022-02-22
Attending: INTERNAL MEDICINE
Payer: MEDICARE

## 2022-02-22 DIAGNOSIS — Z82.49 FAMILY HISTORY OF CORONARY ARTERY DISEASE: ICD-10-CM

## 2022-02-22 DIAGNOSIS — E11.69 HYPERLIPIDEMIA DUE TO TYPE 2 DIABETES MELLITUS: ICD-10-CM

## 2022-02-22 DIAGNOSIS — F17.200 SMOKER: ICD-10-CM

## 2022-02-22 DIAGNOSIS — R07.89 OTHER CHEST PAIN: ICD-10-CM

## 2022-02-22 DIAGNOSIS — R00.2 PALPITATIONS: ICD-10-CM

## 2022-02-22 DIAGNOSIS — M51.36 DDD (DEGENERATIVE DISC DISEASE), LUMBAR: ICD-10-CM

## 2022-02-22 DIAGNOSIS — R06.09 DOE (DYSPNEA ON EXERTION): ICD-10-CM

## 2022-02-22 DIAGNOSIS — E78.5 HYPERLIPIDEMIA DUE TO TYPE 2 DIABETES MELLITUS: ICD-10-CM

## 2022-02-22 DIAGNOSIS — I48.91 NEW ONSET A-FIB: ICD-10-CM

## 2022-02-22 LAB
CV STRESS BASE HR: 99 BPM
DIASTOLIC BLOOD PRESSURE: 81 MMHG
NUC REST EJECTION FRACTION: 54
NUC STRESS EJECTION FRACTION: 58 %
OHS CV CPX 85 PERCENT MAX PREDICTED HEART RATE MALE: 140
OHS CV CPX MAX PREDICTED HEART RATE: 165
OHS CV CPX PATIENT IS FEMALE: 0
OHS CV CPX PATIENT IS MALE: 1
OHS CV CPX PEAK DIASTOLIC BLOOD PRESSURE: 71 MMHG
OHS CV CPX PEAK HEAR RATE: 103 BPM
OHS CV CPX PEAK RATE PRESSURE PRODUCT: NORMAL
OHS CV CPX PEAK SYSTOLIC BLOOD PRESSURE: 125 MMHG
OHS CV CPX PERCENT MAX PREDICTED HEART RATE ACHIEVED: 62
OHS CV CPX RATE PRESSURE PRODUCT PRESENTING: NORMAL
STRESS ECHO POST EXERCISE DUR SEC: 58 SECONDS
SYSTOLIC BLOOD PRESSURE: 112 MMHG

## 2022-02-22 PROCEDURE — 93016 CV STRESS TEST SUPVJ ONLY: CPT | Mod: HCNC,,, | Performed by: INTERNAL MEDICINE

## 2022-02-22 PROCEDURE — 93017 CV STRESS TEST TRACING ONLY: CPT | Mod: HCNC

## 2022-02-22 PROCEDURE — 93018 CV STRESS TEST I&R ONLY: CPT | Mod: HCNC,,, | Performed by: INTERNAL MEDICINE

## 2022-02-22 PROCEDURE — 93018 STRESS TEST WITH MYOCARDIAL PERFUSION (CUPID ONLY): ICD-10-PCS | Mod: HCNC,,, | Performed by: INTERNAL MEDICINE

## 2022-02-22 PROCEDURE — 78452 HT MUSCLE IMAGE SPECT MULT: CPT | Mod: 26,HCNC,, | Performed by: INTERNAL MEDICINE

## 2022-02-22 PROCEDURE — 93226 XTRNL ECG REC<48 HR SCAN A/R: CPT | Mod: HCNC

## 2022-02-22 PROCEDURE — 63600175 PHARM REV CODE 636 W HCPCS: Mod: HCNC | Performed by: INTERNAL MEDICINE

## 2022-02-22 PROCEDURE — 78452 STRESS TEST WITH MYOCARDIAL PERFUSION (CUPID ONLY): ICD-10-PCS | Mod: 26,HCNC,, | Performed by: INTERNAL MEDICINE

## 2022-02-22 PROCEDURE — A9502 TC99M TETROFOSMIN: HCPCS | Mod: HCNC

## 2022-02-22 PROCEDURE — 93227 XTRNL ECG REC<48 HR R&I: CPT | Mod: HCNC,,, | Performed by: STUDENT IN AN ORGANIZED HEALTH CARE EDUCATION/TRAINING PROGRAM

## 2022-02-22 PROCEDURE — 93016 STRESS TEST WITH MYOCARDIAL PERFUSION (CUPID ONLY): ICD-10-PCS | Mod: HCNC,,, | Performed by: INTERNAL MEDICINE

## 2022-02-22 PROCEDURE — 93227 HOLTER MONITOR - 48 HOUR (CUPID ONLY): ICD-10-PCS | Mod: HCNC,,, | Performed by: STUDENT IN AN ORGANIZED HEALTH CARE EDUCATION/TRAINING PROGRAM

## 2022-02-22 RX ORDER — REGADENOSON 0.08 MG/ML
0.4 INJECTION, SOLUTION INTRAVENOUS ONCE
Status: COMPLETED | OUTPATIENT
Start: 2022-02-22 | End: 2022-02-22

## 2022-02-22 RX ADMIN — REGADENOSON 0.4 MG: 0.08 INJECTION, SOLUTION INTRAVENOUS at 10:02

## 2022-02-26 ENCOUNTER — NURSE TRIAGE (OUTPATIENT)
Dept: ADMINISTRATIVE | Facility: CLINIC | Age: 57
End: 2022-02-26
Payer: MEDICARE

## 2022-02-26 LAB
OHS CV EVENT MONITOR DAY: 0
OHS CV HOLTER LENGTH DECIMAL HOURS: 48
OHS CV HOLTER LENGTH HOURS: 48
OHS CV HOLTER LENGTH MINUTES: 0
OHS CV HOLTER SINUS AVERAGE HR: 76
OHS CV HOLTER SINUS MAX HR: 124
OHS CV HOLTER SINUS MIN HR: 51

## 2022-02-27 NOTE — TELEPHONE ENCOUNTER
Reason for Disposition   [1] Caller has NON-URGENT medicine question about med that PCP prescribed AND [2] triager unable to answer question    Additional Information   Negative: [1] Intentional drug overdose AND [2] suicidal thoughts or ideas   Negative: Drug overdose and triager unable to answer question   Negative: Caller requesting information unrelated to medicine   Negative: Caller requesting information about COVID-19 Vaccine   Negative: Caller requesting information about Emergency Contraception   Negative: Caller requesting information about Combined Birth Control Pills   Negative: Caller requesting information about Progestin Birth Control Pills   Negative: Caller requesting information about Post-Op pain or medicines   Negative: Caller requesting a prescription antibiotic (such as Penicillin) for Strep throat and has a positive culture result   Negative: Caller requesting a prescription anti-viral med (such as Tamiflu) and has influenza (flu)  symptoms   Negative: Immunization reaction suspected   Negative: Rash while taking a medicine or within 3 days of stopping it   Negative: [1] Asthma and [2] having symptoms of asthma (cough, wheezing, etc.)   Negative: [1] Symptom of illness (e.g., headache, abdominal pain, earache, vomiting) AND [2] more than mild   Negative: Breastfeeding questions about mother's medicines and diet   Negative: MORE THAN A DOUBLE DOSE of a prescription or over-the-counter (OTC) drug   Negative: [1] DOUBLE DOSE (an extra dose or lesser amount) of prescription drug AND [2] any symptoms (e.g., dizziness, nausea, pain, sleepiness)   Negative: [1] DOUBLE DOSE (an extra dose or lesser amount) of over-the-counter (OTC) drug AND [2] any symptoms (e.g., dizziness, nausea, pain, sleepiness)   Negative: Took another person's prescription drug   Negative: [1] DOUBLE DOSE (an extra dose or lesser amount) of prescription drug AND [2] NO symptoms (Exception: a double dose  of antibiotics)   Negative: Diabetes drug error or overdose (e.g., took wrong type of insulin or took extra dose)   Negative: [1] Prescription refill request for ESSENTIAL medicine (i.e., likelihood of harm to patient if not taken) AND [2] triager unable to refill per department policy   Negative: [1] Prescription not at pharmacy AND [2] was prescribed by PCP recently (Exception: triager has access to EMR and prescription is recorded there. Go to Home Care and confirm for pharmacy.)   Negative: [1] Pharmacy calling with prescription question AND [2] triager unable to answer question   Negative: [1] Caller has URGENT medicine question about med that PCP or specialist prescribed AND [2] triager unable to answer question   Negative: Medicine patch causing local rash or itching   Negative: [1] Caller has medicine question about med NOT prescribed by PCP AND [2] triager unable to answer question (e.g., compatibility with other med, storage)   Negative: Prescription request for new medicine (not a refill)   Negative: [1] Prescription refill request for NON-ESSENTIAL medicine (i.e., no harm to patient if med not taken) AND [2] triager unable to refill per department policy   Negative: Prescription refill request for a CONTROLLED substance (e.g., narcotics, ADHD medicines)    Protocols used: MEDICATION QUESTION CALL-A-AH  spouse called re out of test strips for ihealth machine.  Pt able to check BG just doesn't have a way for the results to be sent directly to office. Urgent message sent to PCP to call pt Monday. Call back with questions.

## 2022-02-28 ENCOUNTER — PATIENT OUTREACH (OUTPATIENT)
Dept: ADMINISTRATIVE | Facility: OTHER | Age: 57
End: 2022-02-28
Payer: MEDICARE

## 2022-02-28 ENCOUNTER — OFFICE VISIT (OUTPATIENT)
Dept: OPHTHALMOLOGY | Facility: CLINIC | Age: 57
End: 2022-02-28
Payer: MEDICARE

## 2022-02-28 DIAGNOSIS — E11.65 TYPE 2 DIABETES MELLITUS WITH HYPERGLYCEMIA, WITHOUT LONG-TERM CURRENT USE OF INSULIN: ICD-10-CM

## 2022-02-28 DIAGNOSIS — H52.03 HYPEROPIA OF BOTH EYES: ICD-10-CM

## 2022-02-28 DIAGNOSIS — H53.8 BLURRED VISION, BILATERAL: ICD-10-CM

## 2022-02-28 DIAGNOSIS — E11.9 DIABETES MELLITUS WITHOUT COMPLICATION: Primary | ICD-10-CM

## 2022-02-28 DIAGNOSIS — E11.36 DIABETIC CATARACT: ICD-10-CM

## 2022-02-28 PROCEDURE — 1160F PR REVIEW ALL MEDS BY PRESCRIBER/CLIN PHARMACIST DOCUMENTED: ICD-10-PCS | Mod: HCNC,CPTII,S$GLB, | Performed by: OPTOMETRIST

## 2022-02-28 PROCEDURE — 92004 PR EYE EXAM, NEW PATIENT,COMPREHESV: ICD-10-PCS | Mod: HCNC,S$GLB,, | Performed by: OPTOMETRIST

## 2022-02-28 PROCEDURE — 3060F POS MICROALBUMINURIA REV: CPT | Mod: HCNC,CPTII,S$GLB, | Performed by: OPTOMETRIST

## 2022-02-28 PROCEDURE — 99999 PR PBB SHADOW E&M-EST. PATIENT-LVL III: ICD-10-PCS | Mod: PBBFAC,HCNC,, | Performed by: OPTOMETRIST

## 2022-02-28 PROCEDURE — 1159F MED LIST DOCD IN RCRD: CPT | Mod: HCNC,CPTII,S$GLB, | Performed by: OPTOMETRIST

## 2022-02-28 PROCEDURE — 92015 PR REFRACTION: ICD-10-PCS | Mod: HCNC,S$GLB,, | Performed by: OPTOMETRIST

## 2022-02-28 PROCEDURE — 3066F NEPHROPATHY DOC TX: CPT | Mod: HCNC,CPTII,S$GLB, | Performed by: OPTOMETRIST

## 2022-02-28 PROCEDURE — 3060F PR POS MICROALBUMINURIA RESULT DOCUMENTED/REVIEW: ICD-10-PCS | Mod: HCNC,CPTII,S$GLB, | Performed by: OPTOMETRIST

## 2022-02-28 PROCEDURE — 92015 DETERMINE REFRACTIVE STATE: CPT | Mod: HCNC,S$GLB,, | Performed by: OPTOMETRIST

## 2022-02-28 PROCEDURE — 3046F HEMOGLOBIN A1C LEVEL >9.0%: CPT | Mod: HCNC,CPTII,S$GLB, | Performed by: OPTOMETRIST

## 2022-02-28 PROCEDURE — 1159F PR MEDICATION LIST DOCUMENTED IN MEDICAL RECORD: ICD-10-PCS | Mod: HCNC,CPTII,S$GLB, | Performed by: OPTOMETRIST

## 2022-02-28 PROCEDURE — 3066F PR DOCUMENTATION OF TREATMENT FOR NEPHROPATHY: ICD-10-PCS | Mod: HCNC,CPTII,S$GLB, | Performed by: OPTOMETRIST

## 2022-02-28 PROCEDURE — 92004 COMPRE OPH EXAM NEW PT 1/>: CPT | Mod: HCNC,S$GLB,, | Performed by: OPTOMETRIST

## 2022-02-28 PROCEDURE — 99999 PR PBB SHADOW E&M-EST. PATIENT-LVL III: CPT | Mod: PBBFAC,HCNC,, | Performed by: OPTOMETRIST

## 2022-02-28 PROCEDURE — 3046F PR MOST RECENT HEMOGLOBIN A1C LEVEL > 9.0%: ICD-10-PCS | Mod: HCNC,CPTII,S$GLB, | Performed by: OPTOMETRIST

## 2022-02-28 PROCEDURE — 1160F RVW MEDS BY RX/DR IN RCRD: CPT | Mod: HCNC,CPTII,S$GLB, | Performed by: OPTOMETRIST

## 2022-02-28 NOTE — PROGRESS NOTES
HPI     NP to DKT  Patient here today for yearly eye exam  Diagnosed with diabetes in 2022 1 month ago  Lab Results       Component                Value               Date                       HGBA1C                   11.4 (H)            01/24/2022            Vision changes since last eye exam?: Yes blurred vision at distance and   near  Wears OTC readers     Any eye pain today: No    Other ocular symptoms: Headaches and tearing eyes    Interested in contact lens fitting today? No    1. DM                Last edited by Jamilah Alcaraz, PCT on 2/28/2022  8:46 AM. (History)              Assessment /Plan     For exam results, see Encounter Report.    Diabetes mellitus without complication  Recently diagnosed, last A1c 11.4 Stressed importance of DM control to preserve vision. No diabetic retinopathy was seen in either eye today. Continue strict blood glucose control.  Reviewed importance of yearly dilated eye exams. Continue close care with PCP regarding diabetes.      Type 2 diabetes mellitus with hyperglycemia, without long-term current use of insulin  -     Ambulatory referral/consult to Optometry    Blurred vision, bilateral  -     Ambulatory referral/consult to Optometry    Diabetic cataract  Cataracts not significantly affecting activities of daily living and therefore surgery is not indicated at this time.   Will continue to monitor over the next 12 months. Pt to call or RTC with any significant change in vision prior to next visit.     Hyperopia of both eyes  Eyeglass Final Rx     Eyeglass Final Rx       Sphere Cylinder Axis Add    Right +1.50 +0.50 005 +2.50    Left +1.25   +2.50    Expiration Date: 2/28/2023                RTC 1 year for CEE with DFE sooner if any changes to vision or worsening symptoms.

## 2022-03-04 ENCOUNTER — OFFICE VISIT (OUTPATIENT)
Dept: DIABETES | Facility: CLINIC | Age: 57
End: 2022-03-04
Payer: MEDICARE

## 2022-03-04 VITALS
DIASTOLIC BLOOD PRESSURE: 77 MMHG | WEIGHT: 207 LBS | BODY MASS INDEX: 23.95 KG/M2 | HEIGHT: 78 IN | SYSTOLIC BLOOD PRESSURE: 129 MMHG | HEART RATE: 74 BPM

## 2022-03-04 DIAGNOSIS — E11.65 TYPE 2 DIABETES MELLITUS WITH HYPERGLYCEMIA, WITHOUT LONG-TERM CURRENT USE OF INSULIN: Primary | ICD-10-CM

## 2022-03-04 DIAGNOSIS — I15.2 HYPERTENSION ASSOCIATED WITH DIABETES: ICD-10-CM

## 2022-03-04 DIAGNOSIS — I48.91 NEW ONSET A-FIB: ICD-10-CM

## 2022-03-04 DIAGNOSIS — E78.5 HYPERLIPIDEMIA DUE TO TYPE 2 DIABETES MELLITUS: ICD-10-CM

## 2022-03-04 DIAGNOSIS — E11.59 HYPERTENSION ASSOCIATED WITH DIABETES: ICD-10-CM

## 2022-03-04 DIAGNOSIS — E11.69 HYPERLIPIDEMIA DUE TO TYPE 2 DIABETES MELLITUS: ICD-10-CM

## 2022-03-04 LAB — GLUCOSE SERPL-MCNC: 149 MG/DL (ref 70–110)

## 2022-03-04 PROCEDURE — 3060F POS MICROALBUMINURIA REV: CPT | Mod: HCNC,CPTII,S$GLB, | Performed by: NURSE PRACTITIONER

## 2022-03-04 PROCEDURE — 99214 OFFICE O/P EST MOD 30 MIN: CPT | Mod: HCNC,S$GLB,, | Performed by: NURSE PRACTITIONER

## 2022-03-04 PROCEDURE — 99999 PR PBB SHADOW E&M-EST. PATIENT-LVL III: CPT | Mod: PBBFAC,HCNC,, | Performed by: NURSE PRACTITIONER

## 2022-03-04 PROCEDURE — 3046F PR MOST RECENT HEMOGLOBIN A1C LEVEL > 9.0%: ICD-10-PCS | Mod: HCNC,CPTII,S$GLB, | Performed by: NURSE PRACTITIONER

## 2022-03-04 PROCEDURE — 99214 PR OFFICE/OUTPT VISIT, EST, LEVL IV, 30-39 MIN: ICD-10-PCS | Mod: HCNC,S$GLB,, | Performed by: NURSE PRACTITIONER

## 2022-03-04 PROCEDURE — 1159F MED LIST DOCD IN RCRD: CPT | Mod: HCNC,CPTII,S$GLB, | Performed by: NURSE PRACTITIONER

## 2022-03-04 PROCEDURE — 1159F PR MEDICATION LIST DOCUMENTED IN MEDICAL RECORD: ICD-10-PCS | Mod: HCNC,CPTII,S$GLB, | Performed by: NURSE PRACTITIONER

## 2022-03-04 PROCEDURE — 1160F RVW MEDS BY RX/DR IN RCRD: CPT | Mod: HCNC,CPTII,S$GLB, | Performed by: NURSE PRACTITIONER

## 2022-03-04 PROCEDURE — 3060F PR POS MICROALBUMINURIA RESULT DOCUMENTED/REVIEW: ICD-10-PCS | Mod: HCNC,CPTII,S$GLB, | Performed by: NURSE PRACTITIONER

## 2022-03-04 PROCEDURE — 3066F PR DOCUMENTATION OF TREATMENT FOR NEPHROPATHY: ICD-10-PCS | Mod: HCNC,CPTII,S$GLB, | Performed by: NURSE PRACTITIONER

## 2022-03-04 PROCEDURE — 3066F NEPHROPATHY DOC TX: CPT | Mod: HCNC,CPTII,S$GLB, | Performed by: NURSE PRACTITIONER

## 2022-03-04 PROCEDURE — 99999 PR PBB SHADOW E&M-EST. PATIENT-LVL III: ICD-10-PCS | Mod: PBBFAC,HCNC,, | Performed by: NURSE PRACTITIONER

## 2022-03-04 PROCEDURE — 95251 PR GLUCOSE MONITOR, 72 HOUR, PHYS INTERP: ICD-10-PCS | Mod: HCNC,S$GLB,, | Performed by: NURSE PRACTITIONER

## 2022-03-04 PROCEDURE — 95251 CONT GLUC MNTR ANALYSIS I&R: CPT | Mod: HCNC,S$GLB,, | Performed by: NURSE PRACTITIONER

## 2022-03-04 PROCEDURE — 1160F PR REVIEW ALL MEDS BY PRESCRIBER/CLIN PHARMACIST DOCUMENTED: ICD-10-PCS | Mod: HCNC,CPTII,S$GLB, | Performed by: NURSE PRACTITIONER

## 2022-03-04 PROCEDURE — 82962 GLUCOSE BLOOD TEST: CPT | Mod: HCNC,S$GLB,, | Performed by: NURSE PRACTITIONER

## 2022-03-04 PROCEDURE — 82962 POCT GLUCOSE, HAND-HELD DEVICE: ICD-10-PCS | Mod: HCNC,S$GLB,, | Performed by: NURSE PRACTITIONER

## 2022-03-04 PROCEDURE — 3046F HEMOGLOBIN A1C LEVEL >9.0%: CPT | Mod: HCNC,CPTII,S$GLB, | Performed by: NURSE PRACTITIONER

## 2022-03-04 NOTE — PATIENT INSTRUCTIONS
Medication Regimen/Changes:   - Continue Novolog three times a day before each meal. Use sliding scale.  - Continue Levemir 20 units twice a day  - Continue Trulicity 0.75 mg once a week    Patient Instructions:  Carbohydrate Count: 30-45 grams/meal and 15 grams/snack with limit of 2 snacks per day.  Exercise: Goal is 150 minutes or more per week.  Bring meter and blood sugar log to each appointment.     Goals for Blood Sugar:  Fastin-130 mg/dl  2 hours after meal:  mg/dl  Before Bed: 100-150 mg/dl  If Blood sugar is below 70 mg/dl, DO NOT take diabetes medication. Eat first and then recheck blood sugar in 20 minutes.  Foods to eat if blood sugar is below 70 mg/dl  1/2 glass of orange juice   1/2 regular cola can   3-4 hard candies   3-4 small glucose tabs.   Foods to eat if blood sugar is below 50 mg/dl  1 glass of orange juice  1 regular cola can  8 hard candies  8 small glucose tabs.   If you have repeated eating/blood sugar recheck process 2 times and blood sugar still below 70 mg/dl, call 911.

## 2022-03-04 NOTE — PROGRESS NOTES
PCP: Prachi Castano MD    Subjective:     Chief Complaint: Diabetes follow up.  Last visit with me: 2/15/2022 for initial consult.    HPI: 56 y.o.  male for diabetes follow up.   Type II diabetes since August 2021.  Comorbidities: HTN, HLD and Atrial Fibrillation     Family History of Type 1/2 DM: no  Known diabetes complications:  DKA/HHS: yes  Retinopathy: no, +diabetic cataract  Peripheral neuropathy: no  Nephropathy: yes    Interval history:  Voices recent hospital admission [facility] January 1/25   Denies having hypoglycemia.   Endorses diabetes related symptoms: Changes in Vision, blurry vision 1 month ago.  Since last visit, pt started on Trulicity. At that time he stopped his Levemir dose.  Off Levemir for approximately 2 weeks now.    Blood glucose monitoring via sample CGM Stacy.   Reporting period: Feb 16 - Mar 1, 2022  14 day report generated as follows:    Glucose Details  Average glucose: 161 mg/dL  Standard deviation: 26.4%  GMI: 7.2  -----------------------------  Time in Range  Very High: 4%  High: 23%  In range: 73%  Low: 0%  Very Low: 0%  Target Range   mg/dL  -----------------------------  CGM Details  Sensor usage: 85%    Overall, there is a pattern of euglycemia with postprandial hyperglycemia in all meals.   Activity: Sedentary- walking 30 mins, 2x day, 3x/wk started 3 weeks ago  Diet:3 meals/day;infrequent snacks/day.    Medication compliance all of the time, diet compliance most of the time.   To be enrolled in diabetes education classes.     Previous regimen: Metformin x 1 month    Past failed treatment: N/A    Current DM Medications:   - Levemir 20 units bid: off for 2 weeks   - Trulicity 0.75 mg weekly (Sunday): taken 2 doses   - Novolog units tid ac meals per sliding scale     150-200: 2 units    201-250: 4 units    251-300: 6 units    301-350: 8 units    >350: 10 units     Allergies  Review of patient's allergies indicates:  No Known Allergies    Labs Reviewed:    His  most recent A1C is:  Lab Results   Component Value Date    HGBA1C 11.4 (H) 01/24/2022    HGBA1C 7.1 (H) 08/04/2021    HGBA1C 7.6 (H) 02/09/2021       His most recent BMP is:  Lab Results   Component Value Date     02/07/2022    K 5.1 02/07/2022    CL 99 02/07/2022    CO2 30 (H) 02/07/2022    BUN 26 (H) 02/07/2022    CREATININE 1.4 02/07/2022    CALCIUM 9.9 02/07/2022    ANIONGAP 8 02/07/2022    ESTGFRAFRICA >60 02/07/2022    EGFRNONAA 56 (A) 02/07/2022       No results found for: CPEPTIDE  No results found for: GLUTAMICACID    There is no height or weight on file to calculate BMI.    Weight gain 0 lbs since last visit.  Wt Readings from Last 3 Encounters:   02/15/22 0913 92.9 kg (204 lb 12.9 oz)   02/02/22 1124 91.6 kg (201 lb 15.1 oz)   01/27/22 0434 94.4 kg (208 lb 1.8 oz)   01/26/22 0700 88.9 kg (196 lb)   01/25/22 2100 89.3 kg (196 lb 13.9 oz)   01/25/22 1240 88.5 kg (195 lb)        His blood sugar in clinic today is: 149    Lab Results   Component Value Date    POCGLU 198 (A) 02/15/2022       Diabetes Management Status  Statin: Taking  ACE/ARB: Not taking    Screening or Prevention Patient's value Goal Complete/Controlled?   HgA1C Testing and Control   Lab Results   Component Value Date    HGBA1C 11.4 (H) 01/24/2022      Annually/Less than 8% No   Lipid profile : 01/24/2022 Annually Yes   LDL control Lab Results   Component Value Date    LDLCALC 80.4 01/24/2022    Annually/Less than 100 mg/dl  Yes   Nephropathy screening Lab Results   Component Value Date    MICALBCREAT 148.8 (H) 01/25/2022     Lab Results   Component Value Date    PROTEINUA Negative 01/25/2022    Annually Yes   Blood pressure BP Readings from Last 1 Encounters:   02/15/22 126/76    Less than 140/90 Yes   Dilated retinal exam : 02/28/2022 Annually Yes    Foot exam   : 02/15/2022 Annually Yes     Social History     Socioeconomic History    Marital status:    Tobacco Use    Smoking status: Current Some Day Smoker     Packs/day:  0.50     Types: Cigarettes    Smokeless tobacco: Never Used   Substance and Sexual Activity    Alcohol use: Yes     Alcohol/week: 12.0 standard drinks     Types: 12 Cans of beer per week     Comment: weekends only    Drug use: No    Sexual activity: Yes     Past Medical History:   Diagnosis Date    Acute renal failure 1/25/2022    Arthritis     Atrial fibrillation with RVR 1/25/2022    Back pain     Hyperlipidemia     Hypertension     Pneumonia     Polyneuropathy     Tobacco dependence     Type 2 diabetes mellitus without complication, without long-term current use of insulin 8/4/2021     Review of Systems   Constitutional: Negative for activity change, appetite change, fatigue and unexpected weight change.   HENT: Negative for dental problem and trouble swallowing.    Eyes: Positive for visual disturbance (blurry vision).   Respiratory: Negative for chest tightness and shortness of breath.    Cardiovascular: Negative for chest pain, palpitations and leg swelling.   Gastrointestinal: Negative for abdominal pain, constipation, diarrhea, nausea and vomiting.   Endocrine: Negative for polydipsia, polyphagia and polyuria.   Genitourinary: Negative for difficulty urinating, dysuria, frequency and urgency.        Negative yeast infection   Musculoskeletal: Positive for gait problem (ambulates with cane). Negative for myalgias and neck pain.   Integumentary:  Negative for rash and wound.   Allergic/Immunologic: Negative.    Neurological: Negative for dizziness, syncope, weakness, numbness and headaches.   Hematological: Negative.    Psychiatric/Behavioral: Negative for confusion and sleep disturbance.   All other systems reviewed and are negative.     Objective:      Physical Exam  Vitals reviewed.   Constitutional:       Appearance: Normal appearance.   HENT:      Head: Normocephalic and atraumatic.   Eyes:      General: Vision grossly intact.      Extraocular Movements: Extraocular movements intact.       Pupils: Pupils are equal, round, and reactive to light.   Neck:      Thyroid: No thyroid mass or thyroid tenderness.   Cardiovascular:      Rate and Rhythm: Normal rate and regular rhythm.      Pulses: Normal pulses.           Radial pulses are 2+ on the right side and 2+ on the left side.        Dorsalis pedis pulses are 2+ on the right side and 2+ on the left side.      Heart sounds: Normal heart sounds.   Pulmonary:      Effort: Pulmonary effort is normal.      Breath sounds: Normal breath sounds.   Abdominal:      General: Bowel sounds are normal.      Palpations: Abdomen is soft.   Musculoskeletal:         General: Normal range of motion.      Cervical back: Normal range of motion and neck supple.      Right lower leg: No edema.      Left lower leg: No edema.      Right foot: No deformity or bunion.      Left foot: No deformity or bunion.   Feet:      Right foot:       Skin integrity: Skin integrity normal. No ulcer, skin breakdown, callus or dry skin.      Toenail Condition: Right toenails are abnormally thick and long.      Left foot:      Skin integrity: Skin integrity normal. No ulcer, skin breakdown, callus or dry skin.      Toenail Condition: Left toenails are abnormally thick and long.      Comments: Fungal disease on all toes.  Lymphadenopathy:      Cervical: No cervical adenopathy.   Skin:     General: Skin is warm and dry.      Findings: No rash or wound.      Comments: Negative for acanthosis nigricans. Well-healed SQ injection sites.   Neurological:      Mental Status: He is alert and oriented to person, place, and time.      Coordination: Coordination is intact.      Gait: Gait is intact.   Psychiatric:         Attention and Perception: Attention normal.         Mood and Affect: Mood normal.         Speech: Speech normal.         Behavior: Behavior normal. Behavior is cooperative.         Thought Content: Thought content normal.         Cognition and Memory: Cognition normal.     Assessment / Plan:      Diagnoses and all orders for this visit:    Type 2 diabetes mellitus with hyperglycemia, without long-term current use of insulin  -     POCT Glucose, Hand-Held Device    Hypertension associated with diabetes    Hyperlipidemia due to type 2 diabetes mellitus    New onset a-fib    Additional Plan Details:  - Condition: uncontrolled, most recent A1c of 11.4% was completed 5 weeks ago.   - Monitor blood glucose:  3-4x daily.Goals reviewed. Maintain BG log. Continue digital diabetes medicine enrollment. Pt would benefit from CGM use. Discussed with pt and he is agreeable to using CGM. Will order [employer] CGM supplies.  - CGM note: Patient is testing 4 times per day. Patient is injecting meal time insulin 3 times daily and is self adjusting insulin based on blood glucose reading. Patient requires CGM for blood sugar monitoring due to frequent insulin dose changes. Patient reports severe glucose excursions.  - Hypoglycemia protocol: Reviewed and risk discussed.  Notify if BG readings below 80. Protocol printout provided.   - Diet: Limit carbohydrate intake 30-45 grams/meal, 3x daily and 15 grams/snack , limit of two daily.   - Activity: Recommend at least 150 minutes of exercise in a week.  - BP and LDL: Recommend lifestyle modifications and follow up with PCP for management.   - Medication Changes:    - Continue Novolog three times a day before each meal. Use sliding scale.    - Resume Levemir 20 units twice a day   - ContinueTrulicity 0.75 mg once a week    - Lab results: A1C discussed.  - Health Maintenance standards addressed today: A1c to be scheduled.   - Risks of uncontrolled DM explained. Importance of routine foot and eye exams reviewed. Scheduled as appropriate.  -The patient was explained the above plan and given opportunity to ask questions.  He understands, chooses and consents to this plan and accepts all the risks, which include but are not limited to the risks mentioned above.   - Labs ordered as  above.  - CDE Referral: Scheduled  - Nurse visit: Deferred  - Follow up: 2 weeks to CGM data download and interpretation and determine insulin dose adjustment.       Charlotte T. Delacruz, FNP-C [facility]    A total of 30 minutes was spent in face to face time, of which over 50 % was spent in counseling patient on disease process, complications, treatment, and side effects of medications.

## 2022-03-11 RX ORDER — GABAPENTIN 600 MG/1
600 TABLET ORAL 3 TIMES DAILY
Qty: 90 TABLET | Refills: 3 | Status: SHIPPED | OUTPATIENT
Start: 2022-03-11 | End: 2022-08-18 | Stop reason: SDUPTHER

## 2022-03-11 NOTE — TELEPHONE ENCOUNTER
No new care gaps identified.  Powered by [employer]. Reference number: [id number].   3/11/2022 11:27:38 AM CST

## 2022-03-15 ENCOUNTER — TELEPHONE (OUTPATIENT)
Dept: DIABETES | Facility: CLINIC | Age: 57
End: 2022-03-15
Payer: MEDICARE

## 2022-03-15 NOTE — TELEPHONE ENCOUNTER
Msg left regarding missed appt today. Return contacts provided. Will send [employer] msg.

## 2022-03-17 PROCEDURE — 99454 PR REMOTE MNTR, PHYS PARAM, INITIAL, EA 30 DAYS: ICD-10-PCS | Mod: S$GLB,,, | Performed by: FAMILY MEDICINE

## 2022-03-17 PROCEDURE — 99454 REM MNTR PHYSIOL PARAM 16-30: CPT | Mod: S$GLB,,, | Performed by: FAMILY MEDICINE

## 2022-03-23 ENCOUNTER — TELEPHONE (OUTPATIENT)
Dept: DIABETES | Facility: CLINIC | Age: 57
End: 2022-03-23
Payer: MEDICARE

## 2022-03-30 ENCOUNTER — OFFICE VISIT (OUTPATIENT)
Dept: DIABETES | Facility: CLINIC | Age: 57
End: 2022-03-30
Payer: MEDICARE

## 2022-03-30 VITALS
HEART RATE: 76 BPM | DIASTOLIC BLOOD PRESSURE: 92 MMHG | SYSTOLIC BLOOD PRESSURE: 139 MMHG | HEIGHT: 78 IN | WEIGHT: 205.13 LBS | BODY MASS INDEX: 23.73 KG/M2

## 2022-03-30 DIAGNOSIS — E11.69 HYPERLIPIDEMIA DUE TO TYPE 2 DIABETES MELLITUS: ICD-10-CM

## 2022-03-30 DIAGNOSIS — E11.59 HYPERTENSION ASSOCIATED WITH DIABETES: ICD-10-CM

## 2022-03-30 DIAGNOSIS — I15.2 HYPERTENSION ASSOCIATED WITH DIABETES: ICD-10-CM

## 2022-03-30 DIAGNOSIS — E78.5 HYPERLIPIDEMIA DUE TO TYPE 2 DIABETES MELLITUS: ICD-10-CM

## 2022-03-30 DIAGNOSIS — E11.65 TYPE 2 DIABETES MELLITUS WITH HYPERGLYCEMIA, WITHOUT LONG-TERM CURRENT USE OF INSULIN: Primary | ICD-10-CM

## 2022-03-30 LAB — GLUCOSE SERPL-MCNC: 109 MG/DL (ref 70–110)

## 2022-03-30 PROCEDURE — 99999 PR PBB SHADOW E&M-EST. PATIENT-LVL III: ICD-10-PCS | Mod: PBBFAC,,, | Performed by: NURSE PRACTITIONER

## 2022-03-30 PROCEDURE — 3075F PR MOST RECENT SYSTOLIC BLOOD PRESS GE 130-139MM HG: ICD-10-PCS | Mod: CPTII,S$GLB,, | Performed by: NURSE PRACTITIONER

## 2022-03-30 PROCEDURE — 3075F SYST BP GE 130 - 139MM HG: CPT | Mod: CPTII,S$GLB,, | Performed by: NURSE PRACTITIONER

## 2022-03-30 PROCEDURE — 3060F PR POS MICROALBUMINURIA RESULT DOCUMENTED/REVIEW: ICD-10-PCS | Mod: CPTII,S$GLB,, | Performed by: NURSE PRACTITIONER

## 2022-03-30 PROCEDURE — 3008F PR BODY MASS INDEX (BMI) DOCUMENTED: ICD-10-PCS | Mod: CPTII,S$GLB,, | Performed by: NURSE PRACTITIONER

## 2022-03-30 PROCEDURE — 1159F MED LIST DOCD IN RCRD: CPT | Mod: CPTII,S$GLB,, | Performed by: NURSE PRACTITIONER

## 2022-03-30 PROCEDURE — 3046F PR MOST RECENT HEMOGLOBIN A1C LEVEL > 9.0%: ICD-10-PCS | Mod: CPTII,S$GLB,, | Performed by: NURSE PRACTITIONER

## 2022-03-30 PROCEDURE — 3060F POS MICROALBUMINURIA REV: CPT | Mod: CPTII,S$GLB,, | Performed by: NURSE PRACTITIONER

## 2022-03-30 PROCEDURE — 99999 PR PBB SHADOW E&M-EST. PATIENT-LVL III: CPT | Mod: PBBFAC,,, | Performed by: NURSE PRACTITIONER

## 2022-03-30 PROCEDURE — 3066F PR DOCUMENTATION OF TREATMENT FOR NEPHROPATHY: ICD-10-PCS | Mod: CPTII,S$GLB,, | Performed by: NURSE PRACTITIONER

## 2022-03-30 PROCEDURE — 95251 CONT GLUC MNTR ANALYSIS I&R: CPT | Mod: S$GLB,,, | Performed by: NURSE PRACTITIONER

## 2022-03-30 PROCEDURE — 99214 OFFICE O/P EST MOD 30 MIN: CPT | Mod: S$GLB,,, | Performed by: NURSE PRACTITIONER

## 2022-03-30 PROCEDURE — 1160F PR REVIEW ALL MEDS BY PRESCRIBER/CLIN PHARMACIST DOCUMENTED: ICD-10-PCS | Mod: CPTII,S$GLB,, | Performed by: NURSE PRACTITIONER

## 2022-03-30 PROCEDURE — 82962 GLUCOSE BLOOD TEST: CPT | Mod: S$GLB,,, | Performed by: NURSE PRACTITIONER

## 2022-03-30 PROCEDURE — 3066F NEPHROPATHY DOC TX: CPT | Mod: CPTII,S$GLB,, | Performed by: NURSE PRACTITIONER

## 2022-03-30 PROCEDURE — 99214 PR OFFICE/OUTPT VISIT, EST, LEVL IV, 30-39 MIN: ICD-10-PCS | Mod: S$GLB,,, | Performed by: NURSE PRACTITIONER

## 2022-03-30 PROCEDURE — 1159F PR MEDICATION LIST DOCUMENTED IN MEDICAL RECORD: ICD-10-PCS | Mod: CPTII,S$GLB,, | Performed by: NURSE PRACTITIONER

## 2022-03-30 PROCEDURE — 3046F HEMOGLOBIN A1C LEVEL >9.0%: CPT | Mod: CPTII,S$GLB,, | Performed by: NURSE PRACTITIONER

## 2022-03-30 PROCEDURE — 82962 POCT GLUCOSE, HAND-HELD DEVICE: ICD-10-PCS | Mod: S$GLB,,, | Performed by: NURSE PRACTITIONER

## 2022-03-30 PROCEDURE — 3080F PR MOST RECENT DIASTOLIC BLOOD PRESSURE >= 90 MM HG: ICD-10-PCS | Mod: CPTII,S$GLB,, | Performed by: NURSE PRACTITIONER

## 2022-03-30 PROCEDURE — 3080F DIAST BP >= 90 MM HG: CPT | Mod: CPTII,S$GLB,, | Performed by: NURSE PRACTITIONER

## 2022-03-30 PROCEDURE — 3008F BODY MASS INDEX DOCD: CPT | Mod: CPTII,S$GLB,, | Performed by: NURSE PRACTITIONER

## 2022-03-30 PROCEDURE — 1160F RVW MEDS BY RX/DR IN RCRD: CPT | Mod: CPTII,S$GLB,, | Performed by: NURSE PRACTITIONER

## 2022-03-30 PROCEDURE — 95251 PR GLUCOSE MONITOR, 72 HOUR, PHYS INTERP: ICD-10-PCS | Mod: S$GLB,,, | Performed by: NURSE PRACTITIONER

## 2022-03-30 RX ORDER — INSULIN ASPART 100 [IU]/ML
INJECTION, SOLUTION INTRAVENOUS; SUBCUTANEOUS
Qty: 15 ML | Refills: 3 | Status: SHIPPED | OUTPATIENT
Start: 2022-03-30 | End: 2022-07-27 | Stop reason: SDUPTHER

## 2022-03-30 NOTE — PATIENT INSTRUCTIONS
Medication Regimen/Changes:   - Decrease Levemir 16 units twice a day  - Continue Trulicity 0.75 mg weekly   - Continue Novolog units three times a day before each meal per sliding scale    If premeal blood glucose 150-200: give 2 units   If premeal blood glucose 201-250: give 4 units   If premeal blood glucose 251-300: give 6 units   If premeal blood glucose 301-350: give 8 units   If premeal blood glucose greater than 350: give 10 units    - DO NOT give mealtime insulin if not eating a meal    Patient Instructions:  Carbohydrate Count: 30-45 grams/meal and 15 grams/snack with limit of 2 snacks per day.  Exercise: Goal is 150 minutes or more per week.  Bring meter and blood sugar log to each appointment.     Goals for Blood Sugar:  Fastin-130 mg/dl  2 hours after meal:  mg/dl  Before Bed: 100-150 mg/dl  If Blood sugar is below 70 mg/dl, DO NOT take diabetes medication. Eat first and then recheck blood sugar in 20 minutes.  Foods to eat if blood sugar is below 70 mg/dl  1/2 glass of orange juice   1/2 regular cola can   3-4 hard candies   3-4 small glucose tabs.   Foods to eat if blood sugar is below 50 mg/dl  1 glass of orange juice  1 regular cola can  8 hard candies  8 small glucose tabs.   If you have repeated eating/blood sugar recheck process 2 times and blood sugar still below 70 mg/dl, call 911.

## 2022-04-11 ENCOUNTER — PATIENT MESSAGE (OUTPATIENT)
Dept: OTHER | Facility: OTHER | Age: 57
End: 2022-04-11
Payer: MEDICARE

## 2022-04-14 ENCOUNTER — PATIENT OUTREACH (OUTPATIENT)
Dept: ADMINISTRATIVE | Facility: HOSPITAL | Age: 57
End: 2022-04-14
Payer: MEDICARE

## 2022-05-09 ENCOUNTER — PATIENT MESSAGE (OUTPATIENT)
Dept: SMOKING CESSATION | Facility: CLINIC | Age: 57
End: 2022-05-09
Payer: MEDICARE

## 2022-05-15 ENCOUNTER — PATIENT MESSAGE (OUTPATIENT)
Dept: OTHER | Facility: OTHER | Age: 57
End: 2022-05-15
Payer: MEDICARE

## 2022-05-25 ENCOUNTER — PATIENT MESSAGE (OUTPATIENT)
Dept: OTHER | Facility: OTHER | Age: 57
End: 2022-05-25
Payer: MEDICARE

## 2022-06-24 ENCOUNTER — PATIENT OUTREACH (OUTPATIENT)
Dept: ADMINISTRATIVE | Facility: HOSPITAL | Age: 57
End: 2022-06-24
Payer: MEDICARE

## 2022-06-24 ENCOUNTER — PATIENT MESSAGE (OUTPATIENT)
Dept: ADMINISTRATIVE | Facility: HOSPITAL | Age: 57
End: 2022-06-24
Payer: MEDICARE

## 2022-06-24 NOTE — PROGRESS NOTES
DM Report: Attempted to contact the patient to schedule recheck of Hemoglobin A1c, no answer, no voicemail. Portal message sent to the patient.

## 2022-06-29 ENCOUNTER — OFFICE VISIT (OUTPATIENT)
Dept: CARDIOLOGY | Facility: CLINIC | Age: 57
End: 2022-06-29
Payer: MEDICARE

## 2022-06-29 ENCOUNTER — ANTI-COAG VISIT (OUTPATIENT)
Dept: CARDIOLOGY | Facility: CLINIC | Age: 57
End: 2022-06-29
Payer: MEDICARE

## 2022-06-29 VITALS
BODY MASS INDEX: 23.8 KG/M2 | WEIGHT: 205.69 LBS | SYSTOLIC BLOOD PRESSURE: 122 MMHG | HEART RATE: 90 BPM | DIASTOLIC BLOOD PRESSURE: 80 MMHG | HEIGHT: 78 IN | OXYGEN SATURATION: 98 %

## 2022-06-29 DIAGNOSIS — Z82.49 FAMILY HISTORY OF CORONARY ARTERY DISEASE: ICD-10-CM

## 2022-06-29 DIAGNOSIS — E78.5 HYPERLIPIDEMIA DUE TO TYPE 2 DIABETES MELLITUS: ICD-10-CM

## 2022-06-29 DIAGNOSIS — Z79.01 LONG TERM (CURRENT) USE OF ANTICOAGULANTS: ICD-10-CM

## 2022-06-29 DIAGNOSIS — I48.0 PAF (PAROXYSMAL ATRIAL FIBRILLATION): Primary | ICD-10-CM

## 2022-06-29 DIAGNOSIS — M51.36 DDD (DEGENERATIVE DISC DISEASE), LUMBAR: ICD-10-CM

## 2022-06-29 DIAGNOSIS — E11.59 HYPERTENSION ASSOCIATED WITH DIABETES: ICD-10-CM

## 2022-06-29 DIAGNOSIS — R07.89 OTHER CHEST PAIN: ICD-10-CM

## 2022-06-29 DIAGNOSIS — E11.69 HYPERLIPIDEMIA DUE TO TYPE 2 DIABETES MELLITUS: ICD-10-CM

## 2022-06-29 DIAGNOSIS — I15.2 HYPERTENSION ASSOCIATED WITH DIABETES: ICD-10-CM

## 2022-06-29 DIAGNOSIS — R00.2 PALPITATIONS: ICD-10-CM

## 2022-06-29 DIAGNOSIS — F17.200 SMOKER: ICD-10-CM

## 2022-06-29 PROCEDURE — 3046F HEMOGLOBIN A1C LEVEL >9.0%: CPT | Mod: CPTII,S$GLB,, | Performed by: INTERNAL MEDICINE

## 2022-06-29 PROCEDURE — 99214 PR OFFICE/OUTPT VISIT, EST, LEVL IV, 30-39 MIN: ICD-10-PCS | Mod: S$GLB,,, | Performed by: INTERNAL MEDICINE

## 2022-06-29 PROCEDURE — 3008F PR BODY MASS INDEX (BMI) DOCUMENTED: ICD-10-PCS | Mod: CPTII,S$GLB,, | Performed by: INTERNAL MEDICINE

## 2022-06-29 PROCEDURE — 3066F NEPHROPATHY DOC TX: CPT | Mod: CPTII,S$GLB,, | Performed by: INTERNAL MEDICINE

## 2022-06-29 PROCEDURE — 99999 PR PBB SHADOW E&M-EST. PATIENT-LVL IV: CPT | Mod: PBBFAC,,, | Performed by: INTERNAL MEDICINE

## 2022-06-29 PROCEDURE — 99999 PR PBB SHADOW E&M-EST. PATIENT-LVL IV: ICD-10-PCS | Mod: PBBFAC,,, | Performed by: INTERNAL MEDICINE

## 2022-06-29 PROCEDURE — 3079F PR MOST RECENT DIASTOLIC BLOOD PRESSURE 80-89 MM HG: ICD-10-PCS | Mod: CPTII,S$GLB,, | Performed by: INTERNAL MEDICINE

## 2022-06-29 PROCEDURE — 99214 OFFICE O/P EST MOD 30 MIN: CPT | Mod: S$GLB,,, | Performed by: INTERNAL MEDICINE

## 2022-06-29 PROCEDURE — 3074F SYST BP LT 130 MM HG: CPT | Mod: CPTII,S$GLB,, | Performed by: INTERNAL MEDICINE

## 2022-06-29 PROCEDURE — 3046F PR MOST RECENT HEMOGLOBIN A1C LEVEL > 9.0%: ICD-10-PCS | Mod: CPTII,S$GLB,, | Performed by: INTERNAL MEDICINE

## 2022-06-29 PROCEDURE — 1160F RVW MEDS BY RX/DR IN RCRD: CPT | Mod: CPTII,S$GLB,, | Performed by: INTERNAL MEDICINE

## 2022-06-29 PROCEDURE — 3008F BODY MASS INDEX DOCD: CPT | Mod: CPTII,S$GLB,, | Performed by: INTERNAL MEDICINE

## 2022-06-29 PROCEDURE — 3079F DIAST BP 80-89 MM HG: CPT | Mod: CPTII,S$GLB,, | Performed by: INTERNAL MEDICINE

## 2022-06-29 PROCEDURE — 1159F PR MEDICATION LIST DOCUMENTED IN MEDICAL RECORD: ICD-10-PCS | Mod: CPTII,S$GLB,, | Performed by: INTERNAL MEDICINE

## 2022-06-29 PROCEDURE — 3074F PR MOST RECENT SYSTOLIC BLOOD PRESSURE < 130 MM HG: ICD-10-PCS | Mod: CPTII,S$GLB,, | Performed by: INTERNAL MEDICINE

## 2022-06-29 PROCEDURE — 3060F POS MICROALBUMINURIA REV: CPT | Mod: CPTII,S$GLB,, | Performed by: INTERNAL MEDICINE

## 2022-06-29 PROCEDURE — 3060F PR POS MICROALBUMINURIA RESULT DOCUMENTED/REVIEW: ICD-10-PCS | Mod: CPTII,S$GLB,, | Performed by: INTERNAL MEDICINE

## 2022-06-29 PROCEDURE — 1160F PR REVIEW ALL MEDS BY PRESCRIBER/CLIN PHARMACIST DOCUMENTED: ICD-10-PCS | Mod: CPTII,S$GLB,, | Performed by: INTERNAL MEDICINE

## 2022-06-29 PROCEDURE — 99499 UNLISTED E&M SERVICE: CPT | Mod: S$GLB,,, | Performed by: INTERNAL MEDICINE

## 2022-06-29 PROCEDURE — 1159F MED LIST DOCD IN RCRD: CPT | Mod: CPTII,S$GLB,, | Performed by: INTERNAL MEDICINE

## 2022-06-29 PROCEDURE — 99499 RISK ADDL DX/OHS AUDIT: ICD-10-PCS | Mod: S$GLB,,, | Performed by: INTERNAL MEDICINE

## 2022-06-29 PROCEDURE — 3066F PR DOCUMENTATION OF TREATMENT FOR NEPHROPATHY: ICD-10-PCS | Mod: CPTII,S$GLB,, | Performed by: INTERNAL MEDICINE

## 2022-06-29 RX ORDER — LISINOPRIL 5 MG/1
5 TABLET ORAL DAILY
Qty: 90 TABLET | Refills: 1 | Status: SHIPPED | OUTPATIENT
Start: 2022-06-29 | End: 2022-09-06 | Stop reason: SDUPTHER

## 2022-06-29 RX ORDER — METOPROLOL TARTRATE 50 MG/1
50 TABLET ORAL DAILY
Qty: 90 TABLET | Refills: 3 | Status: SHIPPED | OUTPATIENT
Start: 2022-06-29 | End: 2022-12-21 | Stop reason: SDUPTHER

## 2022-06-29 RX ORDER — WARFARIN SODIUM 5 MG/1
5 TABLET ORAL DAILY
Qty: 30 TABLET | Refills: 2 | Status: SHIPPED | OUTPATIENT
Start: 2022-06-29 | End: 2022-08-02 | Stop reason: SDUPTHER

## 2022-06-29 NOTE — PROGRESS NOTES
Subjective:   Patient ID:  Papa Pope is a 57 y.o. male who presents for cardiac consult of Follow-up      Follow-up  Associated symptoms include chest pain.     The patient came in today for cardiac consult of Follow-up    Papa Pope is a 57 y.o. male pt with AF  , HTN, H LD, DM2 -h/o HHS, DDD, tobacco abuse presents for follow up CV eval.    2/2/22  [facility] summary:   Papa Pope is a 56 year old male with history of DM, tobacco abuse, hypertension, and hyperlipidemia who presents to ED for further for elevated glucose that was noted on routine labs by PCP. Patient states his glucose has been <500 for almost a week. Admits to associated fatigue and weakness. He only takes metformin for his glucose. Tries to monitor his intake of carbohydrates and sugary foods. HgbA1c 8/2021 was 7.1.   Labs in ED reflect marked hypoglycemia with renal insufficiency and electrolyte abnormality. Serum bicarbonate wnl but has anion gap. S/p insulin IV and fluid resuscitation. Repeat BMP pending. EKG revealed atrial fibrillation with RVR converted to NSR with Cardizem IV x 1. Hospital medicine has been consulted for admission.   Hospital Course:   1/26 seen in the ER still on Cardizem gtt 5, Heparin gtt, HR 61-65 whilst there. Patient comfortable on room air  1/27 Patient resumed on his MTP 50 mg PO BID, tolerated Eliquis, got diabetic teaching, has demonstrated back insulin injection, This MD spent about 20 mins in room with him and daughter explaining the reason for Insulin on discharge, the types of insulin, what to look out for.  We will send on Levemir 18 U BID, Moderate SSI TID PRN (no nightly humalog for now), instructed to have short follow up with PCP. Daughter explains that his finger sticks transmit to PCP office automatically, this is good. He says he has Glucometer, strips, lancets at home (was given in his PCP this past Friday)  Patient examined and is stable for discharge    Pt here for initial CV eval after recent  Afib episode. Is taking Lopresor and Eliquis. BP today is elevated 130s/90s. HR 70s. ECHO with normal bi V function, LVH. He used to drink heavily but has quit. He is quitting/quit smoking.     22    Recent Readings 2022 2022 2022 6/15/2022 6/10/2022   SBP (mmHg) 128 141 123 136 126   DBP (mmHg) 88 94 75 77 76   Pulse 98 76 70 68 74       Nuclear stress neg for ischemia 2022. Holter neg for Afib 2022.     Patient feels no leg swelling, no PND,  no dizziness, no syncope, no CNS symptoms.    Patient has fairly good exercise tolerance.    Patient is compliant with medications.    FH - had CVD,  in 80s    HOLTER 2022  Conclusion       · Predominant rhythm is sinus.  · Heart rates varied between 51 and 124 BPM with an average of 76 BPM        Results for orders placed during the hospital encounter of 22    Nuclear Stress - Cardiology Interpreted    Interpretation Summary    Normal myocardial perfusion scan. There is no evidence of myocardial ischemia or infarction.    The gated perfusion images showed an ejection fraction of 54% at rest. The gated perfusion images showed an ejection fraction of 58% post stress.    There is normal wall motion at rest and post stress.    LV cavity size is normal at rest and normal at stress.    The EKG portion of this study is negative for ischemia.    The patient reported no chest pain during the stress test.    There were no arrhythmias during stress.      Results for orders placed during the hospital encounter of 22    Echo    Interpretation Summary  · Concentric hypertrophy and low normal systolic function.  · The estimated ejection fraction is 50%.  · Normal left ventricular diastolic function.  · With normal right ventricular systolic function.      Past Medical History:   Diagnosis Date    Acute renal failure 2022    Arthritis     Atrial fibrillation with RVR 2022    Back pain     Hyperlipidemia     Hypertension      Pneumonia     Polyneuropathy     Tobacco dependence     Type 2 diabetes mellitus without complication, without long-term current use of insulin 8/4/2021       Past Surgical History:   Procedure Laterality Date    COLONOSCOPY N/A 8/22/2017    Procedure: COLONOSCOPY;  Surgeon: Keo Cruz MD;  Location: [facility];  Service: Endoscopy;  Laterality: N/A;    EPIDURAL STEROID INJECTION INTO CERVICAL SPINE N/A 11/1/2019    Procedure: C7/T1 IL SUKHJINDER;  Surgeon: Donnell Jordan MD;  Location: [facility];  Service: Pain Management;  Laterality: N/A;    STOMACH SURGERY      TRANSFORAMINAL EPIDURAL INJECTION OF STEROID Bilateral 8/13/2019    Procedure: Injection,steroid,epidural,transforaminal approach;  Surgeon: Donnell Jordan MD;  Location: [facility];  Service: Pain Management;  Laterality: Bilateral;       Social History     Tobacco Use    Smoking status: Current Some Day Smoker     Packs/day: 0.50     Types: Cigarettes    Smokeless tobacco: Never Used   Substance Use Topics    Alcohol use: Yes     Alcohol/week: 12.0 standard drinks     Types: 12 Cans of beer per week     Comment: weekends only    Drug use: No       Family History   Problem Relation Age of Onset    Hypertension Father        Patient's Medications   New Prescriptions    LISINOPRIL (PRINIVIL,ZESTRIL) 5 MG TABLET    Take 1 tablet (5 mg total) by mouth once daily.    WARFARIN (COUMADIN) 5 MG TABLET    Take 1 tablet (5 mg total) by mouth Daily.   Previous Medications    BLOOD SUGAR DIAGNOSTIC STRP    Use to check blood sugar twice a day    DULAGLUTIDE (TRULICITY) 0.75 MG/0.5 ML PEN INJECTOR    Inject 0.75 mg into the skin every 7 days.    EZETIMIBE (ZETIA) 10 MG TABLET    Take 1 tablet (10 mg total) by mouth once daily.    GABAPENTIN (NEURONTIN) 600 MG TABLET    Take 1 tablet (600 mg total) by mouth 3 (three) times daily. may cause drowsiness    INSULIN ASPART U-100 (NOVOLOG) 100 UNIT/ML (3 ML) INPN PEN    Novolog units three times a  "day before each meal per sliding scale. If -200: 2 units; -250: 4 units; -300: 6 units; -350: 8 units; BG >350: 10 units    INSULIN DETEMIR U-100 (LEVEMIR FLEXTOUCH) 100 UNIT/ML (3 ML) SUBQ INPN PEN    Inject 16 Units into the skin 2 (two) times daily.    PEN NEEDLE, DIABETIC (BD CHRISTIANO 2ND GEN PEN NEEDLE) 32 GAUGE X 5/32" NDLE    Use five times daily    ROSUVASTATIN (CRESTOR) 40 MG TAB    Take 1 tablet (40 mg total) by mouth every evening.    TRUE METRIX AIR GLUCOSE METER KIT    USE AS DIRECTED    TRUEPLUS LANCETS 33 GAUGE MISC    USE  TO  CHECK  BLOOD  SUGAR TWICE DAILY   Modified Medications    Modified Medication Previous Medication    METOPROLOL TARTRATE (LOPRESSOR) 50 MG TABLET metoprolol tartrate (LOPRESSOR) 50 MG tablet       Take 1 tablet (50 mg total) by mouth once daily.    Take 1 tablet (50 mg total) by mouth once daily.   Discontinued Medications    No medications on file       Review of Systems   Constitutional: Positive for malaise/fatigue.   HENT: Negative.    Eyes: Negative.    Respiratory: Positive for shortness of breath.    Cardiovascular: Positive for chest pain and palpitations.   Gastrointestinal: Negative.    Genitourinary: Negative.    Musculoskeletal: Positive for back pain and joint pain.   Skin: Negative.    Neurological: Negative.    Endo/Heme/Allergies: Negative.    Psychiatric/Behavioral: Negative.    All 12 systems otherwise negative.      Wt Readings from Last 3 Encounters:   06/29/22 93.3 kg (205 lb 11 oz)   03/30/22 93.1 kg (205 lb 2.2 oz)   03/04/22 93.9 kg (207 lb 0.2 oz)     Temp Readings from Last 3 Encounters:   01/27/22 97.3 °F (36.3 °C) (Oral)   01/24/22 97.6 °F (36.4 °C) (Tympanic)   08/04/21 97.6 °F (36.4 °C) (Tympanic)     BP Readings from Last 3 Encounters:   06/29/22 122/80   03/30/22 (!) 139/92   03/04/22 129/77     Pulse Readings from Last 3 Encounters:   06/29/22 90   03/30/22 76   03/04/22 74       /80 (BP Location: Right arm, Patient " "Position: Sitting, BP Method: Medium (Manual))   Pulse 90   Ht 6' 6" (1.981 m)   Wt 93.3 kg (205 lb 11 oz)   SpO2 98%   BMI 23.77 kg/m²     Objective:   Physical Exam  Vitals and nursing note reviewed.   Constitutional:       General: He is not in acute distress.     Appearance: He is well-developed. He is not diaphoretic.   HENT:      Head: Normocephalic and atraumatic.      Nose: Nose normal.   Eyes:      General: No scleral icterus.     Conjunctiva/sclera: Conjunctivae normal.   Neck:      Thyroid: No thyromegaly.      Vascular: No JVD.   Cardiovascular:      Rate and Rhythm: Normal rate and regular rhythm.      Heart sounds: S1 normal and S2 normal. No murmur heard.    No friction rub. No gallop. No S3 or S4 sounds.   Pulmonary:      Effort: Pulmonary effort is normal. No respiratory distress.      Breath sounds: Normal breath sounds. No stridor. No wheezing or rales.   Chest:      Chest wall: No tenderness.   Abdominal:      General: Bowel sounds are normal. There is no distension.      Palpations: Abdomen is soft. There is no mass.      Tenderness: There is no abdominal tenderness. There is no rebound.   Genitourinary:     Comments: Deferred  Musculoskeletal:         General: No tenderness or deformity. Normal range of motion.      Cervical back: Normal range of motion and neck supple.   Lymphadenopathy:      Cervical: No cervical adenopathy.   Skin:     General: Skin is warm and dry.      Coloration: Skin is not pale.      Findings: No erythema or rash.   Neurological:      Mental Status: He is alert and oriented to person, place, and time.      Motor: No abnormal muscle tone.      Coordination: Coordination normal.   Psychiatric:         Behavior: Behavior normal.         Thought Content: Thought content normal.         Judgment: Judgment normal.         Lab Results   Component Value Date     02/07/2022    K 5.1 02/07/2022    CL 99 02/07/2022    CO2 30 (H) 02/07/2022    BUN 26 (H) 02/07/2022    " CREATININE 1.4 02/07/2022     (H) 02/07/2022    HGBA1C 11.4 (H) 01/24/2022    AST 21 02/07/2022    ALT 35 02/07/2022    ALBUMIN 3.8 02/07/2022    PROT 7.7 02/07/2022    BILITOT 0.4 02/07/2022    WBC 6.25 02/07/2022    HGB 13.2 (L) 02/07/2022    HCT 40.0 02/07/2022    MCV 96 02/07/2022     02/07/2022    INR 1.0 01/25/2022    TSH 2.172 01/24/2022    CHOL 151 01/24/2022    HDL 31 (L) 01/24/2022    LDLCALC 80.4 01/24/2022    TRIG 198 (H) 01/24/2022    BNP 47 01/25/2022     Assessment:      1. PAF (paroxysmal atrial fibrillation)    2. Palpitations    3. Family history of coronary artery disease    4. Other chest pain    5. Hyperlipidemia due to type 2 diabetes mellitus    6. Smoker    7. DDD (degenerative disc disease), lumbar    8. Hypertension associated with diabetes        Plan:   1. PAF, FH CAD  - change Eliquis to coumadin due to cost, enroll in coumadin clinic  - Nuclear stress neg for ischemia 2/2022.   - Holter neg for Afib 2/2022.     2. HLD  - cont statin    3. Tobacco abuse  - needs cessation    4. DDD, lumbar  - cont tx per PCP    5. DM2 A1c 11.4  - cont tx - needs improvement     6. HTN-  - add low dose Lisinopril and cont BB      Thank you for allowing me to participate in this patient's care. Please do not hesitate to contact me with any questions or concerns. Consult note has been forwarded to the referral physician.     Denny Cunningham MD, [facility]  Cardiovascular Disease  [facility], [place]  [phone] (P)

## 2022-06-29 NOTE — PROGRESS NOTES
Papa Pope is a 56 y/o male pt with AF, HTN, HLD, DM2 -h/o HHS, DDD, tobacco abuse. Who has been enrolled in our coumadin clinic for warfarin monitoring for A.Fib.  He will start taking 5 mg daily warfarin instead of Eliquis due to cost.  LVM and attempted to reach patient x 2.  We will await call back.

## 2022-07-01 ENCOUNTER — TELEPHONE (OUTPATIENT)
Dept: INTERNAL MEDICINE | Facility: CLINIC | Age: 57
End: 2022-07-01
Payer: MEDICARE

## 2022-07-06 ENCOUNTER — PATIENT MESSAGE (OUTPATIENT)
Dept: CARDIOLOGY | Facility: CLINIC | Age: 57
End: 2022-07-06
Payer: MEDICARE

## 2022-07-06 ENCOUNTER — TELEPHONE (OUTPATIENT)
Dept: DIABETES | Facility: CLINIC | Age: 57
End: 2022-07-06
Payer: MEDICARE

## 2022-07-06 ENCOUNTER — PATIENT MESSAGE (OUTPATIENT)
Dept: DIABETES | Facility: CLINIC | Age: 57
End: 2022-07-06
Payer: MEDICARE

## 2022-07-06 NOTE — TELEPHONE ENCOUNTER
Returned patient call no answer/sent MyChart message informing him 7/6/22 1:30 PM appt today with Ankita Haywood NP has been rescheduled to 7/13/22 at 11:30 AM as requested.

## 2022-07-06 NOTE — TELEPHONE ENCOUNTER
----- Message from Liliana Og sent at 7/6/2022  8:02 AM CDT -----  Contact: Papa  Patient is calling to speak with the nurse regarding reschedule today's 1:30 visit for some time next week instead. Patient is requesting a high priority call back at [phone] to discuss concerns.   Thanks,  RP

## 2022-07-08 NOTE — PROGRESS NOTES
After several attempts to reach Mr Pope, I was able to contact him today for an initial coumadin clinic appointment.  He reoprts that he start his warfarin 5 mg last Friday.  Calendar has been updated.  A very brief discussion about warfarin has been carried out.  We discussed the need for frequent and regular monitoring, frequent dosage adjustment and compliance is stressed.  Side effects of potential bleeding and clotting were discussed.  I asked him to moved his administration time of warfarin to the evening.  We will complete his education in the coumadin clinic. Call if any signs of abnormal bleeding.  He will report to the  CC on 7/12/22.

## 2022-07-13 ENCOUNTER — ANTI-COAG VISIT (OUTPATIENT)
Dept: CARDIOLOGY | Facility: CLINIC | Age: 57
End: 2022-07-13
Payer: MEDICARE

## 2022-07-13 DIAGNOSIS — I48.0 PAF (PAROXYSMAL ATRIAL FIBRILLATION): ICD-10-CM

## 2022-07-13 DIAGNOSIS — Z79.01 LONG TERM (CURRENT) USE OF ANTICOAGULANTS: Primary | ICD-10-CM

## 2022-07-13 LAB — INR PPP: 1.1 (ref 2–3)

## 2022-07-13 PROCEDURE — 85610 PROTHROMBIN TIME: CPT | Mod: QW,S$GLB,, | Performed by: INTERNAL MEDICINE

## 2022-07-13 PROCEDURE — 93793 ANTICOAG MGMT PT WARFARIN: CPT | Mod: S$GLB,,,

## 2022-07-13 PROCEDURE — 85610 POCT INR: ICD-10-PCS | Mod: QW,S$GLB,, | Performed by: INTERNAL MEDICINE

## 2022-07-13 PROCEDURE — 93793 PR ANTICOAGULANT MGMT FOR PT TAKING WARFARIN: ICD-10-PCS | Mod: S$GLB,,,

## 2022-07-13 NOTE — PROGRESS NOTES
Patient's INR is sub-thrrapeutic at 1.1.  Patient is new to Coumadin Clinic and to warfarin.   Patient reports taking warfarin 5 mg every day.  Reports no changes.  Advised increased risk of clotting; signs/symptoms of clotting and need to go to ED if experiences any.  Reports no signs/symptoms of clotting.  Coumadin Diet and Education has been reviewed with patient.  Patient verbalizes understanding.  Sent to PharmD for dosing/instructions.    Taking Coumadin   Coumadin (warfarin) helps keep your blood from clotting. But it also increases your risk for bleeding. Because of this, it must be taken exactly as directed. You also need to protect yourself from injury.      Follow These Tips   · Take Coumadin at the same time each day. If you miss a dose, take it as soon as you remember (if less then 4 hours); otherwise, if it's almost time for your next dose, skip the missed dose. Do not take a double dose.   · Go for your blood (protime/INR) tests as often as directed. Note that diet and   · medication can affect your protime/INR level.             REMEMBER:   When value decreases from therapeutic range, the blood                                           gets thicker and when value increases, the blood gets thinner.                                       · Dont take any other medications without checking with your healthcare provider first. This includes aspirin, vitamins, and herbal and other dietary supplements.   · Tell all healthcare providers that you take Coumadin. Its also a good idea to carry a medical ID card or wear a medical-alert bracelet.   · Use a soft toothbrush and an electric razor.   · Dont go barefoot. And dont trim corns or calluses yourself.  ·    When to Call Your Healthcare Provider   Call your healthcare provider right away before you take your next dose of Coumadin if you have any of these problems:   · Bleeding that doesnt stop in 10 minutes   · A heavier-than-normal period or bleeding  between periods   · Coughing or throwing up blood   · Diarrhea or bleeding hemorrhoids   · Dark urine or black stools   · Red or black-and-blue marks on the skin that get larger   · A fever or an illness that gets worse   · Dizziness or fatigue   · Chest pain or trouble breathing   · A serious fall or a blow to the head     Keep Your Diet Steady   Keep your diet pretty much the same each day. Thats because many foods contain vitamin K. Vitamin K helps your blood clot. So eating foods that contain vitamin K can affect the way Coumadin works. You dont need to avoid foods that have vitamin K. But you do need to keep the amount of them you eat steady (about the same day to day). If you change your diet for any reason, such as due to illness or to lose weight, be sure to tell your doctor.      · Examples of foods high in vitamin K are brussels sprouts, avocado, broccoli, cabbage, coleslaw, mustard greens, pamela greens, turnip greens, kale, spinach, osmar lettuce, and some other leafy green vegetables. Foods that are mixed with mayonnaise, such as tuna, chicken, and potato salads.  Oils, such as soybean, canola, and Vegetable oils, are also high in vitamin K.         · Other food products can affect the way Coumadin works in your body:   · Food products that may affect blood clotting include cranberries and cranberry juice, grapefruit juice, fish oil supplements, garlic, patrick, licorice, and turmeric.   · Herbs used in herbal teas or supplements can also affect blood clotting. Keep the amount of herbal teas and supplements you use steady.   · Alcohol can increase the effect of Coumadin in your body.

## 2022-07-13 NOTE — PROGRESS NOTES
INR not at goal. Medications, chart, and patient findings reviewed. See calendar for adjustments to dose and follow up plan.  INR has not moved from baseline even after an extended time period without an INR check due to the fact that we were unable to reach him.  We will boost today and then increase overall dosing plan to hopefully get INR moving.

## 2022-07-21 ENCOUNTER — ANTI-COAG VISIT (OUTPATIENT)
Dept: CARDIOLOGY | Facility: CLINIC | Age: 57
End: 2022-07-21
Payer: MEDICARE

## 2022-07-21 DIAGNOSIS — I48.0 PAF (PAROXYSMAL ATRIAL FIBRILLATION): ICD-10-CM

## 2022-07-21 DIAGNOSIS — Z79.01 LONG TERM (CURRENT) USE OF ANTICOAGULANTS: Primary | ICD-10-CM

## 2022-07-21 LAB — INR PPP: 1.2 (ref 2–3)

## 2022-07-21 PROCEDURE — 93793 ANTICOAG MGMT PT WARFARIN: CPT | Mod: S$GLB,,,

## 2022-07-21 PROCEDURE — 85610 PROTHROMBIN TIME: CPT | Mod: QW,S$GLB,, | Performed by: INTERNAL MEDICINE

## 2022-07-21 PROCEDURE — 93793 PR ANTICOAGULANT MGMT FOR PT TAKING WARFARIN: ICD-10-PCS | Mod: S$GLB,,,

## 2022-07-21 PROCEDURE — 85610 POCT INR: ICD-10-PCS | Mod: QW,S$GLB,, | Performed by: INTERNAL MEDICINE

## 2022-07-21 NOTE — PROGRESS NOTES
Patient's INR is sub-therapeutic at 1.2.  Patient reports followed previous instructions.  Reports no changes.  Advised on increased risk of clotting; signs/symptoms of clotting and need to go to ED if experiences any.  Sent to PharmD for dosing/instructions.

## 2022-07-21 NOTE — PROGRESS NOTES
INR not at goal. Medications, chart, and patient findings reviewed. See calendar for adjustments to dose and follow up plan.  Boost and increase, slight increase from last week.  We will need to monitor closely.

## 2022-07-27 ENCOUNTER — LAB VISIT (OUTPATIENT)
Dept: LAB | Facility: HOSPITAL | Age: 57
End: 2022-07-27
Attending: FAMILY MEDICINE
Payer: MEDICARE

## 2022-07-27 ENCOUNTER — OFFICE VISIT (OUTPATIENT)
Dept: DIABETES | Facility: CLINIC | Age: 57
End: 2022-07-27
Payer: MEDICARE

## 2022-07-27 VITALS
WEIGHT: 205.13 LBS | HEIGHT: 78 IN | HEART RATE: 57 BPM | BODY MASS INDEX: 23.73 KG/M2 | SYSTOLIC BLOOD PRESSURE: 149 MMHG | DIASTOLIC BLOOD PRESSURE: 82 MMHG

## 2022-07-27 DIAGNOSIS — E11.65 TYPE 2 DIABETES MELLITUS WITH HYPERGLYCEMIA, WITHOUT LONG-TERM CURRENT USE OF INSULIN: Primary | ICD-10-CM

## 2022-07-27 DIAGNOSIS — I48.0 PAF (PAROXYSMAL ATRIAL FIBRILLATION): ICD-10-CM

## 2022-07-27 DIAGNOSIS — E78.5 HYPERLIPIDEMIA DUE TO TYPE 2 DIABETES MELLITUS: ICD-10-CM

## 2022-07-27 DIAGNOSIS — E11.59 HYPERTENSION ASSOCIATED WITH DIABETES: ICD-10-CM

## 2022-07-27 DIAGNOSIS — E11.69 HYPERLIPIDEMIA DUE TO TYPE 2 DIABETES MELLITUS: ICD-10-CM

## 2022-07-27 DIAGNOSIS — I15.2 HYPERTENSION ASSOCIATED WITH DIABETES: ICD-10-CM

## 2022-07-27 LAB
GLUCOSE SERPL-MCNC: 141 MG/DL (ref 70–110)
INR PPP: 1.4 (ref 0.8–1.2)
PROTHROMBIN TIME: 14.2 SEC (ref 9–12.5)

## 2022-07-27 PROCEDURE — 99214 OFFICE O/P EST MOD 30 MIN: CPT | Mod: S$GLB,,, | Performed by: NURSE PRACTITIONER

## 2022-07-27 PROCEDURE — 36415 COLL VENOUS BLD VENIPUNCTURE: CPT | Mod: PO | Performed by: INTERNAL MEDICINE

## 2022-07-27 PROCEDURE — 3060F POS MICROALBUMINURIA REV: CPT | Mod: CPTII,S$GLB,, | Performed by: NURSE PRACTITIONER

## 2022-07-27 PROCEDURE — 4010F ACE/ARB THERAPY RXD/TAKEN: CPT | Mod: CPTII,S$GLB,, | Performed by: NURSE PRACTITIONER

## 2022-07-27 PROCEDURE — 3008F PR BODY MASS INDEX (BMI) DOCUMENTED: ICD-10-PCS | Mod: CPTII,S$GLB,, | Performed by: NURSE PRACTITIONER

## 2022-07-27 PROCEDURE — 3079F PR MOST RECENT DIASTOLIC BLOOD PRESSURE 80-89 MM HG: ICD-10-PCS | Mod: CPTII,S$GLB,, | Performed by: NURSE PRACTITIONER

## 2022-07-27 PROCEDURE — 4010F PR ACE/ARB THEARPY RXD/TAKEN: ICD-10-PCS | Mod: CPTII,S$GLB,, | Performed by: NURSE PRACTITIONER

## 2022-07-27 PROCEDURE — 99214 PR OFFICE/OUTPT VISIT, EST, LEVL IV, 30-39 MIN: ICD-10-PCS | Mod: S$GLB,,, | Performed by: NURSE PRACTITIONER

## 2022-07-27 PROCEDURE — 1160F RVW MEDS BY RX/DR IN RCRD: CPT | Mod: CPTII,S$GLB,, | Performed by: NURSE PRACTITIONER

## 2022-07-27 PROCEDURE — 3008F BODY MASS INDEX DOCD: CPT | Mod: CPTII,S$GLB,, | Performed by: NURSE PRACTITIONER

## 2022-07-27 PROCEDURE — 3046F PR MOST RECENT HEMOGLOBIN A1C LEVEL > 9.0%: ICD-10-PCS | Mod: CPTII,S$GLB,, | Performed by: NURSE PRACTITIONER

## 2022-07-27 PROCEDURE — 82962 POCT GLUCOSE, HAND-HELD DEVICE: ICD-10-PCS | Mod: S$GLB,,, | Performed by: NURSE PRACTITIONER

## 2022-07-27 PROCEDURE — 1160F PR REVIEW ALL MEDS BY PRESCRIBER/CLIN PHARMACIST DOCUMENTED: ICD-10-PCS | Mod: CPTII,S$GLB,, | Performed by: NURSE PRACTITIONER

## 2022-07-27 PROCEDURE — 1159F PR MEDICATION LIST DOCUMENTED IN MEDICAL RECORD: ICD-10-PCS | Mod: CPTII,S$GLB,, | Performed by: NURSE PRACTITIONER

## 2022-07-27 PROCEDURE — 3077F PR MOST RECENT SYSTOLIC BLOOD PRESSURE >= 140 MM HG: ICD-10-PCS | Mod: CPTII,S$GLB,, | Performed by: NURSE PRACTITIONER

## 2022-07-27 PROCEDURE — 82962 GLUCOSE BLOOD TEST: CPT | Mod: S$GLB,,, | Performed by: NURSE PRACTITIONER

## 2022-07-27 PROCEDURE — 3046F HEMOGLOBIN A1C LEVEL >9.0%: CPT | Mod: CPTII,S$GLB,, | Performed by: NURSE PRACTITIONER

## 2022-07-27 PROCEDURE — 85610 PROTHROMBIN TIME: CPT | Performed by: INTERNAL MEDICINE

## 2022-07-27 PROCEDURE — 3060F PR POS MICROALBUMINURIA RESULT DOCUMENTED/REVIEW: ICD-10-PCS | Mod: CPTII,S$GLB,, | Performed by: NURSE PRACTITIONER

## 2022-07-27 PROCEDURE — 3066F PR DOCUMENTATION OF TREATMENT FOR NEPHROPATHY: ICD-10-PCS | Mod: CPTII,S$GLB,, | Performed by: NURSE PRACTITIONER

## 2022-07-27 PROCEDURE — 3079F DIAST BP 80-89 MM HG: CPT | Mod: CPTII,S$GLB,, | Performed by: NURSE PRACTITIONER

## 2022-07-27 PROCEDURE — 99999 PR PBB SHADOW E&M-EST. PATIENT-LVL IV: ICD-10-PCS | Mod: PBBFAC,,, | Performed by: NURSE PRACTITIONER

## 2022-07-27 PROCEDURE — 99999 PR PBB SHADOW E&M-EST. PATIENT-LVL IV: CPT | Mod: PBBFAC,,, | Performed by: NURSE PRACTITIONER

## 2022-07-27 PROCEDURE — 1159F MED LIST DOCD IN RCRD: CPT | Mod: CPTII,S$GLB,, | Performed by: NURSE PRACTITIONER

## 2022-07-27 PROCEDURE — 3077F SYST BP >= 140 MM HG: CPT | Mod: CPTII,S$GLB,, | Performed by: NURSE PRACTITIONER

## 2022-07-27 PROCEDURE — 3066F NEPHROPATHY DOC TX: CPT | Mod: CPTII,S$GLB,, | Performed by: NURSE PRACTITIONER

## 2022-07-27 RX ORDER — EMPAGLIFLOZIN 10 MG/1
10 TABLET, FILM COATED ORAL DAILY
Qty: 90 TABLET | Refills: 0 | Status: SHIPPED | OUTPATIENT
Start: 2022-07-27 | End: 2023-01-10 | Stop reason: DRUGHIGH

## 2022-07-27 RX ORDER — INSULIN ASPART 100 [IU]/ML
INJECTION, SOLUTION INTRAVENOUS; SUBCUTANEOUS
Qty: 15 ML | Refills: 3 | Status: SHIPPED | OUTPATIENT
Start: 2022-07-27 | End: 2022-10-10 | Stop reason: SDUPTHER

## 2022-07-27 NOTE — PATIENT INSTRUCTIONS
Medication Regimen/Changes:   - Continue Levemir 16 units twice a day  - Continue Novolog units three times a day before each meal per sliding scale    If premeal blood glucose 150-200: give 2 units   If premeal blood glucose 201-250: give 4 units   If premeal blood glucose 251-300: give 6 units   If premeal blood glucose 301-350: give 8 units   If premeal blood glucose greater than 350: give 10 units  - Begin taking Jardiance 10 mg every morning    Patient Instructions:  Carbohydrate Count: 30-45 grams/meal and 15 grams/snack with limit of 2 snacks per day.  Exercise: Goal is 150 minutes or more per week.  Bring meter and blood sugar log to each appointment.     Goals for Blood Sugar:  Fastin-130 mg/dl  2 hours after meal:  mg/dl  Before Bed: 100-150 mg/dl  If Blood sugar is below 70 mg/dl, DO NOT take diabetes medication. Eat first and then recheck blood sugar in 20 minutes.  Foods to eat if blood sugar is below 70 mg/dl  1/2 glass of orange juice   1/2 regular cola can   3-4 hard candies   3-4 small glucose tabs.   Foods to eat if blood sugar is below 50 mg/dl  1 glass of orange juice  1 regular cola can  8 hard candies  8 small glucose tabs.   If you have repeated eating/blood sugar recheck process 2 times and blood sugar still below 70 mg/dl, call 911.

## 2022-07-27 NOTE — PROGRESS NOTES
PCP: Prachi Castano MD    Subjective:     Chief Complaint: Diabetes follow up.  Last visit with me: 3/30/2022    HPI: 56 y.o.  male for diabetes follow up.   Type II diabetes since August 2021.  Comorbidities: HTN, HLD and Atrial Fibrillation     Family History of Type 1/2 DM: no  Known diabetes complications:  DKA/HHS: yes, 1/25/2022  Retinopathy: no, +diabetic cataract  Peripheral neuropathy: no  Nephropathy: yes    Interval history:  Denies recent hospital admission or ER visit.  Denies having hypoglycemia.   Endorses diabetes related symptoms: None    Blood glucose monitoring via fingerstick: 2xday  Per pt's recall over the last 1 month   Fasting -160   PP lunch 140-150's    Previously on CGM Stacy.  Not been using it for 2 month, ran out of supplies.    Since last visit, been off Trulicity x 2 months, too costly.    Activity: Sedentary- walking 30 mins, 2x day, 3x/wk started 3 weeks ago  Diet: 3 meals/day;infrequent snacks/day.    Medication compliance all of the time, diet compliance most of the time.   To be enrolled in diabetes education classes.     Previous regimen: Metformin x 1 month    Past failed treatment: Trulicity - cost prohibitive    Current DM Medications:   - Levemir 16 units bid   - Trulicity 0.75 mg weekly (Sunday): off x 2 months   - Novolog units tid ac meals per sliding scale: taking an average 2-4 units    If premeal blood glucose 150-200: give 2 units    If premeal blood glucose 201-250: give 4 units    If premeal blood glucose 251-300: give 6 units    If premeal blood glucose 301-350: give 8 units    If premeal blood glucose greater than 350: give 10 units    Allergies  Review of patient's allergies indicates:  No Known Allergies    Labs Reviewed:  His most recent A1C is:  Lab Results   Component Value Date    HGBA1C 11.4 (H) 01/24/2022    HGBA1C 7.1 (H) 08/04/2021    HGBA1C 7.6 (H) 02/09/2021       His most recent BMP is:  Lab Results   Component Value Date    NA  137 02/07/2022    K 5.1 02/07/2022    CL 99 02/07/2022    CO2 30 (H) 02/07/2022    BUN 26 (H) 02/07/2022    CREATININE 1.4 02/07/2022    CALCIUM 9.9 02/07/2022    ANIONGAP 8 02/07/2022    ESTGFRAFRICA >60 02/07/2022    EGFRNONAA 56 (A) 02/07/2022       No results found for: CPEPTIDE  No results found for: GLUTAMICACID    Body mass index is 23.71 kg/m².    Weight gain 0 lbs. since last visit.  Wt Readings from Last 3 Encounters:   07/27/22 0852 93.1 kg (205 lb 2.2 oz)   06/29/22 1521 93.3 kg (205 lb 11 oz)   03/30/22 0958 93.1 kg (205 lb 2.2 oz)        His blood sugar in clinic today is:  Lab Results   Component Value Date    POCGLU 141 (A) 07/27/2022       Diabetes Management Status  Statin: Taking  ACE/ARB: Taking    Screening or Prevention Patient's value Goal Complete/Controlled?   HgA1C Testing and Control   Lab Results   Component Value Date    HGBA1C 11.4 (H) 01/24/2022      Annually/Less than 8% No   Lipid profile : 01/24/2022 Annually Yes   LDL control Lab Results   Component Value Date    LDLCALC 80.4 01/24/2022    Annually/Less than 100 mg/dl  Yes   Nephropathy screening Lab Results   Component Value Date    MICALBCREAT 148.8 (H) 01/25/2022     Lab Results   Component Value Date    PROTEINUA Negative 01/25/2022    Annually Yes   Blood pressure BP Readings from Last 1 Encounters:   07/27/22 (!) 149/82    Less than 140/90 No   Dilated retinal exam : 02/28/2022 Annually Yes    Foot exam   : 02/15/2022 Annually Yes     Social History     Socioeconomic History    Marital status:    Tobacco Use    Smoking status: Current Some Day Smoker     Packs/day: 0.50     Types: Cigarettes    Smokeless tobacco: Never Used   Substance and Sexual Activity    Alcohol use: Yes     Alcohol/week: 12.0 standard drinks     Types: 12 Cans of beer per week     Comment: weekends only    Drug use: No    Sexual activity: Yes     Past Medical History:   Diagnosis Date    Acute renal failure 1/25/2022    Arthritis      Atrial fibrillation with RVR 1/25/2022    Back pain     Hyperlipidemia     Hypertension     Pneumonia     Polyneuropathy     Tobacco dependence     Type 2 diabetes mellitus without complication, without long-term current use of insulin 8/4/2021       Review of Systems   Constitutional: Negative for activity change, appetite change, fatigue and unexpected weight change.   HENT: Negative for dental problem and trouble swallowing.    Eyes: Negative for visual disturbance.   Respiratory: Negative for chest tightness and shortness of breath.    Cardiovascular: Negative for chest pain, palpitations and leg swelling.   Gastrointestinal: Negative for abdominal pain, constipation, diarrhea, nausea and vomiting.   Endocrine: Negative for polydipsia, polyphagia and polyuria.   Genitourinary: Negative for difficulty urinating, dysuria, frequency and urgency.        Negative yeast infection   Musculoskeletal: Positive for gait problem (ambulates with cane). Negative for myalgias and neck pain.   Integumentary:  Negative for rash and wound.   Allergic/Immunologic: Negative.    Neurological: Negative for dizziness, syncope, weakness, numbness and headaches.   Hematological: Negative.    Psychiatric/Behavioral: Negative for confusion and sleep disturbance.   All other systems reviewed and are negative.     Objective:      Physical Exam  Vitals reviewed.   Constitutional:       Appearance: Normal appearance.   HENT:      Head: Normocephalic and atraumatic.   Eyes:      General: Vision grossly intact.      Extraocular Movements: Extraocular movements intact.      Pupils: Pupils are equal, round, and reactive to light.   Neck:      Thyroid: No thyroid mass or thyroid tenderness.   Cardiovascular:      Rate and Rhythm: Normal rate and regular rhythm.      Pulses: Normal pulses.           Radial pulses are 2+ on the right side and 2+ on the left side.        Dorsalis pedis pulses are 2+ on the right side and 2+ on the left side.       Heart sounds: Normal heart sounds.   Pulmonary:      Effort: Pulmonary effort is normal.      Breath sounds: Normal breath sounds.   Abdominal:      General: Bowel sounds are normal.      Palpations: Abdomen is soft.   Musculoskeletal:         General: Normal range of motion.      Cervical back: Normal range of motion and neck supple.      Right lower leg: No edema.      Left lower leg: No edema.      Right foot: No deformity or bunion.      Left foot: No deformity or bunion.   Feet:      Right foot:       Skin integrity: Skin integrity normal. No ulcer, skin breakdown, callus or dry skin.      Toenail Condition: Right toenails are abnormally thick and long.      Left foot:      Skin integrity: Skin integrity normal. No ulcer, skin breakdown, callus or dry skin.      Toenail Condition: Left toenails are abnormally thick and long.      Comments: Fungal disease on all toes.  Lymphadenopathy:      Cervical: No cervical adenopathy.   Skin:     General: Skin is warm and dry.      Findings: No rash or wound.      Comments: Negative for acanthosis nigricans. Well-healed SQ injection sites.   Neurological:      Mental Status: He is alert and oriented to person, place, and time.      Coordination: Coordination is intact.      Gait: Gait is intact.   Psychiatric:         Attention and Perception: Attention normal.         Mood and Affect: Mood normal.         Speech: Speech normal.         Behavior: Behavior normal. Behavior is cooperative.         Thought Content: Thought content normal.         Cognition and Memory: Cognition normal.     Assessment / Plan:     Papa was seen today for diabetes mellitus.    Diagnoses and all orders for this visit:    Type 2 diabetes mellitus with hyperglycemia, without long-term current use of insulin  -     POCT Glucose, Hand-Held Device  -     Hemoglobin A1C; Future  -     empagliflozin (JARDIANCE) 10 mg tablet; Take 1 tablet (10 mg total) by mouth once daily.  -     insulin detemir  U-100 (LEVEMIR FLEXTOUCH) 100 unit/mL (3 mL) SubQ InPn pen; Inject 16 Units into the skin 2 (two) times daily.  -     insulin aspart U-100 (NOVOLOG) 100 unit/mL (3 mL) InPn pen; Novolog units three times a day before each meal per sliding scale. If -200: 2 units; -250: 4 units; -300: 6 units; -350: 8 units; BG >350: 10 units    Hypertension associated with diabetes    Hyperlipidemia due to type 2 diabetes mellitus    Additional Plan Details:  - Condition: uncontrolled, most recent A1C of 11.4% was completed 6 months ago.   - Monitor blood glucose:  3-4x daily.Goals reviewed. Maintain BG log. Continue digital diabetes medicine enrollment. Continue CGM Stacy use.  - CGM note: Patient is testing 4 times per day. Patient is injecting meal time insulin 3 times daily and is self adjusting insulin based on blood glucose reading. Patient requires CGM for blood sugar monitoring due to frequent insulin dose changes. Patient reports severe glucose excursions.  - Hypoglycemia protocol: Reviewed and risk discussed.  Notify if BG readings below 80. Protocol printout provided.   - Diet: Limit carbohydrate intake 30-45 grams/meal, 3x daily and 15 grams/snack , limit of two daily.   - Activity: Recommend at least 150 minutes of exercise in a week.  - BP and LDL: Recommend lifestyle modifications and follow up with PCP for management.   - Medication Changes:    - Continue Levemir 16 units twice a day   - Continue Novolog units three times a day before each meal per sliding scale     If premeal blood glucose 150-200: give 2 units    If premeal blood glucose 201-250: give 4 units    If premeal blood glucose 251-300: give 6 units    If premeal blood glucose 301-350: give 8 units    If premeal blood glucose greater than 350: give 10 units   - Begin taking Jardiance 10 mg every morning   - StopTrulicity - cost prohibitive    - Medication consideration: Titrate basal/bolus insulin to goal BG. May increase Jardiance  dose at next visit if tolerating low dose well.  - Lab results: A1C discussed.  - Health Maintenance standards addressed today: A1c to be scheduled today.  - Risks of uncontrolled DM explained. Importance of routine foot and eye exams reviewed. Scheduled as appropriate.  -The patient was explained the above plan and given opportunity to ask questions.  He understands, chooses and consents to this plan and accepts all the risks, which include but are not limited to the risks mentioned above.   - Labs ordered as above.  - CDE Referral: Scheduled  - Follow up: 4-6 weeks to determine insulin dose adjustment and tolerance to Jardiance and in 3 months for regular follow up.       Ankita Haywood, FNP-C [facility]    A total of 30 minutes was spent in face to face time, of which over 50 % was spent in counseling patient on disease process, complications, treatment, and side effects of medications.

## 2022-07-28 ENCOUNTER — ANTI-COAG VISIT (OUTPATIENT)
Dept: CARDIOLOGY | Facility: CLINIC | Age: 57
End: 2022-07-28
Payer: MEDICARE

## 2022-07-28 DIAGNOSIS — Z79.01 LONG TERM (CURRENT) USE OF ANTICOAGULANTS: Primary | ICD-10-CM

## 2022-07-28 PROCEDURE — 93793 PR ANTICOAGULANT MGMT FOR PT TAKING WARFARIN: ICD-10-PCS | Mod: S$GLB,,,

## 2022-07-28 PROCEDURE — 93793 ANTICOAG MGMT PT WARFARIN: CPT | Mod: S$GLB,,,

## 2022-07-28 NOTE — PROGRESS NOTES
INR not at goal. Medications, chart, and patient findings reviewed. See calendar for adjustments to dose and follow up plan.  INR is slowly moving towards goal, we will increase overall dose and repeat early next week.  Reviewed new dosing with patient.  He prefers lab at [facility] for follow up due to transportation.

## 2022-08-02 ENCOUNTER — ANTI-COAG VISIT (OUTPATIENT)
Dept: CARDIOLOGY | Facility: CLINIC | Age: 57
End: 2022-08-02
Payer: MEDICARE

## 2022-08-02 ENCOUNTER — LAB VISIT (OUTPATIENT)
Dept: LAB | Facility: HOSPITAL | Age: 57
End: 2022-08-02
Attending: FAMILY MEDICINE
Payer: MEDICARE

## 2022-08-02 DIAGNOSIS — Z79.01 LONG TERM (CURRENT) USE OF ANTICOAGULANTS: Primary | ICD-10-CM

## 2022-08-02 DIAGNOSIS — Z79.01 LONG TERM (CURRENT) USE OF ANTICOAGULANTS: ICD-10-CM

## 2022-08-02 LAB
INR PPP: 1.8 (ref 0.8–1.2)
PROTHROMBIN TIME: 19.9 SEC (ref 9–12.5)

## 2022-08-02 PROCEDURE — 36415 COLL VENOUS BLD VENIPUNCTURE: CPT | Performed by: INTERNAL MEDICINE

## 2022-08-02 PROCEDURE — 85610 PROTHROMBIN TIME: CPT | Performed by: INTERNAL MEDICINE

## 2022-08-02 RX ORDER — WARFARIN SODIUM 5 MG/1
TABLET ORAL
Qty: 60 TABLET | Refills: 11 | Status: SHIPPED | OUTPATIENT
Start: 2022-08-02 | End: 2023-08-08 | Stop reason: SDUPTHER

## 2022-08-02 NOTE — PROGRESS NOTES
INR not at goal. Medications, chart, and patient findings reviewed. See calendar for adjustments to dose and follow up plan.  INR is climbing nicely, we will just boost dose today and significant increase from last week.  Patient confirms understanding, we will repeat INR in 1 week.  Refill sent to [facility].

## 2022-08-07 ENCOUNTER — HOSPITAL ENCOUNTER (EMERGENCY)
Facility: HOSPITAL | Age: 57
Discharge: HOME | End: 2022-08-07
Attending: EMERGENCY MEDICINE
Payer: MEDICARE

## 2022-08-07 VITALS
HEIGHT: 78 IN | SYSTOLIC BLOOD PRESSURE: 130 MMHG | DIASTOLIC BLOOD PRESSURE: 80 MMHG | RESPIRATION RATE: 16 BRPM | TEMPERATURE: 98 F | BODY MASS INDEX: 23.9 KG/M2 | HEART RATE: 76 BPM | WEIGHT: 206.56 LBS | OXYGEN SATURATION: 99 %

## 2022-08-07 DIAGNOSIS — Z71.89 ENCOUNTER FOR GLUCOMETER INSTRUCTION: ICD-10-CM

## 2022-08-07 DIAGNOSIS — E16.2 HYPOGLYCEMIA: Primary | ICD-10-CM

## 2022-08-07 LAB
POCT GLUCOSE: 166 MG/DL (ref 70–110)
POCT GLUCOSE: 220 MG/DL (ref 70–110)

## 2022-08-07 PROCEDURE — 99282 EMERGENCY DEPT VISIT SF MDM: CPT | Mod: 25

## 2022-08-07 PROCEDURE — 82962 GLUCOSE BLOOD TEST: CPT

## 2022-08-07 NOTE — ED PROVIDER NOTES
SCRIBE #1 NOTE: I, Chrissy Jean, am scribing for, and in the presence of, Brisa Ho MD. I have scribed the entire note.      History      Chief Complaint   Patient presents with    Hypoglycemia     Pt states blood sugar was 50 at home when he checked it, recent medication change       Review of patient's allergies indicates:  No Known Allergies     HPI   HPI    8/7/2022, 4:25 PM   History obtained from the patient      History of Present Illness: Papa Pope is a 57 y.o. male patient with a PMHx of atrial fibrillation with RVR, HLD, HTN, tobacco dependence, and DM2 who presents to the Emergency Department for hypoglycemia. The pt reports that his blood sugar was normal this morning, but it was 46 before he ate and 50 after he ate PTA. The pt has no complaints at this time. Patient denies any N/V/D, diaphoresis, weakness, dizziness, light-headedness, CP, SOB, fever, chills, and all other sxs at this time. No prior Tx reported. Pt reports that he takes insulin for DM. No further complaints or concerns at this time.         Arrival mode: Personal vehicle    PCP: rPachi Castano MD       Past Medical History:  Past Medical History:   Diagnosis Date    Acute renal failure 1/25/2022    Arthritis     Atrial fibrillation with RVR 1/25/2022    Back pain     Hyperlipidemia     Hypertension     Pneumonia     Polyneuropathy     Tobacco dependence     Type 2 diabetes mellitus without complication, without long-term current use of insulin 8/4/2021       Past Surgical History:  Past Surgical History:   Procedure Laterality Date    COLONOSCOPY N/A 8/22/2017    Procedure: COLONOSCOPY;  Surgeon: Keo Cruz MD;  Location: [facility] ENDO;  Service: Endoscopy;  Laterality: N/A;    EPIDURAL STEROID INJECTION INTO CERVICAL SPINE N/A 11/1/2019    Procedure: C7/T1 IL SUKHJINDER;  Surgeon: Donnell Jordan MD;  Location: [facility] [place];  Service: Pain Management;  Laterality: N/A;    STOMACH SURGERY      TRANSFORAMINAL  EPIDURAL INJECTION OF STEROID Bilateral 8/13/2019    Procedure: Injection,steroid,epidural,transforaminal approach;  Surgeon: Donnell Jordan MD;  [place] [facility] [place] [facility] [place]  Service: Pain Management;  Laterality: Bilateral;         Family History:  Family History   Problem Relation Age of Onset    Hypertension Father        Social History:  Social History     Tobacco Use    Smoking status: Current Some Day Smoker     Packs/day: 0.50     Types: Cigarettes    Smokeless tobacco: Never Used   Substance and Sexual Activity    Alcohol use: Yes     Alcohol/week: 12.0 standard drinks     Types: 12 Cans of beer per week     Comment: weekends only    Drug use: No    Sexual activity: Yes       ROS   Review of Systems   Constitutional: Negative for chills, diaphoresis and fever.   HENT: Negative for sore throat.    Respiratory: Negative for shortness of breath.    Cardiovascular: Negative for chest pain.   Gastrointestinal: Negative for diarrhea, nausea and vomiting.   Genitourinary: Negative for dysuria.   Musculoskeletal: Negative for back pain.   Skin: Negative for rash.   Neurological: Negative for dizziness, weakness and light-headedness.   Hematological: Does not bruise/bleed easily.   All other systems reviewed and are negative.    Physical Exam      Initial Vitals [08/07/22 1603]   BP Pulse Resp Temp SpO2   132/78 76 16 98.3 °F (36.8 °C) 98 %      MAP       --          Physical Exam  Nursing Notes and Vital Signs Reviewed.  Constitutional: Patient is in no acute distress. Well-developed and well-nourished.  Head: Atraumatic. Normocephalic.  Eyes: PERRL. EOM intact. Conjunctivae are not pale. No scleral icterus.  ENT: Mucous membranes are moist. Oropharynx is clear and symmetric.    Neck: Supple. Full ROM. No lymphadenopathy.  Cardiovascular: Regular rate. Regular rhythm. No murmurs, rubs, or gallops. Distal pulses are 2+ and symmetric.  Pulmonary/Chest: No respiratory distress. Clear to auscultation bilaterally.  "No wheezing or rales.  Abdominal: Soft and non-distended.  There is no tenderness.  No rebound, guarding, or rigidity.   Musculoskeletal: Moves all extremities. No obvious deformities. No edema.  Skin: Warm and dry.  Neurological:  Alert, awake, and appropriate.  Normal speech.  No acute focal neurological deficits are appreciated.  Psychiatric: Normal affect. Good eye contact. Appropriate in content.    ED Course    Procedures  ED Vital Signs:  Vitals:    08/07/22 1603 08/07/22 1616   BP: 132/78 130/80   Pulse: 76 76   Resp: 16    Temp: 98.3 °F (36.8 °C)    TempSrc: Oral    SpO2: 98% 99%   Weight: 93.7 kg (206 lb 9.1 oz)    Height: 6' 6" (1.981 m)        Abnormal Lab Results:  Labs Reviewed   POCT GLUCOSE - Abnormal; Notable for the following components:       Result Value    POCT Glucose 220 (*)     All other components within normal limits   POCT GLUCOSE - Abnormal; Notable for the following components:    POCT Glucose 166 (*)     All other components within normal limits   POCT GLUCOSE MONITORING CONTINUOUS        All Lab Results:  Results for orders placed or performed during the hospital encounter of 08/07/22   POCT glucose   Result Value Ref Range    POCT Glucose 220 (H) 70 - 110 mg/dL   POCT glucose   Result Value Ref Range    POCT Glucose 166 (H) 70 - 110 mg/dL         Imaging Results:  Imaging Results    None                 The Emergency Provider reviewed the vital signs and test results, which are outlined above.    ED Discussion     4:26 PM: Reassessed pt at this time. Pt reports that he has supplies to replace his glucometer which he was instructed to do as BS in the ER has been stable and not low. Discussed with pt all pertinent ED information and results. Discussed pt dx and plan of tx. Gave pt all f/u and return to the ED instructions. All questions and concerns were addressed at this time. Pt expresses understanding of information and instructions, and is comfortable with plan to discharge. Pt is " stable for discharge.    I discussed with patient and/or family/caretaker that evaluation in the ED does not suggest any emergent or life threatening medical conditions requiring immediate intervention beyond what was provided in the ED, and I believe patient is safe for discharge.  Regardless, an unremarkable evaluation in the ED does not preclude the development or presence of a serious of life threatening condition. As such, patient was instructed to return immediately for any worsening or change in current symptoms.           ED Medication(s):  Medications - No data to display     Follow-up Information     Prachi Castano MD. Schedule an appointment as soon as possible for a visit in 2 days.    Specialty: Family Medicine  Why: Return to the Emergency Room, If symptoms worsen  Contact information:  [address]  [place] [address]  [phone]                         Discharge Medication List as of 8/7/2022  4:27 PM            Medical Decision Making    Medical Decision Making:   Clinical Tests:   Lab Tests: Ordered and Reviewed           Scribe Attestation:   Scribe #1: I performed the above scribed service and the documentation accurately describes the services I performed. I attest to the accuracy of the note.    Attending:   Physician Attestation Statement for Scribe #1: I, Brisa Ho MD, personally performed the services described in this documentation, as scribed by Chrissy Jean, in my presence, and it is both accurate and complete.          Clinical Impression       ICD-10-CM ICD-9-CM   1. Hypoglycemia  E16.2 251.2   2. Encounter for glucometer instruction  Z71.89 V65.49       Disposition:   Disposition: Discharged  Condition: Stable         Brisa Ho MD  08/07/22 0363

## 2022-08-07 NOTE — FIRST PROVIDER EVALUATION
"Medical screening exam completed.  I have conducted a focused provider triage encounter, findings are as follows:    Brief history of present illness:  Patient states he feels fine but his glucometer at home said he was at 50.  Two hundred twenty in triage    Vitals:    08/07/22 1603   BP: 132/78   BP Location: Right arm   Patient Position: Sitting   Pulse: 76   Resp: 16   Temp: 98.3 °F (36.8 °C)   TempSrc: Oral   SpO2: 98%   Weight: 93.7 kg (206 lb 9.1 oz)   Height: 6' 6" (1.981 m)       Pertinent physical exam:  NAD    Brief workup plan:  Lab work    Preliminary workup initiated; this workup will be continued and followed by the physician or advanced practice provider that is assigned to the patient when roomed.  "

## 2022-08-10 ENCOUNTER — ANTI-COAG VISIT (OUTPATIENT)
Dept: CARDIOLOGY | Facility: CLINIC | Age: 57
End: 2022-08-10
Payer: MEDICARE

## 2022-08-10 DIAGNOSIS — I48.0 PAF (PAROXYSMAL ATRIAL FIBRILLATION): ICD-10-CM

## 2022-08-10 DIAGNOSIS — Z79.01 LONG TERM (CURRENT) USE OF ANTICOAGULANTS: Primary | ICD-10-CM

## 2022-08-10 LAB — INR PPP: 2.7 (ref 2–3)

## 2022-08-10 PROCEDURE — 93793 PR ANTICOAGULANT MGMT FOR PT TAKING WARFARIN: ICD-10-PCS | Mod: S$GLB,,,

## 2022-08-10 PROCEDURE — 85610 PROTHROMBIN TIME: CPT | Mod: QW,S$GLB,, | Performed by: INTERNAL MEDICINE

## 2022-08-10 PROCEDURE — 93793 ANTICOAG MGMT PT WARFARIN: CPT | Mod: S$GLB,,,

## 2022-08-10 PROCEDURE — 85610 POCT INR: ICD-10-PCS | Mod: QW,S$GLB,, | Performed by: INTERNAL MEDICINE

## 2022-08-10 NOTE — PROGRESS NOTES
Patient's INR is therapeutic at 2.7.  Patient reports followed previous instructions.  Reports no changes.   Instructions given:  Continue warfarin 7.5 mg on Fridays, Mondays and Wednesdays; and 10 mg all other days.  Recheck in two weeks on 8/24/22.  Calendar reviewed with patient.  Patient verbalizes understanding.

## 2022-08-17 ENCOUNTER — TELEPHONE (OUTPATIENT)
Dept: CARDIOLOGY | Facility: CLINIC | Age: 57
End: 2022-08-17
Payer: MEDICARE

## 2022-08-18 ENCOUNTER — OFFICE VISIT (OUTPATIENT)
Dept: INTERNAL MEDICINE | Facility: CLINIC | Age: 57
End: 2022-08-18
Payer: MEDICARE

## 2022-08-18 ENCOUNTER — TELEPHONE (OUTPATIENT)
Dept: PREADMISSION TESTING | Facility: HOSPITAL | Age: 57
End: 2022-08-18
Payer: MEDICARE

## 2022-08-18 VITALS
SYSTOLIC BLOOD PRESSURE: 112 MMHG | HEART RATE: 63 BPM | WEIGHT: 209 LBS | OXYGEN SATURATION: 99 % | BODY MASS INDEX: 24.15 KG/M2 | DIASTOLIC BLOOD PRESSURE: 60 MMHG

## 2022-08-18 DIAGNOSIS — E78.5 HYPERLIPIDEMIA DUE TO TYPE 2 DIABETES MELLITUS: ICD-10-CM

## 2022-08-18 DIAGNOSIS — M51.36 DDD (DEGENERATIVE DISC DISEASE), LUMBAR: ICD-10-CM

## 2022-08-18 DIAGNOSIS — E11.65 TYPE 2 DIABETES MELLITUS WITH HYPERGLYCEMIA, WITHOUT LONG-TERM CURRENT USE OF INSULIN: ICD-10-CM

## 2022-08-18 DIAGNOSIS — E11.69 HYPERLIPIDEMIA DUE TO TYPE 2 DIABETES MELLITUS: ICD-10-CM

## 2022-08-18 DIAGNOSIS — I15.2 HYPERTENSION ASSOCIATED WITH DIABETES: Primary | ICD-10-CM

## 2022-08-18 DIAGNOSIS — I48.0 PAF (PAROXYSMAL ATRIAL FIBRILLATION): ICD-10-CM

## 2022-08-18 DIAGNOSIS — F17.200 SMOKER: ICD-10-CM

## 2022-08-18 DIAGNOSIS — Z86.010 HISTORY OF COLON POLYPS: ICD-10-CM

## 2022-08-18 DIAGNOSIS — E11.59 HYPERTENSION ASSOCIATED WITH DIABETES: Primary | ICD-10-CM

## 2022-08-18 DIAGNOSIS — Z12.11 COLON CANCER SCREENING: ICD-10-CM

## 2022-08-18 PROBLEM — I48.91 NEW ONSET A-FIB: Status: RESOLVED | Noted: 2022-01-25 | Resolved: 2022-08-18

## 2022-08-18 LAB
ALBUMIN SERPL BCP-MCNC: 3.7 G/DL (ref 3.5–5.2)
ALP SERPL-CCNC: 74 U/L (ref 55–135)
ALT SERPL W/O P-5'-P-CCNC: 26 U/L (ref 10–44)
ANION GAP SERPL CALC-SCNC: 10 MMOL/L (ref 8–16)
AST SERPL-CCNC: 21 U/L (ref 10–40)
BILIRUB SERPL-MCNC: 0.3 MG/DL (ref 0.1–1)
BUN SERPL-MCNC: 20 MG/DL (ref 6–20)
CALCIUM SERPL-MCNC: 9.5 MG/DL (ref 8.7–10.5)
CHLORIDE SERPL-SCNC: 105 MMOL/L (ref 95–110)
CO2 SERPL-SCNC: 23 MMOL/L (ref 23–29)
CREAT SERPL-MCNC: 1.1 MG/DL (ref 0.5–1.4)
EST. GFR  (NO RACE VARIABLE): >60 ML/MIN/1.73 M^2
ESTIMATED AVG GLUCOSE: 148 MG/DL (ref 68–131)
GLUCOSE SERPL-MCNC: 233 MG/DL (ref 70–110)
HBA1C MFR BLD: 6.8 % (ref 4–5.6)
POTASSIUM SERPL-SCNC: 4 MMOL/L (ref 3.5–5.1)
PROT SERPL-MCNC: 7 G/DL (ref 6–8.4)
SODIUM SERPL-SCNC: 138 MMOL/L (ref 136–145)

## 2022-08-18 PROCEDURE — 3066F NEPHROPATHY DOC TX: CPT | Mod: CPTII,S$GLB,, | Performed by: FAMILY MEDICINE

## 2022-08-18 PROCEDURE — 4010F ACE/ARB THERAPY RXD/TAKEN: CPT | Mod: CPTII,S$GLB,, | Performed by: FAMILY MEDICINE

## 2022-08-18 PROCEDURE — 99214 PR OFFICE/OUTPT VISIT, EST, LEVL IV, 30-39 MIN: ICD-10-PCS | Mod: S$GLB,,, | Performed by: FAMILY MEDICINE

## 2022-08-18 PROCEDURE — 1159F PR MEDICATION LIST DOCUMENTED IN MEDICAL RECORD: ICD-10-PCS | Mod: CPTII,S$GLB,, | Performed by: FAMILY MEDICINE

## 2022-08-18 PROCEDURE — 1159F MED LIST DOCD IN RCRD: CPT | Mod: CPTII,S$GLB,, | Performed by: FAMILY MEDICINE

## 2022-08-18 PROCEDURE — 3044F HG A1C LEVEL LT 7.0%: CPT | Mod: CPTII,S$GLB,, | Performed by: FAMILY MEDICINE

## 2022-08-18 PROCEDURE — 3060F PR POS MICROALBUMINURIA RESULT DOCUMENTED/REVIEW: ICD-10-PCS | Mod: CPTII,S$GLB,, | Performed by: FAMILY MEDICINE

## 2022-08-18 PROCEDURE — 3008F PR BODY MASS INDEX (BMI) DOCUMENTED: ICD-10-PCS | Mod: CPTII,S$GLB,, | Performed by: FAMILY MEDICINE

## 2022-08-18 PROCEDURE — 1160F PR REVIEW ALL MEDS BY PRESCRIBER/CLIN PHARMACIST DOCUMENTED: ICD-10-PCS | Mod: CPTII,S$GLB,, | Performed by: FAMILY MEDICINE

## 2022-08-18 PROCEDURE — 4010F PR ACE/ARB THEARPY RXD/TAKEN: ICD-10-PCS | Mod: CPTII,S$GLB,, | Performed by: FAMILY MEDICINE

## 2022-08-18 PROCEDURE — 3044F PR MOST RECENT HEMOGLOBIN A1C LEVEL <7.0%: ICD-10-PCS | Mod: CPTII,S$GLB,, | Performed by: FAMILY MEDICINE

## 2022-08-18 PROCEDURE — 1160F RVW MEDS BY RX/DR IN RCRD: CPT | Mod: CPTII,S$GLB,, | Performed by: FAMILY MEDICINE

## 2022-08-18 PROCEDURE — 99999 PR PBB SHADOW E&M-EST. PATIENT-LVL IV: ICD-10-PCS | Mod: PBBFAC,,, | Performed by: FAMILY MEDICINE

## 2022-08-18 PROCEDURE — 83036 HEMOGLOBIN GLYCOSYLATED A1C: CPT | Performed by: FAMILY MEDICINE

## 2022-08-18 PROCEDURE — 3008F BODY MASS INDEX DOCD: CPT | Mod: CPTII,S$GLB,, | Performed by: FAMILY MEDICINE

## 2022-08-18 PROCEDURE — 3078F PR MOST RECENT DIASTOLIC BLOOD PRESSURE < 80 MM HG: ICD-10-PCS | Mod: CPTII,S$GLB,, | Performed by: FAMILY MEDICINE

## 2022-08-18 PROCEDURE — 99214 OFFICE O/P EST MOD 30 MIN: CPT | Mod: S$GLB,,, | Performed by: FAMILY MEDICINE

## 2022-08-18 PROCEDURE — 3060F POS MICROALBUMINURIA REV: CPT | Mod: CPTII,S$GLB,, | Performed by: FAMILY MEDICINE

## 2022-08-18 PROCEDURE — 3078F DIAST BP <80 MM HG: CPT | Mod: CPTII,S$GLB,, | Performed by: FAMILY MEDICINE

## 2022-08-18 PROCEDURE — 99999 PR PBB SHADOW E&M-EST. PATIENT-LVL IV: CPT | Mod: PBBFAC,,, | Performed by: FAMILY MEDICINE

## 2022-08-18 PROCEDURE — 3074F SYST BP LT 130 MM HG: CPT | Mod: CPTII,S$GLB,, | Performed by: FAMILY MEDICINE

## 2022-08-18 PROCEDURE — 3074F PR MOST RECENT SYSTOLIC BLOOD PRESSURE < 130 MM HG: ICD-10-PCS | Mod: CPTII,S$GLB,, | Performed by: FAMILY MEDICINE

## 2022-08-18 PROCEDURE — 3066F PR DOCUMENTATION OF TREATMENT FOR NEPHROPATHY: ICD-10-PCS | Mod: CPTII,S$GLB,, | Performed by: FAMILY MEDICINE

## 2022-08-18 PROCEDURE — 80053 COMPREHEN METABOLIC PANEL: CPT | Performed by: FAMILY MEDICINE

## 2022-08-18 RX ORDER — GABAPENTIN 600 MG/1
600 TABLET ORAL 3 TIMES DAILY
Qty: 90 TABLET | Refills: 6 | Status: SHIPPED | OUTPATIENT
Start: 2022-08-18 | End: 2022-08-18 | Stop reason: SDUPTHER

## 2022-08-18 RX ORDER — GABAPENTIN 600 MG/1
600 TABLET ORAL 3 TIMES DAILY
Qty: 270 TABLET | Refills: 3 | Status: SHIPPED | OUTPATIENT
Start: 2022-08-18 | End: 2023-02-20

## 2022-08-18 RX ORDER — SODIUM, POTASSIUM,MAG SULFATES 17.5-3.13G
1 SOLUTION, RECONSTITUTED, ORAL ORAL DAILY
Qty: 1 KIT | Refills: 0 | Status: SHIPPED | OUTPATIENT
Start: 2022-08-18 | End: 2022-08-20

## 2022-08-18 NOTE — PROGRESS NOTES
Subjective:       Patient ID: Papa oPpe is a 57 y.o. male.    Chief Complaint: Follow-up    57-year-old  male patient with Patient Active Problem List:     Hyperlipidemia due to type 2 diabetes mellitus     Hypertension associated with diabetes     DDD (degenerative disc disease), lumbar     History of colon polyps     Smoker     Type 2 diabetes mellitus with hyperglycemia, without long-term current use of insulin     PAF (paroxysmal atrial fibrillation)  Here for follow-up on chronic medical conditions and reports that patient has been taking his medications regularly and has been maintaining his blood glucose levels.  Continues to have chronic low back pain up to six to 7/10 radiating to bilateral lower legs and requesting refill on gabapentin, patient has done well with physical therapy a year ago and would like to continue.  Denies any recent falls.  Has been smoking occasionally    Review of Systems   Constitutional: Negative for appetite change and fatigue.   Eyes: Negative for visual disturbance.   Respiratory: Negative for shortness of breath.    Cardiovascular: Negative for chest pain, palpitations and leg swelling.   Gastrointestinal: Negative for abdominal pain, nausea and vomiting.   Musculoskeletal: Positive for back pain. Negative for myalgias.   Skin: Negative for rash.   Neurological: Positive for numbness. Negative for weakness and headaches.   Psychiatric/Behavioral: Negative for sleep disturbance.         /60 (BP Location: Right arm, Patient Position: Sitting, BP Method: Medium (Manual))   Pulse 63   Wt 94.8 kg (208 lb 15.9 oz)   SpO2 99%   BMI 24.15 kg/m²   Objective:      Physical Exam  Constitutional:       Appearance: He is well-developed.   HENT:      Head: Normocephalic and atraumatic.   Cardiovascular:      Rate and Rhythm: Normal rate and regular rhythm.      Heart sounds: Normal heart sounds. No murmur heard.  Pulmonary:      Effort: Pulmonary effort is  normal.      Breath sounds: Normal breath sounds. No wheezing.   Abdominal:      General: Bowel sounds are normal.      Palpations: Abdomen is soft.      Tenderness: There is no abdominal tenderness.   Musculoskeletal:         General: Tenderness present.      Comments: Positive for paraspinal lumbar muscle tenderness bilaterally   Skin:     General: Skin is warm and dry.      Findings: No rash.   Neurological:      Mental Status: He is alert and oriented to person, place, and time.   Psychiatric:         Mood and Affect: Mood normal.           Assessment/Plan:   1. Hypertension associated with diabetes  - Comprehensive Metabolic Panel; Future  - Comprehensive Metabolic Panel  Blood pressure is stable currently on lisinopril 5 mg and metoprolol 50 mg daily    2. Hyperlipidemia due to type 2 diabetes mellitus  Currently taking Crestor 40 mg and Zetia 10 mg daily    3. Type 2 diabetes mellitus with hyperglycemia, without long-term current use of insulin  - gabapentin (NEURONTIN) 600 MG tablet; Take 1 tablet (600 mg total) by mouth 3 (three) times daily. may cause drowsiness  Dispense: 270 tablet; Refill: 3  - Hemoglobin A1c  Stable on Jardiance 10 mg, NovoLog as per sliding scale  Strict lifestyle changes recommended with 1800 ADA low-fat and low-cholesterol diet and exercise 30 minutes daily      4. PAF (paroxysmal atrial fibrillation)  Stable on metoprolol 50 mg daily and warfarin  Followed by Cardiology    5. DDD (degenerative disc disease), lumbar  - gabapentin (NEURONTIN) 600 MG tablet; Take 1 tablet (600 mg total) by mouth 3 (three) times daily. may cause drowsiness  Dispense: 270 tablet; Refill: 3  - Ambulatory referral/consult to Physical/Occupational Therapy; Future  Currently taking gabapentin 600 mg 3 times daily, refill given today  Advised to take extra-strength Tylenol as needed and will refer to physical therapy    6. History of colon polyps    7. Smoker  Encouraged to quit smoking    8. Colon cancer  screening  - Ambulatory referral/consult to Endo Procedure ; Future   Due for colonoscopy    Patient was advised to consider getting pneumonia shingles and tetanus vaccination at outside pharmacy

## 2022-08-18 NOTE — TELEPHONE ENCOUNTER
Patient is scheduled for a colonoscopy on 9/30/22. Is the patient cleared for the procedure? Is he cleared to stop his coumadin 5 days prior to the procedure?  Thanks,  Krissy STACY

## 2022-08-19 ENCOUNTER — PATIENT OUTREACH (OUTPATIENT)
Dept: ADMINISTRATIVE | Facility: HOSPITAL | Age: 57
End: 2022-08-19
Payer: MEDICARE

## 2022-08-24 ENCOUNTER — ANTI-COAG VISIT (OUTPATIENT)
Dept: CARDIOLOGY | Facility: CLINIC | Age: 57
End: 2022-08-24
Payer: MEDICARE

## 2022-08-24 DIAGNOSIS — Z79.01 LONG TERM (CURRENT) USE OF ANTICOAGULANTS: Primary | ICD-10-CM

## 2022-08-24 DIAGNOSIS — I48.0 PAF (PAROXYSMAL ATRIAL FIBRILLATION): ICD-10-CM

## 2022-08-24 LAB — INR PPP: 4.4 (ref 2–3)

## 2022-08-24 PROCEDURE — 93793 PR ANTICOAGULANT MGMT FOR PT TAKING WARFARIN: ICD-10-PCS | Mod: S$GLB,,,

## 2022-08-24 PROCEDURE — 85610 PROTHROMBIN TIME: CPT | Mod: QW,S$GLB,, | Performed by: INTERNAL MEDICINE

## 2022-08-24 PROCEDURE — 85610 POCT INR: ICD-10-PCS | Mod: QW,S$GLB,, | Performed by: INTERNAL MEDICINE

## 2022-08-24 PROCEDURE — 93793 ANTICOAG MGMT PT WARFARIN: CPT | Mod: S$GLB,,,

## 2022-08-24 NOTE — PROGRESS NOTES
Patient's INR is supra-therapeutic at 4.4.  Patient reports followed previous instructions.  Reports consumed alcoholic beverages during the weekend. .  Re-educated patient on Coumadin Diet and increased risk of bleeding with consumption of alcohol.  Advised of signs/symptoms of bleeding and need to go to ED if experiences any.  Patient reports no signs/symptoms of bleeding.    Patient reports scheduled to have two teeth pulled but does not recall the date.  Advised to contact our office with procedure date as soon as possible.  Patient states dentist at [facility] says will need to be off of warfarin for five days prior to procedure.  Patient is scheduled for dental clearance with Dr. Cunningham on 8/31/22.    Instructions given: hold warfarin dose today 8/24/22; then re-challenge warfarin 7.5 mg on Fridays, Mondays and Wednesdays; and 10 mg all other days.  Recheck in one week on 8/31/22 with other appointment.  Calendar reviewed with patient.  Patient verbalizes understanding.

## 2022-08-25 ENCOUNTER — PES CALL (OUTPATIENT)
Dept: ADMINISTRATIVE | Facility: CLINIC | Age: 57
End: 2022-08-25
Payer: MEDICARE

## 2022-08-31 ENCOUNTER — ANTI-COAG VISIT (OUTPATIENT)
Dept: CARDIOLOGY | Facility: CLINIC | Age: 57
End: 2022-08-31
Payer: MEDICARE

## 2022-08-31 DIAGNOSIS — I48.91 ATRIAL FIBRILLATION, UNSPECIFIED TYPE: ICD-10-CM

## 2022-08-31 DIAGNOSIS — Z79.01 LONG TERM (CURRENT) USE OF ANTICOAGULANTS: Primary | ICD-10-CM

## 2022-08-31 LAB — INR PPP: 2.9 (ref 2–3)

## 2022-08-31 PROCEDURE — 85610 POCT INR: ICD-10-PCS | Mod: QW,S$GLB,, | Performed by: INTERNAL MEDICINE

## 2022-08-31 PROCEDURE — 93793 PR ANTICOAGULANT MGMT FOR PT TAKING WARFARIN: ICD-10-PCS | Mod: S$GLB,,,

## 2022-08-31 PROCEDURE — 85610 PROTHROMBIN TIME: CPT | Mod: QW,S$GLB,, | Performed by: INTERNAL MEDICINE

## 2022-08-31 PROCEDURE — 93793 ANTICOAG MGMT PT WARFARIN: CPT | Mod: S$GLB,,,

## 2022-08-31 NOTE — PROGRESS NOTES
Patient's INR is therapeutic at 2.9.   Patient reports followed previous instructions.  Reports scheduled for colonoscopy on 9/30/22 and will schedule date for tooth extractions after seeing   Dr. Cunningham on 9/6/22 for dental clearance.  Instructions given: continue warfarin 7.5 mg on Wednesdays, Fridays and Mondays; and 10 mg all other days.  Recheck on 9/6/22 with other appointment.  Calendar reviewed with patient.  Patient verbalizes understanding.

## 2022-09-06 ENCOUNTER — ANTI-COAG VISIT (OUTPATIENT)
Dept: CARDIOLOGY | Facility: CLINIC | Age: 57
End: 2022-09-06
Payer: MEDICARE

## 2022-09-06 ENCOUNTER — HOSPITAL ENCOUNTER (OUTPATIENT)
Dept: CARDIOLOGY | Facility: HOSPITAL | Age: 57
Discharge: HOME | End: 2022-09-06
Attending: INTERNAL MEDICINE
Payer: MEDICARE

## 2022-09-06 ENCOUNTER — OFFICE VISIT (OUTPATIENT)
Dept: CARDIOLOGY | Facility: CLINIC | Age: 57
End: 2022-09-06
Payer: MEDICARE

## 2022-09-06 VITALS
DIASTOLIC BLOOD PRESSURE: 60 MMHG | WEIGHT: 198 LBS | OXYGEN SATURATION: 97 % | BODY MASS INDEX: 22.91 KG/M2 | SYSTOLIC BLOOD PRESSURE: 114 MMHG | HEIGHT: 78 IN | HEART RATE: 64 BPM

## 2022-09-06 DIAGNOSIS — I48.0 PAF (PAROXYSMAL ATRIAL FIBRILLATION): Primary | ICD-10-CM

## 2022-09-06 DIAGNOSIS — E11.69 HYPERLIPIDEMIA DUE TO TYPE 2 DIABETES MELLITUS: ICD-10-CM

## 2022-09-06 DIAGNOSIS — I48.0 PAF (PAROXYSMAL ATRIAL FIBRILLATION): ICD-10-CM

## 2022-09-06 DIAGNOSIS — E78.5 HYPERLIPIDEMIA DUE TO TYPE 2 DIABETES MELLITUS: ICD-10-CM

## 2022-09-06 DIAGNOSIS — M51.36 DDD (DEGENERATIVE DISC DISEASE), LUMBAR: ICD-10-CM

## 2022-09-06 DIAGNOSIS — Z82.49 FAMILY HISTORY OF CORONARY ARTERY DISEASE: ICD-10-CM

## 2022-09-06 DIAGNOSIS — E11.59 HYPERTENSION ASSOCIATED WITH DIABETES: ICD-10-CM

## 2022-09-06 DIAGNOSIS — R07.89 OTHER CHEST PAIN: ICD-10-CM

## 2022-09-06 DIAGNOSIS — R00.2 PALPITATIONS: ICD-10-CM

## 2022-09-06 DIAGNOSIS — Z79.01 LONG TERM (CURRENT) USE OF ANTICOAGULANTS: Primary | ICD-10-CM

## 2022-09-06 DIAGNOSIS — I15.2 HYPERTENSION ASSOCIATED WITH DIABETES: ICD-10-CM

## 2022-09-06 DIAGNOSIS — Z01.810 PREOP CARDIOVASCULAR EXAM: Primary | ICD-10-CM

## 2022-09-06 DIAGNOSIS — Z79.01 LONG TERM (CURRENT) USE OF ANTICOAGULANTS: ICD-10-CM

## 2022-09-06 LAB — INR PPP: 8 (ref 2–3)

## 2022-09-06 PROCEDURE — 99214 OFFICE O/P EST MOD 30 MIN: CPT | Mod: S$GLB,,, | Performed by: INTERNAL MEDICINE

## 2022-09-06 PROCEDURE — 3078F DIAST BP <80 MM HG: CPT | Mod: CPTII,S$GLB,, | Performed by: INTERNAL MEDICINE

## 2022-09-06 PROCEDURE — 99999 PR PBB SHADOW E&M-EST. PATIENT-LVL IV: CPT | Mod: PBBFAC,,, | Performed by: INTERNAL MEDICINE

## 2022-09-06 PROCEDURE — 93005 ELECTROCARDIOGRAM TRACING: CPT

## 2022-09-06 PROCEDURE — 3044F HG A1C LEVEL LT 7.0%: CPT | Mod: CPTII,S$GLB,, | Performed by: INTERNAL MEDICINE

## 2022-09-06 PROCEDURE — 3066F NEPHROPATHY DOC TX: CPT | Mod: CPTII,S$GLB,, | Performed by: INTERNAL MEDICINE

## 2022-09-06 PROCEDURE — 99214 PR OFFICE/OUTPT VISIT, EST, LEVL IV, 30-39 MIN: ICD-10-PCS | Mod: S$GLB,,, | Performed by: INTERNAL MEDICINE

## 2022-09-06 PROCEDURE — 3008F PR BODY MASS INDEX (BMI) DOCUMENTED: ICD-10-PCS | Mod: CPTII,S$GLB,, | Performed by: INTERNAL MEDICINE

## 2022-09-06 PROCEDURE — 3060F PR POS MICROALBUMINURIA RESULT DOCUMENTED/REVIEW: ICD-10-PCS | Mod: CPTII,S$GLB,, | Performed by: INTERNAL MEDICINE

## 2022-09-06 PROCEDURE — 4010F PR ACE/ARB THEARPY RXD/TAKEN: ICD-10-PCS | Mod: CPTII,S$GLB,, | Performed by: INTERNAL MEDICINE

## 2022-09-06 PROCEDURE — 3066F PR DOCUMENTATION OF TREATMENT FOR NEPHROPATHY: ICD-10-PCS | Mod: CPTII,S$GLB,, | Performed by: INTERNAL MEDICINE

## 2022-09-06 PROCEDURE — 3008F BODY MASS INDEX DOCD: CPT | Mod: CPTII,S$GLB,, | Performed by: INTERNAL MEDICINE

## 2022-09-06 PROCEDURE — 4010F ACE/ARB THERAPY RXD/TAKEN: CPT | Mod: CPTII,S$GLB,, | Performed by: INTERNAL MEDICINE

## 2022-09-06 PROCEDURE — 3074F SYST BP LT 130 MM HG: CPT | Mod: CPTII,S$GLB,, | Performed by: INTERNAL MEDICINE

## 2022-09-06 PROCEDURE — 1159F MED LIST DOCD IN RCRD: CPT | Mod: CPTII,S$GLB,, | Performed by: INTERNAL MEDICINE

## 2022-09-06 PROCEDURE — 3060F POS MICROALBUMINURIA REV: CPT | Mod: CPTII,S$GLB,, | Performed by: INTERNAL MEDICINE

## 2022-09-06 PROCEDURE — 1159F PR MEDICATION LIST DOCUMENTED IN MEDICAL RECORD: ICD-10-PCS | Mod: CPTII,S$GLB,, | Performed by: INTERNAL MEDICINE

## 2022-09-06 PROCEDURE — 99999 PR PBB SHADOW E&M-EST. PATIENT-LVL IV: ICD-10-PCS | Mod: PBBFAC,,, | Performed by: INTERNAL MEDICINE

## 2022-09-06 PROCEDURE — 93010 ELECTROCARDIOGRAM REPORT: CPT | Mod: ,,, | Performed by: INTERNAL MEDICINE

## 2022-09-06 PROCEDURE — 1160F PR REVIEW ALL MEDS BY PRESCRIBER/CLIN PHARMACIST DOCUMENTED: ICD-10-PCS | Mod: CPTII,S$GLB,, | Performed by: INTERNAL MEDICINE

## 2022-09-06 PROCEDURE — 3078F PR MOST RECENT DIASTOLIC BLOOD PRESSURE < 80 MM HG: ICD-10-PCS | Mod: CPTII,S$GLB,, | Performed by: INTERNAL MEDICINE

## 2022-09-06 PROCEDURE — 3044F PR MOST RECENT HEMOGLOBIN A1C LEVEL <7.0%: ICD-10-PCS | Mod: CPTII,S$GLB,, | Performed by: INTERNAL MEDICINE

## 2022-09-06 PROCEDURE — 85610 PROTHROMBIN TIME: CPT | Mod: QW,S$GLB,, | Performed by: INTERNAL MEDICINE

## 2022-09-06 PROCEDURE — 3074F PR MOST RECENT SYSTOLIC BLOOD PRESSURE < 130 MM HG: ICD-10-PCS | Mod: CPTII,S$GLB,, | Performed by: INTERNAL MEDICINE

## 2022-09-06 PROCEDURE — 1160F RVW MEDS BY RX/DR IN RCRD: CPT | Mod: CPTII,S$GLB,, | Performed by: INTERNAL MEDICINE

## 2022-09-06 PROCEDURE — 93010 EKG 12-LEAD: ICD-10-PCS | Mod: ,,, | Performed by: INTERNAL MEDICINE

## 2022-09-06 PROCEDURE — 85610 POCT INR: ICD-10-PCS | Mod: QW,S$GLB,, | Performed by: INTERNAL MEDICINE

## 2022-09-06 RX ORDER — LISINOPRIL 5 MG/1
5 TABLET ORAL DAILY
Qty: 90 TABLET | Refills: 1 | Status: SHIPPED | OUTPATIENT
Start: 2022-09-06 | End: 2023-02-20 | Stop reason: SDUPTHER

## 2022-09-06 NOTE — PROGRESS NOTES
Critical returned from lab - 5.8 vs 8.0 on POC machine.  We will hold 2 doses, patient should return next week and avoid ETOH with warfarin.  We will need extraction date, prefer to hold no doses if possible.  If dentist needs to hold  - we would recommend 3 doses. He will also need to hold 5 days for upcoming c-scope.  If possible should consider extraction 1-2 days after c-scope when INR will be low.

## 2022-09-06 NOTE — PROGRESS NOTES
Patient's INR is supra-therapeutic at >8.0.   Patient reports followed previous dosing instructions.  Patient reports he drank an alcoholic beverage on 9/5/22.  Advised patient of increased risk of bleeding with consumption of alcohol.  Advised patient of signs/symptoms of bleeding and need to go to ED if experiences any.  Patient denies having any signs/symptoms of bleeding.  Patient reports he has already taken warfarin dose this morning.  Patient has been advised to hold any further doses until notified by Coumadin Clinic.  Venipuncture INR has been scheduled at [facility] lab today.  Patient is scheduled for colonoscopy on 9/30/22 and will schedule to have two teeth extracted soon.  Advised patient to notify Coumadin Clinic as soon as date is scheduled.  Patient verbalizes understanding. Sent to PharmD for dosing/instructions.

## 2022-09-06 NOTE — PROGRESS NOTES
Subjective:   Patient ID:  Papa Pope is a 57 y.o. male who presents for cardiac consult of No chief complaint on file.      Follow-up  Associated symptoms include chest pain.   The patient came in today for cardiac consult of No chief complaint on file.    Papa Pope is a 57 y.o. male pt with AF, HTN, H LD, DM2 -h/o HHS, DDD, tobacco abuse presents for follow up CV eval.    2/2/22  Hosp [place] summary:   Papa Pope is a 56 year old male with history of DM, tobacco abuse, hypertension, and hyperlipidemia who presents to ED for further for elevated glucose that was noted on routine labs by PCP. Patient states his glucose has been <500 for almost a week. Admits to associated fatigue and weakness. He only takes metformin for his glucose. Tries to monitor his intake of carbohydrates and sugary foods. HgbA1c 8/2021 was 7.1.   Labs in ED reflect marked hypoglycemia with renal insufficiency and electrolyte abnormality. Serum bicarbonate wnl but has anion gap. S/p insulin IV and fluid resuscitation. Repeat BMP pending. EKG revealed atrial fibrillation with RVR converted to NSR with Cardizem IV x 1. Hospital medicine has been consulted for admission.   Hospital Course:   1/26 seen in the ER still on Cardizem gtt 5, Heparin gtt, HR 61-65 whilst there. Patient comfortable on room air  1/27 Patient resumed on his MTP 50 mg PO BID, tolerated Eliquis, got diabetic teaching, has demonstrated back insulin injection, This MD spent about 20 mins in room with him and daughter explaining the reason for Insulin on discharge, the types of insulin, what to look out for.  We will send on Levemir 18 U BID, Moderate SSI TID PRN (no nightly humalog for now), instructed to have short follow up with PCP. Daughter explains that his finger sticks transmit to PCP office automatically, this is good. He says he has Glucometer, strips, lancets at home (was given in his PCP this past Friday)  Patient examined and is stable for discharge    Pt here  for initial CV eval after recent Afib episode. Is taking Lopresor and Eliquis. BP today is elevated 130s/90s. HR 70s. ECHO with normal bi V function, LVH. He used to drink heavily but has quit. He is quitting/quit smoking.     22    Recent Readings 2022 2022 2022 6/15/2022 6/10/2022   SBP (mmHg) 128 141 123 136 126   DBP (mmHg) 88 94 75 77 76   Pulse 98 76 70 68 74     Nuclear stress neg for ischemia 2022. Holter neg for Afib 2022.     22  Pt has upcoming C scope with Dr. Barajas. Recent A1c improving to 6.8.     Recent Readings 2022   SBP (mmHg) 115 120 135 135 125   DBP (mmHg) 75 74 81 81 78   Pulse 106 75 70 72 66     BP and HR well controlled. He will need teeth extractions will have 2 removed every few months.   ECG - NSR    Patient feels no leg swelling, no PND,  no dizziness, no syncope, no CNS symptoms.    Patient has fairly good exercise tolerance.    Patient is compliant with medications.    FH - had CVD,  in 80s    HOLTER 2022  Conclusion       Predominant rhythm is sinus.  Heart rates varied between 51 and 124 BPM with an average of 76 BPM        Results for orders placed during the hospital encounter of 22    Nuclear Stress - Cardiology Interpreted    Interpretation Summary    Normal myocardial perfusion scan. There is no evidence of myocardial ischemia or infarction.    The gated perfusion images showed an ejection fraction of 54% at rest. The gated perfusion images showed an ejection fraction of 58% post stress.    There is normal wall motion at rest and post stress.    LV cavity size is normal at rest and normal at stress.    The EKG portion of this study is negative for ischemia.    The patient reported no chest pain during the stress test.    There were no arrhythmias during stress.      Results for orders placed during the hospital encounter of 22    Echo    Interpretation Summary  · Concentric hypertrophy and  low normal systolic function.  · The estimated ejection fraction is 50%.  · Normal left ventricular diastolic function.  · With normal right ventricular systolic function.      Past Medical History:   Diagnosis Date    Acute renal failure 1/25/2022    Arthritis     Atrial fibrillation with RVR 1/25/2022    Back pain     Hyperlipidemia     Hypertension     Pneumonia     Polyneuropathy     Tobacco dependence     Type 2 diabetes mellitus without complication, without long-term current use of insulin 8/4/2021       Past Surgical History:   Procedure Laterality Date    COLONOSCOPY N/A 8/22/2017    Procedure: COLONOSCOPY;  Surgeon: Keo Cruz MD;  Location: [facility] ENDO;  Service: Endoscopy;  Laterality: N/A;    EPIDURAL STEROID INJECTION INTO CERVICAL SPINE N/A 11/1/2019    Procedure: C7/T1 IL SUKHJINDER;  Surgeon: Donnell Jordan MD;  Location: [facility] PAIN MGT;  Service: Pain Management;  Laterality: N/A;    STOMACH SURGERY      TRANSFORAMINAL EPIDURAL INJECTION OF STEROID Bilateral 8/13/2019    Procedure: Injection,steroid,epidural,transforaminal approach;  Surgeon: Donnell Jordan MD;  Location: [facility] PAIN [facility]GT;  Service: Pain Management;  Laterality: Bilateral;       Social History     Tobacco Use    Smoking status: Some Days     Packs/day: 0.50     Types: Cigarettes    Smokeless tobacco: Never   Substance Use Topics    Alcohol use: Yes     Alcohol/week: 12.0 standard drinks     Types: 12 Cans of beer per week     Comment: weekends only    Drug use: No       Family History   Problem Relation Age of Onset    Hypertension Father        Patient's Medications   New Prescriptions    No medications on file   Previous Medications    BLOOD SUGAR DIAGNOSTIC STRP    Use to check blood sugar twice a day    EMPAGLIFLOZIN (JARDIANCE) 10 MG TABLET    Take 1 tablet (10 mg total) by mouth once daily.    EZETIMIBE (ZETIA) 10 MG TABLET    Take 1 tablet (10 mg total) by mouth once daily.    GABAPENTIN (NEURONTIN) 600 MG TABLET    Take 1  "tablet (600 mg total) by mouth 3 (three) times daily. may cause drowsiness    INSULIN ASPART U-100 (NOVOLOG) 100 UNIT/ML (3 ML) INPN PEN    Novolog units three times a day before each meal per sliding scale. If -200: 2 units; -250: 4 units; -300: 6 units; -350: 8 units; BG >350: 10 units    INSULIN DETEMIR U-100 (LEVEMIR FLEXTOUCH) 100 UNIT/ML (3 ML) SUBQ INPN PEN    Inject 16 Units into the skin 2 (two) times daily.    LISINOPRIL (PRINIVIL,ZESTRIL) 5 MG TABLET    Take 1 tablet (5 mg total) by mouth once daily.    METOPROLOL TARTRATE (LOPRESSOR) 50 MG TABLET    Take 1 tablet (50 mg total) by mouth once daily.    PEN NEEDLE, DIABETIC (BD CHRISTIANO 2ND GEN PEN NEEDLE) 32 GAUGE X 5/32" NDLE    Use five times daily    ROSUVASTATIN (CRESTOR) 40 MG TAB    Take 1 tablet (40 mg total) by mouth every evening.    TRUE METRIX AIR GLUCOSE METER KIT    USE AS DIRECTED    TRUEPLUS LANCETS 33 GAUGE MISC    USE  TO  CHECK  BLOOD  SUGAR TWICE DAILY    WARFARIN (COUMADIN) 5 MG TABLET    Take 1.5 tablets (7.5 mg total) by mouth every Mon, Wed, Fri AND 2 tablets (10 mg total) every Tuesday, Thursday, Saturday, Sunday.   Modified Medications    No medications on file   Discontinued Medications    No medications on file       Review of Systems   Constitutional:  Positive for malaise/fatigue.   HENT: Negative.     Eyes: Negative.    Respiratory:  Positive for shortness of breath.    Cardiovascular:  Positive for chest pain and palpitations.   Gastrointestinal: Negative.    Genitourinary: Negative.    Musculoskeletal:  Positive for back pain and joint pain.   Skin: Negative.    Neurological: Negative.    Endo/Heme/Allergies: Negative.    Psychiatric/Behavioral: Negative.     All 12 systems otherwise negative.    Wt Readings from Last 3 Encounters:   08/18/22 94.8 kg (208 lb 15.9 oz)   08/07/22 93.7 kg (206 lb 9.1 oz)   07/27/22 93.1 kg (205 lb 2.2 oz)     Temp Readings from Last 3 Encounters:   08/07/22 98.3 °F (36.8 °C) " (Oral)   01/27/22 97.3 °F (36.3 °C) (Oral)   01/24/22 97.6 °F (36.4 °C) (Tympanic)     BP Readings from Last 3 Encounters:   08/18/22 112/60   08/07/22 130/80   07/27/22 (!) 149/82     Pulse Readings from Last 3 Encounters:   08/18/22 63   08/07/22 76   07/27/22 (!) 57       There were no vitals taken for this visit.    Objective:   Physical Exam  Vitals and nursing note reviewed.   Constitutional:       General: He is not in acute distress.     Appearance: He is well-developed. He is not diaphoretic.   HENT:      Head: Normocephalic and atraumatic.      Nose: Nose normal.   Eyes:      General: No scleral icterus.     Conjunctiva/sclera: Conjunctivae normal.   Neck:      Thyroid: No thyromegaly.      Vascular: No JVD.   Cardiovascular:      Rate and Rhythm: Normal rate and regular rhythm.      Heart sounds: S1 normal and S2 normal. No murmur heard.    No friction rub. No gallop. No S3 or S4 sounds.   Pulmonary:      Effort: Pulmonary effort is normal. No respiratory distress.      Breath sounds: Normal breath sounds. No stridor. No wheezing or rales.   Chest:      Chest wall: No tenderness.   Abdominal:      General: Bowel sounds are normal. There is no distension.      Palpations: Abdomen is soft. There is no mass.      Tenderness: There is no abdominal tenderness. There is no rebound.   Genitourinary:     Comments: Deferred  Musculoskeletal:         General: No tenderness or deformity. Normal range of motion.      Cervical back: Normal range of motion and neck supple.   Lymphadenopathy:      Cervical: No cervical adenopathy.   Skin:     General: Skin is warm and dry.      Coloration: Skin is not pale.      Findings: No erythema or rash.   Neurological:      Mental Status: He is alert and oriented to person, place, and time.      Motor: No abnormal muscle tone.      Coordination: Coordination normal.   Psychiatric:         Behavior: Behavior normal.         Thought Content: Thought content normal.         Judgment:  Judgment normal.       Lab Results   Component Value Date     08/18/2022    K 4.0 08/18/2022     08/18/2022    CO2 23 08/18/2022    BUN 20 08/18/2022    CREATININE 1.1 08/18/2022     (H) 08/18/2022    HGBA1C 6.8 (H) 08/18/2022    AST 21 08/18/2022    ALT 26 08/18/2022    ALBUMIN 3.7 08/18/2022    PROT 7.0 08/18/2022    BILITOT 0.3 08/18/2022    WBC 6.25 02/07/2022    HGB 13.2 (L) 02/07/2022    HCT 40.0 02/07/2022    MCV 96 02/07/2022     02/07/2022    INR 2.9 08/31/2022    INR 1.8 (H) 08/02/2022    TSH 2.172 01/24/2022    CHOL 151 01/24/2022    HDL 31 (L) 01/24/2022    LDLCALC 80.4 01/24/2022    TRIG 198 (H) 01/24/2022    BNP 47 01/25/2022     Assessment:      1. Preop cardiovascular exam    2. PAF (paroxysmal atrial fibrillation)    3. Other chest pain    4. Family history of coronary artery disease    5. Palpitations    6. Long term (current) use of anticoagulants    7. Hyperlipidemia due to type 2 diabetes mellitus    8. DDD (degenerative disc disease), lumbar    9. Hypertension associated with diabetes          Plan:   1. PAF, FH CAD - in NSR now  - change Eliquis to coumadin due to cost, enroll in coumadin clinic  - Nuclear stress neg for ischemia 2/2022.   - Holter neg for Afib 2/2022.     2. HLD  - cont statin    3. Tobacco abuse  - needs cessation    4. DDD, lumbar  - cont tx per PCP  - refer to pain     5. DM2 A1c 11.4 --> 6.8  - cont tx - has good improvement     6. HTN-  - cont  low dose Lisinopril and cont BB    7. Preop CV eval  -Cscope with Dr. Barajas, teeth extractions with [facility]  - Low CV risk, cont BB  - ok to hold coumadin 5 days and resume post op as pt is in NSR  - needs close f/u with coumadin clinic    Thank you for allowing me to participate in this patient's care. Please do not hesitate to contact me with any questions or concerns. Consult note has been forwarded to the referral physician.     Denny Cunningham MD, FACC  Cardiovascular  Disease  [facility], [place]  [phone] (P)

## 2022-09-07 ENCOUNTER — CLINICAL SUPPORT (OUTPATIENT)
Dept: REHABILITATION | Facility: HOSPITAL | Age: 57
End: 2022-09-07
Payer: MEDICARE

## 2022-09-07 DIAGNOSIS — M51.36 DDD (DEGENERATIVE DISC DISEASE), LUMBAR: ICD-10-CM

## 2022-09-07 PROCEDURE — 97161 PT EVAL LOW COMPLEX 20 MIN: CPT

## 2022-09-07 PROCEDURE — 97110 THERAPEUTIC EXERCISES: CPT

## 2022-09-07 NOTE — PLAN OF CARE
[facility]  Physical Therapy Initial Evaluation    Name: Papa Pope  Clinic Number: [id number]    Therapy Diagnosis:   Encounter Diagnosis   Name Primary?    DDD (degenerative disc disease), lumbar      Physician: Prachi Castano MD    Physician Orders: PT Eval and Treat   Medical Diagnosis from Referral: M51.36 (ICD-10-CM) - DDD (degenerative disc disease), lumbar  Evaluation Date: 9/7/2022  Authorization Period Expiration: 8/18/23  Plan of Care Expiration: 11/28/22  Visit # / Visits authorized: 1/ 1    Time In: 9:35  Time Out: 10:30  Total Billable Time: 10 minutes    Precautions: DM II, Acute Renal Failure, HTN, Hyperlipidemia, A-Fib    Subjective   Date of onset: 15 years  History of current condition - Papa reports: pain began when he retired from [employer].  Pt. Reports pain radiates down posterior both legs.       Pain:  Current 8/10, worst 8/10, best 0/10   Location:  across low back and B LEs  Description: tightness in low back, numbness and tingling in LEs  Aggravating Factors: bending, lifting over 30#, standing too long  Easing Factors: sitting    Prior Therapy: yes  Social History:  lives with wife  Occupation: Retired   Prior Level of Function: Independent with pain  Current Level of Function: limited with yard work, limited with lifting    Imaging:  Lumbar X-Ray 2/3/21  FINDINGS:  There is minimal grade 1 anterolisthesis of L4 on L5.  No fracture or pars defect.  Mild L4-L5 degenerative disc disease.  Inferior lumbar spine predominant facet arthropathy.  No osseous erosion or suspicious osseous lesion.  Sacroiliac joints are unremarkable.  Unchanged metallic foreign body is present within the lower back soft tissues.     Impression:     As above.    Medical History:   Past Medical History:   Diagnosis Date    Acute renal failure 1/25/2022    Arthritis     Atrial fibrillation with RVR 1/25/2022    Back pain     Hyperlipidemia     Hypertension     Pneumonia      Polyneuropathy     Tobacco dependence     Type 2 diabetes mellitus without complication, without long-term current use of insulin 8/4/2021       Surgical History:   Papa Pope  has a past surgical history that includes Stomach surgery; Colonoscopy (N/A, 8/22/2017); Transforaminal epidural injection of steroid (Bilateral, 8/13/2019); and Epidural steroid injection into cervical spine (N/A, 11/1/2019).    Medications:   Papa has a current medication list which includes the following prescription(s): blood sugar diagnostic, jardiance, ezetimibe, gabapentin, insulin aspart u-100, insulin detemir u-100, lisinopril, metoprolol tartrate, pen needle, diabetic, rosuvastatin, true metrix air glucose meter, trueplus lancets, and warfarin.    Allergies:   Review of patient's allergies indicates:  No Known Allergies     Pts goals: to decrease pain in back and legs to tolerate daily activities comfortably    Objective       CMS Impairment/Limitation/Restriction for FOTO Lumbar Survey    Therapist reviewed FOTO scores for Papa Pope on 9/7/2022.   FOTO documents entered into EPIC - see Media section.    Limitation Score: 44%       Posture/Structure:  WNL  Gait: decreased standing balance, amb with cane, scissoring gait    L/sp AROM:   % Pain Present (Y/N)   FB Reach to ankles Pain but gets better after a few bends   RSB Reach to superior knee pain   LSB Reach to prox fibula head Greater pain than L    BB 0 Greater pain than flexion   RRot Reach to prox.  pain   LRot Reach to prox. gastroc Greater pain than            R L  Strength:  Hip flexors  3/5 3/5  Quadriceps  5/5 5/5      Hamstrings  3+/5 5/5     Anterior Tibialis 4/5 5/5     Peroneals  3/5 3+/5     Post. Tibialis  3+/5 3/5     Gastrocnemius 4/5 5/5     Adductors  NT/5 NT/5     External rotators 3/5 3+/5     Internal rotators 3/5 3/5     Gluteus Medius 3-/5 3/5     Gluteus Stiven 1+/5 1+/5     Great toe extension NT/5 NT/5     Plank time  30 sec  modified           Special Test:      SLR Test:  Neg. Bilat.    Quadrant:  B Ext. rot    Ralph:  R quads, L quad and L hip flexor tightness  HS 90/90   R:L 70:45 degrees         PIVM Lumbar: tenderness R unilateral P-A mob L5    Neural Tension Test: + B sciatic nerve bias        TREATMENT   Treatment Time In: 10:15  Treatment Time Out: 10:30  Total Treatment time separate from Evaluation: 10 minutes    Papa received therapeutic exercises to develop strength, flexibility, and core stabilization for 10 minutes including: modified plank with feet elevated, hand heel rock, bridging, B eccentric HS length            Home Exercises and Patient Education Provided    Education provided:   -Education on condition, HEP, and activity    Written Home Exercises Provided: yes.  Exercises were reviewed and Papa was able to demonstrate them prior to the end of the session.  Papa demonstrated good  understanding of the education provided.     See EMR under Patient Instructions for exercises provided 9/7/2022.    Assessment   Papa is a 57 y.o. male referred to outpatient Physical Therapy with a medical diagnosis of M51.36 (ICD-10-CM) - DDD (degenerative disc disease), lumbar. Pt presents with significant tightness B HS, moderate weakness in TrA, tightness B quads, tightness L hip flexors, mild to significant weakness in B lower extremities.  Pt. Educated on importance of improving HS length to increase neural extensibility to reduce radicular symptoms.    Pt prognosis is Good.   Pt will benefit from skilled outpatient Physical Therapy to address the deficits stated above and in the chart below, provide pt/family education, and to maximize pt's level of independence.     Plan of care discussed with patient: Yes  Pt's spiritual, cultural and educational needs considered and patient is agreeable to the plan of care and goals as stated below:     Anticipated Barriers for therapy: none    Medical Necessity is demonstrated by the  following  History  Co-morbidities and personal factors that may impact the plan of care Co-morbidities:     DM II, Acute Renal Failure, HTN, Hyperlipidemia, A-Fib    Personal Factors:   no deficits     low   Examination  Body Structures and Functions, activity limitations and participation restrictions that may impact the plan of care Body Regions:   back  lower extremities    Body Systems:    ROM  strength  gross coordinated movement  balance  gait  motor control  motor learning    Participation Restrictions:   Limited with     Activity limitations:   Learning and applying knowledge  no deficits    General Tasks and Commands  no deficits    Communication  no deficits    Mobility  lifting and carrying objects  walking    Self care  no deficits    Domestic Life  shopping  cooking  doing house work (cleaning house, washing dishes, laundry)  assisting others    Interactions/Relationships  no deficits    Life Areas  no deficits    Community and Social Life  community life  recreation and leisure         low   Clinical Presentation stable and uncomplicated low   Decision Making/ Complexity Score: low     Goals:  Short Term Goals: In 6 weeks   1.I with HEP  2.Pt to perform lumbar AROM in all plane of motion to reach high and low surfaces comfortably.  3. Pt to increase TrA strength to good to increase lumbar stability.   4.Pt to increase B hip muscle extensibility WNL to reduce neural tension and radicular symptoms.  5.Pt to score less than 20% impaired on the FOTO.    Long Term Goals: In 12 weeks  1.Pt to score less than 10% impaired on the FOTO.  2. Patient to demo increase in LE strength to 5/5 gross MMT to tolerate bending and squatting activities.  3. Patient to have decreased pain to less than 2/10 at all times.  4. Patient to have neg B sciatic nerve bias to tolerate daily activities without radicular symptoms.   5. Patient to perform daily activities including heavy lifting without limitation.      Plan   Plan of  care Certification: 9/7/2022 to 11/28/22.    Outpatient Physical Therapy 2 times weekly for 10 weeks to include the following interventions: Gait Training, Manual Therapy, Moist Heat/ Ice, Neuromuscular Re-ed, Patient Education, Self Care, Therapeutic Activities, and Therapeutic Exercise, e-stim, FDN.    Ana Owens, PT    Thank you for this referral.    These services are reasonable and necessary for the conditions set forth above while under my care.

## 2022-09-16 ENCOUNTER — CLINICAL SUPPORT (OUTPATIENT)
Dept: REHABILITATION | Facility: HOSPITAL | Age: 57
End: 2022-09-16
Payer: MEDICARE

## 2022-09-16 DIAGNOSIS — R29.898 LEG WEAKNESS, BILATERAL: Primary | ICD-10-CM

## 2022-09-16 PROCEDURE — 97140 MANUAL THERAPY 1/> REGIONS: CPT

## 2022-09-16 PROCEDURE — 97110 THERAPEUTIC EXERCISES: CPT

## 2022-09-16 NOTE — PROGRESS NOTES
[facility]   Physical Therapy Treatment Note     Name: Papa Pope  Clinic Number: [id number]    Therapy Diagnosis:   Encounter Diagnosis   Name Primary?    Leg weakness, bilateral Yes     Physician: Prachi Castano MD    Visit Date: 9/16/2022    Physician Orders: PT Eval and Treat   Medical Diagnosis from Referral: M51.36 (ICD-10-CM) - DDD (degenerative disc disease), lumbar  Evaluation Date: 9/7/2022  Authorization Period Expiration: 8/18/23  Plan of Care Expiration: 11/28/22  Visit # / Visits authorized: 1/ 12    PTA Visit #: -/5     Time In: 9:00  Time Out: 9:45  Total Billable Time: 44 minutes    SUBJECTIVE     Pt reports: feeling better after initial eval.    He was not compliant with home exercise program.  Response to previous treatment: none  Functional change: none noted    Pain: 8/10  Location:  LB to B LEs     OBJECTIVE     Objective Measures updated at progress report unless specified.     Treatment     Papa received the treatments listed below:      Papa received therapeutic exercises to develop strength, flexibility, and core stabilization for 32 minutes including: recumbent bike for joint mobility x 10 min., modified plank with feet lowered x 60 min. hold, Quadruped alt. Ues 1x 10, Quadruped Alt Les 2 x 10,  hand heel rock, B SL open books, bridging, B eccentric HS length 2 x 10, B femoral nerve glides    manual therapy techniques: Joint mobilizations were applied to the: 12 for 12 minutes, including: B lumbar gapping mob x 6 min., manual lumbar traction x 6 min.                  Patient Education and Home Exercises     Home Exercises Provided and Patient Education Provided     Education provided:   - HEP, condition, activity    Written Home Exercises Provided: yes. Exercises were reviewed and Papa was able to demonstrate them prior to the end of the session.  Papa demonstrated good  understanding of the education provided. See EMR under Patient Instructions for  exercises provided during therapy sessions    ASSESSMENT       Pt. Has not performed HEP, so HEP reviewed.  Pt. Educated on importance of core strengthening, spinal mobility, and LE muscle and neural extensibility to reduce pain.  Pt. Had tenderness B lower lumbar region with P-A mobs- appropriate mobilizations performed.      Papa Is progressing well towards his goals.   Pt prognosis is Excellent.     Pt will continue to benefit from skilled outpatient physical therapy to address the deficits listed in the problem list box on initial evaluation, provide pt/family education and to maximize pt's level of independence in the home and community environment.     Pt's spiritual, cultural and educational needs considered and pt agreeable to plan of care and goals.     Anticipated barriers to physical therapy: none    Goals:   Short Term Goals: In 6 weeks   1.I with HEP  2.Pt to perform lumbar AROM in all plane of motion to reach high and low surfaces comfortably.  3. Pt to increase TrA strength to good to increase lumbar stability.   4.Pt to increase B hip muscle extensibility WNL to reduce neural tension and radicular symptoms.  5.Pt to score less than 20% impaired on the FOTO.     Long Term Goals: In 12 weeks  1.Pt to score less than 10% impaired on the FOTO.  2. Patient to demo increase in LE strength to 5/5 gross MMT to tolerate bending and squatting activities.  3. Patient to have decreased pain to less than 2/10 at all times.  4. Patient to have neg B sciatic nerve bias to tolerate daily activities without radicular symptoms.   5. Patient to perform daily activities including heavy lifting without limitation.       PLAN     Plan of care Certification: 9/7/2022 to 11/28/22.     Outpatient Physical Therapy 2 times weekly for 10 weeks to include the following interventions: Gait Training, Manual Therapy, Moist Heat/ Ice, Neuromuscular Re-ed, Patient Education, Self Care, Therapeutic Activities, and Therapeutic  Exercise, e-stim, FDN.       Ana Owens, PT

## 2022-09-19 ENCOUNTER — TELEPHONE (OUTPATIENT)
Dept: REHABILITATION | Facility: HOSPITAL | Age: 57
End: 2022-09-19
Payer: MEDICARE

## 2022-09-19 NOTE — TELEPHONE ENCOUNTER
Called patient about missed appointment this afternoon and he would like to reschedule for Friday.

## 2022-09-20 ENCOUNTER — ANTI-COAG VISIT (OUTPATIENT)
Dept: CARDIOLOGY | Facility: CLINIC | Age: 57
End: 2022-09-20
Payer: MEDICARE

## 2022-09-20 DIAGNOSIS — I48.91 ATRIAL FIBRILLATION, UNSPECIFIED TYPE: ICD-10-CM

## 2022-09-20 DIAGNOSIS — Z79.01 LONG TERM (CURRENT) USE OF ANTICOAGULANTS: Primary | ICD-10-CM

## 2022-09-20 LAB — INR PPP: 3.9 (ref 2–3)

## 2022-09-20 PROCEDURE — 93793 PR ANTICOAGULANT MGMT FOR PT TAKING WARFARIN: ICD-10-PCS | Mod: S$GLB,,,

## 2022-09-20 PROCEDURE — 93793 ANTICOAG MGMT PT WARFARIN: CPT | Mod: S$GLB,,,

## 2022-09-20 PROCEDURE — 85610 PROTHROMBIN TIME: CPT | Mod: QW,S$GLB,, | Performed by: INTERNAL MEDICINE

## 2022-09-20 PROCEDURE — 85610 POCT INR: ICD-10-PCS | Mod: QW,S$GLB,, | Performed by: INTERNAL MEDICINE

## 2022-09-20 NOTE — PROGRESS NOTES
Patient's INR is supra-therapeutic at 3.9.  Patient  reports followed previous instructions.  Patient s scheduled for colonoscopy on 9/30/22 and will need to hold warfarin for 5 days prior with no bridge.  Patient reports he will try to schedule tooth extractions 1-2 days after procedure.  Advised patient of increased risk of bleeding; signs/symptoms of bleeding and need to go to ED if experiences any.   Patient reports no signs/symptoms of bleeding.  Sent to PharmD for dosing/instructions.

## 2022-09-20 NOTE — PROGRESS NOTES
INR not at goal. Medications, chart, and patient findings reviewed. See calendar for adjustments to dose and follow up plan.  Hold dose tomorrow for continued elevation.  We will also lower overall dose.  C-scope plan per calendar.  Boost x 2 doses post procedure and repeat INR on Tuesday 10/4/22.   Please have patient update us with tooth extraction dates so that we can boost on different days if required and see patient in clinic at a different date for follow up.

## 2022-09-21 ENCOUNTER — PATIENT MESSAGE (OUTPATIENT)
Dept: OTHER | Facility: OTHER | Age: 57
End: 2022-09-21
Payer: MEDICARE

## 2022-09-23 DIAGNOSIS — I48.0 PAF (PAROXYSMAL ATRIAL FIBRILLATION): Primary | ICD-10-CM

## 2022-09-26 NOTE — PROGRESS NOTES
Procedure instructions reviewed. Place, time, begin low fiber diet, clear liquids only on day before procedure no solid food at all, nothing to eat or drink after 6 am dose of prep on am of procedure, no chewing gum or sucking on candy, take BP medication with sip of water at least 2 hours after am dose of prep on am of procedure, do not take diabetic medication on am of procedure, have someone to drive them home and bowel prep instructions reviewed with pt. Pt verbalized understanding.

## 2022-09-27 ENCOUNTER — TELEPHONE (OUTPATIENT)
Dept: REHABILITATION | Facility: HOSPITAL | Age: 57
End: 2022-09-27
Payer: MEDICARE

## 2022-09-27 NOTE — TELEPHONE ENCOUNTER
Pt. Reports that his brother is very ill.  Pt. Reports that he has been having to go to appointments with his brother.  Pt. Educated of next appointment and to call clinic if unable to attend PT appointment.

## 2022-09-30 ENCOUNTER — ANESTHESIA (OUTPATIENT)
Dept: ENDOSCOPY | Facility: HOSPITAL | Age: 57
End: 2022-09-30
Payer: MEDICARE

## 2022-09-30 ENCOUNTER — HOSPITAL ENCOUNTER (OUTPATIENT)
Facility: HOSPITAL | Age: 57
Discharge: HOME | End: 2022-09-30
Attending: COLON & RECTAL SURGERY | Admitting: COLON & RECTAL SURGERY
Payer: MEDICARE

## 2022-09-30 ENCOUNTER — ANESTHESIA EVENT (OUTPATIENT)
Dept: ENDOSCOPY | Facility: HOSPITAL | Age: 57
End: 2022-09-30
Payer: MEDICARE

## 2022-09-30 DIAGNOSIS — Z12.11 SCREENING FOR COLON CANCER: ICD-10-CM

## 2022-09-30 LAB
INR PPP: 1.2 (ref 0.8–1.2)
POCT GLUCOSE: 121 MG/DL (ref 70–110)
PROTHROMBIN TIME: 12.3 SEC (ref 9–12.5)

## 2022-09-30 PROCEDURE — 63600175 PHARM REV CODE 636 W HCPCS: Performed by: FAMILY MEDICINE

## 2022-09-30 PROCEDURE — 88305 TISSUE EXAM BY PATHOLOGIST: CPT | Mod: 26,,, | Performed by: PATHOLOGY

## 2022-09-30 PROCEDURE — 45385 COLONOSCOPY W/LESION REMOVAL: CPT | Mod: PT | Performed by: COLON & RECTAL SURGERY

## 2022-09-30 PROCEDURE — 45380 COLONOSCOPY AND BIOPSY: CPT | Mod: PT,59,, | Performed by: COLON & RECTAL SURGERY

## 2022-09-30 PROCEDURE — 88305 TISSUE EXAM BY PATHOLOGIST: ICD-10-PCS | Mod: 26,,, | Performed by: PATHOLOGY

## 2022-09-30 PROCEDURE — 45380 COLONOSCOPY AND BIOPSY: CPT | Mod: PT,59 | Performed by: COLON & RECTAL SURGERY

## 2022-09-30 PROCEDURE — 85610 PROTHROMBIN TIME: CPT | Performed by: COLON & RECTAL SURGERY

## 2022-09-30 PROCEDURE — 37000009 HC ANESTHESIA EA ADD 15 MINS: Performed by: COLON & RECTAL SURGERY

## 2022-09-30 PROCEDURE — 27201012 HC FORCEPS, HOT/COLD, DISP: Performed by: COLON & RECTAL SURGERY

## 2022-09-30 PROCEDURE — 45385 PR COLONOSCOPY,REMV LESN,SNARE: ICD-10-PCS | Mod: PT,,, | Performed by: COLON & RECTAL SURGERY

## 2022-09-30 PROCEDURE — 37000008 HC ANESTHESIA 1ST 15 MINUTES: Performed by: COLON & RECTAL SURGERY

## 2022-09-30 PROCEDURE — 45385 COLONOSCOPY W/LESION REMOVAL: CPT | Mod: PT,,, | Performed by: COLON & RECTAL SURGERY

## 2022-09-30 PROCEDURE — 88305 TISSUE EXAM BY PATHOLOGIST: CPT | Performed by: PATHOLOGY

## 2022-09-30 PROCEDURE — 25000003 PHARM REV CODE 250: Performed by: FAMILY MEDICINE

## 2022-09-30 PROCEDURE — 27201089 HC SNARE, DISP (ANY): Performed by: COLON & RECTAL SURGERY

## 2022-09-30 PROCEDURE — 45380 PR COLONOSCOPY,BIOPSY: ICD-10-PCS | Mod: PT,59,, | Performed by: COLON & RECTAL SURGERY

## 2022-09-30 RX ORDER — LIDOCAINE HYDROCHLORIDE 20 MG/ML
INJECTION, SOLUTION EPIDURAL; INFILTRATION; INTRACAUDAL; PERINEURAL
Status: DISCONTINUED | OUTPATIENT
Start: 2022-09-30 | End: 2022-09-30

## 2022-09-30 RX ORDER — PROPOFOL 10 MG/ML
VIAL (ML) INTRAVENOUS
Status: DISCONTINUED | OUTPATIENT
Start: 2022-09-30 | End: 2022-09-30

## 2022-09-30 RX ADMIN — PROPOFOL 50 MG: 10 INJECTION, EMULSION INTRAVENOUS at 02:09

## 2022-09-30 RX ADMIN — LIDOCAINE HYDROCHLORIDE 40 MG: 20 INJECTION, SOLUTION EPIDURAL; INFILTRATION; INTRACAUDAL; PERINEURAL at 01:09

## 2022-09-30 RX ADMIN — SODIUM CHLORIDE, SODIUM LACTATE, POTASSIUM CHLORIDE, AND CALCIUM CHLORIDE: .6; .31; .03; .02 INJECTION, SOLUTION INTRAVENOUS at 01:09

## 2022-09-30 RX ADMIN — PROPOFOL 50 MG: 10 INJECTION, EMULSION INTRAVENOUS at 01:09

## 2022-09-30 RX ADMIN — PROPOFOL 100 MG: 10 INJECTION, EMULSION INTRAVENOUS at 01:09

## 2022-09-30 NOTE — H&P
"[facility] ([facility])  Colon and Rectal Surgery  History & Physical    Patient Name: Papa Pope  MRN: [id number]  Admission Date: 9/30/2022  Attending Physician: Noé Neff MD  Primary Care Provider: Prachi Castano MD    Patient information was obtained from patient and medical records.    Subjective:     Chief Complaint/Reason for Admission: Here for Colonoscopy    History of Present Illness:  Patient is a 57 y.o. male presents for colonoscopy. Last cscope in 2017 with adenomas removed. No current hematochezia, melena or change in bowel habits. No personal or fam hx of CRC or IBD.    No current facility-administered medications on file prior to encounter.     Current Outpatient Medications on File Prior to Encounter   Medication Sig    blood sugar diagnostic Strp Use to check blood sugar twice a day    empagliflozin (JARDIANCE) 10 mg tablet Take 1 tablet (10 mg total) by mouth once daily.    ezetimibe (ZETIA) 10 mg tablet Take 1 tablet (10 mg total) by mouth once daily.    insulin aspart U-100 (NOVOLOG) 100 unit/mL (3 mL) InPn pen Novolog units three times a day before each meal per sliding scale. If -200: 2 units; -250: 4 units; -300: 6 units; -350: 8 units; BG >350: 10 units    insulin detemir U-100 (LEVEMIR FLEXTOUCH) 100 unit/mL (3 mL) SubQ InPn pen Inject 16 Units into the skin 2 (two) times daily.    metoprolol tartrate (LOPRESSOR) 50 MG tablet Take 1 tablet (50 mg total) by mouth once daily.    pen needle, diabetic (BD CHRISTIANO 2ND GEN PEN NEEDLE) 32 gauge x 5/32" Ndle Use five times daily    rosuvastatin (CRESTOR) 40 MG Tab Take 1 tablet (40 mg total) by mouth every evening.    TRUE METRIX AIR GLUCOSE METER kit USE AS DIRECTED    TRUEPLUS LANCETS 33 gauge Misc USE  TO  CHECK  BLOOD  SUGAR TWICE DAILY    gabapentin (NEURONTIN) 600 MG tablet Take 1 tablet (600 mg total) by mouth 3 (three) times daily. may cause drowsiness    warfarin (COUMADIN) 5 MG tablet Take 1.5 tablets " (7.5 mg total) by mouth every Mon, Wed, Fri AND 2 tablets (10 mg total) every Tuesday, Thursday, Saturday, Sunday.       Review of patient's allergies indicates:  No Known Allergies    Past Medical History:   Diagnosis Date    Acute renal failure 1/25/2022    Arthritis     Atrial fibrillation with RVR 1/25/2022    Back pain     Hyperlipidemia     Hypertension     Pneumonia     Polyneuropathy     Tobacco dependence     Type 2 diabetes mellitus without complication, without long-term current use of insulin 8/4/2021     Past Surgical History:   Procedure Laterality Date    COLONOSCOPY N/A 8/22/2017    Procedure: COLONOSCOPY;  Surgeon: Keo Cruz MD;  Location: [facility] END[facility];  Service: Endoscopy;  Laterality: N/A;    EPIDURAL STEROID INJECTION INTO CERVICAL SPINE N/A 11/1/2019    Procedure: C7/T1 IL SUKHJINDER;  Surgeon: Donnell Jordan MD;  Location: [facility] PAIN [facility]GT;  Service: Pain Management;  Laterality: N/A;    STOMACH SURGERY      TRANSFORAMINAL EPIDURAL INJECTION OF STEROID Bilateral 8/13/2019    Procedure: Injection,steroid,epidural,transforaminal approach;  Surgeon: Donnell Jordan MD;  Location: [facility];  Service: Pain Management;  Laterality: Bilateral;     Family History       Problem Relation (Age of Onset)    Hypertension Father          Tobacco Use    Smoking status: Some Days     Packs/day: 0.50     Types: Cigarettes    Smokeless tobacco: Never   Substance and Sexual Activity    Alcohol use: Yes     Alcohol/week: 12.0 standard drinks     Types: 12 Cans of beer per week     Comment: weekends only    Drug use: No    Sexual activity: Yes     Review of Systems   Constitutional:  Negative for activity change, appetite change, chills, fatigue, fever and unexpected weight change.   HENT:  Negative for congestion, ear pain, sore throat and trouble swallowing.    Eyes:  Negative for pain, redness and itching.   Respiratory:  Negative for cough, shortness of breath and wheezing.    Cardiovascular:  Negative for  chest pain, palpitations and leg swelling.   Gastrointestinal:  Negative for abdominal distention, abdominal pain, anal bleeding, blood in stool, constipation, diarrhea, nausea, rectal pain and vomiting.   Endocrine: Negative for cold intolerance, heat intolerance and polyuria.   Genitourinary:  Negative for dysuria, flank pain, frequency and hematuria.   Musculoskeletal:  Negative for gait problem, joint swelling and neck pain.   Skin:  Negative for color change, rash and wound.   Allergic/Immunologic: Negative for environmental allergies and immunocompromised state.   Neurological:  Negative for dizziness, speech difficulty, weakness and numbness.   Psychiatric/Behavioral:  Negative for agitation, confusion and hallucinations.    Objective:     Vital Signs (Most Recent):  Temp: 97.9 °F (36.6 °C) (09/30/22 1158)  Pulse: 64 (09/30/22 1158)  Resp: 16 (09/30/22 1158)  BP: (!) 162/96 (09/30/22 1158)  SpO2: 100 % (09/30/22 1158)   Vital Signs (24h Range):  Temp:  [97.9 °F (36.6 °C)] 97.9 °F (36.6 °C)  Pulse:  [64] 64  Resp:  [16] 16  SpO2:  [100 %] 100 %  BP: (162)/(96) 162/96     Weight: 90.7 kg (200 lb)  Body mass index is 23.11 kg/m².    Physical Exam  Constitutional:       Appearance: He is well-developed.   HENT:      Head: Normocephalic and atraumatic.   Eyes:      Conjunctiva/sclera: Conjunctivae normal.   Neck:      Thyroid: No thyromegaly.   Cardiovascular:      Rate and Rhythm: Normal rate and regular rhythm.   Pulmonary:      Effort: Pulmonary effort is normal. No respiratory distress.   Abdominal:      General: There is no distension.      Palpations: Abdomen is soft. There is no mass.      Tenderness: There is no abdominal tenderness.   Musculoskeletal:         General: No tenderness. Normal range of motion.      Cervical back: Normal range of motion.   Skin:     General: Skin is warm and dry.      Capillary Refill: Capillary refill takes less than 2 seconds.      Findings: No rash.   Neurological:       Mental Status: He is alert and oriented to person, place, and time.         Assessment/Plan:     Patient is a 57 y.o. male who presents for colonoscopy     - Ok to proceed to endoscopy suite for colonoscopy  - Consent obtained. All risks, benefits and alternatives fully explained to patient, including but not limited to bleeding, infection, perforation, and missed polyps. All questions appropriately answered to patient's satisfaction. Consent signed and placed on chart.    There are no hospital problems to display for this patient.    VTE Risk Mitigation (From admission, onward)      None            Noé Neff MD  Colon and Rectal Surgery  [facility] ([facility])

## 2022-09-30 NOTE — PROVATION PATIENT INSTRUCTIONS
Discharge Summary/Instructions after an Endoscopic Procedure  Patient Name: Papa Pope  Patient MRN: [id number]  Patient YOB: 1965 Friday, September 30, 2022 Noé Neff MD  Dear patient,  As a result of recent federal legislation (The Federal Cures Act), you may   receive lab or pathology results from your procedure in your [facility]   account before your physician is able to contact you. Your physician or   their representative will relay the results to you with their   recommendations at their soonest availability.  Thank you,  RESTRICTIONS:  During your procedure today, you received medications for sedation.  These   medications may affect your judgment, balance and coordination.  Therefore,   for 24 hours, you have the following restrictions:   - DO NOT drive a car, operate machinery, make legal/financial decisions,   sign important papers or drink alcohol.    ACTIVITY:  Today: no heavy lifting, straining or running due to procedural   sedation/anesthesia.  The following day: return to full activity including work.  DIET:  Eat and drink normally unless instructed otherwise.     TREATMENT FOR COMMON SIDE EFFECTS:  - Mild abdominal pain, nausea, belching, bloating or excessive gas:  rest,   eat lightly and use a heating pad.  - Sore Throat: treat with throat lozenges and/or gargle with warm salt   water.  - Because air was used during the procedure, expelling large amounts of air   from your rectum or belching is normal.  - If a bowel prep was taken, you may not have a bowel movement for 1-3 days.    This is normal.  SYMPTOMS TO WATCH FOR AND REPORT TO YOUR PHYSICIAN:  1. Abdominal pain or bloating, other than gas cramps.  2. Chest pain.  3. Back pain.  4. Signs of infection such as: chills or fever occurring within 24 hours   after the procedure.  5. Rectal bleeding, which would show as bright red, maroon, or black stools.   (A tablespoon of blood from the rectum is not serious, especially  if   hemorrhoids are present.)  6. Vomiting.  7. Weakness or dizziness.  GO DIRECTLY TO THE NEAREST EMERGENCY ROOM IF YOU HAVE ANY OF THE FOLLOWING:      Difficulty breathing              Chills and/or fever over 101 F   Persistent vomiting and/or vomiting blood   Severe abdominal pain   Severe chest pain   Black, tarry stools   Bleeding- more than one tablespoon   Any other symptom or condition that you feel may need urgent attention  Your doctor recommends these additional instructions:  If any biopsies were taken, your doctors clinic will contact you in 1 to 2   weeks with any results.  - Discharge patient to home.   - High fiber diet.   - Continue present medications.   - Await pathology results.   - Repeat colonoscopy in 5 years for surveillance.   - Return to primary care physician PRN.  For questions, problems or results please call your physician Noé Neff MD at Work:  [phone]  If you have any questions about the above instructions, call the GI   department at [phone] or call the endoscopy unit at [phone]   from 7am until 3 pm.  [facility] [place], EMERGENCY ROOM PHONE NUMBER:   [phone]  IF A COMPLICATION OR EMERGENCY SITUATION ARISES AND YOU ARE UNABLE TO REACH   YOUR PHYSICIAN - GO DIRECTLY TO THE EMERGENCY ROOM.  I have read or have had read to me these discharge instructions for my   procedure and have received a written copy.  I understand these   instructions and will follow-up with my physician if I have any questions.     __________________________________       _____________________________________  Nurse Signature                                          Patient/Designated   Responsible Party Signature  MD Noé Jules MD  9/30/2022 2:20:53 PM  This report has been verified and signed electronically.  Dear patient,  As a result of recent federal legislation (The Federal Cures Act), you may   receive lab or pathology  results from your procedure in your [facility]   account before your physician is able to contact you. Your physician or   their representative will relay the results to you with their   recommendations at their soonest availability.  Thank you,  PROVATION

## 2022-09-30 NOTE — ANESTHESIA POSTPROCEDURE EVALUATION
Anesthesia Post Evaluation    Patient: Papa Pope    Procedure(s) Performed: Procedure(s) (LRB):  COLONOSCOPY 9/20--Malur hold coumadin, no bridge (N/A)    Final Anesthesia Type: MAC      Patient location during evaluation: GI PACU  Patient participation: Yes- Able to Participate  Level of consciousness: awake and alert  Post-procedure vital signs: reviewed and stable  Pain management: adequate  Airway patency: patent    PONV status at discharge: No PONV  Anesthetic complications: no      Cardiovascular status: stable  Respiratory status: unassisted and spontaneous ventilation  Hydration status: euvolemic  Follow-up not needed.          Vitals Value Taken Time   BP  09/30/22 1417   Temp  09/30/22 1417   Pulse  09/30/22 1417   Resp  09/30/22 1417   SpO2  09/30/22 1417         No case tracking events are documented in the log.      Pain/Laith Score: No data recorded

## 2022-09-30 NOTE — PLAN OF CARE
DR HEAD AT BEDSIDE TO SPEAK TO PT. REGARDING RESULTS.  VSS, NO GI BLEEDING, NO ABD. PAIN, NO N/V. PT. DISCHARGED FROM UNIT.

## 2022-09-30 NOTE — ANESTHESIA PREPROCEDURE EVALUATION
09/30/2022  Papa Pope is a 57 y.o., male.      Pre-op Assessment    I have reviewed the Patient Summary Reports.     I have reviewed the Nursing Notes. I have reviewed the NPO Status.   I have reviewed the Medications.     Review of Systems  Anesthesia Hx:  No problems with previous Anesthesia    Social:  Smoker    Cardiovascular:   Hypertension Dysrhythmias atrial fibrillation hyperlipidemia ECG has been reviewed. Normal sinus rhythm   Normal ECG   When compared with ECG of 22-FEB-2022 09:52,   Questionable change in The axis   ST elevation now present in Lateral leads   T wave inversion no longer evident in Lateral leads   Confirmed by MANOHAR VINCENT, BLAINE ([id number]) on 9/6/2022 7:40:33 PM     Echo 1/2022  Summary     Concentric hypertrophy and low normal systolic function.   The estimated ejection fraction is 50%.   Normal left ventricular diastolic function.   With normal right ventricular systolic function.      Renal/:   Chronic Renal Disease    Hepatic/GI:   Bowel Prep. Hx polyps    Musculoskeletal:   Arthritis   Spine Disorders: Disc disease and Degenerative disease    Endocrine:   Diabetes, type 2        Physical Exam  General: Well nourished, Cooperative, Alert and Oriented    Airway:  Mallampati: II   Mouth Opening: Normal  TM Distance: Normal  Tongue: Normal  Neck ROM: Normal ROM    Dental:  Intact    Chest/Lungs:  Clear to auscultation    Heart:  Rhythm: Regular Rhythm  Sounds: Normal    Abdomen:  Normal        Anesthesia Plan  Type of Anesthesia, risks & benefits discussed:    Anesthesia Type: MAC  Intra-op Monitoring Plan: Standard ASA Monitors  Post Op Pain Control Plan: multimodal analgesia  Induction:  IV  Informed Consent: Informed consent signed with the Patient and all parties understand the risks and agree with anesthesia plan.  All questions answered.   ASA Score: 3  Day of Surgery Review  of History & Physical: H&P Update referred to the surgeon/provider.I have interviewed and examined the patient. I have reviewed the patient's H&P dated:     Ready For Surgery From Anesthesia Perspective.     .

## 2022-09-30 NOTE — TRANSFER OF CARE
"Anesthesia Transfer of Care Note    Patient: Papa Pope    Procedure(s) Performed: Procedure(s) (LRB):  COLONOSCOPY 9/20--Malur hold coumadin, no bridge (N/A)    Patient location: GI    Anesthesia Type: MAC    Transport from OR: Transported from OR on room air with adequate spontaneous ventilation    Post pain: adequate analgesia    Post assessment: no apparent anesthetic complications    Post vital signs: stable    Level of consciousness: awake and responds to stimulation    Nausea/Vomiting: no nausea/vomiting    Complications: none    Transfer of care protocol was followed      Last vitals:   Visit Vitals  BP (!) 162/96 (BP Location: Left arm, Patient Position: Sitting)   Pulse 64   Temp 36.6 °C (97.9 °F) (Temporal)   Resp 16   Ht 6' 6" (1.981 m)   Wt 90.7 kg (200 lb)   SpO2 100%   BMI 23.11 kg/m²     "

## 2022-10-03 VITALS
OXYGEN SATURATION: 99 % | HEART RATE: 56 BPM | SYSTOLIC BLOOD PRESSURE: 130 MMHG | RESPIRATION RATE: 16 BRPM | HEIGHT: 78 IN | DIASTOLIC BLOOD PRESSURE: 87 MMHG | WEIGHT: 200 LBS | TEMPERATURE: 99 F | BODY MASS INDEX: 23.14 KG/M2

## 2022-10-05 LAB
FINAL PATHOLOGIC DIAGNOSIS: NORMAL
GROSS: NORMAL
Lab: NORMAL

## 2022-10-06 ENCOUNTER — TELEPHONE (OUTPATIENT)
Dept: REHABILITATION | Facility: HOSPITAL | Age: 57
End: 2022-10-06
Payer: MEDICARE

## 2022-10-06 NOTE — TELEPHONE ENCOUNTER
Called pt. Secondary no show for appointment.  Pt. Reports that his brother had surgery yesterday and he is caring for him.  Pt. Educated that he has no other visits scheduled.  Pt. Educated that his referral expires mid November, and if he has not returned at that point he will be discharged.  Pt. Agreeable.

## 2022-10-07 ENCOUNTER — ANTI-COAG VISIT (OUTPATIENT)
Dept: CARDIOLOGY | Facility: CLINIC | Age: 57
End: 2022-10-07
Payer: MEDICARE

## 2022-10-07 ENCOUNTER — LAB VISIT (OUTPATIENT)
Dept: LAB | Facility: HOSPITAL | Age: 57
End: 2022-10-07
Attending: FAMILY MEDICINE
Payer: MEDICARE

## 2022-10-07 DIAGNOSIS — Z79.01 LONG TERM (CURRENT) USE OF ANTICOAGULANTS: Primary | ICD-10-CM

## 2022-10-07 DIAGNOSIS — Z79.01 LONG TERM (CURRENT) USE OF ANTICOAGULANTS: ICD-10-CM

## 2022-10-07 LAB
INR PPP: 1.5 (ref 0.8–1.2)
PROTHROMBIN TIME: 16.8 SEC (ref 9–12.5)

## 2022-10-07 PROCEDURE — 85610 PROTHROMBIN TIME: CPT | Performed by: INTERNAL MEDICINE

## 2022-10-07 PROCEDURE — 93793 ANTICOAG MGMT PT WARFARIN: CPT | Mod: S$GLB,,,

## 2022-10-07 PROCEDURE — 93793 PR ANTICOAGULANT MGMT FOR PT TAKING WARFARIN: ICD-10-PCS | Mod: S$GLB,,,

## 2022-10-07 PROCEDURE — 36415 COLL VENOUS BLD VENIPUNCTURE: CPT | Performed by: INTERNAL MEDICINE

## 2022-10-07 NOTE — PROGRESS NOTES
INR not at goal. Medications, chart, and patient findings reviewed. See calendar for adjustments to dose and follow up plan.  Patient reports that he was not able to resume warfarin per directions, brother was in hospital.  Patent missed 3 doses post procedure.  He will boost today with 10 mg and then resume regular warfarin dose.  INR is starting to climb - 1.5 today.  Repeat INR in about 1 week.  [facility] 10 /18/22  ER with any s/s of clotting.

## 2022-10-10 ENCOUNTER — OFFICE VISIT (OUTPATIENT)
Dept: DIABETES | Facility: CLINIC | Age: 57
End: 2022-10-10
Payer: MEDICARE

## 2022-10-10 DIAGNOSIS — E11.65 TYPE 2 DIABETES MELLITUS WITH HYPERGLYCEMIA, WITHOUT LONG-TERM CURRENT USE OF INSULIN: Primary | ICD-10-CM

## 2022-10-10 DIAGNOSIS — E11.59 HYPERTENSION ASSOCIATED WITH DIABETES: ICD-10-CM

## 2022-10-10 DIAGNOSIS — I15.2 HYPERTENSION ASSOCIATED WITH DIABETES: ICD-10-CM

## 2022-10-10 DIAGNOSIS — E78.5 HYPERLIPIDEMIA DUE TO TYPE 2 DIABETES MELLITUS: ICD-10-CM

## 2022-10-10 DIAGNOSIS — E11.69 HYPERLIPIDEMIA DUE TO TYPE 2 DIABETES MELLITUS: ICD-10-CM

## 2022-10-10 PROCEDURE — 1159F PR MEDICATION LIST DOCUMENTED IN MEDICAL RECORD: ICD-10-PCS | Mod: CPTII,95,, | Performed by: NURSE PRACTITIONER

## 2022-10-10 PROCEDURE — 3066F NEPHROPATHY DOC TX: CPT | Mod: CPTII,95,, | Performed by: NURSE PRACTITIONER

## 2022-10-10 PROCEDURE — 1160F PR REVIEW ALL MEDS BY PRESCRIBER/CLIN PHARMACIST DOCUMENTED: ICD-10-PCS | Mod: CPTII,95,, | Performed by: NURSE PRACTITIONER

## 2022-10-10 PROCEDURE — 3044F PR MOST RECENT HEMOGLOBIN A1C LEVEL <7.0%: ICD-10-PCS | Mod: CPTII,95,, | Performed by: NURSE PRACTITIONER

## 2022-10-10 PROCEDURE — 3060F POS MICROALBUMINURIA REV: CPT | Mod: CPTII,95,, | Performed by: NURSE PRACTITIONER

## 2022-10-10 PROCEDURE — 1160F RVW MEDS BY RX/DR IN RCRD: CPT | Mod: CPTII,95,, | Performed by: NURSE PRACTITIONER

## 2022-10-10 PROCEDURE — 3044F HG A1C LEVEL LT 7.0%: CPT | Mod: CPTII,95,, | Performed by: NURSE PRACTITIONER

## 2022-10-10 PROCEDURE — 99442 PR PHYSICIAN TELEPHONE EVALUATION 11-20 MIN: ICD-10-PCS | Mod: 95,,, | Performed by: NURSE PRACTITIONER

## 2022-10-10 PROCEDURE — 99442 PR PHYSICIAN TELEPHONE EVALUATION 11-20 MIN: CPT | Mod: 95,,, | Performed by: NURSE PRACTITIONER

## 2022-10-10 PROCEDURE — 1159F MED LIST DOCD IN RCRD: CPT | Mod: CPTII,95,, | Performed by: NURSE PRACTITIONER

## 2022-10-10 PROCEDURE — 4010F PR ACE/ARB THEARPY RXD/TAKEN: ICD-10-PCS | Mod: CPTII,95,, | Performed by: NURSE PRACTITIONER

## 2022-10-10 PROCEDURE — 3060F PR POS MICROALBUMINURIA RESULT DOCUMENTED/REVIEW: ICD-10-PCS | Mod: CPTII,95,, | Performed by: NURSE PRACTITIONER

## 2022-10-10 PROCEDURE — 3066F PR DOCUMENTATION OF TREATMENT FOR NEPHROPATHY: ICD-10-PCS | Mod: CPTII,95,, | Performed by: NURSE PRACTITIONER

## 2022-10-10 PROCEDURE — 4010F ACE/ARB THERAPY RXD/TAKEN: CPT | Mod: CPTII,95,, | Performed by: NURSE PRACTITIONER

## 2022-10-10 RX ORDER — INSULIN ASPART 100 [IU]/ML
INJECTION, SOLUTION INTRAVENOUS; SUBCUTANEOUS
Qty: 15 ML | Refills: 3 | Status: SHIPPED | OUTPATIENT
Start: 2022-10-10 | End: 2022-11-22

## 2022-10-10 NOTE — PROGRESS NOTES
Established Patient - Audio Only Telehealth Visit     The patient location is: Home ([place])  The chief complaint leading to consultation is: Diabetes Follow-up  Visit type: Virtual visit with audio only (telephone)  Total time spent with patient: 15 minutes    The reason for the audio only service rather than synchronous audio and video virtual visit was related to technical difficulties or patient preference/necessity.     Each patient to whom I provide medical services by telemedicine is:  (1) informed of the relationship between the physician and patient and the respective role of any other health care provider with respect to management of the patient; and (2) notified that they may decline to receive medical services by telemedicine and may withdraw from such care at any time. Patient verbally consented to receive this service via voice-only telephone call.    This service was not originating from a related E/M service provided within the previous 7 days nor will  to an E/M service or procedure within the next 24 hours or my soonest available appointment.  Prevailing standard of care was able to be met in this audio-only visit.     PCP: Prachi Castano MD    Subjective:     Chief Complaint: Diabetes follow up.  Last visit with me: 7/27/2022    HPI: 56 y.o.  male for diabetes follow up.   Type II diabetes since August 2021.  Comorbidities: HTN, HLD and Atrial Fibrillation     Family History of Type 1/2 DM: no  Known diabetes complications:  DKA/HHS: yes, 1/25/2022  Retinopathy: no, +diabetic cataract  Peripheral neuropathy: no  Nephropathy: yes    Interval history:  Denies recent hospital admission or ER visit.  Denies having hypoglycemia.   Endorses diabetes related symptoms: None    Blood glucose monitoring via fingerstick: 1-2xday  Per pt's recall over the last 2-4 weeks   Fasting  this morning, average 140-150   Postprandial 150's     Previously on CGM Stacy, cost  prohibitive.    At previous visit, started on Jardiance.  He admits he has not started taking medication.  Not sure if this will be affordable for him.    Activity level: Sedentary- walking 30 mins, 2x day, 3x/wk started 3 weeks ago  Meal Planning: 3 meals/day;infrequent snacks/day.    Medication compliance all of the time, diet compliance most of the time.   To be enrolled in diabetes education classes.     Previous regimen: Metformin x 1 month    Past failed treatment: Trulicity - cost prohibitive    Current DM Medications:  Diabetes Medications               empagliflozin (JARDIANCE) 10 mg tablet Take 1 tablet (10 mg total) by mouth once daily. Not started    insulin aspart U-100 (NOVOLOG) 100 unit/mL (3 mL) InPn pen Novolog units three times a day before each meal per sliding scale. If -200: 2 units; -250: 4 units; -300: 6 units; -350: 8 units; BG >350: 10 units    insulin detemir U-100 (LEVEMIR FLEXTOUCH) 100 unit/mL (3 mL) SubQ InPn pen Inject 16 Units into the skin 2 (two) times daily.       Allergies  Review of patient's allergies indicates:  No Known Allergies    Labs Reviewed:  His most recent A1C is:  Lab Results   Component Value Date    HGBA1C 6.8 (H) 08/18/2022    HGBA1C 6.3 (H) 07/27/2022    HGBA1C 11.4 (H) 01/24/2022       His most recent BMP is:  Lab Results   Component Value Date     08/18/2022    K 4.0 08/18/2022     08/18/2022    CO2 23 08/18/2022    BUN 20 08/18/2022    CREATININE 1.1 08/18/2022    CALCIUM 9.5 08/18/2022    ANIONGAP 10 08/18/2022    ESTGFRAFRICA >60 02/07/2022    EGFRNONAA 56 (A) 02/07/2022       No results found for: CPEPTIDE  No results found for: GLUTAMICACID    There is no height or weight on file to calculate BMI.    Wt Readings from Last 3 Encounters:   09/30/22 1158 90.7 kg (200 lb)   09/06/22 1126 89.8 kg (198 lb)   08/18/22 0821 94.8 kg (208 lb 15.9 oz)        His blood sugar in clinic today is: Not assessed due to type of  visit.    Diabetes Management Status  Statin: Taking  ACE/ARB: Taking    Screening or Prevention Patient's value Goal Complete/Controlled?   HgA1C Testing and Control   Lab Results   Component Value Date    HGBA1C 6.8 (H) 08/18/2022      Annually/Less than 8% Yes     Lipid profile : 01/24/2022 Annually Yes     LDL control Lab Results   Component Value Date    LDLCALC 80.4 01/24/2022    Annually/Less than 100 mg/dl  Yes     Nephropathy screening Lab Results   Component Value Date    MICALBCREAT 148.8 (H) 01/25/2022     Lab Results   Component Value Date    PROTEINUA Negative 01/25/2022    Annually Yes     Blood pressure BP Readings from Last 1 Encounters:   09/30/22 130/87    Less than 140/90 Yes     Dilated retinal exam : 02/28/2022 Annually Yes    Foot exam   : 02/15/2022 Annually Yes       Social History     Socioeconomic History    Marital status:    Tobacco Use    Smoking status: Some Days     Packs/day: 0.50     Types: Cigarettes    Smokeless tobacco: Never   Substance and Sexual Activity    Alcohol use: Yes     Alcohol/week: 12.0 standard drinks     Types: 12 Cans of beer per week     Comment: weekends only    Drug use: No    Sexual activity: Yes     Past Medical History:   Diagnosis Date    Acute renal failure 1/25/2022    Arthritis     Atrial fibrillation with RVR 1/25/2022    Back pain     Hyperlipidemia     Hypertension     Pneumonia     Polyneuropathy     Tobacco dependence     Type 2 diabetes mellitus without complication, without long-term current use of insulin 8/4/2021     Review of Systems   Constitutional: Negative for activity change, appetite change, fatigue and unexpected weight change.   HENT: Negative for dental problem and trouble swallowing.    Eyes: Negative for visual disturbance.   Respiratory: Negative for chest tightness and shortness of breath.    Cardiovascular: Negative for chest pain, palpitations and leg swelling.   Gastrointestinal: Negative for abdominal pain, constipation,  diarrhea, nausea and vomiting.   Endocrine: Negative for polydipsia, polyphagia and polyuria.   Genitourinary: Negative for difficulty urinating, dysuria, frequency and urgency.        Negative yeast infection   Musculoskeletal: Positive for gait problem (ambulates with cane). Negative for myalgias and neck pain.   Integumentary:  Negative for rash and wound.   Allergic/Immunologic: Negative.    Neurological: Negative for dizziness, syncope, weakness, numbness and headaches.   Hematological: Negative.    Psychiatric/Behavioral: Negative for confusion and sleep disturbance.   All other systems reviewed and are negative.     Objective:      Physical Exam  Neurological:      Mental Status: He is alert and oriented to person, place, and time. Mental status is at baseline.   Psychiatric:         Attention and Perception: Attention normal.         Mood and Affect: Mood normal.         Speech: Speech normal.         Thought Content: Thought content normal.         Cognition and Memory: Cognition normal.         Judgment: Judgment normal.     Assessment / Plan:     Diagnoses and all orders for this visit:    Type 2 diabetes mellitus with hyperglycemia, without long-term current use of insulin  -     Hemoglobin A1C; Future  -     insulin detemir U-100 (LEVEMIR FLEXTOUCH) 100 unit/mL (3 mL) SubQ InPn pen; Inject 16 Units into the skin 2 (two) times daily.  -     insulin aspart U-100 (NOVOLOG) 100 unit/mL (3 mL) InPn pen; Novolog units three times a day before each meal per sliding scale. If -200: 2 units; -250: 4 units; -300: 6 units; -350: 8 units; BG >350: 10 units    Hypertension associated with diabetes  - On ACE  - Continue current meds  - PCP for management     Hyperlipidemia due to type 2 diabetes mellitus   - LDL controlled  - On Statin  - Continue current meds  - PCP for management     Additional Plan Details:  - Condition: controlled, most recent A1C of  6.8 % was completed 6 weeks ago.   -  Monitor blood glucose:  3-4x daily.Goals reviewed. Maintain BG log. Continue digital diabetes medicine enrollment.   - Hypoglycemia protocol: Reviewed and risk discussed.  Notify if BG readings below 80. Protocol printout provided.   - Diet: Limit carbohydrate intake 30-45 grams/meal, 3x daily and 15 grams/snack , limit of two daily.   - Activity: Recommend at least 150 minutes of exercise in a week.  - BP and LDL: Recommend lifestyle modifications and follow up with PCP for management.   - Medication Changes:    - Continue Levemir 16 units twice a day   - Continue Novolog units three times a day before each meal per sliding scale     If premeal blood glucose 150-200: give 2 units    If premeal blood glucose 201-250: give 4 units    If premeal blood glucose 251-300: give 6 units    If premeal blood glucose 301-350: give 8 units    If premeal blood glucose greater than 350: give 10 units   - Begin taking Jardiance 10 mg every morning     - Medication consideration: Titrate basal/bolus insulin to goal BG. May increase Jardiance dose at next visit if tolerating low dose well.  - Lab results: A1C discussed.  - Health Maintenance standards addressed today: A1c to be scheduled.  - Risks of uncontrolled DM explained. Importance of routine foot and eye exams reviewed. Scheduled as appropriate.  -The patient was explained the above plan and given opportunity to ask questions.  He understands, chooses and consents to this plan and accepts all the risks, which include but are not limited to the risks mentioned above.   - Labs ordered as above.  - CDE Referral: Scheduled  - Follow up:  3 months, in clinic.         Charlotte T. Delacruz, FNP-C [facility]    A total of 15 minutes was spent in face to face time, of which over 50 % was spent in counseling patient on disease process, complications, treatment, and side effects of medications.

## 2022-10-10 NOTE — PATIENT INSTRUCTIONS
Medication Regimen:   - Continue Levemir 16 units twice a day  - Continue Novolog units three times a day before each meal per sliding scale    If premeal blood glucose 150-200: give 2 units   If premeal blood glucose 201-250: give 4 units   If premeal blood glucose 251-300: give 6 units   If premeal blood glucose 301-350: give 8 units   If premeal blood glucose greater than 350: give 10 units  - Begin taking Jardiance 10 mg every morning    Patient Instructions:  Carbohydrate Count: 30-45 grams/meal and 15 grams/snack with limit of 2 snacks per day.  Exercise: Goal is 150 minutes or more per week.  Bring meter and blood sugar log to each appointment.     Goals for Blood Sugar:  Fastin-130 mg/dl  2 hours after meal:  mg/dl  Before Bed: 100-150 mg/dl  If Blood sugar is below 70 mg/dl, DO NOT take diabetes medication. Eat first and then recheck blood sugar in 20 minutes.  Foods to eat if blood sugar is below 70 mg/dl  1/2 glass of orange juice   1/2 regular cola can   3-4 hard candies   3-4 small glucose tabs.   Foods to eat if blood sugar is below 50 mg/dl  1 glass of orange juice  1 regular cola can  8 hard candies  8 small glucose tabs.   If you have repeated eating/blood sugar recheck process 2 times and blood sugar still below 70 mg/dl, call 911.           Medication Regimen:     Patient Instructions:  Carbohydrate Count: 30-45 grams/meal and 15 grams/snack with limit of 2 snacks per day.  Exercise: Goal is 150 minutes or more per week.  Bring meter and blood sugar log to each appointment.     Goals for Blood Sugar:  Fastin-130 mg/dl  2 hours after meal:  mg/dl  Before Bed: 100-150 mg/dl  If Blood sugar is below 70 mg/dl, DO NOT take diabetes medication. Eat first and then recheck blood sugar in 20 minutes.  Foods to eat if blood sugar is below 70 mg/dl  1/2 glass of orange juice   1/2 regular cola can   3-4 hard candies   3-4 small glucose tabs.   Foods to eat if blood sugar is below 50  mg/dl  1 glass of orange juice  1 regular cola can  8 hard candies  8 small glucose tabs.   If you have repeated eating/blood sugar recheck process 2 times and blood sugar still below 70 mg/dl, call 911.

## 2022-10-18 ENCOUNTER — ANTI-COAG VISIT (OUTPATIENT)
Dept: CARDIOLOGY | Facility: CLINIC | Age: 57
End: 2022-10-18
Payer: MEDICARE

## 2022-10-18 DIAGNOSIS — I48.91 ATRIAL FIBRILLATION, UNSPECIFIED TYPE: ICD-10-CM

## 2022-10-18 DIAGNOSIS — I48.0 PAF (PAROXYSMAL ATRIAL FIBRILLATION): ICD-10-CM

## 2022-10-18 DIAGNOSIS — Z79.01 LONG TERM (CURRENT) USE OF ANTICOAGULANTS: Primary | ICD-10-CM

## 2022-10-18 LAB — INR PPP: 6.2 (ref 2–3)

## 2022-10-18 PROCEDURE — 93793 PR ANTICOAGULANT MGMT FOR PT TAKING WARFARIN: ICD-10-PCS | Mod: S$GLB,,,

## 2022-10-18 PROCEDURE — 85610 PROTHROMBIN TIME: CPT | Mod: QW,S$GLB,, | Performed by: INTERNAL MEDICINE

## 2022-10-18 PROCEDURE — 93793 ANTICOAG MGMT PT WARFARIN: CPT | Mod: S$GLB,,,

## 2022-10-18 PROCEDURE — 85610 POCT INR: ICD-10-PCS | Mod: QW,S$GLB,, | Performed by: INTERNAL MEDICINE

## 2022-10-18 NOTE — PROGRESS NOTES
Patient has been advised of dosing/instructions and appointment scheduled.  Calendar reviewed with patient.  Patient verbalizes understanding.

## 2022-10-18 NOTE — PROGRESS NOTES
INR not at goal. Medications, chart, and patient findings reviewed. See calendar for adjustments to dose and follow up plan.  Incorrect dose taken.

## 2022-10-18 NOTE — PROGRESS NOTES
Patient's INR is supra-therapeutic at 6.2.   Patient reports took incorrect doses due to not having calendar available to refer back to.  Advised patient to write down dosing when called with dosing/instructions.  Patient reports no other changes.  Advised of increased risk of bleeding; sign/symptoms of bleeding and need to go to ED if experiences any.  Patient reports no signs/symptoms of bleeding.  Sent to PharmD for dosing/instructions.

## 2022-10-26 ENCOUNTER — ANTI-COAG VISIT (OUTPATIENT)
Dept: CARDIOLOGY | Facility: CLINIC | Age: 57
End: 2022-10-26
Payer: MEDICARE

## 2022-10-26 DIAGNOSIS — Z79.01 LONG TERM (CURRENT) USE OF ANTICOAGULANTS: Primary | ICD-10-CM

## 2022-10-26 DIAGNOSIS — I48.0 PAF (PAROXYSMAL ATRIAL FIBRILLATION): ICD-10-CM

## 2022-10-26 LAB — INR PPP: 3.7 (ref 2–3)

## 2022-10-26 PROCEDURE — 85610 POCT INR: ICD-10-PCS | Mod: QW,S$GLB,, | Performed by: INTERNAL MEDICINE

## 2022-10-26 PROCEDURE — 85610 PROTHROMBIN TIME: CPT | Mod: QW,S$GLB,, | Performed by: INTERNAL MEDICINE

## 2022-10-26 PROCEDURE — 93793 ANTICOAG MGMT PT WARFARIN: CPT | Mod: S$GLB,,,

## 2022-10-26 PROCEDURE — 93793 PR ANTICOAGULANT MGMT FOR PT TAKING WARFARIN: ICD-10-PCS | Mod: S$GLB,,,

## 2022-10-26 NOTE — PROGRESS NOTES
Patient's INR is supra-therapeutic at 3.7.  Patient reports followed previous instructions.  Reports fell in his driveway on or around 10/15/22 and had a bruise to right thigh that has since cleared up.  Advised patient of increased risk of bleeding with fall and elevated INR.  Advised of signs/symptoms of bleeding and need to go to ED if experiences any.  Reports not having any signs/symptoms of bleeding.  Instructions given: Hold warfarin dose today 10/26/22; then re-challenge warfarin 10 mg on Thursdays, Sundays and Tuesdays; and 7.5 mg all other days.  Recheck in two weeks on 11/9/22.  Calendar reviewed with patient.  Patient verbalizes understanding.

## 2022-10-29 ENCOUNTER — IMMUNIZATION (OUTPATIENT)
Dept: INTERNAL MEDICINE | Facility: CLINIC | Age: 57
End: 2022-10-29
Payer: MEDICARE

## 2022-10-29 PROCEDURE — 90686 IIV4 VACC NO PRSV 0.5 ML IM: CPT | Mod: S$GLB,,, | Performed by: FAMILY MEDICINE

## 2022-10-29 PROCEDURE — G0008 ADMIN INFLUENZA VIRUS VAC: HCPCS | Mod: S$GLB,,, | Performed by: FAMILY MEDICINE

## 2022-10-29 PROCEDURE — 90686 FLU VACCINE (QUAD) GREATER THAN OR EQUAL TO 3YO PRESERVATIVE FREE IM: ICD-10-PCS | Mod: S$GLB,,, | Performed by: FAMILY MEDICINE

## 2022-10-29 PROCEDURE — G0008 FLU VACCINE (QUAD) GREATER THAN OR EQUAL TO 3YO PRESERVATIVE FREE IM: ICD-10-PCS | Mod: S$GLB,,, | Performed by: FAMILY MEDICINE

## 2022-11-09 ENCOUNTER — ANTI-COAG VISIT (OUTPATIENT)
Dept: CARDIOLOGY | Facility: CLINIC | Age: 57
End: 2022-11-09
Payer: MEDICARE

## 2022-11-09 DIAGNOSIS — Z79.01 LONG TERM (CURRENT) USE OF ANTICOAGULANTS: Primary | ICD-10-CM

## 2022-11-09 DIAGNOSIS — I48.0 PAF (PAROXYSMAL ATRIAL FIBRILLATION): ICD-10-CM

## 2022-11-09 LAB — INR PPP: 3.1 (ref 2–3)

## 2022-11-09 PROCEDURE — 93793 ANTICOAG MGMT PT WARFARIN: CPT | Mod: S$GLB,,,

## 2022-11-09 PROCEDURE — 93793 PR ANTICOAGULANT MGMT FOR PT TAKING WARFARIN: ICD-10-PCS | Mod: S$GLB,,,

## 2022-11-09 PROCEDURE — 85610 POCT INR: ICD-10-PCS | Mod: QW,S$GLB,, | Performed by: INTERNAL MEDICINE

## 2022-11-09 PROCEDURE — 85610 PROTHROMBIN TIME: CPT | Mod: QW,S$GLB,, | Performed by: INTERNAL MEDICINE

## 2022-11-09 NOTE — PROGRESS NOTES
Patient's INR is supra-therapeutic at 3.1.   Patient reports followed previous instructions.  Reports no changes. Advised of increased risk of bleeding; signs/symptoms of bleeding and need to go to ED if experiences any.  Patient reports not having any signs/symptoms of bleeding.  Instructions given: Take 5 mg today 11/9/22; then decrease overall dose to warfarin 10 mg on Thursdays and Saturdays; and 7.5 mg all other days.  Recheck on 11/16/22 with other appointment.  Calendar reviewed with patient.  Patient verbalizes understanding.

## 2022-11-13 DIAGNOSIS — E11.69 HYPERLIPIDEMIA DUE TO TYPE 2 DIABETES MELLITUS: ICD-10-CM

## 2022-11-13 DIAGNOSIS — E78.5 HYPERLIPIDEMIA DUE TO TYPE 2 DIABETES MELLITUS: ICD-10-CM

## 2022-11-13 RX ORDER — ROSUVASTATIN CALCIUM 40 MG/1
TABLET, COATED ORAL
Qty: 90 TABLET | Refills: 0 | Status: SHIPPED | OUTPATIENT
Start: 2022-11-13 | End: 2023-02-20 | Stop reason: SDUPTHER

## 2022-11-13 NOTE — TELEPHONE ENCOUNTER
Care Due:                  Date            Visit Type   Department     Provider  --------------------------------------------------------------------------------                                EP -                              PRIMARY      [facility] INTER[facility]  Prachi Mainampati  Last Visit: 08-      CARE ([facility])   MEDICINE       Castano                              EP -                              PRIMARY      [facility]  Prachi Mainampati  Next Visit: 02-      CARE ([facility])   MEDICINE       Castano                                                            Last  Test          Frequency    Reason                     Performed    Due Date  --------------------------------------------------------------------------------    Lipid Panel.  12 months..  ezetimibe, rosuvastatin..  01- 01-    [facility] Embedded Care Gaps. Reference number: [id number]. 11/13/2022   2:36:38 AM CST

## 2022-11-13 NOTE — TELEPHONE ENCOUNTER
Refill Decision Note   Papa Pope  is requesting a refill authorization.  Brief Assessment and Rationale for Refill:  Approve    -Medication-Related Problems Identified: Requires labs  Medication Therapy Plan:       Medication Reconciliation Completed: No   Comments:     Provider Staff:     Action is required for this patient.   Please see care gap opportunities below in Care Due Message.     Thanks!  [facility]     Appointments      Date Provider   Last Visit   8/18/2022 Prachi Castano MD   Next Visit   2/20/2023 Prachi Castano MD     Note composed:4:12 PM 11/13/2022           Note composed:4:12 PM 11/13/2022

## 2022-11-15 NOTE — PROGRESS NOTES
Interventional Pain Established Patient Clinic Visit    Chief Pain Complaint:  Low back pain with BLE radicular pain  Neck pain with left arm pain     Interval history 11/16/2022  Mr. Hargrove is a 57-year-old gentleman with past medical history significant for atrial fibrillation, hyperlipidemia, hypertension, type 2 diabetes, nicotine dependence who presents to [facility] care, previous Dr. Jordan patient.    Today patient reports lower back and leg pain.  Pain has been present for several years without inciting accident injury or trauma.  Pain is intermittent and today is rated an 8/10.  Patient reports pain in a bandlike distribution in the lower back which radiates down the posterior aspects of bilateral lower extremities in L5-S1 distribution to the calf.  Pain is described as tingling and tight in nature.  Pain is exacerbated when moving from sitting to standing and with standing and ambulation.  Patient reports he is able to stand approximately 5 minutes before requiring rest.  Patient does endorse associated weakness in the lower extremities associated with his pain.  Pain is improved with prior transforaminal epidural steroid injection as well as prior prescription of Lortab medication.  Patient is actively involved in physical therapy weekly with improvement in range of motion.  Patient denies any meaningful relief from gabapentin, which she is taking 600 mg twice daily.    Patient reports significant motor weakness.  Patient denies night fever/night sweats, urinary incontinence, bowel incontinence, significant weight loss, and loss of sensations.      Pain Disability Index Review:     Last 3 PDI Scores 9/12/2019 8/8/2019   Pain Disability Index (PDI) 48 27       Non-Pharmacologic Treatments:  Physical Therapy/Home Exercise: yes  Ice/Heat:yes  TENS: no  Acupuncture: no  Massage: no  Chiropractic: no    Other: no      Pain Medications:  - Adjuvant Medications: Neurontin (Gabapentin)  - Anti-Coagulants: Coumadin  "( Warfarin)    Pain Procedures:  -11/01/2019:  C7-T1 interlaminar epidural steroid injection; Dr. Jordan  -08/13/2019: Bilateral S1 transforaminal epidural steroid injection; Dr. Jordan    Past Medical History:   Diagnosis Date    Acute renal failure 1/25/2022    Arthritis     Atrial fibrillation with RVR 1/25/2022    Back pain     Hyperlipidemia     Hypertension     Pneumonia     Polyneuropathy     Tobacco dependence     Type 2 diabetes mellitus without complication, without long-term current use of insulin 8/4/2021       Review of patient's allergies indicates:  No Known Allergies    Current Outpatient Medications on File Prior to Visit   Medication Sig Dispense Refill    blood sugar diagnostic Strp Use to check blood sugar twice a day 100 each 6    empagliflozin (JARDIANCE) 10 mg tablet Take 1 tablet (10 mg total) by mouth once daily. 90 tablet 0    ezetimibe (ZETIA) 10 mg tablet Take 1 tablet (10 mg total) by mouth once daily. 90 tablet 3    gabapentin (NEURONTIN) 600 MG tablet Take 1 tablet (600 mg total) by mouth 3 (three) times daily. may cause drowsiness 270 tablet 3    insulin aspart U-100 (NOVOLOG) 100 unit/mL (3 mL) InPn pen Novolog units three times a day before each meal per sliding scale. If -200: 2 units; -250: 4 units; -300: 6 units; -350: 8 units; BG >350: 10 units 15 mL 3    insulin detemir U-100 (LEVEMIR FLEXTOUCH) 100 unit/mL (3 mL) SubQ InPn pen Inject 16 Units into the skin 2 (two) times daily. 15 mL 3    lisinopriL (PRINIVIL,ZESTRIL) 5 MG tablet Take 1 tablet (5 mg total) by mouth once daily. 90 tablet 1    metoprolol tartrate (LOPRESSOR) 50 MG tablet Take 1 tablet (50 mg total) by mouth once daily. 90 tablet 3    pen needle, diabetic (BD CHRISTIANO 2ND GEN PEN NEEDLE) 32 gauge x 5/32" Ndle Use five times daily 100 each 0    rosuvastatin (CRESTOR) 40 MG Tab TAKE 1 TABLET EVERY EVENING 90 tablet 0    TRUE METRIX AIR GLUCOSE METER kit USE AS DIRECTED 1 each 0    TRUEPLUS LANCETS " 33 gauge Misc USE  TO  CHECK  BLOOD  SUGAR TWICE DAILY 200 each 0    warfarin (COUMADIN) 5 MG tablet Take 1.5 tablets (7.5 mg total) by mouth every Mon, Wed, Fri AND 2 tablets (10 mg total) every Tuesday, Thursday, Saturday, Sunday. (Patient taking differently: Take 2 tablets (10 mg total) by mouth on Tues and  Thurs; AND 1.5 tablets (7.5 mg total) all other days OR AS DIRECTED BY COUMADIN CLINIC.) 60 tablet 11     No current facility-administered medications on file prior to visit.       Past Surgical History:   Procedure Laterality Date    COLONOSCOPY N/A 8/22/2017    Procedure: COLONOSCOPY;  Surgeon: Keo Cruz MD;  Location: [facility] ENDO;  Service: Endoscopy;  Laterality: N/A;    COLONOSCOPY N/A 9/30/2022    Procedure: COLONOSCOPY 9/20--[place] hold coumadin, no bridge;  Surgeon: Noé Neff MD;  Location: [facility] ENDO;  Service: General;  Laterality: N/A;    EPIDURAL STEROID INJECTION INTO CERVICAL SPINE N/A 11/1/2019    Procedure: C7/T1 IL SUKHJINDER;  Surgeon: Donnell Jordan MD;  Location: [facility] PAIN MGT;  Service: Pain Management;  Laterality: N/A;    STOMACH SURGERY      TRANSFORAMINAL EPIDURAL INJECTION OF STEROID Bilateral 8/13/2019    Procedure: Injection,steroid,epidural,transforaminal approach;  Surgeon: Donnell Jordan MD;  Location: [facility] PAIN MGT;  Service: Pain Management;  Laterality: Bilateral;       Interval hx: Dr. Serrano: 2/4/21  Initial History of Present Illness:  08/08/2019: Dr. Jordan:   This patient is a 57 y.o. male who presents today complaining of the above noted pain/s. The patient describes the pain as follows.  Mr. Pope is a new patient to clinic with complaints of low back pain which radiates in his bilateral lower extremities in addition to neck pain which radiates in his bilateral upper extremities.  Currently he rates his pain as 10/10 which is constant throughout the day reports having symptoms that radiate down the posterior right leg into the bottom of the foot in the lateral  aspect of foot in the S1 distribution in the same pattern in the left leg.  He reports having some weakness in his right lower extremity and reports having left arm numbness.  He finds that his symptoms are worse when he sits for long periods and is improved with activity.  He denies having had surgery, injections, physical therapy for his cervical and lumbar spines.  He reports mostly thing a throbbing sensation in his lower extremities and upper extremities. He denies having bowel and bladder difficulties.  He has been started on gabapentin taking 600 mg twice daily which he has found to be helpful.  There was no inciting event for these complaints.    Previous Therapy:  Medications:gabapentin  Injections:  bilateral S1 transforaminal epidural steroid injections on 8/13/19 with 30% pain relief  Surgeries:  None   Physical Therapy:  None    Past Surgical History:   Procedure Laterality Date    COLONOSCOPY N/A 8/22/2017    Procedure: COLONOSCOPY;  Surgeon: Keo Cruz MD;  Location: [facility];  Service: Endoscopy;  Laterality: N/A;    COLONOSCOPY N/A 9/30/2022    Procedure: COLONOSCOPY 9/20--[place] hold coumadin, no bridge;  Surgeon: Noé Neff MD;  Location: [facility];  Service: General;  Laterality: N/A;    EPIDURAL STEROID INJECTION INTO CERVICAL SPINE N/A 11/1/2019    Procedure: C7/T1 IL SUKHJINDER;  Surgeon: Donnell Jordan MD;  Location: [facility] PAIN MGT;  Service: Pain Management;  Laterality: N/A;    STOMACH SURGERY      TRANSFORAMINAL EPIDURAL INJECTION OF STEROID Bilateral 8/13/2019    Procedure: Injection,steroid,epidural,transforaminal approach;  Surgeon: Donnell Jordan MD;  Location: [facility] PAIN MGT;  Service: Pain Management;  Laterality: Bilateral;       Imaging / Labs / Studies (reviewed on 11/16/2022):        XR Lumbar Spine 2/3/21  FINDINGS:  There is minimal grade 1 anterolisthesis of L4 on L5.  No fracture or pars defect.  Mild L4-L5 degenerative disc disease.  Inferior lumbar spine  predominant facet arthropathy.  No osseous erosion or suspicious osseous lesion.  Sacroiliac joints are unremarkable.  Unchanged metallic foreign body is present within the lower back soft tissues.    Review of Systems:  Constitutional: Negative for fever.   Eyes: Negative for blurred vision.   Respiratory: Negative for cough and wheezing.    Cardiovascular: Negative for chest pain and orthopnea.   Gastrointestinal: Negative for constipation, diarrhea, nausea and vomiting.   Genitourinary: Negative for dysuria.   Musculoskeletal: Positive for back pain, joint pain and neck pain.   Skin: Negative for itching and rash.   Neurological: Positive for weakness.   Endo/Heme/Allergies: Does not bruise/bleed easily.         Physical Exam:  Vitals:  There were no vitals taken for this visit.  (reviewed on 11/16/2022)    Physical Exam  GENERAL: Well appearing, in no acute distress, alert and oriented x3.  PSYCH:  Mood and affect appropriate.  SKIN: Skin color, texture, turgor normal, no rashes or lesions.  HEAD/FACE:  Normocephalic, atraumatic. Cranial nerves grossly intact.  CV: RRR with palpation of the radial artery.  PULM: No evidence of respiratory difficulty, symmetric chest rise.  GI:  Soft and non-tender.    BACK: Straight leg raising in the sitting and supine positions is negative to radicular pain.  pain to palpation over the facet joints of the lumbar spine or spinous processes. Normal range of motion without pain reproduction.  EXTREMITIES: Peripheral joint ROM is full and pain free without obvious instability or laxity in all four extremities. No deformities, edema, or skin discoloration. Good capillary refill.  MUSCULOSKELETAL: Able to stand on heels & toes.   Shoulder, hip, and knee provocative maneuvers are negative.  There is no pain with palpation over the sacroiliac joints bilaterally.  Gaenslen's, Distraction/Compression and  FABERs test is negative.  Facet loading test is negative bilaterally.   Bilateral  upper and lower extremity strength is normal and symmetric.  No atrophy or tone abnormalities are noted.    RIGHT Lower extremity: Hip flexion 5/5, Hip Abduction 5/5, Hip Adduction 5/5, Knee extension 5/5, Knee flexion 5/5, Ankle dorsiflexion5/5, Extensor hallucis longus 5/5, Ankle plantarflexion 5/5  LEFT Lower extremity:  Hip flexion 5/5, Hip Abduction 5/5,Hip Adduction 5/5, Knee extension 5/5, Knee flexion 5/5, Ankle dorsiflexion 5/5, Extensor hallucis longus 5/5, Ankle plantarflexion 5/5  -Normal testing knee (patellar) jerk and ankle (achilles) jerk    NEURO: Bilateral upper and lower extremity coordination and muscle stretch reflexes are physiologic and symmetric. No loss of sensation is noted.  GAIT: normal.    Assessment/ Plan:  1. Lumbar radiculopathy  IR Epidural Transfor 1st Vert Lumbar Butch    Case Request-RAD/Other Procedure Area: Bilateral L5/S1 and S1 transforaminal epidural steroid injection with RN IV sedation      2. DDD (degenerative disc disease), lumbar  Ambulatory referral/consult to Pain Clinic      3. Lumbar stenosis with neurogenic claudication            1. Interventional: Schedule for bilateral L5/S1 and S1 transforaminal epidural steroid injection to see if this helps with radicular pain.  We have discussed procedure, benefit, potential risks and alternative treatment options in detail.  Patient has elected to pursue this procedure.    Anticoagulation:  Yes Coumadin  Per ASA guidelines for secondary prophylaxis, patient will need to pause Coumadin 5 days prior to his procedure with an INR of less than 1.3 on the procedure day.  Will obtain cardiac clearance from          2. Pharmacologic:       --I will start the patient on a Lyrica titration to see if this helps with neuropathic pain.  We discussed increasing the dose gradually to reach a therapeutic goal according to the following algorithm.  We discussed potential side effects of this medication which may include  drowsiness,dizziness, dry mouth, constipation or peripheral edema.    -Week 1: Take 1 capsule, 75 mg b.i.d.  -Week 2: Take 2 capsules, 150 mg b.i.d.  -Week 3+: Take 3 capsules, 225 mg b.i.d.    -discontinue gabapentin secondary to inefficacy      3. Rehabilitative:  --We discussed continuing physical therapy to help manage the patient/s painful condition. The patient was counseled that muscle strengthening will improve the long term prognosis in regards to pain and may also help increase range of motion and mobility. They were told that one of the goals of physical therapy is that they learn how to do the exercises so that they can do them independently at home daily upon completion.        4. Diagnostic: Lumbar MRI reviewed from 2016.  We have discussed ordering updated imaging in the future pending response to pharmacologic management and interventional treatment.    5. Consult: prior consult to Neurosurgery for severe canal stenosis but surgical intervention not desired at this time    6. Follow up:  4-6 weeks post injection      The above plan and management options were discussed at length with patient. Patient is in agreement with the above and verbalized understanding.    - I discussed the goals of interventional chronic pain management with the patient on today's visit. We discussed a multimodal and systematic approach to pain.  This includes diagnostic and therapeutic injections, adjuvant pharmacologic treatment, physical therapy, and at times psychiatry.  I emphasized the importance of regular exercise, core strengthening and stretching, diet and weight loss as a cornerstone of long-term pain management.    - This condition does not require this patient to take time off of work, and the primary goal of our Pain Management services is to improve the patient's functional capacity.  - Patient Questions: Answered all of the patient's questions regarding diagnoses, therapy, treatment and next steps      Venkatesh  MD Alejo

## 2022-11-16 ENCOUNTER — LAB VISIT (OUTPATIENT)
Dept: LAB | Facility: HOSPITAL | Age: 57
End: 2022-11-16
Attending: NURSE PRACTITIONER
Payer: MEDICARE

## 2022-11-16 ENCOUNTER — OFFICE VISIT (OUTPATIENT)
Dept: PAIN MEDICINE | Facility: CLINIC | Age: 57
End: 2022-11-16
Payer: MEDICARE

## 2022-11-16 ENCOUNTER — ANTI-COAG VISIT (OUTPATIENT)
Dept: CARDIOLOGY | Facility: CLINIC | Age: 57
End: 2022-11-16
Payer: MEDICARE

## 2022-11-16 DIAGNOSIS — M54.16 LUMBAR RADICULOPATHY: Primary | ICD-10-CM

## 2022-11-16 DIAGNOSIS — E11.65 TYPE 2 DIABETES MELLITUS WITH HYPERGLYCEMIA, WITHOUT LONG-TERM CURRENT USE OF INSULIN: ICD-10-CM

## 2022-11-16 DIAGNOSIS — M48.062 LUMBAR STENOSIS WITH NEUROGENIC CLAUDICATION: ICD-10-CM

## 2022-11-16 DIAGNOSIS — M51.36 DDD (DEGENERATIVE DISC DISEASE), LUMBAR: ICD-10-CM

## 2022-11-16 DIAGNOSIS — Z79.01 LONG TERM (CURRENT) USE OF ANTICOAGULANTS: Primary | ICD-10-CM

## 2022-11-16 DIAGNOSIS — I48.0 PAF (PAROXYSMAL ATRIAL FIBRILLATION): ICD-10-CM

## 2022-11-16 LAB
ESTIMATED AVG GLUCOSE: 157 MG/DL (ref 68–131)
HBA1C MFR BLD: 7.1 % (ref 4–5.6)
INR PPP: 3.3 (ref 2–3)

## 2022-11-16 PROCEDURE — 99214 OFFICE O/P EST MOD 30 MIN: CPT | Mod: S$GLB,,, | Performed by: ANESTHESIOLOGY

## 2022-11-16 PROCEDURE — 85610 POCT INR: ICD-10-PCS | Mod: QW,S$GLB,, | Performed by: INTERNAL MEDICINE

## 2022-11-16 PROCEDURE — 3044F PR MOST RECENT HEMOGLOBIN A1C LEVEL <7.0%: ICD-10-PCS | Mod: CPTII,S$GLB,, | Performed by: ANESTHESIOLOGY

## 2022-11-16 PROCEDURE — 99999 PR PBB SHADOW E&M-EST. PATIENT-LVL III: ICD-10-PCS | Mod: PBBFAC,,, | Performed by: ANESTHESIOLOGY

## 2022-11-16 PROCEDURE — 93793 PR ANTICOAGULANT MGMT FOR PT TAKING WARFARIN: ICD-10-PCS | Mod: S$GLB,,,

## 2022-11-16 PROCEDURE — 99999 PR PBB SHADOW E&M-EST. PATIENT-LVL III: CPT | Mod: PBBFAC,,, | Performed by: ANESTHESIOLOGY

## 2022-11-16 PROCEDURE — 3044F HG A1C LEVEL LT 7.0%: CPT | Mod: CPTII,S$GLB,, | Performed by: ANESTHESIOLOGY

## 2022-11-16 PROCEDURE — 99214 PR OFFICE/OUTPT VISIT, EST, LEVL IV, 30-39 MIN: ICD-10-PCS | Mod: S$GLB,,, | Performed by: ANESTHESIOLOGY

## 2022-11-16 PROCEDURE — 85610 PROTHROMBIN TIME: CPT | Mod: QW,S$GLB,, | Performed by: INTERNAL MEDICINE

## 2022-11-16 PROCEDURE — 4010F PR ACE/ARB THEARPY RXD/TAKEN: ICD-10-PCS | Mod: CPTII,S$GLB,, | Performed by: ANESTHESIOLOGY

## 2022-11-16 PROCEDURE — 4010F ACE/ARB THERAPY RXD/TAKEN: CPT | Mod: CPTII,S$GLB,, | Performed by: ANESTHESIOLOGY

## 2022-11-16 PROCEDURE — 3066F NEPHROPATHY DOC TX: CPT | Mod: CPTII,S$GLB,, | Performed by: ANESTHESIOLOGY

## 2022-11-16 PROCEDURE — 3060F PR POS MICROALBUMINURIA RESULT DOCUMENTED/REVIEW: ICD-10-PCS | Mod: CPTII,S$GLB,, | Performed by: ANESTHESIOLOGY

## 2022-11-16 PROCEDURE — 93793 ANTICOAG MGMT PT WARFARIN: CPT | Mod: S$GLB,,,

## 2022-11-16 PROCEDURE — 83036 HEMOGLOBIN GLYCOSYLATED A1C: CPT | Performed by: NURSE PRACTITIONER

## 2022-11-16 PROCEDURE — 3060F POS MICROALBUMINURIA REV: CPT | Mod: CPTII,S$GLB,, | Performed by: ANESTHESIOLOGY

## 2022-11-16 PROCEDURE — 3066F PR DOCUMENTATION OF TREATMENT FOR NEPHROPATHY: ICD-10-PCS | Mod: CPTII,S$GLB,, | Performed by: ANESTHESIOLOGY

## 2022-11-16 PROCEDURE — 1159F PR MEDICATION LIST DOCUMENTED IN MEDICAL RECORD: ICD-10-PCS | Mod: CPTII,S$GLB,, | Performed by: ANESTHESIOLOGY

## 2022-11-16 PROCEDURE — 1159F MED LIST DOCD IN RCRD: CPT | Mod: CPTII,S$GLB,, | Performed by: ANESTHESIOLOGY

## 2022-11-16 PROCEDURE — 36415 COLL VENOUS BLD VENIPUNCTURE: CPT | Performed by: NURSE PRACTITIONER

## 2022-11-16 PROCEDURE — 1160F PR REVIEW ALL MEDS BY PRESCRIBER/CLIN PHARMACIST DOCUMENTED: ICD-10-PCS | Mod: CPTII,S$GLB,, | Performed by: ANESTHESIOLOGY

## 2022-11-16 PROCEDURE — 1160F RVW MEDS BY RX/DR IN RCRD: CPT | Mod: CPTII,S$GLB,, | Performed by: ANESTHESIOLOGY

## 2022-11-16 RX ORDER — PREGABALIN 75 MG/1
CAPSULE ORAL
Qty: 180 CAPSULE | Refills: 0 | Status: SHIPPED | OUTPATIENT
Start: 2022-11-16 | End: 2022-11-22 | Stop reason: SDUPTHER

## 2022-11-16 NOTE — PROGRESS NOTES
Patient's INR is supra-therapeutic at 3.3.  Patient reports followed previous instructions.  Patient reports drank cranberry juice on yesterday and drinks alcoholic beverages on weekends.  Re-educated on Coumadin Diet and need to keep consistent.  Advised of increased risk of bleeding; signs/symptoms of bleeding and need to go to ED if experiences any.  Patient reports had episode of shortness of breath on last night.  Sent PharmD for dosing/instructions.

## 2022-11-16 NOTE — PROGRESS NOTES
INR not at goal. Medications, chart, and patient findings reviewed. See calendar for adjustments to dose and follow up plan.  Lower dose today and lower overall dose once more.  Repeat in 2 weeks.

## 2022-11-17 ENCOUNTER — PATIENT MESSAGE (OUTPATIENT)
Dept: PAIN MEDICINE | Facility: CLINIC | Age: 57
End: 2022-11-17
Payer: MEDICARE

## 2022-11-18 NOTE — TELEPHONE ENCOUNTER
Good Afternoon    Pt is requesting for a refill of: Pregabalin  Last filed:11/17/22  Last encounter:11/16/22  Up coming apt: NONE  Pharm[facility]: [facility]     .David COLE     denies pain/discomfort

## 2022-11-22 ENCOUNTER — PATIENT MESSAGE (OUTPATIENT)
Dept: DIABETES | Facility: CLINIC | Age: 57
End: 2022-11-22
Payer: MEDICARE

## 2022-11-22 ENCOUNTER — TELEPHONE (OUTPATIENT)
Dept: PAIN MEDICINE | Facility: CLINIC | Age: 57
End: 2022-11-22
Payer: MEDICARE

## 2022-11-22 ENCOUNTER — TELEPHONE (OUTPATIENT)
Dept: DIABETES | Facility: CLINIC | Age: 57
End: 2022-11-22
Payer: MEDICARE

## 2022-11-22 DIAGNOSIS — E11.65 TYPE 2 DIABETES MELLITUS WITH HYPERGLYCEMIA, WITHOUT LONG-TERM CURRENT USE OF INSULIN: Primary | ICD-10-CM

## 2022-11-22 RX ORDER — PREGABALIN 75 MG/1
CAPSULE ORAL
Qty: 90 CAPSULE | Refills: 0 | Status: SHIPPED | OUTPATIENT
Start: 2022-11-22 | End: 2023-02-20 | Stop reason: SDUPTHER

## 2022-11-22 RX ORDER — INSULIN LISPRO 100 [IU]/ML
INJECTION, SOLUTION INTRAVENOUS; SUBCUTANEOUS
Qty: 15 ML | Refills: 3 | Status: SHIPPED | OUTPATIENT
Start: 2022-11-22 | End: 2023-08-21 | Stop reason: SDUPTHER

## 2022-11-22 RX ORDER — PREGABALIN 75 MG/1
CAPSULE ORAL
Qty: 180 CAPSULE | Refills: 0 | Status: SHIPPED | OUTPATIENT
Start: 2022-11-22 | End: 2022-11-22 | Stop reason: SDUPTHER

## 2022-11-22 NOTE — TELEPHONE ENCOUNTER
Patient was notified and informed. Pt verbalized understanding.   Pt would like to speak w/ provider about insulin blue pen; his insurance said it's too high and would like to see if their is a substitute.     ----- Message from Ankita Haywood NP sent at 11/22/2022  1:18 PM CST -----  Inform patient I have reviewed recent A1C lab result.   It is 7.1, increased from previous reading.

## 2022-11-25 ENCOUNTER — PATIENT MESSAGE (OUTPATIENT)
Dept: PAIN MEDICINE | Facility: CLINIC | Age: 57
End: 2022-11-25
Payer: MEDICARE

## 2022-11-28 ENCOUNTER — PES CALL (OUTPATIENT)
Dept: ADMINISTRATIVE | Facility: CLINIC | Age: 57
End: 2022-11-28
Payer: MEDICARE

## 2022-11-28 DIAGNOSIS — I48.0 PAF (PAROXYSMAL ATRIAL FIBRILLATION): ICD-10-CM

## 2022-11-28 DIAGNOSIS — I15.2 HYPERTENSION ASSOCIATED WITH DIABETES: Primary | ICD-10-CM

## 2022-11-28 DIAGNOSIS — E11.59 HYPERTENSION ASSOCIATED WITH DIABETES: Primary | ICD-10-CM

## 2022-11-29 ENCOUNTER — ANTI-COAG VISIT (OUTPATIENT)
Dept: CARDIOLOGY | Facility: CLINIC | Age: 57
End: 2022-11-29
Payer: MEDICARE

## 2022-11-29 ENCOUNTER — TELEPHONE (OUTPATIENT)
Dept: CARDIOLOGY | Facility: CLINIC | Age: 57
End: 2022-11-29
Payer: MEDICARE

## 2022-11-29 ENCOUNTER — HOSPITAL ENCOUNTER (OUTPATIENT)
Dept: CARDIOLOGY | Facility: HOSPITAL | Age: 57
Discharge: HOME | End: 2022-11-29
Attending: STUDENT IN AN ORGANIZED HEALTH CARE EDUCATION/TRAINING PROGRAM
Payer: MEDICARE

## 2022-11-29 DIAGNOSIS — I15.2 HYPERTENSION ASSOCIATED WITH DIABETES: ICD-10-CM

## 2022-11-29 DIAGNOSIS — E11.59 HYPERTENSION ASSOCIATED WITH DIABETES: ICD-10-CM

## 2022-11-29 DIAGNOSIS — Z79.01 LONG TERM (CURRENT) USE OF ANTICOAGULANTS: Primary | ICD-10-CM

## 2022-11-29 DIAGNOSIS — I48.0 PAF (PAROXYSMAL ATRIAL FIBRILLATION): ICD-10-CM

## 2022-11-29 PROCEDURE — 85610 POCT INR: ICD-10-PCS | Mod: QW,HCNC,S$GLB, | Performed by: INTERNAL MEDICINE

## 2022-11-29 PROCEDURE — 93793 ANTICOAG MGMT PT WARFARIN: CPT | Mod: S$GLB,,,

## 2022-11-29 PROCEDURE — 93793 PR ANTICOAGULANT MGMT FOR PT TAKING WARFARIN: ICD-10-PCS | Mod: S$GLB,,,

## 2022-11-29 PROCEDURE — 85610 PROTHROMBIN TIME: CPT | Mod: QW,HCNC,S$GLB, | Performed by: INTERNAL MEDICINE

## 2022-11-29 PROCEDURE — 93005 ELECTROCARDIOGRAM TRACING: CPT

## 2022-11-29 PROCEDURE — 93010 ELECTROCARDIOGRAM REPORT: CPT | Mod: ,,, | Performed by: INTERNAL MEDICINE

## 2022-11-29 PROCEDURE — 93010 EKG 12-LEAD: ICD-10-PCS | Mod: ,,, | Performed by: INTERNAL MEDICINE

## 2022-11-29 NOTE — PROGRESS NOTES
INR not at goal. Medications, chart, and patient findings reviewed. See calendar for adjustments to dose and follow up plan.  Hold x 2 doses and lower Thursdays dose.  Repeat INR Friday.

## 2022-11-29 NOTE — PROGRESS NOTES
Patient reports drank alcoholic beverages this past weekend and has started taking Lyrica.  Advised patient of increased risk of bleeding; signs/symptoms of bleeding and need to go to ED if experiences any.  Patient reports not having any signs/symptoms of bleeding.

## 2022-11-29 NOTE — TELEPHONE ENCOUNTER
Spoke with pt in regards to           Please contact the patient and let them know that their results reveal critically elevated INR, needs to contact coumadin clinic to adjust/hold coumadin as needed, if any bleeding needs to go to ER. Per Dr. Cunningham          Pt verbalized understanding with no questions or concerns.

## 2022-11-30 ENCOUNTER — TELEPHONE (OUTPATIENT)
Dept: PAIN MEDICINE | Facility: CLINIC | Age: 57
End: 2022-11-30
Payer: MEDICARE

## 2022-12-05 ENCOUNTER — ANTI-COAG VISIT (OUTPATIENT)
Dept: CARDIOLOGY | Facility: CLINIC | Age: 57
End: 2022-12-05
Payer: MEDICARE

## 2022-12-05 ENCOUNTER — LAB VISIT (OUTPATIENT)
Dept: LAB | Facility: HOSPITAL | Age: 57
End: 2022-12-05
Attending: STUDENT IN AN ORGANIZED HEALTH CARE EDUCATION/TRAINING PROGRAM
Payer: MEDICARE

## 2022-12-05 ENCOUNTER — TELEPHONE (OUTPATIENT)
Dept: CARDIOLOGY | Facility: CLINIC | Age: 57
End: 2022-12-05
Payer: MEDICARE

## 2022-12-05 DIAGNOSIS — Z79.01 LONG TERM (CURRENT) USE OF ANTICOAGULANTS: ICD-10-CM

## 2022-12-05 DIAGNOSIS — I48.0 PAF (PAROXYSMAL ATRIAL FIBRILLATION): Primary | ICD-10-CM

## 2022-12-05 LAB
INR PPP: 7.7 (ref 0.8–1.2)
PROTHROMBIN TIME: 74.4 SEC (ref 9–12.5)

## 2022-12-05 PROCEDURE — 36415 COLL VENOUS BLD VENIPUNCTURE: CPT | Mod: PO | Performed by: INTERNAL MEDICINE

## 2022-12-05 PROCEDURE — 85610 PROTHROMBIN TIME: CPT | Performed by: INTERNAL MEDICINE

## 2022-12-05 NOTE — PROGRESS NOTES
"INR not at goal. Medications, chart, and patient findings reviewed. See calendar for adjustments to dose and follow up plan.  Patient did not return for INR check on Friday.  We will hold all doses and repeat INR in 2 days.  Patient took incorrect (higher dose) on Friday and also drank more alcohol over the weekend after his INR elevation during the week from drinking.  States that he "will not be doing that anyone."  Reviewed with danger of drinking while on warfarin.  ER with any s/s of bleeding.  STOP warfarin.  Repeat INR on Wednesday in [facility].    "

## 2022-12-05 NOTE — TELEPHONE ENCOUNTER
Spoke with pt in regards to     Let him know results reveal elevated INR, needs to contact coumadin clinic to adjust/hold coumadin as needed, if any bleeding needs to go to ER. Per Dr. Cunningham        Pt verbalized understanding with no questions or concerns.

## 2022-12-07 ENCOUNTER — ANTI-COAG VISIT (OUTPATIENT)
Dept: CARDIOLOGY | Facility: CLINIC | Age: 57
End: 2022-12-07
Payer: MEDICARE

## 2022-12-07 DIAGNOSIS — I48.0 PAF (PAROXYSMAL ATRIAL FIBRILLATION): ICD-10-CM

## 2022-12-07 DIAGNOSIS — Z79.01 LONG TERM (CURRENT) USE OF ANTICOAGULANTS: Primary | ICD-10-CM

## 2022-12-07 LAB — INR PPP: 5.1 (ref 2–3)

## 2022-12-07 PROCEDURE — 93793 ANTICOAG MGMT PT WARFARIN: CPT | Mod: HCNC,S$GLB,,

## 2022-12-07 PROCEDURE — 93793 PR ANTICOAGULANT MGMT FOR PT TAKING WARFARIN: ICD-10-PCS | Mod: HCNC,S$GLB,,

## 2022-12-07 PROCEDURE — 85610 PROTHROMBIN TIME: CPT | Mod: QW,HCNC,S$GLB, | Performed by: STUDENT IN AN ORGANIZED HEALTH CARE EDUCATION/TRAINING PROGRAM

## 2022-12-07 PROCEDURE — 85610 POCT INR: ICD-10-PCS | Mod: QW,HCNC,S$GLB, | Performed by: STUDENT IN AN ORGANIZED HEALTH CARE EDUCATION/TRAINING PROGRAM

## 2022-12-07 NOTE — PROGRESS NOTES
Patient's INR is supra-therapeutic at 5.1.  Patient reports followed previous instructions.  Patient reports scheduled to have SUKHJINDER on 12/23/22 and will need to hold warfarin for 5 days prior.  Advised of increased risk of bleeding; sign/symptoms of bleeding and need to go to ED if experiences any.  Patient reports not having any signs/symptoms of bleeding.  Sent to Glenda lowe dosing/instructions.

## 2022-12-07 NOTE — PROGRESS NOTES
INR not at goal. Medications, chart, and patient findings reviewed. See calendar for adjustments to dose and follow up plan.  INR remains elevated, out precaution, we will hold x 2 more doses since INR is slows coming down after two episodes of excessive drinking -PT MUST REDUCE alcohol intake.  ER with any signs of bleeding.  We will dose him for upcoming procedure at next visit.

## 2022-12-15 ENCOUNTER — ANTI-COAG VISIT (OUTPATIENT)
Dept: CARDIOLOGY | Facility: CLINIC | Age: 57
End: 2022-12-15
Payer: MEDICARE

## 2022-12-15 DIAGNOSIS — I48.0 PAF (PAROXYSMAL ATRIAL FIBRILLATION): ICD-10-CM

## 2022-12-15 DIAGNOSIS — Z79.01 LONG TERM (CURRENT) USE OF ANTICOAGULANTS: Primary | ICD-10-CM

## 2022-12-15 LAB — INR PPP: 3.6 (ref 2–3)

## 2022-12-15 PROCEDURE — 85610 PROTHROMBIN TIME: CPT | Mod: QW,HCNC,S$GLB, | Performed by: INTERNAL MEDICINE

## 2022-12-15 PROCEDURE — 85610 POCT INR: ICD-10-PCS | Mod: QW,HCNC,S$GLB, | Performed by: INTERNAL MEDICINE

## 2022-12-15 PROCEDURE — 93793 ANTICOAG MGMT PT WARFARIN: CPT | Mod: HCNC,S$GLB,,

## 2022-12-15 PROCEDURE — 93793 PR ANTICOAGULANT MGMT FOR PT TAKING WARFARIN: ICD-10-PCS | Mod: HCNC,S$GLB,,

## 2022-12-15 NOTE — PROGRESS NOTES
INR not at goal. Medications, chart, and patient findings reviewed. See calendar for adjustments to dose and follow up plan.  We have struggled with very recent elevations.  We will hold dose today for elevation and as procedure is approaching.  We will lower dose for 2 days then begin a 5 day hold.  This will be done to try to get INR into range (lower end) then hold for the standard 5 days eddie-procedure to get INR to baseline.  Patient MUST AVOID alcohol during this time. No boost post procedure.

## 2022-12-15 NOTE — PROGRESS NOTES
INR is supratherapeutic at 3.6 - has improved.  Previous warfarin instructions were verified and followed.  No bleeding issues reported.  SUKHJINDER is planned for 12/23/2022.  Sent to PharmD for pre/post warfarin instructions for upcoming SUKHJINDER.

## 2022-12-20 PROBLEM — E11.42 DIABETIC POLYNEUROPATHY ASSOCIATED WITH TYPE 2 DIABETES MELLITUS: Status: ACTIVE | Noted: 2022-12-20

## 2022-12-20 PROBLEM — N18.32 TYPE 2 DIABETES MELLITUS WITH STAGE 3B CHRONIC KIDNEY DISEASE, WITH LONG-TERM CURRENT USE OF INSULIN: Status: ACTIVE | Noted: 2022-12-20

## 2022-12-20 PROBLEM — Z79.4 TYPE 2 DIABETES MELLITUS WITH STAGE 3B CHRONIC KIDNEY DISEASE, WITH LONG-TERM CURRENT USE OF INSULIN: Status: ACTIVE | Noted: 2022-12-20

## 2022-12-20 PROBLEM — E78.1 TYPE 2 DIABETES MELLITUS WITH HYPERTRIGLYCERIDEMIA: Status: ACTIVE | Noted: 2022-12-20

## 2022-12-20 PROBLEM — Z79.01 ON COUMADIN FOR ATRIAL FIBRILLATION: Status: ACTIVE | Noted: 2022-12-20

## 2022-12-20 PROBLEM — I77.9 CAROTID ARTERY DISEASE: Status: ACTIVE | Noted: 2022-12-20

## 2022-12-20 PROBLEM — Z79.4 LONG TERM (CURRENT) USE OF INSULIN: Status: ACTIVE | Noted: 2022-12-20

## 2022-12-20 PROBLEM — E11.69 TYPE 2 DIABETES MELLITUS WITH HYPERTRIGLYCERIDEMIA: Status: ACTIVE | Noted: 2022-12-20

## 2022-12-20 PROBLEM — E11.22 TYPE 2 DIABETES MELLITUS WITH STAGE 3B CHRONIC KIDNEY DISEASE, WITH LONG-TERM CURRENT USE OF INSULIN: Status: ACTIVE | Noted: 2022-12-20

## 2022-12-20 PROBLEM — I48.91 ON COUMADIN FOR ATRIAL FIBRILLATION: Status: ACTIVE | Noted: 2022-12-20

## 2022-12-21 ENCOUNTER — OFFICE VISIT (OUTPATIENT)
Dept: CARDIOLOGY | Facility: CLINIC | Age: 57
End: 2022-12-21
Payer: MEDICARE

## 2022-12-21 VITALS
HEART RATE: 72 BPM | WEIGHT: 209 LBS | SYSTOLIC BLOOD PRESSURE: 126 MMHG | DIASTOLIC BLOOD PRESSURE: 84 MMHG | BODY MASS INDEX: 24.15 KG/M2 | OXYGEN SATURATION: 98 %

## 2022-12-21 DIAGNOSIS — Z79.01 LONG TERM (CURRENT) USE OF ANTICOAGULANTS: ICD-10-CM

## 2022-12-21 DIAGNOSIS — E11.69 HYPERLIPIDEMIA DUE TO TYPE 2 DIABETES MELLITUS: ICD-10-CM

## 2022-12-21 DIAGNOSIS — I48.91 ATRIAL FIBRILLATION, UNSPECIFIED TYPE: ICD-10-CM

## 2022-12-21 DIAGNOSIS — N52.9 ERECTILE DYSFUNCTION, UNSPECIFIED ERECTILE DYSFUNCTION TYPE: ICD-10-CM

## 2022-12-21 DIAGNOSIS — R07.89 OTHER CHEST PAIN: ICD-10-CM

## 2022-12-21 DIAGNOSIS — I15.2 HYPERTENSION ASSOCIATED WITH DIABETES: ICD-10-CM

## 2022-12-21 DIAGNOSIS — E78.5 HYPERLIPIDEMIA DUE TO TYPE 2 DIABETES MELLITUS: ICD-10-CM

## 2022-12-21 DIAGNOSIS — Z82.49 FAMILY HISTORY OF CORONARY ARTERY DISEASE: ICD-10-CM

## 2022-12-21 DIAGNOSIS — M51.36 DDD (DEGENERATIVE DISC DISEASE), LUMBAR: ICD-10-CM

## 2022-12-21 DIAGNOSIS — I48.0 PAF (PAROXYSMAL ATRIAL FIBRILLATION): Primary | ICD-10-CM

## 2022-12-21 DIAGNOSIS — E11.59 HYPERTENSION ASSOCIATED WITH DIABETES: ICD-10-CM

## 2022-12-21 DIAGNOSIS — R00.2 PALPITATIONS: ICD-10-CM

## 2022-12-21 PROCEDURE — 3066F PR DOCUMENTATION OF TREATMENT FOR NEPHROPATHY: ICD-10-PCS | Mod: HCNC,CPTII,S$GLB, | Performed by: INTERNAL MEDICINE

## 2022-12-21 PROCEDURE — 3060F PR POS MICROALBUMINURIA RESULT DOCUMENTED/REVIEW: ICD-10-PCS | Mod: HCNC,CPTII,S$GLB, | Performed by: INTERNAL MEDICINE

## 2022-12-21 PROCEDURE — 3066F NEPHROPATHY DOC TX: CPT | Mod: HCNC,CPTII,S$GLB, | Performed by: INTERNAL MEDICINE

## 2022-12-21 PROCEDURE — 3079F DIAST BP 80-89 MM HG: CPT | Mod: HCNC,CPTII,S$GLB, | Performed by: INTERNAL MEDICINE

## 2022-12-21 PROCEDURE — 1160F RVW MEDS BY RX/DR IN RCRD: CPT | Mod: HCNC,CPTII,S$GLB, | Performed by: INTERNAL MEDICINE

## 2022-12-21 PROCEDURE — 3079F PR MOST RECENT DIASTOLIC BLOOD PRESSURE 80-89 MM HG: ICD-10-PCS | Mod: HCNC,CPTII,S$GLB, | Performed by: INTERNAL MEDICINE

## 2022-12-21 PROCEDURE — 3051F PR MOST RECENT HEMOGLOBIN A1C LEVEL 7.0 - < 8.0%: ICD-10-PCS | Mod: HCNC,CPTII,S$GLB, | Performed by: INTERNAL MEDICINE

## 2022-12-21 PROCEDURE — 3008F PR BODY MASS INDEX (BMI) DOCUMENTED: ICD-10-PCS | Mod: HCNC,CPTII,S$GLB, | Performed by: INTERNAL MEDICINE

## 2022-12-21 PROCEDURE — 3008F BODY MASS INDEX DOCD: CPT | Mod: HCNC,CPTII,S$GLB, | Performed by: INTERNAL MEDICINE

## 2022-12-21 PROCEDURE — 3051F HG A1C>EQUAL 7.0%<8.0%: CPT | Mod: HCNC,CPTII,S$GLB, | Performed by: INTERNAL MEDICINE

## 2022-12-21 PROCEDURE — 99214 PR OFFICE/OUTPT VISIT, EST, LEVL IV, 30-39 MIN: ICD-10-PCS | Mod: HCNC,S$GLB,, | Performed by: INTERNAL MEDICINE

## 2022-12-21 PROCEDURE — 4010F PR ACE/ARB THEARPY RXD/TAKEN: ICD-10-PCS | Mod: HCNC,CPTII,S$GLB, | Performed by: INTERNAL MEDICINE

## 2022-12-21 PROCEDURE — 99999 PR PBB SHADOW E&M-EST. PATIENT-LVL III: CPT | Mod: PBBFAC,HCNC,, | Performed by: INTERNAL MEDICINE

## 2022-12-21 PROCEDURE — 99214 OFFICE O/P EST MOD 30 MIN: CPT | Mod: HCNC,S$GLB,, | Performed by: INTERNAL MEDICINE

## 2022-12-21 PROCEDURE — 3060F POS MICROALBUMINURIA REV: CPT | Mod: HCNC,CPTII,S$GLB, | Performed by: INTERNAL MEDICINE

## 2022-12-21 PROCEDURE — 3074F SYST BP LT 130 MM HG: CPT | Mod: HCNC,CPTII,S$GLB, | Performed by: INTERNAL MEDICINE

## 2022-12-21 PROCEDURE — 1160F PR REVIEW ALL MEDS BY PRESCRIBER/CLIN PHARMACIST DOCUMENTED: ICD-10-PCS | Mod: HCNC,CPTII,S$GLB, | Performed by: INTERNAL MEDICINE

## 2022-12-21 PROCEDURE — 1159F PR MEDICATION LIST DOCUMENTED IN MEDICAL RECORD: ICD-10-PCS | Mod: HCNC,CPTII,S$GLB, | Performed by: INTERNAL MEDICINE

## 2022-12-21 PROCEDURE — 4010F ACE/ARB THERAPY RXD/TAKEN: CPT | Mod: HCNC,CPTII,S$GLB, | Performed by: INTERNAL MEDICINE

## 2022-12-21 PROCEDURE — 1159F MED LIST DOCD IN RCRD: CPT | Mod: HCNC,CPTII,S$GLB, | Performed by: INTERNAL MEDICINE

## 2022-12-21 PROCEDURE — 3074F PR MOST RECENT SYSTOLIC BLOOD PRESSURE < 130 MM HG: ICD-10-PCS | Mod: HCNC,CPTII,S$GLB, | Performed by: INTERNAL MEDICINE

## 2022-12-21 PROCEDURE — 99999 PR PBB SHADOW E&M-EST. PATIENT-LVL III: ICD-10-PCS | Mod: PBBFAC,HCNC,, | Performed by: INTERNAL MEDICINE

## 2022-12-21 RX ORDER — METOPROLOL TARTRATE 50 MG/1
50 TABLET ORAL DAILY
Qty: 90 TABLET | Refills: 3 | Status: SHIPPED | OUTPATIENT
Start: 2022-12-21 | End: 2023-08-21 | Stop reason: SDUPTHER

## 2022-12-21 RX ORDER — SILDENAFIL 100 MG/1
100 TABLET, FILM COATED ORAL DAILY PRN
Qty: 5 TABLET | Refills: 0 | Status: SHIPPED | OUTPATIENT
Start: 2022-12-21 | End: 2024-02-16

## 2022-12-21 NOTE — PROGRESS NOTES
Subjective:   Patient ID:  Papa Pope is a 57 y.o. male who presents for cardiac consult of No chief complaint on file.        The patient came in today for cardiac consult of No chief complaint on file.      Papa Pope is a 57 y.o. male pt with AF, HTN, H LD, DM2 -h/o HHS, DDD, tobacco abuse presents for follow up CV eval.    2/2/22  [facility] summary:   Papa Pope is a 56 year old male with history of DM, tobacco abuse, hypertension, and hyperlipidemia who presents to ED for further for elevated glucose that was noted on routine labs by PCP. Patient states his glucose has been <500 for almost a week. Admits to associated fatigue and weakness. He only takes metformin for his glucose. Tries to monitor his intake of carbohydrates and sugary foods. HgbA1c 8/2021 was 7.1.   Labs in ED reflect marked hypoglycemia with renal insufficiency and electrolyte abnormality. Serum bicarbonate wnl but has anion gap. S/p insulin IV and fluid resuscitation. Repeat BMP pending. EKG revealed atrial fibrillation with RVR converted to NSR with Cardizem IV x 1. Hospital medicine has been consulted for admission.   Hospital Course:   1/26 seen in the ER still on Cardizem gtt 5, Heparin gtt, HR 61-65 whilst there. Patient comfortable on room air  1/27 Patient resumed on his MTP 50 mg PO BID, tolerated Eliquis, got diabetic teaching, has demonstrated back insulin injection, This MD spent about 20 mins in room with him and daughter explaining the reason for Insulin on discharge, the types of insulin, what to look out for.  We will send on Levemir 18 U BID, Moderate SSI TID PRN (no nightly humalog for now), instructed to have short follow up with PCP. Daughter explains that his finger sticks transmit to PCP office automatically, this is good. He says he has Glucometer, strips, lancets at home (was given in his PCP this past Friday)  Patient examined and is stable for discharge    Pt here for initial CV eval after recent Afib episode. Is  taking Lopresor and Eliquis. BP today is elevated 130s/90s. HR 70s. ECHO with normal bi V function, LVH. He used to drink heavily but has quit. He is quitting/quit smoking.     22    Recent Readings 2022 2022 2022 6/15/2022 6/10/2022   SBP (mmHg) 128 141 123 136 126   DBP (mmHg) 88 94 75 77 76   Pulse 98 76 70 68 74     Nuclear stress neg for ischemia 2022. Holter neg for Afib 2022.     22  Pt has upcoming C scope with Dr. Barajas. Recent A1c improving to 6.8.     Recent Readings 2022   SBP (mmHg) 115 120 135 135 125   DBP (mmHg) 75 74 81 81 78   Pulse 106 75 70 72 66     BP and HR well controlled. He will need teeth extractions will have 2 removed every few months.   ECG - NSR    22  BP and Hr well controlled. BMI 24 - 208 lbs.     Recent Readings 2022   SBP (mmHg) 135 124 124 114 123   DBP (mmHg) 90 76 81 79 78   Pulse 78 74 68 71 72     Occ elevated DBP.     Patient feels no leg swelling, no PND,  no dizziness, no syncope, no CNS symptoms.    Patient has fairly good exercise tolerance.    Patient is compliant with medications.    FH - had CVD,  in 80s    HOLTER 2022  Conclusion       Predominant rhythm is sinus.  Heart rates varied between 51 and 124 BPM with an average of 76 BPM        Results for orders placed during the hospital encounter of 22    Nuclear Stress - Cardiology Interpreted    Interpretation Summary    Normal myocardial perfusion scan. There is no evidence of myocardial ischemia or infarction.    The gated perfusion images showed an ejection fraction of 54% at rest. The gated perfusion images showed an ejection fraction of 58% post stress.    There is normal wall motion at rest and post stress.    LV cavity size is normal at rest and normal at stress.    The EKG portion of this study is negative for ischemia.    The patient reported no chest pain during the  stress test.    There were no arrhythmias during stress.      Results for orders placed during the hospital encounter of 01/25/22    Echo    Interpretation Summary  · Concentric hypertrophy and low normal systolic function.  · The estimated ejection fraction is 50%.  · Normal left ventricular diastolic function.  · With normal right ventricular systolic function.      Past Medical History:   Diagnosis Date    Acute renal failure 1/25/2022    Arthritis     Atrial fibrillation with RVR 1/25/2022    Back pain     Hyperlipidemia     Hypertension     Pneumonia     Polyneuropathy     Tobacco dependence     Type 2 diabetes mellitus without complication, without long-term current use of insulin 8/4/2021       Past Surgical History:   Procedure Laterality Date    COLONOSCOPY N/A 8/22/2017    Procedure: COLONOSCOPY;  Surgeon: Keo Cruz MD;  Location: [facility];  Service: Endoscopy;  Laterality: N/A;    COLONOSCOPY N/A 9/30/2022    Procedure: COLONOSCOPY 9/20--Malur hold coumadin, no bridge;  Surgeon: Noé Neff MD;  Location: [facility];  Service: General;  Laterality: N/A;    EPIDURAL STEROID INJECTION INTO CERVICAL SPINE N/A 11/1/2019    Procedure: C7/T1 IL SUKHJINDER;  Surgeon: Donnell Jordan MD;  Location: [facility];  Service: Pain Management;  Laterality: N/A;    STOMACH SURGERY      TRANSFORAMINAL EPIDURAL INJECTION OF STEROID Bilateral 8/13/2019    Procedure: Injection,steroid,epidural,transforaminal approach;  Surgeon: Donnell Jordan MD;  Location: [facility];  Service: Pain Management;  Laterality: Bilateral;       Social History     Tobacco Use    Smoking status: Some Days     Packs/day: 0.50     Types: Cigarettes    Smokeless tobacco: Never   Substance Use Topics    Alcohol use: Yes     Alcohol/week: 12.0 standard drinks     Types: 12 Cans of beer per week     Comment: weekends only    Drug use: No       Family History   Problem Relation Age of Onset    Hypertension Father        Patient's  "Medications   New Prescriptions    No medications on file   Previous Medications    BLOOD SUGAR DIAGNOSTIC STRP    Use to check blood sugar twice a day    EMPAGLIFLOZIN (JARDIANCE) 10 MG TABLET    Take 1 tablet (10 mg total) by mouth once daily.    EZETIMIBE (ZETIA) 10 MG TABLET    TAKE 1 TABLET EVERY DAY    GABAPENTIN (NEURONTIN) 600 MG TABLET    Take 1 tablet (600 mg total) by mouth 3 (three) times daily. may cause drowsiness    HUMALOG KWIKPEN INSULIN 100 UNIT/ML PEN    Inject into the skin three times a day before each meal per sliding scale. If -200: 2 units; -250: 4 units; -300: 6 units; -350: 8 units; BG >350: 10 unit    INSULIN DETEMIR U-100 (LEVEMIR FLEXTOUCH) 100 UNIT/ML (3 ML) SUBQ INPN PEN    Inject 16 Units into the skin 2 (two) times daily.    LISINOPRIL (PRINIVIL,ZESTRIL) 5 MG TABLET    Take 1 tablet (5 mg total) by mouth once daily.    PEN NEEDLE, DIABETIC (BD CHRISTIANO 2ND GEN PEN NEEDLE) 32 GAUGE X 5/32" NDLE    Use five times daily    PREGABALIN (LYRICA) 75 MG CAPSULE    Take 1 capsule (75 mg total) by mouth 2 (two) times daily for 15 days, THEN 2 capsules (150 mg total) 2 (two) times daily for 15 days. Take 1 capsule QHS x 1 week, then increase to BID (if tolerated)..    ROSUVASTATIN (CRESTOR) 40 MG TAB    TAKE 1 TABLET EVERY EVENING    TRUE METRIX AIR GLUCOSE METER KIT    USE AS DIRECTED    TRUE[facility] LANCETS [facility]    USE  TO  CHECK  BLOOD  SUGAR TWICE DAILY    WARFARIN (COUMADIN) 5 MG TABLET    Take 1.5 tablets (7.5 mg total) by mouth every Mon, Wed, Fri AND 2 tablets (10 mg total) every Tuesday, Thursday, Saturday, Sunday.   Modified Medications    Modified Medication Previous Medication    METOPROLOL TARTRATE (LOPRESSOR) 50 MG TABLET metoprolol tartrate (LOPRESSOR) 50 MG tablet       Take 1 tablet (50 mg total) by mouth once daily.    Take 1 tablet (50 mg total) by mouth once daily.   Discontinued Medications    No medications on file       Review of Systems "   Constitutional:  Positive for malaise/fatigue.   HENT: Negative.     Eyes: Negative.    Respiratory:  Positive for shortness of breath.    Cardiovascular:  Positive for chest pain and palpitations.   Gastrointestinal: Negative.    Genitourinary: Negative.    Musculoskeletal:  Positive for back pain and joint pain.   Skin: Negative.    Neurological: Negative.    Endo/Heme/Allergies: Negative.    Psychiatric/Behavioral: Negative.     All 12 systems otherwise negative.    Wt Readings from Last 3 Encounters:   12/21/22 94.8 kg (208 lb 15.9 oz)   09/30/22 90.7 kg (200 lb)   09/06/22 89.8 kg (198 lb)     Temp Readings from Last 3 Encounters:   09/30/22 98.9 °F (37.2 °C) (Skin)   08/07/22 98.3 °F (36.8 °C) (Oral)   01/27/22 97.3 °F (36.3 °C) (Oral)     BP Readings from Last 3 Encounters:   12/21/22 126/84   09/30/22 130/87   09/06/22 114/60     Pulse Readings from Last 3 Encounters:   12/21/22 72   09/30/22 (!) 56   09/06/22 64       /84   Pulse 72   Wt 94.8 kg (208 lb 15.9 oz)   SpO2 98%   BMI 24.15 kg/m²     Objective:   Physical Exam  Vitals and nursing note reviewed.   Constitutional:       General: He is not in acute distress.     Appearance: He is well-developed. He is not diaphoretic.   HENT:      Head: Normocephalic and atraumatic.      Nose: Nose normal.   Eyes:      General: No scleral icterus.     Conjunctiva/sclera: Conjunctivae normal.   Neck:      Thyroid: No thyromegaly.      Vascular: No JVD.   Cardiovascular:      Rate and Rhythm: Normal rate and regular rhythm.      Heart sounds: S1 normal and S2 normal. No murmur heard.    No friction rub. No gallop. No S3 or S4 sounds.   Pulmonary:      Effort: Pulmonary effort is normal. No respiratory distress.      Breath sounds: Normal breath sounds. No stridor. No wheezing or rales.   Chest:      Chest wall: No tenderness.   Abdominal:      General: Bowel sounds are normal. There is no distension.      Palpations: Abdomen is soft. There is no mass.       Tenderness: There is no abdominal tenderness. There is no rebound.   Genitourinary:     Comments: Deferred  Musculoskeletal:         General: No tenderness or deformity. Normal range of motion.      Cervical back: Normal range of motion and neck supple.   Lymphadenopathy:      Cervical: No cervical adenopathy.   Skin:     General: Skin is warm and dry.      Coloration: Skin is not pale.      Findings: No erythema or rash.   Neurological:      Mental Status: He is alert and oriented to person, place, and time.      Motor: No abnormal muscle tone.      Coordination: Coordination normal.   Psychiatric:         Behavior: Behavior normal.         Thought Content: Thought content normal.         Judgment: Judgment normal.       Lab Results   Component Value Date     08/18/2022    K 4.0 08/18/2022     08/18/2022    CO2 23 08/18/2022    BUN 20 08/18/2022    CREATININE 1.1 08/18/2022     (H) 08/18/2022    HGBA1C 7.1 (H) 11/16/2022    AST 21 08/18/2022    ALT 26 08/18/2022    ALBUMIN 3.7 08/18/2022    PROT 7.0 08/18/2022    BILITOT 0.3 08/18/2022    WBC 6.25 02/07/2022    HGB 13.2 (L) 02/07/2022    HCT 40.0 02/07/2022    MCV 96 02/07/2022     02/07/2022    INR 3.6 (A) 12/15/2022    INR 7.7 (HH) 12/05/2022    TSH 2.172 01/24/2022    CHOL 151 01/24/2022    HDL 31 (L) 01/24/2022    LDLCALC 80.4 01/24/2022    TRIG 198 (H) 01/24/2022    BNP 47 01/25/2022     Assessment:      1. PAF (paroxysmal atrial fibrillation)    2. Long term (current) use of anticoagulants    3. Hypertension associated with diabetes    4. Other chest pain    5. Atrial fibrillation, unspecified type    6. Family history of coronary artery disease    7. Palpitations    8. Hyperlipidemia due to type 2 diabetes mellitus    9. DDD (degenerative disc disease), lumbar            Plan:   1. PAF, FH CAD   - change Eliquis to coumadin due to cost - had few INR 7.7, 8.0 due to alc   - Nuclear stress neg for ischemia 2/2022.   - Holter neg for  Afib 2/2022.     2. HLD  - cont statin    3. Tobacco abuse, alc abuse  - needs alc cessation  - needs cessation    4. DDD, lumbar  - cont tx per PCP  - f/u pain - will have injections     5. DM2 A1c 11.4 --> 6.8 --> 7.1; ED  - viagra PRN   - cont tx     6. HTN-  - cont low dose Lisinopril and cont BB    7. Preop CV eval  -Cscope with Dr. Barajas, teeth extractions with [facility]  - Low CV risk, cont BB  - ok to hold coumadin 5 days and resume post op as pt is in NSR  - needs close f/u with coumadin clinic    Thank you for allowing me to participate in this patient's care. Please do not hesitate to contact me with any questions or concerns. Consult note has been forwarded to the referral physician.     Denyn Cunningham MD, [facility]  Cardiovascular Disease  [facility], [place]  [phone] (P)

## 2022-12-22 NOTE — PROGRESS NOTES
12/22 received notification that procedure scheduled for 12/23 has been cancelled with no plans for rescheduling. Please see calendar for updated warfarin plan - will give boosted dose of 12.5mg tonight and then resume maintenance plan with repeat INR next week.

## 2022-12-29 ENCOUNTER — ANTI-COAG VISIT (OUTPATIENT)
Dept: CARDIOLOGY | Facility: CLINIC | Age: 57
End: 2022-12-29
Payer: MEDICARE

## 2022-12-29 DIAGNOSIS — Z79.01 LONG TERM (CURRENT) USE OF ANTICOAGULANTS: ICD-10-CM

## 2022-12-29 DIAGNOSIS — I48.0 PAF (PAROXYSMAL ATRIAL FIBRILLATION): Primary | ICD-10-CM

## 2022-12-29 LAB — INR PPP: 2.6 (ref 2–3)

## 2022-12-29 PROCEDURE — 93793 PR ANTICOAGULANT MGMT FOR PT TAKING WARFARIN: ICD-10-PCS | Mod: HCNC,S$GLB,,

## 2022-12-29 PROCEDURE — 93793 ANTICOAG MGMT PT WARFARIN: CPT | Mod: HCNC,S$GLB,,

## 2022-12-29 NOTE — PROGRESS NOTES
Patient's INR is therapeutic at 2.6.  Patient reports he had to cancel his SUKHJINDER that was scheduled on 12/23/22 and has not rescheduled.  Patient reports followed previous instructions.  Reports no other changes.  Instructions given: continue warfarin 10 mg on Thursdays; and 7.5 mg all other days.  Recheck on 1/10/23 with other appointment.  Calendar reviewed with patient.  Patient verbalizes understanding.

## 2023-01-10 ENCOUNTER — OFFICE VISIT (OUTPATIENT)
Dept: DIABETES | Facility: CLINIC | Age: 58
End: 2023-01-10
Payer: MEDICARE

## 2023-01-10 ENCOUNTER — ANTI-COAG VISIT (OUTPATIENT)
Dept: CARDIOLOGY | Facility: CLINIC | Age: 58
End: 2023-01-10
Payer: MEDICARE

## 2023-01-10 VITALS
HEART RATE: 65 BPM | BODY MASS INDEX: 24.25 KG/M2 | DIASTOLIC BLOOD PRESSURE: 86 MMHG | SYSTOLIC BLOOD PRESSURE: 133 MMHG | WEIGHT: 209.88 LBS

## 2023-01-10 DIAGNOSIS — I48.0 PAF (PAROXYSMAL ATRIAL FIBRILLATION): ICD-10-CM

## 2023-01-10 DIAGNOSIS — I15.2 HYPERTENSION ASSOCIATED WITH DIABETES: ICD-10-CM

## 2023-01-10 DIAGNOSIS — E78.5 HYPERLIPIDEMIA DUE TO TYPE 2 DIABETES MELLITUS: ICD-10-CM

## 2023-01-10 DIAGNOSIS — E11.69 HYPERLIPIDEMIA DUE TO TYPE 2 DIABETES MELLITUS: ICD-10-CM

## 2023-01-10 DIAGNOSIS — Z79.01 LONG TERM (CURRENT) USE OF ANTICOAGULANTS: Primary | ICD-10-CM

## 2023-01-10 DIAGNOSIS — E11.59 HYPERTENSION ASSOCIATED WITH DIABETES: ICD-10-CM

## 2023-01-10 DIAGNOSIS — E11.65 TYPE 2 DIABETES MELLITUS WITH HYPERGLYCEMIA, WITHOUT LONG-TERM CURRENT USE OF INSULIN: Primary | ICD-10-CM

## 2023-01-10 LAB
GLUCOSE SERPL-MCNC: 102 MG/DL (ref 70–110)
INR PPP: 4.4 (ref 2–3)

## 2023-01-10 PROCEDURE — 82962 GLUCOSE BLOOD TEST: CPT | Mod: HCNC,S$GLB,, | Performed by: NURSE PRACTITIONER

## 2023-01-10 PROCEDURE — 3008F BODY MASS INDEX DOCD: CPT | Mod: HCNC,CPTII,S$GLB, | Performed by: NURSE PRACTITIONER

## 2023-01-10 PROCEDURE — 85610 POCT INR: ICD-10-PCS | Mod: QW,HCNC,S$GLB, | Performed by: INTERNAL MEDICINE

## 2023-01-10 PROCEDURE — 93793 ANTICOAG MGMT PT WARFARIN: CPT | Mod: HCNC,S$GLB,,

## 2023-01-10 PROCEDURE — 1159F MED LIST DOCD IN RCRD: CPT | Mod: HCNC,CPTII,S$GLB, | Performed by: NURSE PRACTITIONER

## 2023-01-10 PROCEDURE — 93793 PR ANTICOAGULANT MGMT FOR PT TAKING WARFARIN: ICD-10-PCS | Mod: HCNC,S$GLB,,

## 2023-01-10 PROCEDURE — 82962 POCT GLUCOSE, HAND-HELD DEVICE: ICD-10-PCS | Mod: HCNC,S$GLB,, | Performed by: NURSE PRACTITIONER

## 2023-01-10 PROCEDURE — 85610 PROTHROMBIN TIME: CPT | Mod: QW,HCNC,S$GLB, | Performed by: INTERNAL MEDICINE

## 2023-01-10 PROCEDURE — 3075F SYST BP GE 130 - 139MM HG: CPT | Mod: HCNC,CPTII,S$GLB, | Performed by: NURSE PRACTITIONER

## 2023-01-10 PROCEDURE — 1160F PR REVIEW ALL MEDS BY PRESCRIBER/CLIN PHARMACIST DOCUMENTED: ICD-10-PCS | Mod: HCNC,CPTII,S$GLB, | Performed by: NURSE PRACTITIONER

## 2023-01-10 PROCEDURE — 99214 OFFICE O/P EST MOD 30 MIN: CPT | Mod: HCNC,S$GLB,, | Performed by: NURSE PRACTITIONER

## 2023-01-10 PROCEDURE — 99999 PR PBB SHADOW E&M-EST. PATIENT-LVL IV: ICD-10-PCS | Mod: PBBFAC,HCNC,, | Performed by: NURSE PRACTITIONER

## 2023-01-10 PROCEDURE — 3075F PR MOST RECENT SYSTOLIC BLOOD PRESS GE 130-139MM HG: ICD-10-PCS | Mod: HCNC,CPTII,S$GLB, | Performed by: NURSE PRACTITIONER

## 2023-01-10 PROCEDURE — 99214 PR OFFICE/OUTPT VISIT, EST, LEVL IV, 30-39 MIN: ICD-10-PCS | Mod: HCNC,S$GLB,, | Performed by: NURSE PRACTITIONER

## 2023-01-10 PROCEDURE — 99999 PR PBB SHADOW E&M-EST. PATIENT-LVL IV: CPT | Mod: PBBFAC,HCNC,, | Performed by: NURSE PRACTITIONER

## 2023-01-10 PROCEDURE — 1160F RVW MEDS BY RX/DR IN RCRD: CPT | Mod: HCNC,CPTII,S$GLB, | Performed by: NURSE PRACTITIONER

## 2023-01-10 PROCEDURE — 1159F PR MEDICATION LIST DOCUMENTED IN MEDICAL RECORD: ICD-10-PCS | Mod: HCNC,CPTII,S$GLB, | Performed by: NURSE PRACTITIONER

## 2023-01-10 PROCEDURE — 3008F PR BODY MASS INDEX (BMI) DOCUMENTED: ICD-10-PCS | Mod: HCNC,CPTII,S$GLB, | Performed by: NURSE PRACTITIONER

## 2023-01-10 PROCEDURE — 3079F PR MOST RECENT DIASTOLIC BLOOD PRESSURE 80-89 MM HG: ICD-10-PCS | Mod: HCNC,CPTII,S$GLB, | Performed by: NURSE PRACTITIONER

## 2023-01-10 PROCEDURE — 3079F DIAST BP 80-89 MM HG: CPT | Mod: HCNC,CPTII,S$GLB, | Performed by: NURSE PRACTITIONER

## 2023-01-10 NOTE — PROGRESS NOTES
PCP: Prachi Castano MD    Subjective:     Chief Complaint: Diabetes follow up.  Last visit with me: 10/10/2022    HPI: 57 y.o.  male for diabetes follow up.   Type II diabetes since August 2021.  Comorbidities: HTN, HLD and Atrial Fibrillation     Family History of Type 1/2 DM: no  Known diabetes complications:  DKA/HHS: yes, 1/25/2022  Retinopathy: no, +diabetic cataract  Peripheral neuropathy: no  Nephropathy: yes    Interval history:  Denies recent hospital admission or ER visit.  Denies having hypoglycemia.   Endorses diabetes related symptoms: None    Blood glucose monitoring via fingerstick: 1-2xday  Enrolled in digital diabetes medicine.  Per pt's entries over the last 2 weeks (see below)   Fasting BG 92 this morning, range 132 to mid 300's    AC dinner average 200-300's    Diabetes Digital Medicine Flowsheet Readings    Papa's recent readings (past 60 days):  Time Taken Glucose (mg/dL)   1/10/2023  7:21 AM 92   1/9/2023  7:59    1/9/2023  6:38    1/8/2023  9:42    1/7/2023 11:22    1/7/2023  9:03    1/6/2023  6:42    1/6/2023  6:30    1/5/2023  7:12    1/5/2023  2:27    1/5/2023  7:28    1/4/2023  6:38    1/3/2023  8:30    1/3/2023  9:13    1/2/2023  9:23    1/2/2023  3:54    1/2/2023  6:06       Previously on CGM Stacy, cost prohibitive.    Activity level: Sedentary- walking 30 mins, 2x day, 3x/wk started 3 weeks ago  Meal Planning: 3 meals/day;infrequent snacks/day.  Diet off track due to holidays.    Medication compliance all of the time, diet compliance most of the time.   To be enrolled in diabetes education classes.     Previous regimen: Metformin x 1 month    Past failed treatment: Trulicity - cost prohibitive    Current DM Medications:  Diabetes Medications               empagliflozin (JARDIANCE) 10 mg tablet Take 1 tablet (10 mg total) by mouth once daily.   Not started, cost prohibitive     insulin aspart U-100 (NOVOLOG) 100 unit/mL (3 mL) InPn pen Novolog units three times a day before each meal per sliding scale. If -200: 2 units; -250: 4 units; -300: 6 units; -350: 8 units; BG >350: 10 units    insulin detemir U-100 (LEVEMIR FLEXTOUCH) 100 unit/mL (3 mL) SubQ InPn pen Inject 16 Units into the skin 2 (two) times daily.     Allergies  Review of patient's allergies indicates:  No Known Allergies    Labs Reviewed:  His most recent A1C is:  Lab Results   Component Value Date    HGBA1C 7.1 (H) 11/16/2022    HGBA1C 6.8 (H) 08/18/2022    HGBA1C 6.3 (H) 07/27/2022       His most recent BMP is:  Lab Results   Component Value Date     08/18/2022    K 4.0 08/18/2022     08/18/2022    CO2 23 08/18/2022    BUN 20 08/18/2022    CREATININE 1.1 08/18/2022    CALCIUM 9.5 08/18/2022    ANIONGAP 10 08/18/2022    ESTGFRAFRICA >60 02/07/2022    EGFRNONAA 56 (A) 02/07/2022       No results found for: CPEPTIDE  No results found for: GLUTAMICACID    Body mass index is 24.25 kg/m².    Weight gain 9 lbs. since last visit.  Wt Readings from Last 3 Encounters:   01/10/23 0823 95.2 kg (209 lb 14.1 oz)   12/21/22 0905 94.8 kg (208 lb 15.9 oz)   09/30/22 1158 90.7 kg (200 lb)        His blood sugar in clinic today is:  Lab Results   Component Value Date    POCGLU 102 01/10/2023       Diabetes Management Status  Statin: Taking  ACE/ARB: Taking    Screening or Prevention Patient's value Goal Complete/Controlled?   HgA1C Testing and Control   Lab Results   Component Value Date    HGBA1C 7.1 (H) 11/16/2022      Annually/Less than 8% Yes     Lipid profile : 01/24/2022 Annually Yes     LDL control Lab Results   Component Value Date    LDLCALC 80.4 01/24/2022    Annually/Less than 100 mg/dl  Yes     Nephropathy screening Lab Results   Component Value Date    MICALBCREAT 148.8 (H) 01/25/2022     Lab Results   Component Value Date    PROTEINUA Negative 01/25/2022    Annually Yes     Blood pressure BP  Readings from Last 1 Encounters:   01/10/23 133/86    Less than 140/90 Yes     Dilated retinal exam : 02/28/2022 Annually Yes    Foot exam   : 02/15/2022 Annually Yes       Social History     Socioeconomic History    Marital status:    Tobacco Use    Smoking status: Some Days     Packs/day: 0.50     Types: Cigarettes    Smokeless tobacco: Never   Substance and Sexual Activity    Alcohol use: Yes     Alcohol/week: 12.0 standard drinks     Types: 12 Cans of beer per week     Comment: weekends only    Drug use: No    Sexual activity: Yes     Past Medical History:   Diagnosis Date    Acute renal failure 1/25/2022    Arthritis     Atrial fibrillation with RVR 1/25/2022    Back pain     Hyperlipidemia     Hypertension     Pneumonia     Polyneuropathy     Tobacco dependence     Type 2 diabetes mellitus without complication, without long-term current use of insulin 8/4/2021     Review of Systems   Constitutional: Negative for activity change, appetite change, fatigue and unexpected weight change.   HENT: Negative for dental problem and trouble swallowing.    Eyes: Negative for visual disturbance.   Respiratory: Negative for chest tightness and shortness of breath.    Cardiovascular: Negative for chest pain, palpitations and leg swelling.   Gastrointestinal: Negative for abdominal pain, constipation, diarrhea, nausea and vomiting.   Endocrine: Negative for polydipsia, polyphagia and polyuria.   Genitourinary: Negative for difficulty urinating, dysuria, frequency and urgency.        Negative yeast infection   Musculoskeletal: Positive for gait problem (ambulates with cane). Negative for myalgias and neck pain.   Integumentary:  Negative for rash and wound.   Allergic/Immunologic: Negative.    Neurological: Negative for dizziness, syncope, weakness, numbness and headaches.   Hematological: Negative.    Psychiatric/Behavioral: Negative for confusion and sleep disturbance.   All other systems reviewed and are negative.      Objective:      Physical Exam  Vitals reviewed.   Constitutional:       Appearance: Normal appearance.   HENT:      Head: Normocephalic and atraumatic.   Eyes:      General: Vision grossly intact.      Extraocular Movements: Extraocular movements intact.      Pupils: Pupils are equal, round, and reactive to light.   Neck:      Thyroid: No thyroid mass or thyroid tenderness.   Cardiovascular:      Rate and Rhythm: Normal rate and regular rhythm.      Pulses: Normal pulses.           Radial pulses are 2+ on the right side and 2+ on the left side.        Dorsalis pedis pulses are 2+ on the right side and 2+ on the left side.      Heart sounds: Normal heart sounds.   Pulmonary:      Effort: Pulmonary effort is normal.      Breath sounds: Normal breath sounds.   Abdominal:      General: Bowel sounds are normal.      Palpations: Abdomen is soft.   Musculoskeletal:         General: Normal range of motion.      Cervical back: Normal range of motion and neck supple.      Right lower leg: No edema.      Left lower leg: No edema.      Right foot: No deformity or bunion.      Left foot: No deformity or bunion.   Feet:      Right foot:       Skin integrity: Skin integrity normal. No ulcer, skin breakdown, callus or dry skin.      Toenail Condition: Right toenails are abnormally thick and long.      Left foot:      Skin integrity: Skin integrity normal. No ulcer, skin breakdown, callus or dry skin.      Toenail Condition: Left toenails are abnormally thick and long.      Comments: Fungal disease on all toes.  Lymphadenopathy:      Cervical: No cervical adenopathy.   Skin:     General: Skin is warm and dry.      Findings: No rash or wound.      Comments: Negative for acanthosis nigricans. Well-healed SQ injection sites.   Neurological:      Mental Status: He is alert and oriented to person, place, and time.      Coordination: Coordination is intact.      Gait: Gait is intact.   Psychiatric:         Attention and Perception:  Attention normal.         Mood and Affect: Mood normal.         Speech: Speech normal.         Behavior: Behavior normal. Behavior is cooperative.         Thought Content: Thought content normal.         Cognition and Memory: Cognition normal.   Assessment / Plan:     Papa was seen today for diabetes mellitus.    Diagnoses and all orders for this visit:    Type 2 diabetes mellitus with hyperglycemia, without long-term current use of insulin  -     POCT Glucose, Hand-Held Device  -     Hemoglobin A1C; Future  -     insulin detemir U-100 (LEVEMIR FLEXTOUCH) 100 unit/mL (3 mL) SubQ InPn pen; Inject 16 Units into the skin 2 (two) times daily.  -     empagliflozin (JARDIANCE) 25 mg tablet; Take 1 tablet (25 mg total) by mouth every morning.  -     Microalbumin/Creatinine Ratio, Urine; Future    Hypertension associated with diabetes  - On ACE  - Continue current meds  - PCP for management     Hyperlipidemia due to type 2 diabetes mellitus   - LDL controlled  - On Statin  - Continue current meds  - PCP for management     Additional Plan Details:  - Condition: controlled, most recent A1C of  6.8 % was completed 6 weeks ago.   - Monitor blood glucose:  3-4x daily.Goals reviewed. Maintain BG log. Continue digital diabetes medicine enrollment.   - Hypoglycemia protocol: Reviewed and risk discussed.  Notify if BG readings below 80. Protocol printout provided.   - Diet: Limit carbohydrate intake 30-45 grams/meal, 3x daily and 15 grams/snack , limit of two daily. Agreed to resume diet.  - Activity: Recommend at least 150 minutes of exercise in a week.  - BP and LDL: Recommend lifestyle modifications and follow up with PCP for management.   - Medication Changes:   Begin taking Jardiance 25 mg every morning (pharmacy assistance to cover cost)  Continue Levemir 16 units twice a day  Continue Novolog three times a day before each meal per sliding scale    If premeal blood glucose 150-200: give 2 units   If premeal blood glucose  201-250: give 4 units   If premeal blood glucose 251-300: give 6 units   If premeal blood glucose 301-350: give 8 units   If premeal blood glucose greater than 350: give 10 units     - Medication consideration: Titrate basal/bolus insulin to goal BG. Added SGLT2 inhibitor for cardiac/renal benefit.    - Lab results: A1C discussed.  - Health Maintenance standards addressed today: A1c to be scheduled.  - Risks of uncontrolled DM explained. Importance of routine foot and eye exams reviewed. Scheduled as appropriate.  -The patient was explained the above plan and given opportunity to ask questions.  He understands, chooses and consents to this plan and accepts all the risks, which include but are not limited to the risks mentioned above.   - Labs ordered as above.  - CDE Referral: Scheduled  - Follow up:  3 months, in clinic.         Charlotte T. Delacruz, FNP-C [facility]    A total of 30 minutes was spent in face to face time, of which over 50 % was spent in counseling patient on disease process, complications, treatment, and side effects of medications.

## 2023-01-10 NOTE — PATIENT INSTRUCTIONS
Medication Regimen:   Begin taking Jardiance 25 mg every morning  Continue Levemir 16 units twice a day  Continue Novolog three times a day before each meal per sliding scale    If premeal blood glucose 150-200: give 2 units   If premeal blood glucose 201-250: give 4 units   If premeal blood glucose 251-300: give 6 units   If premeal blood glucose 301-350: give 8 units   If premeal blood glucose greater than 350: give 10 units    Patient Instructions:  Carbohydrate Count: 30-45 grams/meal and 15 grams/snack with limit of 2 snacks per day.  Exercise: Goal is 150 minutes or more per week.  Bring meter and blood sugar log to each appointment.     Goals for Blood Sugar:  Fastin-130 mg/dl  2 hours after meal:  mg/dl  Before Bed: 100-150 mg/dl  If Blood sugar is below 70 mg/dl, DO NOT take diabetes medication. Eat first and then recheck blood sugar in 20 minutes.  Foods to eat if blood sugar is below 70 mg/dl  1/2 glass of orange juice   1/2 regular cola can   3-4 hard candies   3-4 small glucose tabs.   Foods to eat if blood sugar is below 50 mg/dl  1 glass of orange juice  1 regular cola can  8 hard candies  8 small glucose tabs.   If you have repeated eating/blood sugar recheck process 2 times and blood sugar still below 70 mg/dl, call 911.

## 2023-01-10 NOTE — PROGRESS NOTES
Patient's INR is supra-therapeutic at 4.4.  Patient reports followed previous dose instructions.  Reports drank alcoholic beverages this weekend.  Advised patient  of increased risk of bleeding;signs/symptoms of bleeding and need to go to ED if experiences any.  Patient reports no signs/symptoms of bleeding.  Sent to PharmD for dosing/instructions.

## 2023-01-10 NOTE — PROGRESS NOTES
INR not at goal. Medications, chart, and patient findings reviewed. See calendar for adjustments to dose and follow up plan.  Patient needs to reduce ETOH intake as this significantly increases his risk of bleeding.  HIs INR is generally in range without ETOH.  No dose change this time as we have recently reduced dose.

## 2023-01-19 ENCOUNTER — TELEPHONE (OUTPATIENT)
Dept: DIABETES | Facility: CLINIC | Age: 58
End: 2023-01-19
Payer: MEDICARE

## 2023-01-19 NOTE — TELEPHONE ENCOUNTER
Received request from [facility] medical for Dexcom supply order. Submitted order via [place].

## 2023-01-24 ENCOUNTER — ANTI-COAG VISIT (OUTPATIENT)
Dept: CARDIOLOGY | Facility: CLINIC | Age: 58
End: 2023-01-24
Payer: MEDICARE

## 2023-01-24 DIAGNOSIS — Z79.01 LONG TERM (CURRENT) USE OF ANTICOAGULANTS: Primary | ICD-10-CM

## 2023-01-24 DIAGNOSIS — I48.0 PAF (PAROXYSMAL ATRIAL FIBRILLATION): ICD-10-CM

## 2023-01-24 LAB — INR PPP: 5.8 (ref 2–3)

## 2023-01-24 PROCEDURE — 85610 POCT INR: ICD-10-PCS | Mod: QW,HCNC,S$GLB, | Performed by: INTERNAL MEDICINE

## 2023-01-24 PROCEDURE — 85610 PROTHROMBIN TIME: CPT | Mod: QW,HCNC,S$GLB, | Performed by: INTERNAL MEDICINE

## 2023-01-24 PROCEDURE — 93793 ANTICOAG MGMT PT WARFARIN: CPT | Mod: HCNC,S$GLB,,

## 2023-01-24 PROCEDURE — 93793 PR ANTICOAGULANT MGMT FOR PT TAKING WARFARIN: ICD-10-PCS | Mod: HCNC,S$GLB,,

## 2023-01-24 NOTE — PROGRESS NOTES
INR not at goal. Medications, chart, and patient findings reviewed. See calendar for adjustments to dose and follow up plan.  Patient needs to stop drinking as he does not drink in moderation.  He will continue to have dangerously elevated INRs putting him at risk for bleeding.  ETOH putting him at risk for falls as well, which could result SDH, ICH etc. We will hold and lower dose.  Lower dose that may procedure subtherapeutic INRs in between testing puts him at risk for stroke/clots.  This will be reiterated to him again today.  May need to consider MD following INR if patient continues to drink.

## 2023-01-24 NOTE — PROGRESS NOTES
Patient's INR is supra-therapeutic at 5.8.  Patient reports followed previous instructions.  Patient reports may have drank more beer than usual this weekend.  Advised of increased risk of bleeding; signs/symptoms of bleeding and need to go to ED if experiences any.  Patient reports not having any signs/symptoms of bleeding.  Sent to PharmD for dosing/instructions.

## 2023-01-27 ENCOUNTER — TELEPHONE (OUTPATIENT)
Dept: DIABETES | Facility: CLINIC | Age: 58
End: 2023-01-27
Payer: MEDICARE

## 2023-01-27 NOTE — TELEPHONE ENCOUNTER
Received fax request for dexcom supplies from [facility]. Order submitted via [place] on 1/19/23 and status says pending review.

## 2023-01-31 ENCOUNTER — ANTI-COAG VISIT (OUTPATIENT)
Dept: CARDIOLOGY | Facility: CLINIC | Age: 58
End: 2023-01-31
Payer: MEDICARE

## 2023-01-31 DIAGNOSIS — I48.0 PAF (PAROXYSMAL ATRIAL FIBRILLATION): ICD-10-CM

## 2023-01-31 DIAGNOSIS — Z79.01 LONG TERM (CURRENT) USE OF ANTICOAGULANTS: Primary | ICD-10-CM

## 2023-01-31 LAB — INR PPP: 5.3 (ref 2–3)

## 2023-01-31 PROCEDURE — 85610 PROTHROMBIN TIME: CPT | Mod: QW,HCNC,S$GLB, | Performed by: INTERNAL MEDICINE

## 2023-01-31 PROCEDURE — 93793 PR ANTICOAGULANT MGMT FOR PT TAKING WARFARIN: ICD-10-PCS | Mod: HCNC,S$GLB,,

## 2023-01-31 PROCEDURE — 85610 POCT INR: ICD-10-PCS | Mod: QW,HCNC,S$GLB, | Performed by: INTERNAL MEDICINE

## 2023-01-31 PROCEDURE — 93793 ANTICOAG MGMT PT WARFARIN: CPT | Mod: HCNC,S$GLB,,

## 2023-01-31 NOTE — PROGRESS NOTES
Patient's INR is supra-therapeutic at 5.3.  Patient reports followed previous instructions.  Reports he has been taking various liquid cold medications OTC.  Re-educated patient on Coumadin Diet and Therapy and increased risk of bleeding with taking liquid cough/cold medications.  Advised to take tablets or capsules. Advised of increased risk of bleeding;  signs/symptoms of bleeding and need to go to ED if experiences any.  Patient reports not having any signs/symptoms of bleeding.  Sent to PharmD for dosing/instructions.

## 2023-01-31 NOTE — PROGRESS NOTES
INR not at goal. Medications, chart, and patient findings reviewed. See calendar for adjustments to dose and follow up plan.  STOP OTCs and avoid alcohol.  Risk of bleeding is significantly increased with continued elevated INRs.  I am again holding 2 doses of warfarin and lowering his weekly dose until INR is under control.  ER with any s/s of bleeding.

## 2023-02-02 NOTE — TELEPHONE ENCOUNTER
Physician order received from [facility] for CGM. Completed and signed by Ankita and faxed back.

## 2023-02-07 DIAGNOSIS — Z00.00 ENCOUNTER FOR MEDICARE ANNUAL WELLNESS EXAM: ICD-10-CM

## 2023-02-09 ENCOUNTER — ANTI-COAG VISIT (OUTPATIENT)
Dept: CARDIOLOGY | Facility: CLINIC | Age: 58
End: 2023-02-09
Payer: MEDICARE

## 2023-02-09 DIAGNOSIS — Z00.00 ENCOUNTER FOR MEDICARE ANNUAL WELLNESS EXAM: ICD-10-CM

## 2023-02-09 DIAGNOSIS — Z79.01 LONG TERM (CURRENT) USE OF ANTICOAGULANTS: Primary | ICD-10-CM

## 2023-02-09 DIAGNOSIS — I48.0 PAF (PAROXYSMAL ATRIAL FIBRILLATION): ICD-10-CM

## 2023-02-09 LAB — INR PPP: 4.8 (ref 2–3)

## 2023-02-09 PROCEDURE — 93793 PR ANTICOAGULANT MGMT FOR PT TAKING WARFARIN: ICD-10-PCS | Mod: HCNC,S$GLB,,

## 2023-02-09 PROCEDURE — 85610 PROTHROMBIN TIME: CPT | Mod: QW,HCNC,S$GLB, | Performed by: INTERNAL MEDICINE

## 2023-02-09 PROCEDURE — 85610 POCT INR: ICD-10-PCS | Mod: QW,HCNC,S$GLB, | Performed by: INTERNAL MEDICINE

## 2023-02-09 PROCEDURE — 93793 ANTICOAG MGMT PT WARFARIN: CPT | Mod: HCNC,S$GLB,,

## 2023-02-09 NOTE — PROGRESS NOTES
Patient's INR is supra-therapeutic at 4.8.  Patient reports followed previous dosing/instructions.  Reports drank a couple of alcoholic beverages on 2/8/23.  Re-educated patient on increased risk of bleeding with consumption of alcohol; signs/symptoms of bleeding and need to go to ED if experiences any.  Patient reports not having any signs/symptoms of bleeding.  Sent PharmD for dosing/instructions.

## 2023-02-09 NOTE — PROGRESS NOTES
INR not at goal. Medications, chart, and patient findings reviewed. See calendar for adjustments to dose and follow up plan.  I am recommending that he stops all alcohol intake to allow INR to improve.  He continues to drink despite our efforts and reminders.  Patient continues to say that he will stop drinking.  My concerns are many but GIB is definitely a priority concern.  Additionally, if I continue to lower his dose and his INR drop and he is subtherapeutic, his risk of stroke increases.  I will notify Dr Cunningham.

## 2023-02-20 ENCOUNTER — OFFICE VISIT (OUTPATIENT)
Dept: INTERNAL MEDICINE | Facility: CLINIC | Age: 58
End: 2023-02-20
Payer: MEDICARE

## 2023-02-20 ENCOUNTER — LAB VISIT (OUTPATIENT)
Dept: LAB | Facility: HOSPITAL | Age: 58
End: 2023-02-20
Payer: MEDICARE

## 2023-02-20 VITALS
RESPIRATION RATE: 20 BRPM | DIASTOLIC BLOOD PRESSURE: 76 MMHG | HEIGHT: 78 IN | HEART RATE: 64 BPM | WEIGHT: 208.75 LBS | BODY MASS INDEX: 24.15 KG/M2 | OXYGEN SATURATION: 99 % | SYSTOLIC BLOOD PRESSURE: 130 MMHG

## 2023-02-20 DIAGNOSIS — M51.36 DDD (DEGENERATIVE DISC DISEASE), LUMBAR: ICD-10-CM

## 2023-02-20 DIAGNOSIS — E11.42 ONYCHOMYCOSIS OF MULTIPLE TOENAILS WITH TYPE 2 DIABETES MELLITUS AND PERIPHERAL NEUROPATHY: ICD-10-CM

## 2023-02-20 DIAGNOSIS — N18.32 TYPE 2 DIABETES MELLITUS WITH STAGE 3B CHRONIC KIDNEY DISEASE, WITH LONG-TERM CURRENT USE OF INSULIN: ICD-10-CM

## 2023-02-20 DIAGNOSIS — I15.2 HYPERTENSION ASSOCIATED WITH DIABETES: Primary | ICD-10-CM

## 2023-02-20 DIAGNOSIS — Z12.5 ENCOUNTER FOR SCREENING FOR MALIGNANT NEOPLASM OF PROSTATE: ICD-10-CM

## 2023-02-20 DIAGNOSIS — E11.69 ONYCHOMYCOSIS OF MULTIPLE TOENAILS WITH TYPE 2 DIABETES MELLITUS AND PERIPHERAL NEUROPATHY: ICD-10-CM

## 2023-02-20 DIAGNOSIS — E11.59 HYPERTENSION ASSOCIATED WITH DIABETES: Primary | ICD-10-CM

## 2023-02-20 DIAGNOSIS — E11.22 TYPE 2 DIABETES MELLITUS WITH STAGE 3B CHRONIC KIDNEY DISEASE, WITH LONG-TERM CURRENT USE OF INSULIN: ICD-10-CM

## 2023-02-20 DIAGNOSIS — E11.65 TYPE 2 DIABETES MELLITUS WITH HYPERGLYCEMIA, WITHOUT LONG-TERM CURRENT USE OF INSULIN: ICD-10-CM

## 2023-02-20 DIAGNOSIS — Z79.4 TYPE 2 DIABETES MELLITUS WITH STAGE 3B CHRONIC KIDNEY DISEASE, WITH LONG-TERM CURRENT USE OF INSULIN: ICD-10-CM

## 2023-02-20 DIAGNOSIS — E11.42 DIABETIC POLYNEUROPATHY ASSOCIATED WITH TYPE 2 DIABETES MELLITUS: ICD-10-CM

## 2023-02-20 DIAGNOSIS — I48.0 PAF (PAROXYSMAL ATRIAL FIBRILLATION): ICD-10-CM

## 2023-02-20 DIAGNOSIS — Z86.010 HISTORY OF COLON POLYPS: ICD-10-CM

## 2023-02-20 DIAGNOSIS — I77.9 CAROTID ARTERY DISEASE, UNSPECIFIED LATERALITY, UNSPECIFIED TYPE: ICD-10-CM

## 2023-02-20 DIAGNOSIS — B35.1 ONYCHOMYCOSIS OF MULTIPLE TOENAILS WITH TYPE 2 DIABETES MELLITUS AND PERIPHERAL NEUROPATHY: ICD-10-CM

## 2023-02-20 DIAGNOSIS — E11.69 HYPERLIPIDEMIA DUE TO TYPE 2 DIABETES MELLITUS: ICD-10-CM

## 2023-02-20 DIAGNOSIS — E78.5 HYPERLIPIDEMIA DUE TO TYPE 2 DIABETES MELLITUS: ICD-10-CM

## 2023-02-20 LAB
ALBUMIN/CREAT UR: 149.5 UG/MG (ref 0–30)
CREAT UR-MCNC: 91 MG/DL (ref 23–375)
MICROALBUMIN UR DL<=1MG/L-MCNC: 136 UG/ML

## 2023-02-20 PROCEDURE — 3075F SYST BP GE 130 - 139MM HG: CPT | Mod: HCNC,CPTII,S$GLB, | Performed by: FAMILY MEDICINE

## 2023-02-20 PROCEDURE — 1160F PR REVIEW ALL MEDS BY PRESCRIBER/CLIN PHARMACIST DOCUMENTED: ICD-10-PCS | Mod: HCNC,CPTII,S$GLB, | Performed by: FAMILY MEDICINE

## 2023-02-20 PROCEDURE — 99214 OFFICE O/P EST MOD 30 MIN: CPT | Mod: HCNC,S$GLB,, | Performed by: FAMILY MEDICINE

## 2023-02-20 PROCEDURE — 99999 PR PBB SHADOW E&M-EST. PATIENT-LVL IV: CPT | Mod: PBBFAC,HCNC,, | Performed by: FAMILY MEDICINE

## 2023-02-20 PROCEDURE — 3008F BODY MASS INDEX DOCD: CPT | Mod: HCNC,CPTII,S$GLB, | Performed by: FAMILY MEDICINE

## 2023-02-20 PROCEDURE — 3008F PR BODY MASS INDEX (BMI) DOCUMENTED: ICD-10-PCS | Mod: HCNC,CPTII,S$GLB, | Performed by: FAMILY MEDICINE

## 2023-02-20 PROCEDURE — 82570 ASSAY OF URINE CREATININE: CPT | Mod: 91,HCNC | Performed by: NURSE PRACTITIONER

## 2023-02-20 PROCEDURE — 3075F PR MOST RECENT SYSTOLIC BLOOD PRESS GE 130-139MM HG: ICD-10-PCS | Mod: HCNC,CPTII,S$GLB, | Performed by: FAMILY MEDICINE

## 2023-02-20 PROCEDURE — 99999 PR PBB SHADOW E&M-EST. PATIENT-LVL IV: ICD-10-PCS | Mod: PBBFAC,HCNC,, | Performed by: FAMILY MEDICINE

## 2023-02-20 PROCEDURE — 1160F RVW MEDS BY RX/DR IN RCRD: CPT | Mod: HCNC,CPTII,S$GLB, | Performed by: FAMILY MEDICINE

## 2023-02-20 PROCEDURE — 1159F PR MEDICATION LIST DOCUMENTED IN MEDICAL RECORD: ICD-10-PCS | Mod: HCNC,CPTII,S$GLB, | Performed by: FAMILY MEDICINE

## 2023-02-20 PROCEDURE — 99214 PR OFFICE/OUTPT VISIT, EST, LEVL IV, 30-39 MIN: ICD-10-PCS | Mod: HCNC,S$GLB,, | Performed by: FAMILY MEDICINE

## 2023-02-20 PROCEDURE — 3078F DIAST BP <80 MM HG: CPT | Mod: HCNC,CPTII,S$GLB, | Performed by: FAMILY MEDICINE

## 2023-02-20 PROCEDURE — 1159F MED LIST DOCD IN RCRD: CPT | Mod: HCNC,CPTII,S$GLB, | Performed by: FAMILY MEDICINE

## 2023-02-20 PROCEDURE — 3078F PR MOST RECENT DIASTOLIC BLOOD PRESSURE < 80 MM HG: ICD-10-PCS | Mod: HCNC,CPTII,S$GLB, | Performed by: FAMILY MEDICINE

## 2023-02-20 RX ORDER — EZETIMIBE 10 MG/1
10 TABLET ORAL DAILY
Qty: 90 TABLET | Refills: 1 | Status: SHIPPED | OUTPATIENT
Start: 2023-02-20 | End: 2023-08-21 | Stop reason: SDUPTHER

## 2023-02-20 RX ORDER — LISINOPRIL 5 MG/1
5 TABLET ORAL DAILY
Qty: 90 TABLET | Refills: 1 | Status: SHIPPED | OUTPATIENT
Start: 2023-02-20 | End: 2023-08-21 | Stop reason: SDUPTHER

## 2023-02-20 RX ORDER — PREGABALIN 75 MG/1
75 CAPSULE ORAL 2 TIMES DAILY
Qty: 60 CAPSULE | Refills: 3 | Status: SHIPPED | OUTPATIENT
Start: 2023-02-20 | End: 2023-08-21 | Stop reason: SDUPTHER

## 2023-02-20 RX ORDER — ROSUVASTATIN CALCIUM 40 MG/1
40 TABLET, COATED ORAL NIGHTLY
Qty: 90 TABLET | Refills: 1 | Status: SHIPPED | OUTPATIENT
Start: 2023-02-20 | End: 2023-08-21 | Stop reason: SDUPTHER

## 2023-02-20 NOTE — PROGRESS NOTES
"Subjective:       Patient ID: Papa Pope is a 57 y.o. male.    Chief Complaint: Follow-up    57-year-old  male patient with Patient Active Problem List:     Hyperlipidemia due to type 2 diabetes mellitus     Hypertension associated with diabetes     DDD (degenerative disc disease), lumbar     History of colon polyps     Smoker     Type 2 diabetes mellitus with hyperglycemia, without long-term current use of insulin     Leg weakness, bilateral     Hyperosmolar hyperglycemic state (HHS)     Acute renal failure     Hyperkalemia     PAF (paroxysmal atrial fibrillation)     On Coumadin for atrial fibrillation     Carotid artery disease     Type 2 diabetes mellitus with hypertriglyceridemia     Long term (current) use of insulin     Type 2 diabetes mellitus with stage 3b chronic kidney disease, with long-term current use of insulin     Diabetic polyneuropathy associated with type 2 diabetes mellitus  Here for follow-up on chronic medical conditions  Patient has not been watching his diet closely sugars have been running in the range of 200s  Denies any chest pain difficulty breathing with palpitations  Reports improvement with neuropathy symptoms taking Lyrica  Requesting refills on few medications today    Review of Systems   Constitutional:  Negative for appetite change and fatigue.   Eyes:  Negative for visual disturbance.   Respiratory:  Negative for shortness of breath.    Cardiovascular:  Negative for chest pain, palpitations and leg swelling.   Gastrointestinal:  Negative for abdominal pain, nausea and vomiting.   Endocrine: Negative for polydipsia and polyuria.   Musculoskeletal:  Negative for myalgias.   Skin:  Negative for rash.   Neurological:  Positive for numbness. Negative for weakness and headaches.   Psychiatric/Behavioral:  Negative for sleep disturbance.        /76 (BP Location: Left arm, Patient Position: Sitting, BP Method: Medium (Manual))   Pulse 64   Resp 20   Ht 6' 6" " (1.981 m)   Wt 94.7 kg (208 lb 12.4 oz)   SpO2 99%   BMI 24.13 kg/m²   Objective:      Physical Exam  Cardiovascular:      Pulses:           Dorsalis pedis pulses are 2+ on the left side.   Feet:      Right foot:      Protective Sensation: 10 sites tested.  10 sites sensed.      Skin integrity: Callus and dry skin present.      Toenail Condition: Fungal disease present.     Left foot:      Protective Sensation: 10 sites tested.        Skin integrity: Callus and dry skin present.      Toenail Condition: Fungal disease present.        Assessment/Plan:   1. Hypertension associated with diabetes  -     Urinalysis, Reflex to Urine Culture Urine, Clean Catch; Future; Expected date: 02/20/2023  -     Hemoglobin A1C; Future; Expected date: 02/20/2023  -     TSH; Future; Expected date: 02/20/2023  -     Lipid Panel; Future; Expected date: 02/20/2023  -     Comprehensive Metabolic Panel; Future; Expected date: 02/20/2023  -     CBC Auto Differential; Future; Expected date: 02/20/2023  -     Microalbumin/Creatinine Ratio, Urine; Future; Expected date: 02/20/2023  -     PSA, Screening; Future; Expected date: 02/20/2023  -     lisinopriL (PRINIVIL,ZESTRIL) 5 MG tablet; Take 1 tablet (5 mg total) by mouth once daily.  Dispense: 90 tablet; Refill: 1  Blood pressure well controlled on metoprolol 50 mg daily lisinopril 5 mg    2. Hyperlipidemia due to type 2 diabetes mellitus  -     Lipid Panel; Future; Expected date: 02/20/2023  -     rosuvastatin (CRESTOR) 40 MG Tab; Take 1 tablet (40 mg total) by mouth every evening.  Dispense: 90 tablet; Refill: 1  -     ezetimibe (ZETIA) 10 mg tablet; Take 1 tablet (10 mg total) by mouth once daily.  Dispense: 90 tablet; Refill: 1  Currently taking Crestor 40 mg and Zetia 10 mg daily, refills given today    3. Type 2 diabetes mellitus with stage 3b chronic kidney disease, with long-term current use of insulin  4. Diabetic polyneuropathy associated with type 2 diabetes mellitus  -      Hemoglobin A1C; Future; Expected date: 02/20/2023  -     Lipid Panel; Future; Expected date: 02/20/2023  -     Comprehensive Metabolic Panel; Future; Expected date: 02/20/2023  -     Microalbumin/Creatinine Ratio, Urine; Future; Expected date: 02/20/2023  -     empagliflozin (JARDIANCE) 25 mg tablet; Take 1 tablet (25 mg total) by mouth every morning.  Dispense: 90 tablet; Refill: 1  -     pregabalin (LYRICA) 75 MG capsule; Take 1 capsule (75 mg total) by mouth 2 (two) times daily.  Dispense: 60 capsule; Refill: 3    Currently on Jardiance 25 mg, Levemir 16 units twice daily, Humalog as per sliding scale  Neuropathy well controlled on Lyrica 75 mg twice daily-refill given today  Diabetic foot exam stable today  If A1c uncontrolled will consider increasing Levemir from 16-18 units twice a day      5. PAF (paroxysmal atrial fibrillation)  Stable on warfarin    6. DDD (degenerative disc disease), lumbar  Stable and asymptomatic    7. History of colon polyps  Overview:  Colonoscopy 8/22/2017      8. Carotid artery disease, unspecified laterality, unspecified type  Stable and asymptomatic    10. Onychomycosis of multiple toenails with type 2 diabetes mellitus and peripheral neuropathy  -     Ambulatory referral/consult to Podiatry; Future; Expected date: 02/27/2023  Will refer to Podiatry for further evaluation secondary to significant callus and neuropathy symptoms    11. Encounter for screening for malignant neoplasm of prostate  -     PSA, Screening; Future; Expected date: 02/20/2023

## 2023-02-21 DIAGNOSIS — E11.22 TYPE 2 DIABETES MELLITUS WITH STAGE 3B CHRONIC KIDNEY DISEASE, WITH LONG-TERM CURRENT USE OF INSULIN: Primary | ICD-10-CM

## 2023-02-21 DIAGNOSIS — Z79.4 TYPE 2 DIABETES MELLITUS WITH STAGE 3B CHRONIC KIDNEY DISEASE, WITH LONG-TERM CURRENT USE OF INSULIN: Primary | ICD-10-CM

## 2023-02-21 DIAGNOSIS — N18.32 TYPE 2 DIABETES MELLITUS WITH STAGE 3B CHRONIC KIDNEY DISEASE, WITH LONG-TERM CURRENT USE OF INSULIN: Primary | ICD-10-CM

## 2023-02-23 ENCOUNTER — ANTI-COAG VISIT (OUTPATIENT)
Dept: CARDIOLOGY | Facility: CLINIC | Age: 58
End: 2023-02-23
Payer: MEDICARE

## 2023-02-23 DIAGNOSIS — Z79.01 LONG TERM (CURRENT) USE OF ANTICOAGULANTS: Primary | ICD-10-CM

## 2023-02-23 DIAGNOSIS — I48.0 PAF (PAROXYSMAL ATRIAL FIBRILLATION): ICD-10-CM

## 2023-02-23 LAB — INR PPP: 3.6 (ref 2–3)

## 2023-02-23 PROCEDURE — 93793 PR ANTICOAGULANT MGMT FOR PT TAKING WARFARIN: ICD-10-PCS | Mod: HCNC,S$GLB,,

## 2023-02-23 PROCEDURE — 85610 PROTHROMBIN TIME: CPT | Mod: QW,HCNC,S$GLB, | Performed by: STUDENT IN AN ORGANIZED HEALTH CARE EDUCATION/TRAINING PROGRAM

## 2023-02-23 PROCEDURE — 85610 POCT INR: ICD-10-PCS | Mod: QW,HCNC,S$GLB, | Performed by: STUDENT IN AN ORGANIZED HEALTH CARE EDUCATION/TRAINING PROGRAM

## 2023-02-23 PROCEDURE — 93793 ANTICOAG MGMT PT WARFARIN: CPT | Mod: HCNC,S$GLB,,

## 2023-02-23 NOTE — PROGRESS NOTES
INR not at goal. Medications, chart, and patient findings reviewed. See calendar for adjustments to dose and follow up plan.  INR is elevated from ETOH intake.  We will resume dose and repeat INR 2.5 weeks.  Patient has been educated multiple time on ETOH and its affect on INR.  Dr Cunningham has been notified that the patient continues to drink putting him at risk for bleeding.

## 2023-02-23 NOTE — PROGRESS NOTES
Patient's INR is supra-therapeutic at 3.6.  Patient reports followed previous dosing/instructions.  Patient reports consumed a couple of alcoholic beverages this weekend and took gabapentin 600 mg for two days due to was out of Lyrica.  Advised patient of increased risk of bleeding; signs/symptoms of bleeding and need to go to ED if experiences.  Patient reports not having any signs/symptoms of bleeding.  Sent to PharmD for dosing/instructions.

## 2023-02-24 ENCOUNTER — TELEPHONE (OUTPATIENT)
Dept: DIABETES | Facility: CLINIC | Age: 58
End: 2023-02-24
Payer: MEDICARE

## 2023-02-24 NOTE — TELEPHONE ENCOUNTER
Spoke with patient, informed him of lab results    ----- Message from Ankita Haywood NP sent at 2/24/2023 11:32 AM CST -----  Inform patient urine microalbumin result reviewed, remains elevated. It is important to begin taking Jardiance as discussed at recent visit with me. This medication will help with blood sugar control and at the same time is protective of his kidneys.

## 2023-03-03 ENCOUNTER — TELEPHONE (OUTPATIENT)
Dept: DIABETES | Facility: CLINIC | Age: 58
End: 2023-03-03
Payer: MEDICARE

## 2023-03-07 ENCOUNTER — ANTI-COAG VISIT (OUTPATIENT)
Dept: CARDIOLOGY | Facility: CLINIC | Age: 58
End: 2023-03-07
Payer: MEDICARE

## 2023-03-07 ENCOUNTER — OFFICE VISIT (OUTPATIENT)
Dept: CARDIOLOGY | Facility: CLINIC | Age: 58
End: 2023-03-07
Payer: MEDICARE

## 2023-03-07 VITALS
OXYGEN SATURATION: 98 % | WEIGHT: 209 LBS | DIASTOLIC BLOOD PRESSURE: 84 MMHG | HEART RATE: 70 BPM | BODY MASS INDEX: 24.15 KG/M2 | SYSTOLIC BLOOD PRESSURE: 136 MMHG

## 2023-03-07 DIAGNOSIS — E11.69 HYPERLIPIDEMIA DUE TO TYPE 2 DIABETES MELLITUS: ICD-10-CM

## 2023-03-07 DIAGNOSIS — I15.2 HYPERTENSION ASSOCIATED WITH DIABETES: ICD-10-CM

## 2023-03-07 DIAGNOSIS — E78.5 HYPERLIPIDEMIA DUE TO TYPE 2 DIABETES MELLITUS: ICD-10-CM

## 2023-03-07 DIAGNOSIS — R00.2 PALPITATIONS: ICD-10-CM

## 2023-03-07 DIAGNOSIS — F17.200 SMOKER: ICD-10-CM

## 2023-03-07 DIAGNOSIS — Z79.01 LONG TERM (CURRENT) USE OF ANTICOAGULANTS: ICD-10-CM

## 2023-03-07 DIAGNOSIS — E11.59 HYPERTENSION ASSOCIATED WITH DIABETES: ICD-10-CM

## 2023-03-07 DIAGNOSIS — I48.0 PAF (PAROXYSMAL ATRIAL FIBRILLATION): Primary | ICD-10-CM

## 2023-03-07 DIAGNOSIS — M51.36 DDD (DEGENERATIVE DISC DISEASE), LUMBAR: ICD-10-CM

## 2023-03-07 DIAGNOSIS — Z82.49 FAMILY HISTORY OF CORONARY ARTERY DISEASE: ICD-10-CM

## 2023-03-07 DIAGNOSIS — I48.0 PAF (PAROXYSMAL ATRIAL FIBRILLATION): ICD-10-CM

## 2023-03-07 DIAGNOSIS — N52.9 ERECTILE DYSFUNCTION, UNSPECIFIED ERECTILE DYSFUNCTION TYPE: ICD-10-CM

## 2023-03-07 DIAGNOSIS — Z79.01 LONG TERM (CURRENT) USE OF ANTICOAGULANTS: Primary | ICD-10-CM

## 2023-03-07 LAB — INR PPP: 5.2 (ref 2–3)

## 2023-03-07 PROCEDURE — 3079F PR MOST RECENT DIASTOLIC BLOOD PRESSURE 80-89 MM HG: ICD-10-PCS | Mod: HCNC,CPTII,S$GLB, | Performed by: INTERNAL MEDICINE

## 2023-03-07 PROCEDURE — 3046F PR MOST RECENT HEMOGLOBIN A1C LEVEL > 9.0%: ICD-10-PCS | Mod: HCNC,CPTII,S$GLB, | Performed by: INTERNAL MEDICINE

## 2023-03-07 PROCEDURE — 3060F POS MICROALBUMINURIA REV: CPT | Mod: HCNC,CPTII,S$GLB, | Performed by: INTERNAL MEDICINE

## 2023-03-07 PROCEDURE — 3046F HEMOGLOBIN A1C LEVEL >9.0%: CPT | Mod: HCNC,CPTII,S$GLB, | Performed by: INTERNAL MEDICINE

## 2023-03-07 PROCEDURE — 3008F PR BODY MASS INDEX (BMI) DOCUMENTED: ICD-10-PCS | Mod: HCNC,CPTII,S$GLB, | Performed by: INTERNAL MEDICINE

## 2023-03-07 PROCEDURE — 85610 PROTHROMBIN TIME: CPT | Mod: QW,HCNC,S$GLB, | Performed by: INTERNAL MEDICINE

## 2023-03-07 PROCEDURE — 3066F PR DOCUMENTATION OF TREATMENT FOR NEPHROPATHY: ICD-10-PCS | Mod: HCNC,CPTII,S$GLB, | Performed by: INTERNAL MEDICINE

## 2023-03-07 PROCEDURE — 4010F ACE/ARB THERAPY RXD/TAKEN: CPT | Mod: HCNC,CPTII,S$GLB, | Performed by: INTERNAL MEDICINE

## 2023-03-07 PROCEDURE — 99214 PR OFFICE/OUTPT VISIT, EST, LEVL IV, 30-39 MIN: ICD-10-PCS | Mod: HCNC,S$GLB,, | Performed by: INTERNAL MEDICINE

## 2023-03-07 PROCEDURE — 3075F PR MOST RECENT SYSTOLIC BLOOD PRESS GE 130-139MM HG: ICD-10-PCS | Mod: HCNC,CPTII,S$GLB, | Performed by: INTERNAL MEDICINE

## 2023-03-07 PROCEDURE — 3079F DIAST BP 80-89 MM HG: CPT | Mod: HCNC,CPTII,S$GLB, | Performed by: INTERNAL MEDICINE

## 2023-03-07 PROCEDURE — 85610 POCT INR: ICD-10-PCS | Mod: QW,HCNC,S$GLB, | Performed by: INTERNAL MEDICINE

## 2023-03-07 PROCEDURE — 1159F PR MEDICATION LIST DOCUMENTED IN MEDICAL RECORD: ICD-10-PCS | Mod: HCNC,CPTII,S$GLB, | Performed by: INTERNAL MEDICINE

## 2023-03-07 PROCEDURE — 3008F BODY MASS INDEX DOCD: CPT | Mod: HCNC,CPTII,S$GLB, | Performed by: INTERNAL MEDICINE

## 2023-03-07 PROCEDURE — 99999 PR PBB SHADOW E&M-EST. PATIENT-LVL III: ICD-10-PCS | Mod: PBBFAC,HCNC,, | Performed by: INTERNAL MEDICINE

## 2023-03-07 PROCEDURE — 3075F SYST BP GE 130 - 139MM HG: CPT | Mod: HCNC,CPTII,S$GLB, | Performed by: INTERNAL MEDICINE

## 2023-03-07 PROCEDURE — 1159F MED LIST DOCD IN RCRD: CPT | Mod: HCNC,CPTII,S$GLB, | Performed by: INTERNAL MEDICINE

## 2023-03-07 PROCEDURE — 1160F RVW MEDS BY RX/DR IN RCRD: CPT | Mod: HCNC,CPTII,S$GLB, | Performed by: INTERNAL MEDICINE

## 2023-03-07 PROCEDURE — 1160F PR REVIEW ALL MEDS BY PRESCRIBER/CLIN PHARMACIST DOCUMENTED: ICD-10-PCS | Mod: HCNC,CPTII,S$GLB, | Performed by: INTERNAL MEDICINE

## 2023-03-07 PROCEDURE — 99214 OFFICE O/P EST MOD 30 MIN: CPT | Mod: HCNC,S$GLB,, | Performed by: INTERNAL MEDICINE

## 2023-03-07 PROCEDURE — 3060F PR POS MICROALBUMINURIA RESULT DOCUMENTED/REVIEW: ICD-10-PCS | Mod: HCNC,CPTII,S$GLB, | Performed by: INTERNAL MEDICINE

## 2023-03-07 PROCEDURE — 99999 PR PBB SHADOW E&M-EST. PATIENT-LVL III: CPT | Mod: PBBFAC,HCNC,, | Performed by: INTERNAL MEDICINE

## 2023-03-07 PROCEDURE — 4010F PR ACE/ARB THEARPY RXD/TAKEN: ICD-10-PCS | Mod: HCNC,CPTII,S$GLB, | Performed by: INTERNAL MEDICINE

## 2023-03-07 PROCEDURE — 3066F NEPHROPATHY DOC TX: CPT | Mod: HCNC,CPTII,S$GLB, | Performed by: INTERNAL MEDICINE

## 2023-03-07 NOTE — PROGRESS NOTES
INR not at goal. Medications, chart, and patient findings reviewed. See calendar for adjustments to dose and follow up plan.  Continues to drink putting himself at risk for bleeding.  Dr Cunningham was notified.  Cannot afford DOAC due to cost.  Hold x 2 doses and lower dose due to constant ETOH intake.  Per Dr Cunningham - patient may hold warfarin x 5 days for dental procedure/extractions.

## 2023-03-07 NOTE — PROGRESS NOTES
Subjective:   Patient ID:  Papa Pope is a 57 y.o. male who presents for cardiac consult of No chief complaint on file.        The patient came in today for cardiac consult of No chief complaint on file.      Papa Pope is a 57 y.o. male pt with AF, HTN, H LD, DM2 -h/o HHS, DDD, tobacco abuse presents for follow up CV eval.    2/2/22  [facility] summary:   Papa Pope is a 56 year old male with history of DM, tobacco abuse, hypertension, and hyperlipidemia who presents to ED for further for elevated glucose that was noted on routine labs by PCP. Patient states his glucose has been <500 for almost a week. Admits to associated fatigue and weakness. He only takes metformin for his glucose. Tries to monitor his intake of carbohydrates and sugary foods. HgbA1c 8/2021 was 7.1.   Labs in ED reflect marked hypoglycemia with renal insufficiency and electrolyte abnormality. Serum bicarbonate wnl but has anion gap. S/p insulin IV and fluid resuscitation. Repeat BMP pending. EKG revealed atrial fibrillation with RVR converted to NSR with Cardizem IV x 1. Hospital medicine has been consulted for admission.   Hospital Course:   1/26 seen in the ER still on Cardizem gtt 5, Heparin gtt, HR 61-65 whilst there. Patient comfortable on room air  1/27 Patient resumed on his MTP 50 mg PO BID, tolerated Eliquis, got diabetic teaching, has demonstrated back insulin injection, This MD spent about 20 mins in room with him and daughter explaining the reason for Insulin on discharge, the types of insulin, what to look out for.  We will send on Levemir 18 U BID, Moderate SSI TID PRN (no nightly humalog for now), instructed to have short follow up with PCP. Daughter explains that his finger sticks transmit to PCP office automatically, this is good. He says he has Glucometer, strips, lancets at home (was given in his PCP this past Friday)  Patient examined and is stable for discharge    Pt here for initial CV eval after recent Afib episode. Is  taking Lopresor and Eliquis. BP today is elevated 130s/90s. HR 70s. ECHO with normal bi V function, LVH. He used to drink heavily but has quit. He is quitting/quit smoking.     22    Recent Readings 2022 2022 2022 6/15/2022 6/10/2022   SBP (mmHg) 128 141 123 136 126   DBP (mmHg) 88 94 75 77 76   Pulse 98 76 70 68 74     Nuclear stress neg for ischemia 2022. Holter neg for Afib 2022.     22  Pt has upcoming C scope with Dr. Barajas. Recent A1c improving to 6.8.     Recent Readings 2022   SBP (mmHg) 115 120 135 135 125   DBP (mmHg) 75 74 81 81 78   Pulse 106 75 70 72 66     BP and HR well controlled. He will need teeth extractions will have 2 removed every few months.   ECG - NSR    22  BP and Hr well controlled. BMI 24 - 208 lbs.     Recent Readings 2022   SBP (mmHg) 135 124 124 114 123   DBP (mmHg) 90 76 81 79 78   Pulse 78 74 68 71 72     Occ elevated DBP.     3/7/23    Recent Readings 3/5/2023 2023 2023 2023 2023   SBP (mmHg) 130 134 140 124 136   DBP (mmHg) 89 86 89 89 87   Pulse 72 67 57 89 70     BP and HR stable here but still elevated DBPs. Has more back pain at times.     Patient feels no leg swelling, no PND,  no dizziness, no syncope, no CNS symptoms.    Patient has fairly good exercise tolerance.    Patient is compliant with medications.    FH - had CVD,  in 80s    HOLTER 2022  Conclusion       Predominant rhythm is sinus.  Heart rates varied between 51 and 124 BPM with an average of 76 BPM        Results for orders placed during the hospital encounter of 22    Nuclear Stress - Cardiology Interpreted    Interpretation Summary    Normal myocardial perfusion scan. There is no evidence of myocardial ischemia or infarction.    The gated perfusion images showed an ejection fraction of 54% at rest. The gated perfusion images showed an ejection fraction of  58% post stress.    There is normal wall motion at rest and post stress.    LV cavity size is normal at rest and normal at stress.    The EKG portion of this study is negative for ischemia.    The patient reported no chest pain during the stress test.    There were no arrhythmias during stress.      Results for orders placed during the hospital encounter of 01/25/22    Echo    Interpretation Summary  · Concentric hypertrophy and low normal systolic function.  · The estimated ejection fraction is 50%.  · Normal left ventricular diastolic function.  · With normal right ventricular systolic function.      Past Medical History:   Diagnosis Date    Acute renal failure 1/25/2022    Arthritis     Atrial fibrillation with RVR 1/25/2022    Back pain     Hyperlipidemia     Hypertension     Pneumonia     Polyneuropathy     Tobacco dependence     Type 2 diabetes mellitus without complication, without long-term current use of insulin 8/4/2021       Past Surgical History:   Procedure Laterality Date    COLONOSCOPY N/A 8/22/2017    Procedure: COLONOSCOPY;  Surgeon: Keo Cruz MD;  Location: [facility];  Service: Endoscopy;  Laterality: N/A;    COLONOSCOPY N/A 9/30/2022    Procedure: COLONOSCOPY 9/20--[place] hold coumadin, no bridge;  Surgeon: Noé Neff MD;  Location: [facility];  Service: General;  Laterality: N/A;    EPIDURAL STEROID INJECTION INTO CERVICAL SPINE N/A 11/1/2019    Procedure: C7/T1 IL SUKHJINDER;  Surgeon: Donnell Jordan MD;  Location: [facility];  Service: Pain Management;  Laterality: N/A;    STOMACH SURGERY      TRANSFORAMINAL EPIDURAL INJECTION OF STEROID Bilateral 8/13/2019    Procedure: Injection,steroid,epidural,transforaminal approach;  Surgeon: Donnell Jordan MD;  Location: [facility];  Service: Pain Management;  Laterality: Bilateral;       Social History     Tobacco Use    Smoking status: Some Days     Packs/day: 0.50     Types: Cigarettes    Smokeless tobacco: Never   Substance Use  "Topics    Alcohol use: Yes     Alcohol/week: 12.0 standard drinks     Types: 12 Cans of beer per week     Comment: weekends only    Drug use: No       Family History   Problem Relation Age of Onset    Hypertension Father        Patient's Medications   New Prescriptions    No medications on file   Previous Medications    BLOOD SUGAR DIAGNOSTIC STRP    Use to check blood sugar twice a day    EMPAGLIFLOZIN (JARDIANCE) 25 MG TABLET    Take 1 tablet (25 mg total) by mouth every morning.    EZETIMIBE (ZETIA) 10 MG TABLET    Take 1 tablet (10 mg total) by mouth once daily.    HUMALOG KWIKPEN INSULIN 100 UNIT/ML PEN    Inject into the skin three times a day before each meal per sliding scale. If -200: 2 units; -250: 4 units; -300: 6 units; -350: 8 units; BG >350: 10 unit    INSULIN DETEMIR U-100 (LEVEMIR FLEXTOUCH) 100 UNIT/ML (3 ML) SUBQ INPN PEN    Inject 16 Units into the skin 2 (two) times daily.    LISINOPRIL (PRINIVIL,ZESTRIL) 5 MG TABLET    Take 1 tablet (5 mg total) by mouth once daily.    METOPROLOL TARTRATE (LOPRESSOR) 50 MG TABLET    Take 1 tablet (50 mg total) by mouth once daily.    PEN NEEDLE, DIABETIC (BD CHRISTIANO 2ND GEN PEN NEEDLE) 32 GAUGE X 5/32" NDLE    Use five times daily    PREGABALIN (LYRICA) 75 MG CAPSULE    Take 1 capsule (75 mg total) by mouth 2 (two) times daily.    ROSUVASTATIN (CRESTOR) 40 MG TAB    Take 1 tablet (40 mg total) by mouth every evening.    SILDENAFIL (VIAGRA) 100 MG TABLET    Take 1 tablet (100 mg total) by mouth daily as needed for Erectile Dysfunction.    TRUE METRIX AIR GLUCOSE METER KIT    USE AS DIRECTED    TRUEPLUS LANCETS 33 GAUGE MISC    USE  TO  CHECK  BLOOD  SUGAR TWICE DAILY    WARFARIN (COUMADIN) 5 MG TABLET    Take 1.5 tablets (7.5 mg total) by mouth every Mon, Wed, Fri AND 2 tablets (10 mg total) every Tuesday, Thursday, Saturday, Sunday.   Modified Medications    No medications on file   Discontinued Medications    No medications on file "       Review of Systems   Constitutional:  Positive for malaise/fatigue.   HENT: Negative.     Eyes: Negative.    Respiratory:  Positive for shortness of breath.    Cardiovascular:  Positive for chest pain and palpitations.   Gastrointestinal: Negative.    Genitourinary: Negative.    Musculoskeletal:  Positive for back pain and joint pain.   Skin: Negative.    Neurological: Negative.    Endo/Heme/Allergies: Negative.    Psychiatric/Behavioral: Negative.     All 12 systems otherwise negative.    Wt Readings from Last 3 Encounters:   02/20/23 94.7 kg (208 lb 12.4 oz)   01/10/23 95.2 kg (209 lb 14.1 oz)   12/21/22 94.8 kg (208 lb 15.9 oz)     Temp Readings from Last 3 Encounters:   09/30/22 98.9 °F (37.2 °C) (Skin)   08/07/22 98.3 °F (36.8 °C) (Oral)   01/27/22 97.3 °F (36.3 °C) (Oral)     BP Readings from Last 3 Encounters:   02/20/23 130/76   01/10/23 133/86   12/21/22 126/84     Pulse Readings from Last 3 Encounters:   02/20/23 64   01/10/23 65   12/21/22 72       There were no vitals taken for this visit.    Objective:   Physical Exam  Vitals and nursing note reviewed.   Constitutional:       General: He is not in acute distress.     Appearance: He is well-developed. He is not diaphoretic.   HENT:      Head: Normocephalic and atraumatic.      Nose: Nose normal.   Eyes:      General: No scleral icterus.     Conjunctiva/sclera: Conjunctivae normal.   Neck:      Thyroid: No thyromegaly.      Vascular: No JVD.   Cardiovascular:      Rate and Rhythm: Normal rate and regular rhythm.      Heart sounds: S1 normal and S2 normal. No murmur heard.    No friction rub. No gallop. No S3 or S4 sounds.   Pulmonary:      Effort: Pulmonary effort is normal. No respiratory distress.      Breath sounds: Normal breath sounds. No stridor. No wheezing or rales.   Chest:      Chest wall: No tenderness.   Abdominal:      General: Bowel sounds are normal. There is no distension.      Palpations: Abdomen is soft. There is no mass.       Tenderness: There is no abdominal tenderness. There is no rebound.   Genitourinary:     Comments: Deferred  Musculoskeletal:         General: No tenderness or deformity. Normal range of motion.      Cervical back: Normal range of motion and neck supple.   Lymphadenopathy:      Cervical: No cervical adenopathy.   Skin:     General: Skin is warm and dry.      Coloration: Skin is not pale.      Findings: No erythema or rash.   Neurological:      Mental Status: He is alert and oriented to person, place, and time.      Motor: No abnormal muscle tone.      Coordination: Coordination normal.   Psychiatric:         Behavior: Behavior normal.         Thought Content: Thought content normal.         Judgment: Judgment normal.       Lab Results   Component Value Date     02/20/2023    K 4.6 02/20/2023     02/20/2023    CO2 27 02/20/2023    BUN 17 02/20/2023    CREATININE 1.1 02/20/2023     (H) 02/20/2023    HGBA1C 9.1 (H) 02/20/2023    AST 23 02/20/2023    ALT 32 02/20/2023    ALBUMIN 4.0 02/20/2023    PROT 7.0 02/20/2023    BILITOT 0.4 02/20/2023    WBC 6.14 02/20/2023    HGB 13.3 (L) 02/20/2023    HCT 41.8 02/20/2023    MCV 98 02/20/2023     02/20/2023    INR 3.6 (A) 02/23/2023    INR 7.7 (HH) 12/05/2022    TSH 1.743 02/20/2023    CHOL 137 02/20/2023    HDL 44 02/20/2023    LDLCALC 78.0 02/20/2023    TRIG 75 02/20/2023    BNP 47 01/25/2022     Assessment:      1. PAF (paroxysmal atrial fibrillation)    2. Long term (current) use of anticoagulants    3. Hypertension associated with diabetes    4. Family history of coronary artery disease    5. Palpitations    6. Hyperlipidemia due to type 2 diabetes mellitus    7. Erectile dysfunction, unspecified erectile dysfunction type    8. DDD (degenerative disc disease), lumbar    9. Smoker              Plan:   1. PAF, FH CAD   - change Eliquis to coumadin due to cost   - Nuclear stress neg for ischemia 2/2022.   - Holter neg for Afib 2/2022.     2. HLD  -  cont statin    3. Tobacco abuse, alc abuse  - needs alc cessation  - needs cessation    4. DDD, lumbar  - cont tx per PCP  - f/u pain - will have injections     5. DM2 A1c 11.4 --> 6.8 --> 7.1 --> 9.1; ED  - viagra PRN   - cont tx     6. HTN  - cont low dose Lisinopril and cont BB    7. Preop CV eval -  teeth extractions with [facility]  - Low CV risk, cont BB  - ok to hold coumadin 5 days and resume post op as pt is in NSR  - needs close f/u with coumadin clinic    Thank you for allowing me to participate in this patient's care. Please do not hesitate to contact me with any questions or concerns. Consult note has been forwarded to the referral physician.     Denny Cunningham MD, [facility]  Cardiovascular Disease  [facility], [place]  [phone] (P)

## 2023-03-07 NOTE — PROGRESS NOTES
Patient's INR is supra-therapeutic at 5.2.  Patient reports followed previous instructions.  Patient reports he drank two beers this weekend and has been stressed lately.  Advised of increased risk of bleeding; signs/symptoms of bleeding and need to go to ED if experiences any.  Patient reports not having any signs/symptoms of bleeding.  Sent to PharmD for dosing/instructions.

## 2023-03-13 ENCOUNTER — TELEPHONE (OUTPATIENT)
Dept: DIABETES | Facility: CLINIC | Age: 58
End: 2023-03-13
Payer: MEDICARE

## 2023-03-13 NOTE — TELEPHONE ENCOUNTER
Faxed chart note and order to [facility] medical for Dexcom. Order scanned to media

## 2023-03-17 ENCOUNTER — TELEPHONE (OUTPATIENT)
Dept: ADMINISTRATIVE | Facility: HOSPITAL | Age: 58
End: 2023-03-17
Payer: MEDICARE

## 2023-03-20 ENCOUNTER — ANTI-COAG VISIT (OUTPATIENT)
Dept: CARDIOLOGY | Facility: CLINIC | Age: 58
End: 2023-03-20
Payer: MEDICARE

## 2023-03-20 DIAGNOSIS — I48.0 PAF (PAROXYSMAL ATRIAL FIBRILLATION): ICD-10-CM

## 2023-03-20 DIAGNOSIS — Z79.01 LONG TERM (CURRENT) USE OF ANTICOAGULANTS: Primary | ICD-10-CM

## 2023-03-20 LAB — INR PPP: 5.6 (ref 2–3)

## 2023-03-20 PROCEDURE — 93793 PR ANTICOAGULANT MGMT FOR PT TAKING WARFARIN: ICD-10-PCS | Mod: HCNC,S$GLB,,

## 2023-03-20 PROCEDURE — 85610 POCT INR: ICD-10-PCS | Mod: QW,HCNC,S$GLB, | Performed by: INTERNAL MEDICINE

## 2023-03-20 PROCEDURE — 85610 PROTHROMBIN TIME: CPT | Mod: QW,HCNC,S$GLB, | Performed by: INTERNAL MEDICINE

## 2023-03-20 PROCEDURE — 93793 ANTICOAG MGMT PT WARFARIN: CPT | Mod: HCNC,S$GLB,,

## 2023-03-20 NOTE — PROGRESS NOTES
Patient will continue to be at high risk for bleeding with continued alcohol intake - we will hold x 3 doses and lower overall dose by 10 %.  He continues to drink ETOH despite very high INR results.  Lowering dose today since he will not stop drinking.

## 2023-03-20 NOTE — PROGRESS NOTES
Patient's INR is supra-therapeutic at 5.6 despite holding for two days as instructed and a missed dose on 3/18/23.  Patient reports he drank alcoholic beverages on 3/18/23.   Advised patient of risk of bleeding; signs/symptoms of bleeding and need to go to ED if experiences any.  Patient reports he is not having any signs/symptoms of bleeding.  Sent to PharmD for dosing/instructions.

## 2023-03-24 ENCOUNTER — PATIENT MESSAGE (OUTPATIENT)
Dept: DIABETES | Facility: CLINIC | Age: 58
End: 2023-03-24
Payer: MEDICARE

## 2023-03-24 DIAGNOSIS — E11.65 TYPE 2 DIABETES MELLITUS WITH HYPERGLYCEMIA, WITHOUT LONG-TERM CURRENT USE OF INSULIN: Primary | ICD-10-CM

## 2023-03-24 DIAGNOSIS — E11.65 TYPE 2 DIABETES MELLITUS WITH HYPERGLYCEMIA, WITHOUT LONG-TERM CURRENT USE OF INSULIN: ICD-10-CM

## 2023-03-24 RX ORDER — INSULIN DETEMIR 100 [IU]/ML
16 INJECTION, SOLUTION SUBCUTANEOUS 2 TIMES DAILY
Qty: 15 ML | Refills: 5 | Status: SHIPPED | OUTPATIENT
Start: 2023-03-24 | End: 2023-04-13

## 2023-03-30 ENCOUNTER — OFFICE VISIT (OUTPATIENT)
Dept: PODIATRY | Facility: CLINIC | Age: 58
End: 2023-03-30
Payer: MEDICARE

## 2023-03-30 ENCOUNTER — ANTI-COAG VISIT (OUTPATIENT)
Dept: CARDIOLOGY | Facility: CLINIC | Age: 58
End: 2023-03-30
Payer: MEDICARE

## 2023-03-30 VITALS — WEIGHT: 208 LBS | HEIGHT: 78 IN | BODY MASS INDEX: 24.07 KG/M2

## 2023-03-30 DIAGNOSIS — I48.0 PAF (PAROXYSMAL ATRIAL FIBRILLATION): ICD-10-CM

## 2023-03-30 DIAGNOSIS — L84 CALLUS OF FOOT: ICD-10-CM

## 2023-03-30 DIAGNOSIS — E11.49 TYPE II DIABETES MELLITUS WITH NEUROLOGICAL MANIFESTATIONS: ICD-10-CM

## 2023-03-30 DIAGNOSIS — Z79.01 LONG TERM (CURRENT) USE OF ANTICOAGULANTS: Primary | ICD-10-CM

## 2023-03-30 DIAGNOSIS — B35.1 ONYCHOMYCOSIS: ICD-10-CM

## 2023-03-30 DIAGNOSIS — E11.9 ENCOUNTER FOR COMPREHENSIVE DIABETIC FOOT EXAMINATION, TYPE 2 DIABETES MELLITUS: Primary | ICD-10-CM

## 2023-03-30 LAB — INR PPP: 2.4 (ref 2–3)

## 2023-03-30 PROCEDURE — 85610 PROTHROMBIN TIME: CPT | Mod: QW,HCNC,S$GLB, | Performed by: STUDENT IN AN ORGANIZED HEALTH CARE EDUCATION/TRAINING PROGRAM

## 2023-03-30 PROCEDURE — 3008F BODY MASS INDEX DOCD: CPT | Mod: HCNC,CPTII,S$GLB, | Performed by: PODIATRIST

## 2023-03-30 PROCEDURE — 3066F NEPHROPATHY DOC TX: CPT | Mod: HCNC,CPTII,S$GLB, | Performed by: PODIATRIST

## 2023-03-30 PROCEDURE — 1160F RVW MEDS BY RX/DR IN RCRD: CPT | Mod: HCNC,CPTII,S$GLB, | Performed by: PODIATRIST

## 2023-03-30 PROCEDURE — 3008F PR BODY MASS INDEX (BMI) DOCUMENTED: ICD-10-PCS | Mod: HCNC,CPTII,S$GLB, | Performed by: PODIATRIST

## 2023-03-30 PROCEDURE — 85610 POCT INR: ICD-10-PCS | Mod: QW,HCNC,S$GLB, | Performed by: STUDENT IN AN ORGANIZED HEALTH CARE EDUCATION/TRAINING PROGRAM

## 2023-03-30 PROCEDURE — 99213 OFFICE O/P EST LOW 20 MIN: CPT | Mod: 25,HCNC,S$GLB, | Performed by: PODIATRIST

## 2023-03-30 PROCEDURE — 4010F PR ACE/ARB THEARPY RXD/TAKEN: ICD-10-PCS | Mod: HCNC,CPTII,S$GLB, | Performed by: PODIATRIST

## 2023-03-30 PROCEDURE — 3046F HEMOGLOBIN A1C LEVEL >9.0%: CPT | Mod: HCNC,CPTII,S$GLB, | Performed by: PODIATRIST

## 2023-03-30 PROCEDURE — 3060F PR POS MICROALBUMINURIA RESULT DOCUMENTED/REVIEW: ICD-10-PCS | Mod: HCNC,CPTII,S$GLB, | Performed by: PODIATRIST

## 2023-03-30 PROCEDURE — 1159F MED LIST DOCD IN RCRD: CPT | Mod: HCNC,CPTII,S$GLB, | Performed by: PODIATRIST

## 2023-03-30 PROCEDURE — 1159F PR MEDICATION LIST DOCUMENTED IN MEDICAL RECORD: ICD-10-PCS | Mod: HCNC,CPTII,S$GLB, | Performed by: PODIATRIST

## 2023-03-30 PROCEDURE — 99999 PR PBB SHADOW E&M-EST. PATIENT-LVL III: ICD-10-PCS | Mod: PBBFAC,HCNC,, | Performed by: PODIATRIST

## 2023-03-30 PROCEDURE — 99213 PR OFFICE/OUTPT VISIT, EST, LEVL III, 20-29 MIN: ICD-10-PCS | Mod: 25,HCNC,S$GLB, | Performed by: PODIATRIST

## 2023-03-30 PROCEDURE — 3066F PR DOCUMENTATION OF TREATMENT FOR NEPHROPATHY: ICD-10-PCS | Mod: HCNC,CPTII,S$GLB, | Performed by: PODIATRIST

## 2023-03-30 PROCEDURE — 93793 ANTICOAG MGMT PT WARFARIN: CPT | Mod: HCNC,S$GLB,,

## 2023-03-30 PROCEDURE — 99999 PR PBB SHADOW E&M-EST. PATIENT-LVL III: CPT | Mod: PBBFAC,HCNC,, | Performed by: PODIATRIST

## 2023-03-30 PROCEDURE — 3046F PR MOST RECENT HEMOGLOBIN A1C LEVEL > 9.0%: ICD-10-PCS | Mod: HCNC,CPTII,S$GLB, | Performed by: PODIATRIST

## 2023-03-30 PROCEDURE — 4010F ACE/ARB THERAPY RXD/TAKEN: CPT | Mod: HCNC,CPTII,S$GLB, | Performed by: PODIATRIST

## 2023-03-30 PROCEDURE — 3060F POS MICROALBUMINURIA REV: CPT | Mod: HCNC,CPTII,S$GLB, | Performed by: PODIATRIST

## 2023-03-30 PROCEDURE — 93793 PR ANTICOAGULANT MGMT FOR PT TAKING WARFARIN: ICD-10-PCS | Mod: HCNC,S$GLB,,

## 2023-03-30 PROCEDURE — 1160F PR REVIEW ALL MEDS BY PRESCRIBER/CLIN PHARMACIST DOCUMENTED: ICD-10-PCS | Mod: HCNC,CPTII,S$GLB, | Performed by: PODIATRIST

## 2023-03-30 NOTE — PROGRESS NOTES
Patient's iINR is therapeutic at 2.4.  Patient reports followed previous instructions.  Patient reports no changes.  Instructions given: continue warfarin 7.5 mg on Fridays, Mondays and Wednesdays; and 5 mg all other days.  Recheck on 4/12/23 with other appointment.   Calendar reviewed with patient.  Patient verbalizes understanding.

## 2023-04-03 NOTE — PROGRESS NOTES
Subjective:     Patient ID: Papa Pope is a 57 y.o. male.    Chief Complaint: Foot Pain (C/o pain on bottom of both feet and numbness in toes, Pt rates pain 8/10, pt is diabetic, pt is wearing tennis shoes and socks, Pt was last seen on 2/20/23 by PCP Prachi Castano MD)    Papa is a 57 y.o. male who presents to the clinic upon referral from Dr. Castano  for evaluation and treatment of diabetic feet. Papa has a past medical history of Acute renal failure (1/25/2022), Arthritis, Atrial fibrillation with RVR (1/25/2022), Back pain, Hyperlipidemia, Hypertension, Pneumonia, Polyneuropathy, Tobacco dependence, and Type 2 diabetes mellitus without complication, without long-term current use of insulin (8/4/2021). Patient relates no major problem with feet. Only complaints today consist of pain at the bottom of feet and numbness in toes. Patient rates pain 8/10. Patient states pain is mostly at nighttime.     PCP: Prachi Castano MD    Date Last Seen by PCP: 02/20/2023    Current shoe gear: Tennis shoes    Hemoglobin A1C   Date Value Ref Range Status   02/20/2023 9.1 (H) 4.0 - 5.6 % Final     Comment:     ADA Screening Guidelines:  5.7-6.4%  Consistent with prediabetes  >or=6.5%  Consistent with diabetes    High levels of fetal hemoglobin interfere with the HbA1C  assay. Heterozygous hemoglobin variants (HbS, HgC, etc)do  not significantly interfere with this assay.   However, presence of multiple variants may affect accuracy.     11/16/2022 7.1 (H) 4.0 - 5.6 % Final     Comment:     ADA Screening Guidelines:  5.7-6.4%  Consistent with prediabetes  >or=6.5%  Consistent with diabetes    High levels of fetal hemoglobin interfere with the HbA1C  assay. Heterozygous hemoglobin variants (HbS, HgC, etc)do  not significantly interfere with this assay.   However, presence of multiple variants may affect accuracy.     08/18/2022 6.8 (H) 4.0 - 5.6 % Final     Comment:     ADA Screening Guidelines:  5.7-6.4%  Consistent with  prediabetes  >or=6.5%  Consistent with diabetes    High levels of fetal hemoglobin interfere with the HbA1C  assay. Heterozygous hemoglobin variants (HbS, HgC, etc)do  not significantly interfere with this assay.   However, presence of multiple variants may affect accuracy.                  Patient Active Problem List   Diagnosis    Hyperlipidemia due to type 2 diabetes mellitus    Hypertension associated with diabetes    DDD (degenerative disc disease), lumbar    History of colon polyps    Smoker    Type 2 diabetes mellitus with hyperglycemia, without long-term current use of insulin    Leg weakness, bilateral    Hyperosmolar hyperglycemic state (HHS)    Acute renal failure    Hyperkalemia    PAF (paroxysmal atrial fibrillation)    On Coumadin for atrial fibrillation    Carotid artery disease    Type 2 diabetes mellitus with hypertriglyceridemia    Long term (current) use of insulin    Type 2 diabetes mellitus with stage 3b chronic kidney disease, with long-term current use of insulin    Diabetic polyneuropathy associated with type 2 diabetes mellitus       Medication List with Changes/Refills   Current Medications    BLOOD SUGAR DIAGNOSTIC STRP    Use to check blood sugar twice a day    EMPAGLIFLOZIN (JARDIANCE) 25 MG TABLET    Take 1 tablet (25 mg total) by mouth every morning.    EZETIMIBE (ZETIA) 10 MG TABLET    Take 1 tablet (10 mg total) by mouth once daily.    HUMALOG KWIKPEN INSULIN 100 UNIT/ML PEN    Inject into the skin three times a day before each meal per sliding scale. If -200: 2 units; -250: 4 units; -300: 6 units; -350: 8 units; BG >350: 10 unit    INSULIN DETEMIR U-100, LEVEMIR, (LEVEMIR FLEXPEN) 100 UNIT/ML (3 ML) INPN PEN    Inject 16 Units into the skin 2 (two) times daily.    LISINOPRIL (PRINIVIL,ZESTRIL) 5 MG TABLET    Take 1 tablet (5 mg total) by mouth once daily.    METOPROLOL TARTRATE (LOPRESSOR) 50 MG TABLET    Take 1 tablet (50 mg total) by mouth once daily.     "PEN NEEDLE, DIABETIC (BD CHRISTIANO 2ND GEN PEN NEEDLE) 32 GAUGE X 5/32" NDLE    Use five times daily    PREGABALIN (LYRICA) 75 MG CAPSULE    Take 1 capsule (75 mg total) by mouth 2 (two) times daily.    ROSUVASTATIN (CRESTOR) 40 MG TAB    Take 1 tablet (40 mg total) by mouth every evening.    SILDENAFIL (VIAGRA) 100 MG TABLET    Take 1 tablet (100 mg total) by mouth daily as needed for Erectile Dysfunction.    TRUE METRIX AIR GLUCOSE METER KIT    USE AS DIRECTED    TRUEPLUS LANCETS 33 GAUGE MISC    USE  TO  CHECK  BLOOD  SUGAR TWICE DAILY    WARFARIN (COUMADIN) 5 MG TABLET    Take 1.5 tablets (7.5 mg total) by mouth every Mon, Wed, Fri AND 2 tablets (10 mg total) every Tuesday, Thursday, Saturday, Sunday.       Review of patient's allergies indicates:  No Known Allergies    Past Surgical History:   Procedure Laterality Date    COLONOSCOPY N/A 8/22/2017    Procedure: COLONOSCOPY;  Surgeon: Keo Cruz MD;  Location: [facility];  Service: Endoscopy;  Laterality: N/A;    COLONOSCOPY N/A 9/30/2022    Procedure: COLONOSCOPY 9/20--[place] hold coumadin, no bridge;  Surgeon: Noé Neff MD;  Location: [facility];  Service: General;  Laterality: N/A;    EPIDURAL STEROID INJECTION INTO CERVICAL SPINE N/A 11/1/2019    Procedure: C7/T1 IL SUKHJINDER;  Surgeon: Donnell Jordan MD;  Location: [facility];  Service: Pain Management;  Laterality: N/A;    STOMACH SURGERY      TRANSFORAMINAL EPIDURAL INJECTION OF STEROID Bilateral 8/13/2019    Procedure: Injection,steroid,epidural,transforaminal approach;  Surgeon: Donnell Jordan MD;  Location: [facility];  Service: Pain Management;  Laterality: Bilateral;       Family History   Problem Relation Age of Onset    Hypertension Father        Social History     Socioeconomic History    Marital status:    Tobacco Use    Smoking status: Some Days     Packs/day: 0.50     Types: Cigarettes    Smokeless tobacco: Never   Substance and Sexual Activity    Alcohol use: Yes     " "Alcohol/week: 12.0 standard drinks     Types: 12 Cans of beer per week     Comment: weekends only    Drug use: No    Sexual activity: Yes       Vitals:    03/30/23 0801   Weight: 94.3 kg (208 lb)   Height: 6' 6" (1.981 m)   PainSc:   8   PainLoc: Foot       Hemoglobin A1C   Date Value Ref Range Status   02/20/2023 9.1 (H) 4.0 - 5.6 % Final     Comment:     ADA Screening Guidelines:  5.7-6.4%  Consistent with prediabetes  >or=6.5%  Consistent with diabetes    High levels of fetal hemoglobin interfere with the HbA1C  assay. Heterozygous hemoglobin variants (HbS, HgC, etc)do  not significantly interfere with this assay.   However, presence of multiple variants may affect accuracy.     11/16/2022 7.1 (H) 4.0 - 5.6 % Final     Comment:     ADA Screening Guidelines:  5.7-6.4%  Consistent with prediabetes  >or=6.5%  Consistent with diabetes    High levels of fetal hemoglobin interfere with the HbA1C  assay. Heterozygous hemoglobin variants (HbS, HgC, etc)do  not significantly interfere with this assay.   However, presence of multiple variants may affect accuracy.     08/18/2022 6.8 (H) 4.0 - 5.6 % Final     Comment:     ADA Screening Guidelines:  5.7-6.4%  Consistent with prediabetes  >or=6.5%  Consistent with diabetes    High levels of fetal hemoglobin interfere with the HbA1C  assay. Heterozygous hemoglobin variants (HbS, HgC, etc)do  not significantly interfere with this assay.   However, presence of multiple variants may affect accuracy.         Review of Systems   Constitutional:  Negative for chills and fever.   Respiratory:  Negative for shortness of breath.    Cardiovascular:  Negative for chest pain, palpitations, orthopnea, claudication and leg swelling.   Gastrointestinal:  Negative for diarrhea, nausea and vomiting.   Musculoskeletal:  Negative for joint pain.   Skin:  Negative for rash.   Neurological:  Positive for tingling.   Psychiatric/Behavioral: Negative.             Objective:   PHYSICAL EXAM: " Apperance: Alert and orient in no distress,well developed, and with good attention to grooming and body habits  Patient presents ambulating in tennis shoes.   LOWER EXTREMITY EXAM:  VASCULAR: Dorsalis pedis pulses 2/4 bilateral and Posterior Tibial pulses 2/4 bilateral. Capillary fill time <4 seconds bilateral. No edema observed bilateral. Varicosities absent bilateral. Skin temperature of the lower extremities is warm to warm, proximal to distal. Hair growth WNL bilateral.  DERMATOLOGICAL: No skin rashes, subcutaneous nodules, lesions, or ulcers observed bilateral. Nails 1,2,3,4,5 bilateral normal length, thickened, and discolored with subungual debris. Minimal hyperkeratotic tissue noted to bilateral medial hallux.  Webspaces 1,2,3,4 bilateral clean, dry and without evidence of break in skin integrity.   NEUROLOGICAL: Light touch, sharp-dull, proprioception all present and equal bilaterally.  Vibratory sensation diminished at left hallux, intact at right hallux. Protective sensation absent at 3/10 sites as tested with a [place]-Ronald 5.07 monofilament.   MUSCULOSKELETAL: Muscle strength is 5/5 for foot inverters, everters, plantarflexors, and dorsiflexors. Muscle tone is normal. Mild bunions noted bilateral.       Assessment:   Encounter for comprehensive diabetic foot examination, type 2 diabetes mellitus  -     Ambulatory referral/consult to Podiatry    Onychomycosis    Callus of foot    Type II diabetes mellitus with neurological manifestations          Plan:   Encounter for comprehensive diabetic foot examination, type 2 diabetes mellitus  -     Ambulatory referral/consult to Podiatry    Onychomycosis    Callus of foot    Type II diabetes mellitus with neurological manifestations      I counseled the patient on his conditions, regarding findings of my examination, my impressions, and usual treatment plan.   This visit spent on counseling and coordination of care.  Appointment spent on education about the  diabetic foot, neuropathy, and prevention of limb loss.  Shoe inspection. Diabetic Foot Education. Patient reminded of the importance of good nutrition and blood sugar control to help prevent podiatric complications of diabetes. Patient instructed on proper foot hygeine. We discussed wearing proper shoe gear, daily foot inspections, never walking without protective shoe gear, never putting sharp instruments to feet.    The patient and I reviewed the types of shoes he should be wearing, my recommendation includes generally the best time of the day for a shoe fitting is the afternoon, shoes with a wide toe box, very good cushion, and tennis shoes with removable inner soles.The patient and I reviewed my recommendations for over-the-counter orthotic inserts.   Discussed with patient treatments for neuropathy consisting of topical or oral medication.  Recommendations given for over-the-counter medicine such as [employer] [place] [employer] .   Patient  will continue to monitor the areas daily, inspect feet, wear protective shoe gear when ambulatory, moisturizer to maintain skin integrity. Patient reminded of the importance of good nutrition and blood sugar control to help prevent podiatric complications of diabetes.  Patient to return 6 months or sooner if needed.       Kali Zambrano DPM  [facility]

## 2023-04-05 PROBLEM — E11.65 UNCONTROLLED TYPE 2 DIABETES MELLITUS WITH HYPERGLYCEMIA: Status: ACTIVE | Noted: 2023-04-05

## 2023-04-05 PROBLEM — E11.9 TYPE 2 DIABETES MELLITUS WITH HEMOGLOBIN A1C GOAL OF LESS THAN 7.0%: Status: ACTIVE | Noted: 2023-04-05

## 2023-04-12 ENCOUNTER — OFFICE VISIT (OUTPATIENT)
Dept: DIABETES | Facility: CLINIC | Age: 58
End: 2023-04-12
Payer: MEDICARE

## 2023-04-12 DIAGNOSIS — Z53.21 PATIENT LEFT WITHOUT BEING SEEN: Primary | ICD-10-CM

## 2023-04-12 PROCEDURE — 99499 UNLISTED E&M SERVICE: CPT | Mod: HCNC,95,, | Performed by: NURSE PRACTITIONER

## 2023-04-12 PROCEDURE — 1159F MED LIST DOCD IN RCRD: CPT | Mod: HCNC,CPTII,95, | Performed by: NURSE PRACTITIONER

## 2023-04-12 PROCEDURE — 3066F PR DOCUMENTATION OF TREATMENT FOR NEPHROPATHY: ICD-10-PCS | Mod: HCNC,CPTII,95, | Performed by: NURSE PRACTITIONER

## 2023-04-12 PROCEDURE — 3066F NEPHROPATHY DOC TX: CPT | Mod: HCNC,CPTII,95, | Performed by: NURSE PRACTITIONER

## 2023-04-12 PROCEDURE — 4010F PR ACE/ARB THEARPY RXD/TAKEN: ICD-10-PCS | Mod: HCNC,CPTII,95, | Performed by: NURSE PRACTITIONER

## 2023-04-12 PROCEDURE — 3060F PR POS MICROALBUMINURIA RESULT DOCUMENTED/REVIEW: ICD-10-PCS | Mod: HCNC,CPTII,95, | Performed by: NURSE PRACTITIONER

## 2023-04-12 PROCEDURE — 1160F RVW MEDS BY RX/DR IN RCRD: CPT | Mod: HCNC,CPTII,95, | Performed by: NURSE PRACTITIONER

## 2023-04-12 PROCEDURE — 3046F PR MOST RECENT HEMOGLOBIN A1C LEVEL > 9.0%: ICD-10-PCS | Mod: HCNC,CPTII,95, | Performed by: NURSE PRACTITIONER

## 2023-04-12 PROCEDURE — 3046F HEMOGLOBIN A1C LEVEL >9.0%: CPT | Mod: HCNC,CPTII,95, | Performed by: NURSE PRACTITIONER

## 2023-04-12 PROCEDURE — 3060F POS MICROALBUMINURIA REV: CPT | Mod: HCNC,CPTII,95, | Performed by: NURSE PRACTITIONER

## 2023-04-12 PROCEDURE — 1160F PR REVIEW ALL MEDS BY PRESCRIBER/CLIN PHARMACIST DOCUMENTED: ICD-10-PCS | Mod: HCNC,CPTII,95, | Performed by: NURSE PRACTITIONER

## 2023-04-12 PROCEDURE — 99499 NO LOS: ICD-10-PCS | Mod: HCNC,95,, | Performed by: NURSE PRACTITIONER

## 2023-04-12 PROCEDURE — 1159F PR MEDICATION LIST DOCUMENTED IN MEDICAL RECORD: ICD-10-PCS | Mod: HCNC,CPTII,95, | Performed by: NURSE PRACTITIONER

## 2023-04-12 PROCEDURE — 4010F ACE/ARB THERAPY RXD/TAKEN: CPT | Mod: HCNC,CPTII,95, | Performed by: NURSE PRACTITIONER

## 2023-04-12 NOTE — PROGRESS NOTES
Scheduled telehealth visit not completed. Unable to contact patient. Left voicemail message to reschedule. No LOS.

## 2023-04-13 ENCOUNTER — TELEPHONE (OUTPATIENT)
Dept: DIABETES | Facility: CLINIC | Age: 58
End: 2023-04-13
Payer: MEDICARE

## 2023-04-13 ENCOUNTER — ANTI-COAG VISIT (OUTPATIENT)
Dept: CARDIOLOGY | Facility: CLINIC | Age: 58
End: 2023-04-13
Payer: MEDICARE

## 2023-04-13 ENCOUNTER — OFFICE VISIT (OUTPATIENT)
Dept: DIABETES | Facility: CLINIC | Age: 58
End: 2023-04-13
Payer: MEDICARE

## 2023-04-13 DIAGNOSIS — I48.0 PAF (PAROXYSMAL ATRIAL FIBRILLATION): ICD-10-CM

## 2023-04-13 DIAGNOSIS — Z79.01 LONG TERM (CURRENT) USE OF ANTICOAGULANTS: Primary | ICD-10-CM

## 2023-04-13 DIAGNOSIS — I15.2 HYPERTENSION ASSOCIATED WITH DIABETES: ICD-10-CM

## 2023-04-13 DIAGNOSIS — E11.59 HYPERTENSION ASSOCIATED WITH DIABETES: ICD-10-CM

## 2023-04-13 DIAGNOSIS — E11.69 HYPERLIPIDEMIA DUE TO TYPE 2 DIABETES MELLITUS: ICD-10-CM

## 2023-04-13 DIAGNOSIS — E11.65 TYPE 2 DIABETES MELLITUS WITH HYPERGLYCEMIA, WITHOUT LONG-TERM CURRENT USE OF INSULIN: Primary | ICD-10-CM

## 2023-04-13 DIAGNOSIS — E78.5 HYPERLIPIDEMIA DUE TO TYPE 2 DIABETES MELLITUS: ICD-10-CM

## 2023-04-13 LAB — INR PPP: 3 (ref 2–3)

## 2023-04-13 PROCEDURE — 3046F HEMOGLOBIN A1C LEVEL >9.0%: CPT | Mod: HCNC,CPTII,95, | Performed by: NURSE PRACTITIONER

## 2023-04-13 PROCEDURE — 4010F PR ACE/ARB THEARPY RXD/TAKEN: ICD-10-PCS | Mod: HCNC,CPTII,95, | Performed by: NURSE PRACTITIONER

## 2023-04-13 PROCEDURE — 99442 PR PHYSICIAN TELEPHONE EVALUATION 11-20 MIN: ICD-10-PCS | Mod: HCNC,95,, | Performed by: NURSE PRACTITIONER

## 2023-04-13 PROCEDURE — 99442 PR PHYSICIAN TELEPHONE EVALUATION 11-20 MIN: CPT | Mod: HCNC,95,, | Performed by: NURSE PRACTITIONER

## 2023-04-13 PROCEDURE — 3060F POS MICROALBUMINURIA REV: CPT | Mod: HCNC,CPTII,95, | Performed by: NURSE PRACTITIONER

## 2023-04-13 PROCEDURE — 3046F PR MOST RECENT HEMOGLOBIN A1C LEVEL > 9.0%: ICD-10-PCS | Mod: HCNC,CPTII,95, | Performed by: NURSE PRACTITIONER

## 2023-04-13 PROCEDURE — 3066F NEPHROPATHY DOC TX: CPT | Mod: HCNC,CPTII,95, | Performed by: NURSE PRACTITIONER

## 2023-04-13 PROCEDURE — 4010F ACE/ARB THERAPY RXD/TAKEN: CPT | Mod: HCNC,CPTII,95, | Performed by: NURSE PRACTITIONER

## 2023-04-13 PROCEDURE — 1160F RVW MEDS BY RX/DR IN RCRD: CPT | Mod: HCNC,CPTII,95, | Performed by: NURSE PRACTITIONER

## 2023-04-13 PROCEDURE — 1159F PR MEDICATION LIST DOCUMENTED IN MEDICAL RECORD: ICD-10-PCS | Mod: HCNC,CPTII,95, | Performed by: NURSE PRACTITIONER

## 2023-04-13 PROCEDURE — 93793 PR ANTICOAGULANT MGMT FOR PT TAKING WARFARIN: ICD-10-PCS | Mod: HCNC,S$GLB,,

## 2023-04-13 PROCEDURE — 93793 ANTICOAG MGMT PT WARFARIN: CPT | Mod: HCNC,S$GLB,,

## 2023-04-13 PROCEDURE — 3060F PR POS MICROALBUMINURIA RESULT DOCUMENTED/REVIEW: ICD-10-PCS | Mod: HCNC,CPTII,95, | Performed by: NURSE PRACTITIONER

## 2023-04-13 PROCEDURE — 85610 PROTHROMBIN TIME: CPT | Mod: QW,HCNC,S$GLB, | Performed by: INTERNAL MEDICINE

## 2023-04-13 PROCEDURE — 3066F PR DOCUMENTATION OF TREATMENT FOR NEPHROPATHY: ICD-10-PCS | Mod: HCNC,CPTII,95, | Performed by: NURSE PRACTITIONER

## 2023-04-13 PROCEDURE — 1159F MED LIST DOCD IN RCRD: CPT | Mod: HCNC,CPTII,95, | Performed by: NURSE PRACTITIONER

## 2023-04-13 PROCEDURE — 85610 POCT INR: ICD-10-PCS | Mod: QW,HCNC,S$GLB, | Performed by: INTERNAL MEDICINE

## 2023-04-13 PROCEDURE — 1160F PR REVIEW ALL MEDS BY PRESCRIBER/CLIN PHARMACIST DOCUMENTED: ICD-10-PCS | Mod: HCNC,CPTII,95, | Performed by: NURSE PRACTITIONER

## 2023-04-13 RX ORDER — INSULIN DETEMIR 100 [IU]/ML
22 INJECTION, SOLUTION SUBCUTANEOUS 2 TIMES DAILY
Qty: 15 ML | Refills: 5
Start: 2023-04-13 | End: 2023-06-05

## 2023-04-13 NOTE — PROGRESS NOTES
Patient's INR is therapeutic at 3.0.  Patient reports followed previous instructions.  Patient reports no changes.  Instructions given: Continue warfarin 7.5 mg on Fridays, Mondays and Wednesdays; and 5 mg all other days.  Recheck on 5/9/23 with other appointment. Calendar reviewed with patient.  Patient verbalizes understanding.

## 2023-04-13 NOTE — PROGRESS NOTES
Established Patient - Audio Only Telehealth Visit     The patient location is: Home ([place])  The chief complaint leading to consultation is: Diabetes Follow-up  Visit type: Virtual visit with audio only (telephone)  Total time spent with patient: 15 minutes    The reason for the audio only service rather than synchronous audio and video virtual visit was related to technical difficulties or patient preference/necessity.     Each patient to whom I provide medical services by telemedicine is:  (1) informed of the relationship between the physician and patient and the respective role of any other health care provider with respect to management of the patient; and (2) notified that they may decline to receive medical services by telemedicine and may withdraw from such care at any time. Patient verbally consented to receive this service via voice-only telephone call.    This service was not originating from a related E/M service provided within the previous 7 days nor will  to an E/M service or procedure within the next 24 hours or my soonest available appointment.  Prevailing standard of care was able to be met in this audio-only visit.     PCP: Prachi Castano MD    Subjective:     Chief Complaint: Diabetes follow up.  Last visit with me: 1/10/2023    HPI: 57 y.o.  male for diabetes follow up.   Type II diabetes since August 2021.  Comorbidities: HTN, HLD and Atrial Fibrillation     Family History of Type 1/2 DM: no  Known diabetes complications:  DKA/HHS: yes, 1/25/2022  Retinopathy: no, +diabetic cataract  Peripheral neuropathy: no  Nephropathy: yes    Interval history:  Denies recent hospital admission or ER visit.  Denies having hypoglycemia.   Endorses diabetes related symptoms: None    Blood glucose monitoring via fingerstick: 1-2xday  Enrolled in digital diabetes medicine.  Per pt's entries over the last 3 weeks (see below)   Fasting -200   AC dinner range 112-180's   Bedtime 117,  144, 200-300's    Per patient's recall, postprandial BG readings 130-160's    Diabetes Digital Medicine Flowsheet Readings    Papa's recent readings (past 60 days):  Time Taken Glucose (mg/dL)   4/12/2023  6:54    4/11/2023 11:39    4/10/2023  9:26    4/10/2023  8:00    4/9/2023  8:33    4/8/2023  6:20    4/7/2023 10:06    4/7/2023 11:12    4/6/2023  4:18    4/5/2023 10:08    4/5/2023  6:27    4/3/2023 12:19    4/2/2023  9:12    4/2/2023  9:04    4/1/2023  7:57    3/31/2023 10:27    3/30/2023 10:00    3/29/2023  7:07    3/28/2023  7:31    3/27/2023  9:16    3/27/2023  8:16    3/26/2023  8:47    3/24/2023  7:12    3/23/2023 10:04    3/23/2023  5:58    3/22/2023  6:21    3/21/2023  9:09    3/21/2023 10:02       Previously on CGM Stacy, cost prohibitive.    Activity level: Sedentary- walking 30 mins, 2x day, 3x/wk started 3 weeks ago  Meal Planning: 3 meals/day;infrequent snacks/day.    Medication compliance all of the time, diet compliance most of the time.   To be enrolled in diabetes education classes.     Previous regimen: Metformin x 1 month    Past failed treatment: Trulicity - cost prohibitive    Current DM Medications:  Diabetes Medications               empagliflozin (JARDIANCE) 25 mg tablet Take 1 tablet (25 mg total) by mouth every morning.  Not taking    HUMALOG KWIKPEN INSULIN 100 unit/mL pen Inject into the skin three times a day before each meal per sliding scale. If -200: 2 units; -250: 4 units; -300: 6 units; -350: 8 units; BG >350: 10 unit    insulin detemir U-100, Levemir, (LEVEMIR FLEXPEN) 100 unit/mL (3 mL) InPn pen Inject 16 Units into the skin 2 (two) times daily.  Taking 18 units       Allergies  Review of patient's allergies indicates:  No Known Allergies    Labs Reviewed:  His most recent A1C is:  Lab  Results   Component Value Date    HGBA1C 9.1 (H) 02/20/2023    HGBA1C 7.1 (H) 11/16/2022    HGBA1C 6.8 (H) 08/18/2022       His most recent BMP is:  Lab Results   Component Value Date     02/20/2023    K 4.6 02/20/2023     02/20/2023    CO2 27 02/20/2023    BUN 17 02/20/2023    CREATININE 1.1 02/20/2023    CALCIUM 9.3 02/20/2023    ANIONGAP 7 (L) 02/20/2023    ESTGFRAFRICA >60 02/07/2022    EGFRNONAA 56 (A) 02/07/2022       No results found for: CPEPTIDE  No results found for: GLUTAMICACID    There is no height or weight on file to calculate BMI.    Wt Readings from Last 3 Encounters:   03/30/23 0801 94.3 kg (208 lb)   03/07/23 0820 94.8 kg (208 lb 15.9 oz)   02/20/23 0836 94.7 kg (208 lb 12.4 oz)        His blood sugar in clinic today is: Not assessed due to type of visit.    Diabetes Management Status  Statin: Taking  ACE/ARB: Taking    Screening or Prevention Patient's value Goal Complete/Controlled?   HgA1C Testing and Control   Lab Results   Component Value Date    HGBA1C 9.1 (H) 02/20/2023      Annually/Less than 8% No     Lipid profile : 02/20/2023 Annually Yes     LDL control Lab Results   Component Value Date    LDLCALC 78.0 02/20/2023    Annually/Less than 100 mg/dl  Yes     Nephropathy screening Lab Results   Component Value Date    MICALBCREAT 149.5 (H) 02/20/2023     Lab Results   Component Value Date    PROTEINUA Trace (A) 02/20/2023    Annually Yes     Blood pressure BP Readings from Last 1 Encounters:   03/07/23 136/84    Less than 140/90 Yes     Dilated retinal exam : 02/28/2022 Annually Yes    Foot exam   : 03/30/2023 Annually Yes       Social History     Socioeconomic History    Marital status:    Tobacco Use    Smoking status: Some Days     Packs/day: 0.50     Types: Cigarettes    Smokeless tobacco: Never   Substance and Sexual Activity    Alcohol use: Yes     Alcohol/week: 12.0 standard drinks     Types: 12 Cans of beer per week     Comment: weekends only    Drug use: No     Sexual activity: Yes     Past Medical History:   Diagnosis Date    Acute renal failure 1/25/2022    Arthritis     Atrial fibrillation with RVR 1/25/2022    Back pain     Hyperlipidemia     Hypertension     Pneumonia     Polyneuropathy     Tobacco dependence     Type 2 diabetes mellitus without complication, without long-term current use of insulin 8/4/2021       Review of Systems   Constitutional: Negative for activity change, appetite change, fatigue and unexpected weight change.   HENT: Negative for dental problem and trouble swallowing.    Eyes: Negative for visual disturbance.   Respiratory: Negative for chest tightness and shortness of breath.    Cardiovascular: Negative for chest pain, palpitations and leg swelling.   Gastrointestinal: Negative for abdominal pain, constipation, diarrhea, nausea and vomiting.   Endocrine: Negative for polydipsia, polyphagia and polyuria.   Genitourinary: Negative for difficulty urinating, dysuria, frequency and urgency.        Negative yeast infection   Musculoskeletal: Positive for gait problem (ambulates with cane). Negative for myalgias and neck pain.   Integumentary:  Negative for rash and wound.   Allergic/Immunologic: Negative.    Neurological: Negative for dizziness, syncope, weakness, numbness and headaches.   Hematological: Negative.    Psychiatric/Behavioral: Negative for confusion and sleep disturbance.   All other systems reviewed and are negative.     Objective:      Physical Exam  Neurological:      Mental Status: Alert and oriented to person, place, and time. Mental status is at baseline.   Psychiatric:         Attention and Perception: Attention normal.         Mood and Affect: Mood normal.         Speech: Speech normal.         Thought Content: Thought content normal.         Cognition and Memory: Cognition normal.         Judgment: Judgment normal.     Assessment / Plan:     Diagnoses and all orders for this visit:    Type 2 diabetes mellitus with  hyperglycemia, without long-term current use of insulin  -     Hemoglobin A1C; Future  -     insulin detemir U-100, Levemir, (LEVEMIR FLEXPEN) 100 unit/mL (3 mL) InPn pen; Inject 22 Units into the skin 2 (two) times daily.    Hypertension associated with diabetes    Hyperlipidemia due to type 2 diabetes mellitus    Additional Plan Details:  - Condition: uncontrolled, most recent A1C of 9.1 % was completed 6 weeks ago. Increased from previous.  - Monitor blood glucose:  3-4x daily.Goals reviewed. Maintain BG log. Continue digital diabetes medicine enrollment.   - Hypoglycemia protocol: Reviewed and risk discussed.  Notify if BG readings below 80. Protocol printout provided.   - Diet: Limit carbohydrate intake 30-45 grams/meal, 3x daily and 15 grams/snack , limit of two daily.   - Activity: Recommend at least 150 minutes of exercise in a week.  - BP and LDL: Recommend lifestyle modifications and follow up with PCP for management.   - Medication Regimen:     Begin taking Jardiance 25 mg every morning (pharmacy assistance to cover cost)  Increase Levemir 22 units twice a day  Continue Novolog three times a day before each meal per sliding scale    If premeal blood glucose 150-200: give 2 units   If premeal blood glucose 201-250: give 4 units   If premeal blood glucose 251-300: give 6 units   If premeal blood glucose 301-350: give 8 units   If premeal blood glucose greater than 350: give 10 units     - Medication consideration: Titrate basal/bolus insulin to goal BG. Added SGLT2 inhibitor for cardiac/renal benefit.    - Lab results: A1C discussed.  - Health Maintenance standards addressed today: A1c to be scheduled.  - Risks of uncontrolled DM explained. Importance of routine foot and eye exams reviewed. Scheduled as appropriate.  -The patient was explained the above plan and given opportunity to ask questions.  He understands, chooses and consents to this plan and accepts all the risks, which include but are not limited  to the risks mentioned above.   - Labs ordered as above.  - CDE Referral: Scheduled  - Follow up:  2 months, with lab draw prior.         Charlotte T. Delacruz, FNP-C [facility]    A total of 15 minutes was spent in face to face time, of which over 50 % was spent in counseling patient on disease process, complications, treatment, and side effects of medications.

## 2023-04-13 NOTE — PATIENT INSTRUCTIONS
Medication Regimen:   Begin taking Jardiance 25 mg every morning  Increase Levemir 22 units twice a day  Continue Novolog three times a day before each meal per sliding scale    If premeal blood glucose 150-200: give 2 units   If premeal blood glucose 201-250: give 4 units   If premeal blood glucose 251-300: give 6 units   If premeal blood glucose 301-350: give 8 units   If premeal blood glucose greater than 350: give 10 units    Patient Instructions:  Carbohydrate Count: 30-45 grams/meal and 15 grams/snack with limit of 2 snacks per day.  Exercise: Goal is 150 minutes or more per week.  Bring meter and blood sugar log to each appointment.     Goals for Blood Sugar:  Fastin-130 mg/dl  2 hours after meal:  mg/dl  Before Bed: 100-150 mg/dl  If Blood sugar is below 70 mg/dl, DO NOT take diabetes medication. Eat first and then recheck blood sugar in 20 minutes.  Foods to eat if blood sugar is below 70 mg/dl  1/2 glass of orange juice   1/2 regular cola can   3-4 hard candies   3-4 small glucose tabs.   Foods to eat if blood sugar is below 50 mg/dl  1 glass of orange juice  1 regular cola can  8 hard candies  8 small glucose tabs.   If you have repeated eating/blood sugar recheck process 2 times and blood sugar still below 70 mg/dl, call 911.

## 2023-05-01 PROBLEM — E11.29 DIABETES MELLITUS WITH MICROALBUMINURIA: Status: ACTIVE | Noted: 2023-05-01

## 2023-05-01 PROBLEM — R80.9 DIABETES MELLITUS WITH MICROALBUMINURIA: Status: ACTIVE | Noted: 2023-05-01

## 2023-05-09 ENCOUNTER — OFFICE VISIT (OUTPATIENT)
Dept: INTERNAL MEDICINE | Facility: CLINIC | Age: 58
End: 2023-05-09
Payer: MEDICARE

## 2023-05-09 ENCOUNTER — ANTI-COAG VISIT (OUTPATIENT)
Dept: CARDIOLOGY | Facility: CLINIC | Age: 58
End: 2023-05-09
Payer: MEDICARE

## 2023-05-09 ENCOUNTER — TELEPHONE (OUTPATIENT)
Dept: PHARMACY | Facility: CLINIC | Age: 58
End: 2023-05-09
Payer: MEDICARE

## 2023-05-09 VITALS
RESPIRATION RATE: 20 BRPM | BODY MASS INDEX: 24.41 KG/M2 | SYSTOLIC BLOOD PRESSURE: 140 MMHG | HEIGHT: 78 IN | HEART RATE: 62 BPM | DIASTOLIC BLOOD PRESSURE: 80 MMHG | WEIGHT: 211 LBS

## 2023-05-09 DIAGNOSIS — I48.0 PAF (PAROXYSMAL ATRIAL FIBRILLATION): ICD-10-CM

## 2023-05-09 DIAGNOSIS — I15.2 HYPERTENSION ASSOCIATED WITH DIABETES: ICD-10-CM

## 2023-05-09 DIAGNOSIS — R53.81 DEBILITATED PATIENT: ICD-10-CM

## 2023-05-09 DIAGNOSIS — Z79.4 TYPE 2 DIABETES MELLITUS WITH STAGE 3B CHRONIC KIDNEY DISEASE, WITH LONG-TERM CURRENT USE OF INSULIN: ICD-10-CM

## 2023-05-09 DIAGNOSIS — E11.65 UNCONTROLLED TYPE 2 DIABETES MELLITUS WITH HYPERGLYCEMIA: ICD-10-CM

## 2023-05-09 DIAGNOSIS — E11.69 ERECTILE DYSFUNCTION ASSOCIATED WITH TYPE 2 DIABETES MELLITUS: ICD-10-CM

## 2023-05-09 DIAGNOSIS — G89.29 OTHER CHRONIC PAIN: ICD-10-CM

## 2023-05-09 DIAGNOSIS — E11.59 HYPERTENSION ASSOCIATED WITH DIABETES: ICD-10-CM

## 2023-05-09 DIAGNOSIS — E78.5 HYPERLIPIDEMIA DUE TO TYPE 2 DIABETES MELLITUS: ICD-10-CM

## 2023-05-09 DIAGNOSIS — F17.200 SMOKER: ICD-10-CM

## 2023-05-09 DIAGNOSIS — Z79.01 LONG TERM (CURRENT) USE OF ANTICOAGULANTS: Primary | ICD-10-CM

## 2023-05-09 DIAGNOSIS — M51.36 DDD (DEGENERATIVE DISC DISEASE), LUMBAR: ICD-10-CM

## 2023-05-09 DIAGNOSIS — Z74.09 OTHER REDUCED MOBILITY: ICD-10-CM

## 2023-05-09 DIAGNOSIS — E11.29 DIABETES MELLITUS WITH MICROALBUMINURIA: ICD-10-CM

## 2023-05-09 DIAGNOSIS — E11.9 TYPE 2 DIABETES MELLITUS WITH HEMOGLOBIN A1C GOAL OF LESS THAN 7.0%: ICD-10-CM

## 2023-05-09 DIAGNOSIS — Z86.010 HISTORY OF COLON POLYPS: ICD-10-CM

## 2023-05-09 DIAGNOSIS — E78.1 TYPE 2 DIABETES MELLITUS WITH HYPERTRIGLYCERIDEMIA: ICD-10-CM

## 2023-05-09 DIAGNOSIS — R26.9 ABNORMALITY OF GAIT AND MOBILITY: ICD-10-CM

## 2023-05-09 DIAGNOSIS — Z79.01 ON COUMADIN FOR ATRIAL FIBRILLATION: ICD-10-CM

## 2023-05-09 DIAGNOSIS — R80.9 DIABETES MELLITUS WITH MICROALBUMINURIA: ICD-10-CM

## 2023-05-09 DIAGNOSIS — I48.91 ON COUMADIN FOR ATRIAL FIBRILLATION: ICD-10-CM

## 2023-05-09 DIAGNOSIS — E11.42 DIABETIC POLYNEUROPATHY ASSOCIATED WITH TYPE 2 DIABETES MELLITUS: ICD-10-CM

## 2023-05-09 DIAGNOSIS — E11.22 TYPE 2 DIABETES MELLITUS WITH STAGE 3B CHRONIC KIDNEY DISEASE, WITH LONG-TERM CURRENT USE OF INSULIN: ICD-10-CM

## 2023-05-09 DIAGNOSIS — F10.20 UNCOMPLICATED ALCOHOL DEPENDENCE: ICD-10-CM

## 2023-05-09 DIAGNOSIS — N18.32 TYPE 2 DIABETES MELLITUS WITH STAGE 3B CHRONIC KIDNEY DISEASE, WITH LONG-TERM CURRENT USE OF INSULIN: ICD-10-CM

## 2023-05-09 DIAGNOSIS — E11.69 HYPERLIPIDEMIA DUE TO TYPE 2 DIABETES MELLITUS: ICD-10-CM

## 2023-05-09 DIAGNOSIS — Z79.4 LONG TERM (CURRENT) USE OF INSULIN: ICD-10-CM

## 2023-05-09 DIAGNOSIS — E11.69 TYPE 2 DIABETES MELLITUS WITH HYPERTRIGLYCERIDEMIA: ICD-10-CM

## 2023-05-09 DIAGNOSIS — I77.9 CAROTID ARTERY DISEASE, UNSPECIFIED LATERALITY, UNSPECIFIED TYPE: ICD-10-CM

## 2023-05-09 DIAGNOSIS — Z00.00 ENCOUNTER FOR MEDICARE ANNUAL WELLNESS EXAM: ICD-10-CM

## 2023-05-09 DIAGNOSIS — Z00.00 ENCOUNTER FOR PREVENTATIVE ADULT HEALTH CARE EXAMINATION: Primary | ICD-10-CM

## 2023-05-09 DIAGNOSIS — N52.1 ERECTILE DYSFUNCTION ASSOCIATED WITH TYPE 2 DIABETES MELLITUS: ICD-10-CM

## 2023-05-09 PROBLEM — F17.210 CIGARETTE NICOTINE DEPENDENCE WITHOUT COMPLICATION: Status: ACTIVE | Noted: 2023-05-09

## 2023-05-09 LAB — INR PPP: 3.6 (ref 2–3)

## 2023-05-09 PROCEDURE — 3046F PR MOST RECENT HEMOGLOBIN A1C LEVEL > 9.0%: ICD-10-PCS | Mod: HCNC,CPTII,S$GLB, | Performed by: NURSE PRACTITIONER

## 2023-05-09 PROCEDURE — 93793 PR ANTICOAGULANT MGMT FOR PT TAKING WARFARIN: ICD-10-PCS | Mod: HCNC,S$GLB,,

## 2023-05-09 PROCEDURE — 99999 PR PBB SHADOW E&M-EST. PATIENT-LVL V: ICD-10-PCS | Mod: PBBFAC,HCNC,, | Performed by: NURSE PRACTITIONER

## 2023-05-09 PROCEDURE — 3079F PR MOST RECENT DIASTOLIC BLOOD PRESSURE 80-89 MM HG: ICD-10-PCS | Mod: HCNC,CPTII,S$GLB, | Performed by: NURSE PRACTITIONER

## 2023-05-09 PROCEDURE — 3008F PR BODY MASS INDEX (BMI) DOCUMENTED: ICD-10-PCS | Mod: HCNC,CPTII,S$GLB, | Performed by: NURSE PRACTITIONER

## 2023-05-09 PROCEDURE — G0439 PPPS, SUBSEQ VISIT: HCPCS | Mod: HCNC,S$GLB,, | Performed by: NURSE PRACTITIONER

## 2023-05-09 PROCEDURE — 4010F PR ACE/ARB THEARPY RXD/TAKEN: ICD-10-PCS | Mod: HCNC,CPTII,S$GLB, | Performed by: NURSE PRACTITIONER

## 2023-05-09 PROCEDURE — 3079F DIAST BP 80-89 MM HG: CPT | Mod: HCNC,CPTII,S$GLB, | Performed by: NURSE PRACTITIONER

## 2023-05-09 PROCEDURE — 3046F HEMOGLOBIN A1C LEVEL >9.0%: CPT | Mod: HCNC,CPTII,S$GLB, | Performed by: NURSE PRACTITIONER

## 2023-05-09 PROCEDURE — 93793 ANTICOAG MGMT PT WARFARIN: CPT | Mod: HCNC,S$GLB,,

## 2023-05-09 PROCEDURE — 3077F PR MOST RECENT SYSTOLIC BLOOD PRESSURE >= 140 MM HG: ICD-10-PCS | Mod: HCNC,CPTII,S$GLB, | Performed by: NURSE PRACTITIONER

## 2023-05-09 PROCEDURE — 3066F NEPHROPATHY DOC TX: CPT | Mod: HCNC,CPTII,S$GLB, | Performed by: NURSE PRACTITIONER

## 2023-05-09 PROCEDURE — 4010F ACE/ARB THERAPY RXD/TAKEN: CPT | Mod: HCNC,CPTII,S$GLB, | Performed by: NURSE PRACTITIONER

## 2023-05-09 PROCEDURE — 3008F BODY MASS INDEX DOCD: CPT | Mod: HCNC,CPTII,S$GLB, | Performed by: NURSE PRACTITIONER

## 2023-05-09 PROCEDURE — 3060F POS MICROALBUMINURIA REV: CPT | Mod: HCNC,CPTII,S$GLB, | Performed by: NURSE PRACTITIONER

## 2023-05-09 PROCEDURE — 3060F PR POS MICROALBUMINURIA RESULT DOCUMENTED/REVIEW: ICD-10-PCS | Mod: HCNC,CPTII,S$GLB, | Performed by: NURSE PRACTITIONER

## 2023-05-09 PROCEDURE — 99999 PR PBB SHADOW E&M-EST. PATIENT-LVL V: CPT | Mod: PBBFAC,HCNC,, | Performed by: NURSE PRACTITIONER

## 2023-05-09 PROCEDURE — 85610 PROTHROMBIN TIME: CPT | Mod: QW,HCNC,S$GLB, | Performed by: INTERNAL MEDICINE

## 2023-05-09 PROCEDURE — G0439 PR MEDICARE ANNUAL WELLNESS SUBSEQUENT VISIT: ICD-10-PCS | Mod: HCNC,S$GLB,, | Performed by: NURSE PRACTITIONER

## 2023-05-09 PROCEDURE — 85610 POCT INR: ICD-10-PCS | Mod: QW,HCNC,S$GLB, | Performed by: INTERNAL MEDICINE

## 2023-05-09 PROCEDURE — 3077F SYST BP >= 140 MM HG: CPT | Mod: HCNC,CPTII,S$GLB, | Performed by: NURSE PRACTITIONER

## 2023-05-09 PROCEDURE — 3066F PR DOCUMENTATION OF TREATMENT FOR NEPHROPATHY: ICD-10-PCS | Mod: HCNC,CPTII,S$GLB, | Performed by: NURSE PRACTITIONER

## 2023-05-09 NOTE — LETTER
May 24, 2023    Papa Pope  [address] [place] [address]             [facility] Joseelsi - Pharmacy Assistance  [address]  [facility] [address]  Phone: [phone]  Fax: [phone] Dear Mr. Papa Pope     It was a pleasure speaking with you. To follow up on our conversation on 5/24/2023, the Pharmacy Patient Assistance Program needs more information from you before we can submit your Jardiance, Levemir and Novolog application to the [employer] Program. Please return the following documents to the Pharmacy Patient Assistance office (address, email and fax listed below) asap:      Proof of household Income( such as social security statement, 1099 form, pension statement or 3 consecutive pay stubs, Copy of all Insurance cards( front and back), Printout from your Insurance or Pharmacy that shows how much you have spent on prescriptions this year and Signed & dated OCCP/ Patient Authorization Forms            Whats Next:     Once I receive your documentation and authorization from your Provider, your application will be submitted to the [employer] Assistance Program for review. Please be advised it will take 2 to 4 weeks for your application to be processed so you may have to purchase a month's supply of medication from your pharmacy to hold you over during the waiting period. You will be notified of approval or denial by The Program(mail) or myself.      If you have any questions or concerns, please give me a call         Sincerely   Norris Kaplan   [address], [address], [place] [address]  Email:[email or web address]

## 2023-05-09 NOTE — LETTER
June 21, 2023    Papa Pope  [address] [place] [address]             Adalid Cooley - Pharmacy Assistance  [address]  [place] [address]  Phone: [phone]  Fax: [phone] Dear Mr. Pope    My Name is Michaelngozi Kaplan. I am a Pharmacy Technician reaching out on behalf of [facility]. We last spoke on 06/09/23 about assistance with your medication. A letter was sent to your [facility] Portal requesting documentation required to begin the Pharmacy Assistance Process. Unfortunately, we have not heard back from you. Please reach out to my phone number below if you are still in need of assistance with your medications. We look forward to hearing from you soon!     Thank you for choosing [facility] for your healthcare needs.      Norris Kaplan  Pharmacy Patient Assistance

## 2023-05-09 NOTE — PATIENT INSTRUCTIONS
Counseling and Referral of Other Preventative  (Italic type indicates deductible and co-insurance are waived)    Patient Name: Papa Pope  Today's Date: 5/9/2023    Health Maintenance       Date Due Completion Date    Pneumococcal Vaccines (Age 0-64) (2 - PCV) 11/09/2012 11/9/2011    Shingles Vaccine (1 of 2) Never done ---    COVID-19 Vaccine (3 - Booster for Moderna series) 06/10/2021 4/15/2021    TETANUS VACCINE 11/09/2021 11/9/2011    Eye Exam 02/28/2023 2/28/2022    Hemoglobin A1c 05/20/2023 2/20/2023    PROSTATE-SPECIFIC ANTIGEN 02/20/2024 2/20/2023    Diabetes Urine Screening 02/20/2024 2/20/2023    Lipid Panel 02/20/2024 2/20/2023    Foot Exam 03/30/2024 3/30/2023 (Done)    Override on 3/30/2023: Done    Override on 1/25/2022: Done    Low Dose Statin 04/13/2024 4/13/2023    Colorectal Cancer Screening 09/30/2027 9/30/2022        No orders of the defined types were placed in this encounter.      The following information is provided to all patients.  This information is to help you find resources for any of the problems found today that may be affecting your health:                Living healthy guide: www.[facility].[place].gov      Understanding Diabetes: www.diabetes.org      Eating healthy: www.cdc.gov/healthyweight      CDC home safety checklist: www.cdc.gov/steadi/patient.html      Agency on Aging: www.goea.[place].[facility]      Alcoholics anonymous (AA): www.aa.org      Physical Activity: www.jalil.nih.gov/su9ecmx      Tobacco use: www.quitwithus[place].org

## 2023-05-09 NOTE — LETTER
May 18, 2023    Papa Pope  [address] [place] [address]             Adalid Cooley - Pharmacy Assistance  [address]  [facility] [address]  Phone: [phone]  Fax: [phone] Dear Papa Pope         My name is Norris Kaplan and I work with the Pharmacy Patient Assistance Program here at [facility].  We received a referral from your provider, inquiring about assistance with your medication. I tried to contact you to discuss your assistance options, sorry I missed you. If you are still in need of assistance, please reach out to my phone number listed below.       Please be advised enrollment in The Pharmacy Patient Assistance Program will require the following documentation.               Proof of household income (such as tax return, social security award letter, pension/correction statements)     Copy of Insurance (front and back) Cards    Printout from your pharmacy or insurance that shows how much money you have spent on prescription -pays this (2022) year    Signed OCCP/Pharmacy Patient Assistance Authorization Forms             Thank you for choosing [facility] for your healthcare needs      Sincerely  Norris Kaplan   [address], [address], [place] [address]  Phone: [phone]

## 2023-05-09 NOTE — PROGRESS NOTES
"  Papa Pope presented for a  Medicare AWV and comprehensive Health Risk Assessment today. The following components were reviewed and updated:    Medical history  Family History  Social history  Allergies and Current Medications  Health Risk Assessment  Health Maintenance  Care Team         ** See Completed Assessments for Annual Wellness Visit within the encounter summary.**         The following assessments were completed:  Living Situation  CAGE  Depression Screening  Timed Get Up and Go  Whisper Test  Cognitive Function Screening  Nutrition Screening  ADL Screening  PAQ Screening        Vitals:    05/09/23 0835   BP: (!) 140/80   BP Location: Right arm   Patient Position: Sitting   Pulse: 62   Resp: 20   Weight: 95.7 kg (210 lb 15.7 oz)   Height:    Pain scale 6' 6" (1.981 m)    1-2/5 hurts "all over"- chronic     Body mass index is 24.38 kg/m².  Physical Exam  Constitutional:       General: He is not in acute distress.     Appearance: He is not ill-appearing or diaphoretic.   HENT:      Head: Normocephalic and atraumatic.      Mouth/Throat:      Mouth: Mucous membranes are moist.   Eyes:      General:         Right eye: No discharge.         Left eye: No discharge.      Extraocular Movements: Extraocular movements intact.      Conjunctiva/sclera: Conjunctivae normal.   Cardiovascular:      Rate and Rhythm: Normal rate and regular rhythm.   Pulmonary:      Effort: Pulmonary effort is normal. No respiratory distress.   Skin:     General: Skin is warm and dry.   Neurological:      Mental Status: He is alert and oriented to person, place, and time. Mental status is at baseline.      Gait: Gait abnormal.   Psychiatric:         Mood and Affect: Mood normal.         Behavior: Behavior normal.         Thought Content: Thought content normal.         Judgment: Judgment normal.           Diagnoses and health risks identified today and associated recommendations/orders:    1. Encounter for preventative adult health care " examination, Encounter for Medicare annual wellness exam  - Ambulatory Referral/Consult to Enhanced Annual Wellness Visit (eAWV)  Review for opioid screening: Patient does not have Rx for Opioids  Review for substance use disorder: Patient does not use substance per chart    Patient states he drinks 1-2 beers every other night    I offered to discuss advanced care planning, including how to pick a person who would make decisions for you if you were unable to make them for yourself, called a health care power of , and what kind of decisions you might make such as use of life sustaining treatments such as ventilators and tube feeding when faced with a life limiting illness recorded on a living will that they will need to know. (How you want to be cared for as you near the end of your natural life)     X Patient is interested in learning more about how to make advanced directives.  I provided them paperwork and offered to discuss this with them.    2. Diabetic polyneuropathy associated with type 2 diabetes mellitus, Uncontrolled type 2 diabetes mellitus with hyperglycemia, Type 2 diabetes mellitus with hemoglobin A1c goal of less than 7.0%, Diabetes mellitus with microalbuminuria,  Long term (current) use of insulin  Monitor  Follow up with PCP  Continue home humalog, levemir, lyrica  Patient states he has not been taking jardiance because he cant afford it  - Ambulatory referral/consult to Pharmacy Assistance; Future- jardiance, novol and levemir insulin, viagra    3. Carotid artery disease, unspecified laterality, unspecified type, Hyperlipidemia due to type 2 diabetes mellitus, Type 2 diabetes mellitus with hypertriglyceridemia  Monitor  Follow up with cardiology, PCP  Continue home zetia, crestor    4.  DDD (degenerative disc disease), lumbar, Abnormality of gait and mobility, Other reduced mobility, Debilitated patient,  Other chronic pain  Monitor  Follow up with Pain management specialist as  directed  Continue home lyrica    5. Hypertension associated with diabetes  Monitor  Stable  Continue home lisinopril, lopressor    6. PAF (paroxysmal atrial fibrillation), On Coumadin for atrial fibrillation  Monitor  Follow up with cardiology   Continue home coumadin, lopressor    7. Smoker  Monitor  Follow up with PCP  Smoking cessation education discussed with patient  Patient declined Smoking cessation program at this time     8. Type 2 diabetes mellitus with stage 3b chronic kidney disease, with long-term current use of insulin  Monitor  Follow up with PCP    9.  History of colon polyps  Monitor  Follow up with GI as directed     10. Uncomplicated alcohol dependence   Monitor  Follow up with PCP  Discussed with patient importance of cutting back/ stopping alcohol intake    11. Erectile dysfunction associated with type 2 diabetes mellitus   Monitor  Follow up as directed  Patient states he has not been taking viagra because he cant afford it  - Ambulatory referral/consult to Pharmacy Assistance; Future - viagra              Provided Papa with a 5-10 year written screening schedule and personal prevention plan. Recommendations were developed using the USPSTF age appropriate recommendations. Education, counseling, and referrals were provided as needed. After Visit Summary printed and given to patient which includes a list of additional screenings\tests needed.    Follow up in about 1 year (around 5/9/2024) for Annual wellness visit.    JENNIE Lancaster

## 2023-05-09 NOTE — PROGRESS NOTES
Patient's INR is supra-therapeutic at 3.6.  Patient reports followed previous instructions.  Patient reports he consumed alcoholic beverages.  Advised of increased risk of bleeding; signs/symptoms of bleeding and need to go to ED if experiences any.  Patient reports not having any signs/symptoms of bleeding.  Instructions given: Hold warfarin dose today-5/9/23; then re-challenge warfarin 7.5 mg on Wednesdays, Fridays and Mondays; and 5 mg all other days.  Recheck in three weeks on 5/30/23.  Calendar reviewed with patient.  Patient verbalizes understanding.

## 2023-05-09 NOTE — TELEPHONE ENCOUNTER
I have reached out to Papa Pope to inform him of the [employer] application process for Jardiance, Levemir, and Novolog and whats required to apply.  Papa Pope did not answer. I left a voicemail and mailed a letter introducing him to the pharmacy patient assistance program. I will follow up in 5 business days.

## 2023-05-18 NOTE — TELEPHONE ENCOUNTER
A 2nd attempt has been made to establish contact with Papa Pope  via LETTER. The final contact attempt will be made in 5 business days

## 2023-05-24 NOTE — TELEPHONE ENCOUNTER
I have spoken with Papa Pope and informed him of the [employer] and [employer] application process for Jardiance, Levemir, and Novolog and what's required to apply.  Papa Pope will provide the following documents: Proof of household Income( such as social security statement, 1099 form, pension statement or 3 consecutive pay stubs, Copy of all Insurance cards( front and back), Printout from your Insurance or Pharmacy that shows how much you have spent on prescriptions this year, and Signed & dated OCCP/ Patient Authorization Forms      I will follow up with the patient in 5 business days.

## 2023-05-30 ENCOUNTER — ANTI-COAG VISIT (OUTPATIENT)
Dept: CARDIOLOGY | Facility: CLINIC | Age: 58
End: 2023-05-30
Payer: MEDICARE

## 2023-05-30 DIAGNOSIS — I48.0 PAF (PAROXYSMAL ATRIAL FIBRILLATION): ICD-10-CM

## 2023-05-30 DIAGNOSIS — Z79.01 LONG TERM (CURRENT) USE OF ANTICOAGULANTS: Primary | ICD-10-CM

## 2023-05-30 LAB — INR PPP: 2.2 (ref 2–3)

## 2023-05-30 PROCEDURE — 93793 PR ANTICOAGULANT MGMT FOR PT TAKING WARFARIN: ICD-10-PCS | Mod: HCNC,S$GLB,,

## 2023-05-30 PROCEDURE — 93793 ANTICOAG MGMT PT WARFARIN: CPT | Mod: HCNC,S$GLB,,

## 2023-05-30 PROCEDURE — 85610 PROTHROMBIN TIME: CPT | Mod: QW,HCNC,S$GLB, | Performed by: INTERNAL MEDICINE

## 2023-05-30 PROCEDURE — 85610 POCT INR: ICD-10-PCS | Mod: QW,HCNC,S$GLB, | Performed by: INTERNAL MEDICINE

## 2023-05-30 NOTE — PROGRESS NOTES
Patient's INR is therapeutic at 2.2.  Patient reports he followed previous instructions.  Patient reports no changes.  Instructions given: Continue warfarin 7.5 mg on Wednesdays, Fridays and Mondays; and 5 mg all other days.  Recheck in four weeks on 6/27/23.  Calendar reviewed with patient.  Patient verbalizes understanding.

## 2023-06-02 NOTE — TELEPHONE ENCOUNTER
A 2nd attempt has been made to retrieve requested documents from Papa Pope  via LETTER. The final contact attempt will be made in 5 business days

## 2023-06-12 ENCOUNTER — PATIENT OUTREACH (OUTPATIENT)
Dept: ADMINISTRATIVE | Facility: HOSPITAL | Age: 58
End: 2023-06-12
Payer: MEDICARE

## 2023-06-12 NOTE — PROGRESS NOTES
Working Diabetes Lab Report.    Pt has lab appt scheduled, 6/13/23.  All needed labs scheduled.

## 2023-06-13 ENCOUNTER — LAB VISIT (OUTPATIENT)
Dept: LAB | Facility: HOSPITAL | Age: 58
End: 2023-06-13
Attending: FAMILY MEDICINE
Payer: MEDICARE

## 2023-06-13 DIAGNOSIS — E11.65 TYPE 2 DIABETES MELLITUS WITH HYPERGLYCEMIA, WITHOUT LONG-TERM CURRENT USE OF INSULIN: ICD-10-CM

## 2023-06-13 LAB
ESTIMATED AVG GLUCOSE: 157 MG/DL (ref 68–131)
HBA1C MFR BLD: 7.1 % (ref 4–5.6)

## 2023-06-13 PROCEDURE — 83036 HEMOGLOBIN GLYCOSYLATED A1C: CPT | Mod: HCNC | Performed by: NURSE PRACTITIONER

## 2023-06-13 PROCEDURE — 36415 COLL VENOUS BLD VENIPUNCTURE: CPT | Mod: HCNC | Performed by: NURSE PRACTITIONER

## 2023-06-20 ENCOUNTER — OFFICE VISIT (OUTPATIENT)
Dept: DIABETES | Facility: CLINIC | Age: 58
End: 2023-06-20
Payer: MEDICARE

## 2023-06-20 ENCOUNTER — TELEPHONE (OUTPATIENT)
Dept: DIABETES | Facility: CLINIC | Age: 58
End: 2023-06-20

## 2023-06-20 DIAGNOSIS — Z53.21 PATIENT LEFT WITHOUT BEING SEEN: Primary | ICD-10-CM

## 2023-06-20 PROCEDURE — 99499 UNLISTED E&M SERVICE: CPT | Mod: HCNC,95,, | Performed by: NURSE PRACTITIONER

## 2023-06-20 PROCEDURE — 99499 NO LOS: ICD-10-PCS | Mod: HCNC,95,, | Performed by: NURSE PRACTITIONER

## 2023-06-20 NOTE — TELEPHONE ENCOUNTER
----- Message from Ankita Haywood NP sent at 6/20/2023  4:44 PM CDT -----  Audio visit not completed. Contact pt to reschedule visit.

## 2023-06-20 NOTE — PROGRESS NOTES
Scheduled telehealth visit not completed. Unable to contact patient and voicemail is full. Will reschedule for another visit. No LOS.

## 2023-06-21 NOTE — TELEPHONE ENCOUNTER
Papa Pope was scheduled to provide necessary documents to start the application process by 06/16/23. As of today, the following documents have not been received.        Proof of household Income( such as social security statement, 1099 form, pension statement or 3 consecutive pay stubs, Copy of all Insurance cards( front and back), Printout from your Insurance or Pharmacy that shows how much you have spent on prescriptions this year, and Signed & dated OCCP/ Patient Authorization Forms    Please instruct the patient to reach out to Norris Kaplan [phone] or  [email or web address] if (he/she) is still in need of assistance.

## 2023-06-27 ENCOUNTER — ANTI-COAG VISIT (OUTPATIENT)
Dept: CARDIOLOGY | Facility: CLINIC | Age: 58
End: 2023-06-27
Payer: MEDICARE

## 2023-06-27 DIAGNOSIS — I48.0 PAF (PAROXYSMAL ATRIAL FIBRILLATION): ICD-10-CM

## 2023-06-27 DIAGNOSIS — Z79.01 LONG TERM (CURRENT) USE OF ANTICOAGULANTS: Primary | ICD-10-CM

## 2023-06-27 LAB — INR PPP: 3.9 (ref 2–3)

## 2023-06-27 PROCEDURE — 93793 PR ANTICOAGULANT MGMT FOR PT TAKING WARFARIN: ICD-10-PCS | Mod: HCNC,S$GLB,,

## 2023-06-27 PROCEDURE — 85610 POCT INR: ICD-10-PCS | Mod: QW,HCNC,S$GLB, | Performed by: INTERNAL MEDICINE

## 2023-06-27 PROCEDURE — 93793 ANTICOAG MGMT PT WARFARIN: CPT | Mod: HCNC,S$GLB,,

## 2023-06-27 PROCEDURE — 85610 PROTHROMBIN TIME: CPT | Mod: QW,HCNC,S$GLB, | Performed by: INTERNAL MEDICINE

## 2023-06-27 NOTE — PROGRESS NOTES
Patient's INR is supra-therapeutic at 3.9.  Patient reports he followed previous instructions.  Patient reports he consumed alcoholic drinks.  Re-educated patient on Couamdin Diet and increased risk of bleeding with alcohol consumption.  Advised of increased risk of bleeding; signs/symptoms of bleeding and need to go to ED if experiences any.  Patient reports he is not having any signs/symptoms of bleeding.   Instructions given: Hold warfarin dose today -6/27/23; then re-challenge warfarin 7.5 mg on Wednesdays, Fridays and Mondays; and 5 mg all other days.  Recheck on 7/11/23 .  Calendar reviewed with patient.  Patient verbalizes understanding.

## 2023-07-11 ENCOUNTER — ANTI-COAG VISIT (OUTPATIENT)
Dept: CARDIOLOGY | Facility: CLINIC | Age: 58
End: 2023-07-11
Payer: MEDICARE

## 2023-07-11 DIAGNOSIS — Z79.01 LONG TERM (CURRENT) USE OF ANTICOAGULANTS: Primary | ICD-10-CM

## 2023-07-11 DIAGNOSIS — I48.0 PAF (PAROXYSMAL ATRIAL FIBRILLATION): ICD-10-CM

## 2023-07-11 LAB — INR PPP: 4.2 (ref 2–3)

## 2023-07-11 PROCEDURE — 93793 PR ANTICOAGULANT MGMT FOR PT TAKING WARFARIN: ICD-10-PCS | Mod: HCNC,S$GLB,,

## 2023-07-11 PROCEDURE — 93793 ANTICOAG MGMT PT WARFARIN: CPT | Mod: HCNC,S$GLB,,

## 2023-07-11 PROCEDURE — 85610 POCT INR: ICD-10-PCS | Mod: QW,HCNC,S$GLB, | Performed by: INTERNAL MEDICINE

## 2023-07-11 PROCEDURE — 85610 PROTHROMBIN TIME: CPT | Mod: QW,HCNC,S$GLB, | Performed by: INTERNAL MEDICINE

## 2023-07-11 NOTE — PROGRESS NOTES
Patient's INR is supra-therapeutic at 4.2.  Patient reports he followed previous instructions.  Patient reports he consumed alcoholic beverages.  Re-educated patient on Couamdin Diet and increased risk of bleeding with alcohol consumption.  Advised of increased risk of bleeding; signs/symptoms of bleeding and need to go to ED if experiences any.  Patient reports he is not having any signs/symptoms of bleeding.  Sent to PharmD for dosing/instructions.

## 2023-07-11 NOTE — PROGRESS NOTES
INR not at goal. Medications, chart, and patient findings reviewed. See calendar for adjustments to dose and follow up plan.  Pt continues to use alcohol producing very elevated INRs.  We will hold today and lower his weekly dose, which may put him at risk for clotting if INR drop during periods of not drinking.  We will repeat INR 2 weeks.  He should report to the ER with any s/s of clotting/stroke or bleeding from ETOH intake.

## 2023-07-25 ENCOUNTER — ANTI-COAG VISIT (OUTPATIENT)
Dept: CARDIOLOGY | Facility: CLINIC | Age: 58
End: 2023-07-25
Payer: MEDICARE

## 2023-07-25 DIAGNOSIS — Z79.01 LONG TERM (CURRENT) USE OF ANTICOAGULANTS: Primary | ICD-10-CM

## 2023-07-25 DIAGNOSIS — I48.0 PAF (PAROXYSMAL ATRIAL FIBRILLATION): ICD-10-CM

## 2023-07-25 LAB — INR PPP: 3.1 (ref 2–3)

## 2023-07-25 PROCEDURE — 93793 ANTICOAG MGMT PT WARFARIN: CPT | Mod: HCNC,S$GLB,,

## 2023-07-25 PROCEDURE — 93793 PR ANTICOAGULANT MGMT FOR PT TAKING WARFARIN: ICD-10-PCS | Mod: HCNC,S$GLB,,

## 2023-07-25 PROCEDURE — 85610 POCT INR: ICD-10-PCS | Mod: QW,HCNC,S$GLB, | Performed by: INTERNAL MEDICINE

## 2023-07-25 PROCEDURE — 85610 PROTHROMBIN TIME: CPT | Mod: QW,HCNC,S$GLB, | Performed by: INTERNAL MEDICINE

## 2023-07-25 NOTE — PROGRESS NOTES
Patient's INR is supra-therapeutic at 3.1.  Patient reports he followed previous instructions.  Patient reports he drank one alcoholic beverage on last week.  Advised patient of increased risk of bleeding; signs/symptoms of bleeding and need to go to ED if he experiences any.  Patient reports he is not having any signs/symptoms of bleeding.  Instructions given: Take warfarin   2.5 mg today 7/25/23; then re-challenge warfarin 7.5 mg on Fridays and Mondays; and 5 mg all other days.  Recheck on 8/8/23.  Calendar reviewed with patient.  Patient verbalizes understanding.

## 2023-08-08 ENCOUNTER — ANTI-COAG VISIT (OUTPATIENT)
Dept: CARDIOLOGY | Facility: CLINIC | Age: 58
End: 2023-08-08
Payer: MEDICARE

## 2023-08-08 DIAGNOSIS — Z79.01 LONG TERM (CURRENT) USE OF ANTICOAGULANTS: Primary | ICD-10-CM

## 2023-08-08 DIAGNOSIS — I48.0 PAF (PAROXYSMAL ATRIAL FIBRILLATION): ICD-10-CM

## 2023-08-08 DIAGNOSIS — Z79.01 LONG TERM (CURRENT) USE OF ANTICOAGULANTS: ICD-10-CM

## 2023-08-08 LAB — INR PPP: 2.4 (ref 2–3)

## 2023-08-08 PROCEDURE — 93793 ANTICOAG MGMT PT WARFARIN: CPT | Mod: HCNC,S$GLB,,

## 2023-08-08 PROCEDURE — 85610 PROTHROMBIN TIME: CPT | Mod: QW,HCNC,S$GLB, | Performed by: INTERNAL MEDICINE

## 2023-08-08 PROCEDURE — 85610 POCT INR: ICD-10-PCS | Mod: QW,HCNC,S$GLB, | Performed by: INTERNAL MEDICINE

## 2023-08-08 PROCEDURE — 93793 PR ANTICOAGULANT MGMT FOR PT TAKING WARFARIN: ICD-10-PCS | Mod: HCNC,S$GLB,,

## 2023-08-08 RX ORDER — WARFARIN SODIUM 5 MG/1
TABLET ORAL
Qty: 35 TABLET | Refills: 6 | Status: SHIPPED | OUTPATIENT
Start: 2023-08-08 | End: 2024-03-27 | Stop reason: SDUPTHER

## 2023-08-08 NOTE — PROGRESS NOTES
Patient's INR is therapeutic at 2.4.  Patient reports he followed previous instructions.  Patient reports no changes.  Instructions given: continue warfarin 7.5 mg on Fridays and Mondays; and 5 mg all other days.  Recheck on 8/21/23 with other appointment.  Calendar reviewed with patient.  Patient verbalizes understanding.

## 2023-08-21 ENCOUNTER — LAB VISIT (OUTPATIENT)
Dept: LAB | Facility: HOSPITAL | Age: 58
End: 2023-08-21
Payer: MEDICARE

## 2023-08-21 ENCOUNTER — ANTI-COAG VISIT (OUTPATIENT)
Dept: CARDIOLOGY | Facility: CLINIC | Age: 58
End: 2023-08-21
Payer: MEDICARE

## 2023-08-21 ENCOUNTER — OFFICE VISIT (OUTPATIENT)
Dept: INTERNAL MEDICINE | Facility: CLINIC | Age: 58
End: 2023-08-21
Payer: MEDICARE

## 2023-08-21 VITALS
HEART RATE: 56 BPM | SYSTOLIC BLOOD PRESSURE: 118 MMHG | RESPIRATION RATE: 18 BRPM | HEIGHT: 78 IN | OXYGEN SATURATION: 98 % | WEIGHT: 206.81 LBS | DIASTOLIC BLOOD PRESSURE: 72 MMHG | BODY MASS INDEX: 23.93 KG/M2

## 2023-08-21 DIAGNOSIS — E11.42 DIABETIC POLYNEUROPATHY ASSOCIATED WITH TYPE 2 DIABETES MELLITUS: ICD-10-CM

## 2023-08-21 DIAGNOSIS — Z79.01 LONG TERM (CURRENT) USE OF ANTICOAGULANTS: Primary | ICD-10-CM

## 2023-08-21 DIAGNOSIS — E11.29 TYPE 2 DIABETES MELLITUS WITH MICROALBUMINURIA, WITH LONG-TERM CURRENT USE OF INSULIN: ICD-10-CM

## 2023-08-21 DIAGNOSIS — E11.69 HYPERLIPIDEMIA DUE TO TYPE 2 DIABETES MELLITUS: ICD-10-CM

## 2023-08-21 DIAGNOSIS — E11.59 HYPERTENSION ASSOCIATED WITH DIABETES: ICD-10-CM

## 2023-08-21 DIAGNOSIS — R80.9 TYPE 2 DIABETES MELLITUS WITH MICROALBUMINURIA, WITH LONG-TERM CURRENT USE OF INSULIN: ICD-10-CM

## 2023-08-21 DIAGNOSIS — I65.23 BILATERAL CAROTID ARTERY STENOSIS: ICD-10-CM

## 2023-08-21 DIAGNOSIS — I48.91 ON COUMADIN FOR ATRIAL FIBRILLATION: ICD-10-CM

## 2023-08-21 DIAGNOSIS — Z79.4 TYPE 2 DIABETES MELLITUS WITH MICROALBUMINURIA, WITH LONG-TERM CURRENT USE OF INSULIN: ICD-10-CM

## 2023-08-21 DIAGNOSIS — I48.0 PAF (PAROXYSMAL ATRIAL FIBRILLATION): ICD-10-CM

## 2023-08-21 DIAGNOSIS — Z12.5 ENCOUNTER FOR SCREENING FOR MALIGNANT NEOPLASM OF PROSTATE: ICD-10-CM

## 2023-08-21 DIAGNOSIS — R26.9 ABNORMALITY OF GAIT AND MOBILITY: ICD-10-CM

## 2023-08-21 DIAGNOSIS — Z79.01 ON COUMADIN FOR ATRIAL FIBRILLATION: ICD-10-CM

## 2023-08-21 DIAGNOSIS — F17.210 CIGARETTE NICOTINE DEPENDENCE WITHOUT COMPLICATION: ICD-10-CM

## 2023-08-21 DIAGNOSIS — Z00.00 ROUTINE GENERAL MEDICAL EXAMINATION AT A HEALTH CARE FACILITY: Primary | ICD-10-CM

## 2023-08-21 DIAGNOSIS — I15.2 HYPERTENSION ASSOCIATED WITH DIABETES: ICD-10-CM

## 2023-08-21 DIAGNOSIS — Z86.010 HISTORY OF COLON POLYPS: ICD-10-CM

## 2023-08-21 DIAGNOSIS — E11.69 ERECTILE DYSFUNCTION ASSOCIATED WITH TYPE 2 DIABETES MELLITUS: ICD-10-CM

## 2023-08-21 DIAGNOSIS — E78.5 HYPERLIPIDEMIA DUE TO TYPE 2 DIABETES MELLITUS: ICD-10-CM

## 2023-08-21 DIAGNOSIS — M51.36 DDD (DEGENERATIVE DISC DISEASE), LUMBAR: ICD-10-CM

## 2023-08-21 DIAGNOSIS — N52.1 ERECTILE DYSFUNCTION ASSOCIATED WITH TYPE 2 DIABETES MELLITUS: ICD-10-CM

## 2023-08-21 DIAGNOSIS — Z00.00 ROUTINE GENERAL MEDICAL EXAMINATION AT A HEALTH CARE FACILITY: ICD-10-CM

## 2023-08-21 PROBLEM — I77.9 CAROTID ARTERY DISEASE: Status: RESOLVED | Noted: 2022-12-20 | Resolved: 2023-08-21

## 2023-08-21 PROBLEM — E11.65 UNCONTROLLED TYPE 2 DIABETES MELLITUS WITH HYPERGLYCEMIA: Status: RESOLVED | Noted: 2023-04-05 | Resolved: 2023-08-21

## 2023-08-21 PROBLEM — G89.29 OTHER CHRONIC PAIN: Status: RESOLVED | Noted: 2023-05-09 | Resolved: 2023-08-21

## 2023-08-21 PROBLEM — N17.9 ACUTE RENAL FAILURE: Status: RESOLVED | Noted: 2022-01-25 | Resolved: 2023-08-21

## 2023-08-21 PROBLEM — F17.200 SMOKER: Status: RESOLVED | Noted: 2020-06-25 | Resolved: 2023-08-21

## 2023-08-21 PROBLEM — E11.65 TYPE 2 DIABETES MELLITUS WITH HYPERGLYCEMIA, WITHOUT LONG-TERM CURRENT USE OF INSULIN: Status: RESOLVED | Noted: 2021-08-04 | Resolved: 2023-08-21

## 2023-08-21 PROBLEM — E87.5 HYPERKALEMIA: Status: RESOLVED | Noted: 2022-01-25 | Resolved: 2023-08-21

## 2023-08-21 PROBLEM — Z53.21 PATIENT LEFT WITHOUT BEING SEEN: Status: RESOLVED | Noted: 2017-08-22 | Resolved: 2023-08-21

## 2023-08-21 PROBLEM — E78.1 TYPE 2 DIABETES MELLITUS WITH HYPERTRIGLYCERIDEMIA: Status: RESOLVED | Noted: 2022-12-20 | Resolved: 2023-08-21

## 2023-08-21 PROBLEM — E11.00 HYPEROSMOLAR HYPERGLYCEMIC STATE (HHS): Status: RESOLVED | Noted: 2022-01-25 | Resolved: 2023-08-21

## 2023-08-21 PROBLEM — Z74.09 OTHER REDUCED MOBILITY: Status: RESOLVED | Noted: 2023-05-09 | Resolved: 2023-08-21

## 2023-08-21 PROBLEM — E11.9 TYPE 2 DIABETES MELLITUS WITH HEMOGLOBIN A1C GOAL OF LESS THAN 7.0%: Status: RESOLVED | Noted: 2023-04-05 | Resolved: 2023-08-21

## 2023-08-21 PROBLEM — R29.898 LEG WEAKNESS, BILATERAL: Status: RESOLVED | Noted: 2021-08-09 | Resolved: 2023-08-21

## 2023-08-21 PROBLEM — R53.81 DEBILITATED PATIENT: Status: RESOLVED | Noted: 2023-05-09 | Resolved: 2023-08-21

## 2023-08-21 LAB
ALBUMIN/CREAT UR: 197.6 UG/MG (ref 0–30)
BACTERIA #/AREA URNS HPF: ABNORMAL /HPF
BILIRUB UR QL STRIP: NEGATIVE
CLARITY UR: CLEAR
COLOR UR: YELLOW
CREAT UR-MCNC: 123 MG/DL (ref 23–375)
GLUCOSE UR QL STRIP: NEGATIVE
HGB UR QL STRIP: NEGATIVE
HYALINE CASTS #/AREA URNS LPF: 2 /LPF
INR PPP: 3 (ref 2–3)
KETONES UR QL STRIP: NEGATIVE
LEUKOCYTE ESTERASE UR QL STRIP: NEGATIVE
MICROALBUMIN UR DL<=1MG/L-MCNC: 243 UG/ML
MICROSCOPIC COMMENT: ABNORMAL
NITRITE UR QL STRIP: NEGATIVE
PH UR STRIP: 5 [PH] (ref 5–8)
PROT UR QL STRIP: ABNORMAL
RBC #/AREA URNS HPF: 3 /HPF (ref 0–4)
SP GR UR STRIP: 1.02 (ref 1–1.03)
SQUAMOUS #/AREA URNS HPF: 2 /HPF
URN SPEC COLLECT METH UR: ABNORMAL
WBC #/AREA URNS HPF: 3 /HPF (ref 0–5)

## 2023-08-21 PROCEDURE — 85610 PROTHROMBIN TIME: CPT | Mod: QW,HCNC,S$GLB, | Performed by: INTERNAL MEDICINE

## 2023-08-21 PROCEDURE — 93793 PR ANTICOAGULANT MGMT FOR PT TAKING WARFARIN: ICD-10-PCS | Mod: HCNC,S$GLB,,

## 2023-08-21 PROCEDURE — 3066F NEPHROPATHY DOC TX: CPT | Mod: HCNC,CPTII,S$GLB, | Performed by: FAMILY MEDICINE

## 2023-08-21 PROCEDURE — 1160F PR REVIEW ALL MEDS BY PRESCRIBER/CLIN PHARMACIST DOCUMENTED: ICD-10-PCS | Mod: HCNC,CPTII,S$GLB, | Performed by: FAMILY MEDICINE

## 2023-08-21 PROCEDURE — 81000 URINALYSIS NONAUTO W/SCOPE: CPT | Mod: HCNC | Performed by: FAMILY MEDICINE

## 2023-08-21 PROCEDURE — 3066F PR DOCUMENTATION OF TREATMENT FOR NEPHROPATHY: ICD-10-PCS | Mod: HCNC,CPTII,S$GLB, | Performed by: FAMILY MEDICINE

## 2023-08-21 PROCEDURE — 85610 POCT INR: ICD-10-PCS | Mod: QW,HCNC,S$GLB, | Performed by: INTERNAL MEDICINE

## 2023-08-21 PROCEDURE — 99999 PR PBB SHADOW E&M-EST. PATIENT-LVL IV: CPT | Mod: PBBFAC,HCNC,, | Performed by: FAMILY MEDICINE

## 2023-08-21 PROCEDURE — 3060F PR POS MICROALBUMINURIA RESULT DOCUMENTED/REVIEW: ICD-10-PCS | Mod: HCNC,CPTII,S$GLB, | Performed by: FAMILY MEDICINE

## 2023-08-21 PROCEDURE — 3078F DIAST BP <80 MM HG: CPT | Mod: HCNC,CPTII,S$GLB, | Performed by: FAMILY MEDICINE

## 2023-08-21 PROCEDURE — 3008F PR BODY MASS INDEX (BMI) DOCUMENTED: ICD-10-PCS | Mod: HCNC,CPTII,S$GLB, | Performed by: FAMILY MEDICINE

## 2023-08-21 PROCEDURE — 4010F ACE/ARB THERAPY RXD/TAKEN: CPT | Mod: HCNC,CPTII,S$GLB, | Performed by: FAMILY MEDICINE

## 2023-08-21 PROCEDURE — 3051F PR MOST RECENT HEMOGLOBIN A1C LEVEL 7.0 - < 8.0%: ICD-10-PCS | Mod: HCNC,CPTII,S$GLB, | Performed by: FAMILY MEDICINE

## 2023-08-21 PROCEDURE — 1160F RVW MEDS BY RX/DR IN RCRD: CPT | Mod: HCNC,CPTII,S$GLB, | Performed by: FAMILY MEDICINE

## 2023-08-21 PROCEDURE — 3074F PR MOST RECENT SYSTOLIC BLOOD PRESSURE < 130 MM HG: ICD-10-PCS | Mod: HCNC,CPTII,S$GLB, | Performed by: FAMILY MEDICINE

## 2023-08-21 PROCEDURE — 99396 PR PREVENTIVE VISIT,EST,40-64: ICD-10-PCS | Mod: HCNC,S$GLB,, | Performed by: FAMILY MEDICINE

## 2023-08-21 PROCEDURE — 3008F BODY MASS INDEX DOCD: CPT | Mod: HCNC,CPTII,S$GLB, | Performed by: FAMILY MEDICINE

## 2023-08-21 PROCEDURE — 1159F PR MEDICATION LIST DOCUMENTED IN MEDICAL RECORD: ICD-10-PCS | Mod: HCNC,CPTII,S$GLB, | Performed by: FAMILY MEDICINE

## 2023-08-21 PROCEDURE — 93793 ANTICOAG MGMT PT WARFARIN: CPT | Mod: HCNC,S$GLB,,

## 2023-08-21 PROCEDURE — 99999 PR PBB SHADOW E&M-EST. PATIENT-LVL IV: ICD-10-PCS | Mod: PBBFAC,HCNC,, | Performed by: FAMILY MEDICINE

## 2023-08-21 PROCEDURE — 3060F POS MICROALBUMINURIA REV: CPT | Mod: HCNC,CPTII,S$GLB, | Performed by: FAMILY MEDICINE

## 2023-08-21 PROCEDURE — 4010F PR ACE/ARB THEARPY RXD/TAKEN: ICD-10-PCS | Mod: HCNC,CPTII,S$GLB, | Performed by: FAMILY MEDICINE

## 2023-08-21 PROCEDURE — 99396 PREV VISIT EST AGE 40-64: CPT | Mod: HCNC,S$GLB,, | Performed by: FAMILY MEDICINE

## 2023-08-21 PROCEDURE — 1159F MED LIST DOCD IN RCRD: CPT | Mod: HCNC,CPTII,S$GLB, | Performed by: FAMILY MEDICINE

## 2023-08-21 PROCEDURE — 3078F PR MOST RECENT DIASTOLIC BLOOD PRESSURE < 80 MM HG: ICD-10-PCS | Mod: HCNC,CPTII,S$GLB, | Performed by: FAMILY MEDICINE

## 2023-08-21 PROCEDURE — 3051F HG A1C>EQUAL 7.0%<8.0%: CPT | Mod: HCNC,CPTII,S$GLB, | Performed by: FAMILY MEDICINE

## 2023-08-21 PROCEDURE — 3074F SYST BP LT 130 MM HG: CPT | Mod: HCNC,CPTII,S$GLB, | Performed by: FAMILY MEDICINE

## 2023-08-21 PROCEDURE — 82570 ASSAY OF URINE CREATININE: CPT | Mod: HCNC | Performed by: FAMILY MEDICINE

## 2023-08-21 RX ORDER — EZETIMIBE 10 MG/1
10 TABLET ORAL DAILY
Qty: 90 TABLET | Refills: 1 | Status: SHIPPED | OUTPATIENT
Start: 2023-08-21

## 2023-08-21 RX ORDER — ROSUVASTATIN CALCIUM 40 MG/1
40 TABLET, COATED ORAL NIGHTLY
Qty: 90 TABLET | Refills: 1 | Status: SHIPPED | OUTPATIENT
Start: 2023-08-21

## 2023-08-21 RX ORDER — PREGABALIN 75 MG/1
75 CAPSULE ORAL 2 TIMES DAILY
Qty: 60 CAPSULE | Refills: 3 | Status: SHIPPED | OUTPATIENT
Start: 2023-08-21 | End: 2024-03-26

## 2023-08-21 RX ORDER — LISINOPRIL 5 MG/1
5 TABLET ORAL DAILY
Qty: 90 TABLET | Refills: 1 | Status: SHIPPED | OUTPATIENT
Start: 2023-08-21 | End: 2023-08-30 | Stop reason: SDUPTHER

## 2023-08-21 RX ORDER — INSULIN LISPRO 100 [IU]/ML
INJECTION, SOLUTION INTRAVENOUS; SUBCUTANEOUS
Qty: 15 ML | Refills: 3 | Status: SHIPPED | OUTPATIENT
Start: 2023-08-21 | End: 2024-01-08 | Stop reason: ALTCHOICE

## 2023-08-21 RX ORDER — METOPROLOL TARTRATE 50 MG/1
50 TABLET ORAL DAILY
Qty: 90 TABLET | Refills: 1 | Status: SHIPPED | OUTPATIENT
Start: 2023-08-21 | End: 2023-08-30 | Stop reason: SDUPTHER

## 2023-08-21 RX ORDER — INSULIN DETEMIR 100 [IU]/ML
24 INJECTION, SOLUTION SUBCUTANEOUS 2 TIMES DAILY
Qty: 15 ML | Refills: 3 | Status: SHIPPED | OUTPATIENT
Start: 2023-08-21 | End: 2023-12-18 | Stop reason: ALTCHOICE

## 2023-08-21 NOTE — PROGRESS NOTES
"Subjective:       Patient ID: Papa Pope is a 58 y.o. male.    Chief Complaint: Follow-up    58-year-old  male patient with Patient Active Problem List:     Hyperlipidemia due to type 2 diabetes mellitus     Hypertension associated with diabetes     DDD (degenerative disc disease), lumbar     History of colon polyps     PAF (paroxysmal atrial fibrillation)     On Coumadin for atrial fibrillation     Type 2 diabetes mellitus with microalbuminuria, with long-term current use of insulin     Diabetic polyneuropathy associated with type 2 diabetes mellitus     Cigarette nicotine dependence without complication     Uncomplicated alcohol dependence     Abnormality of gait and mobility     Erectile dysfunction associated with type 2 diabetes mellitus  Here for routine annual physicals  Patient has been taking his medications regularly and showed significant improvement in blood glucose levels  Denies any chest pain or difficulty breathing with palpitations and reports that he has been smoking and drinking alcohol a little but not significant enough  Requesting refills on medications today        Review of Systems   Constitutional:  Negative for appetite change and fatigue.   Eyes:  Negative for visual disturbance.   Respiratory:  Negative for shortness of breath.    Cardiovascular:  Negative for chest pain, palpitations and leg swelling.   Gastrointestinal:  Negative for abdominal pain, nausea and vomiting.   Endocrine: Negative for polydipsia and polyuria.   Musculoskeletal:  Negative for myalgias.   Skin:  Negative for rash.   Neurological:  Negative for weakness, numbness and headaches.   Psychiatric/Behavioral:  Negative for sleep disturbance.          /72 (BP Location: Right arm, Patient Position: Sitting, BP Method: Large (Manual))   Pulse (!) 56   Resp 18   Ht 6' 6" (1.981 m)   Wt 93.8 kg (206 lb 12.7 oz)   SpO2 98%   BMI 23.90 kg/m²   Objective:      Physical Exam  Constitutional:      "  Appearance: He is well-developed.   HENT:      Head: Normocephalic and atraumatic.   Cardiovascular:      Rate and Rhythm: Normal rate and regular rhythm.      Heart sounds: Normal heart sounds. No murmur heard.  Pulmonary:      Effort: Pulmonary effort is normal.      Breath sounds: Normal breath sounds. No wheezing.   Abdominal:      General: Bowel sounds are normal.      Palpations: Abdomen is soft.      Tenderness: There is no abdominal tenderness.   Skin:     General: Skin is warm and dry.      Findings: No rash.   Neurological:      Mental Status: He is alert and oriented to person, place, and time.   Psychiatric:         Mood and Affect: Mood normal.           Assessment/Plan:   1. Routine general medical examination at a health care facility  -     Urinalysis, Reflex to Urine Culture Urine, Clean Catch; Future; Expected date: 08/21/2023  -     TSH; Future; Expected date: 08/21/2023  -     Lipid Panel; Future; Expected date: 08/21/2023  -     Comprehensive Metabolic Panel; Future; Expected date: 08/21/2023  -     CBC Auto Differential; Future; Expected date: 08/21/2023  Vital signs stable today.  Clinical exam stable  Continue lifestyle modifications with low-fat and low-cholesterol diet and exercise 30 minutes daily  Encouraged to consider getting tetanus shingles and Pneumovax    2. Hypertension associated with diabetes  -     metoprolol tartrate (LOPRESSOR) 50 MG tablet; Take 1 tablet (50 mg total) by mouth once daily.  Dispense: 90 tablet; Refill: 1  -     Urinalysis, Reflex to Urine Culture Urine, Clean Catch; Future; Expected date: 08/21/2023  -     TSH; Future; Expected date: 08/21/2023  -     Lipid Panel; Future; Expected date: 08/21/2023  -     Comprehensive Metabolic Panel; Future; Expected date: 08/21/2023  Blood pressure is stable currently on metoprolol 50 mg daily and has not been taking lisinopril 5 mg-refill given today and encouraged to keep appointment with Cardiology as scheduled    3.  Hyperlipidemia due to type 2 diabetes mellitus  -     ezetimibe (ZETIA) 10 mg tablet; Take 1 tablet (10 mg total) by mouth once daily.  Dispense: 90 tablet; Refill: 1  -     rosuvastatin (CRESTOR) 40 MG Tab; Take 1 tablet (40 mg total) by mouth every evening.  Dispense: 90 tablet; Refill: 1  -     Lipid Panel; Future; Expected date: 08/21/2023  Currently taking Zetia 10 mg and Crestor 40 mg    4. Type 2 diabetes mellitus with microalbuminuria, with long-term current use of insulin  -     empagliflozin (JARDIANCE) 25 mg tablet; Take 1 tablet (25 mg total) by mouth every morning.  Dispense: 90 tablet; Refill: 1  -     HUMALOG KWIKPEN INSULIN 100 unit/mL pen; Inject into the skin three times a day before each meal per sliding scale. If -200: 2 units; -250: 4 units; -300: 6 units; -350: 8 units; BG >350: 10 unit  Dispense: 15 mL; Refill: 3  -     insulin detemir U-100, Levemir, (LEVEMIR FLEXPEN) 100 unit/mL (3 mL) InPn pen; Inject 24 Units into the skin 2 (two) times daily.  Dispense: 15 mL; Refill: 3  -     Ambulatory referral/consult to Optometry; Future; Expected date: 08/28/2023  -     Hemoglobin A1C; Future; Expected date: 08/21/2023  -     CBC Auto Differential; Future; Expected date: 08/21/2023  -     Microalbumin/Creatinine Ratio, Urine; Future; Expected date: 08/21/2023  Refill given on Jardiance Levemir and Humalog as requested  Patient due for eye exam  Noted significant improvement in A1c  Strict lifestyle changes recommended with 1800 ADA low-fat and low-cholesterol diet and exercise 30 minutes daily      5. Diabetic polyneuropathy associated with type 2 diabetes mellitus  -     pregabalin (LYRICA) 75 MG capsule; Take 1 capsule (75 mg total) by mouth 2 (two) times daily.  Dispense: 60 capsule; Refill: 3  Stable on Lyrica    6. PAF (paroxysmal atrial fibrillation)  7. On Coumadin for atrial fibrillation  Clinically doing well    8. DDD (degenerative disc disease), lumbar  Graded  exercise regimen recommended    9. Bilateral carotid artery stenosis  Stable and asymptomatic    10. Cigarette nicotine dependence without complication  Encouraged to quit smoking    11. Erectile dysfunction associated with type 2 diabetes mellitus    12. History of colon polyps  Overview:  Colonoscopy 8/22/2017      13. Abnormality of gait and mobility  Overview:  Using cane    Other orders  -     lisinopriL (PRINIVIL,ZESTRIL) 5 MG tablet; Take 1 tablet (5 mg total) by mouth once daily.  Dispense: 90 tablet; Refill: 1

## 2023-08-21 NOTE — PROGRESS NOTES
Patient's INR is therapeutic at 3.0.  Patient reports he followed previous instructions.  Patient reports no changes.  Instructions given: Continue warfarin 7.5 mg on Mondays and Fridays; and 5 mg all other days. Recheck on 9/11/23.  Calendar reviewed with patient.  Patient verbalizes understanding.

## 2023-08-29 DIAGNOSIS — I48.0 PAF (PAROXYSMAL ATRIAL FIBRILLATION): Primary | ICD-10-CM

## 2023-08-30 ENCOUNTER — OFFICE VISIT (OUTPATIENT)
Dept: CARDIOLOGY | Facility: CLINIC | Age: 58
End: 2023-08-30
Payer: MEDICARE

## 2023-08-30 ENCOUNTER — HOSPITAL ENCOUNTER (OUTPATIENT)
Dept: CARDIOLOGY | Facility: HOSPITAL | Age: 58
Discharge: HOME | End: 2023-08-30
Attending: INTERNAL MEDICINE
Payer: MEDICARE

## 2023-08-30 VITALS
HEIGHT: 78 IN | DIASTOLIC BLOOD PRESSURE: 90 MMHG | BODY MASS INDEX: 23.88 KG/M2 | OXYGEN SATURATION: 97 % | SYSTOLIC BLOOD PRESSURE: 130 MMHG | HEART RATE: 86 BPM | WEIGHT: 206.38 LBS

## 2023-08-30 DIAGNOSIS — E11.69 HYPERLIPIDEMIA DUE TO TYPE 2 DIABETES MELLITUS: ICD-10-CM

## 2023-08-30 DIAGNOSIS — F17.200 SMOKER: ICD-10-CM

## 2023-08-30 DIAGNOSIS — I48.0 PAF (PAROXYSMAL ATRIAL FIBRILLATION): ICD-10-CM

## 2023-08-30 DIAGNOSIS — N52.9 ERECTILE DYSFUNCTION, UNSPECIFIED ERECTILE DYSFUNCTION TYPE: ICD-10-CM

## 2023-08-30 DIAGNOSIS — R00.2 PALPITATIONS: ICD-10-CM

## 2023-08-30 DIAGNOSIS — E78.5 HYPERLIPIDEMIA DUE TO TYPE 2 DIABETES MELLITUS: ICD-10-CM

## 2023-08-30 DIAGNOSIS — Z79.01 LONG TERM (CURRENT) USE OF ANTICOAGULANTS: ICD-10-CM

## 2023-08-30 DIAGNOSIS — E11.59 HYPERTENSION ASSOCIATED WITH DIABETES: ICD-10-CM

## 2023-08-30 DIAGNOSIS — Z82.49 FAMILY HISTORY OF CORONARY ARTERY DISEASE: ICD-10-CM

## 2023-08-30 DIAGNOSIS — I15.2 HYPERTENSION ASSOCIATED WITH DIABETES: ICD-10-CM

## 2023-08-30 DIAGNOSIS — I48.0 PAF (PAROXYSMAL ATRIAL FIBRILLATION): Primary | ICD-10-CM

## 2023-08-30 DIAGNOSIS — M51.36 DDD (DEGENERATIVE DISC DISEASE), LUMBAR: ICD-10-CM

## 2023-08-30 PROCEDURE — 1159F MED LIST DOCD IN RCRD: CPT | Mod: HCNC,CPTII,S$GLB, | Performed by: INTERNAL MEDICINE

## 2023-08-30 PROCEDURE — 99214 OFFICE O/P EST MOD 30 MIN: CPT | Mod: HCNC,S$GLB,, | Performed by: INTERNAL MEDICINE

## 2023-08-30 PROCEDURE — 3066F PR DOCUMENTATION OF TREATMENT FOR NEPHROPATHY: ICD-10-PCS | Mod: HCNC,CPTII,S$GLB, | Performed by: INTERNAL MEDICINE

## 2023-08-30 PROCEDURE — 3080F PR MOST RECENT DIASTOLIC BLOOD PRESSURE >= 90 MM HG: ICD-10-PCS | Mod: HCNC,CPTII,S$GLB, | Performed by: INTERNAL MEDICINE

## 2023-08-30 PROCEDURE — 3060F PR POS MICROALBUMINURIA RESULT DOCUMENTED/REVIEW: ICD-10-PCS | Mod: HCNC,CPTII,S$GLB, | Performed by: INTERNAL MEDICINE

## 2023-08-30 PROCEDURE — 1160F PR REVIEW ALL MEDS BY PRESCRIBER/CLIN PHARMACIST DOCUMENTED: ICD-10-PCS | Mod: HCNC,CPTII,S$GLB, | Performed by: INTERNAL MEDICINE

## 2023-08-30 PROCEDURE — 93010 EKG 12-LEAD: ICD-10-PCS | Mod: HCNC,,, | Performed by: INTERNAL MEDICINE

## 2023-08-30 PROCEDURE — 99214 PR OFFICE/OUTPT VISIT, EST, LEVL IV, 30-39 MIN: ICD-10-PCS | Mod: HCNC,S$GLB,, | Performed by: INTERNAL MEDICINE

## 2023-08-30 PROCEDURE — 3075F PR MOST RECENT SYSTOLIC BLOOD PRESS GE 130-139MM HG: ICD-10-PCS | Mod: HCNC,CPTII,S$GLB, | Performed by: INTERNAL MEDICINE

## 2023-08-30 PROCEDURE — 99999 PR PBB SHADOW E&M-EST. PATIENT-LVL IV: CPT | Mod: PBBFAC,HCNC,, | Performed by: INTERNAL MEDICINE

## 2023-08-30 PROCEDURE — 3008F BODY MASS INDEX DOCD: CPT | Mod: HCNC,CPTII,S$GLB, | Performed by: INTERNAL MEDICINE

## 2023-08-30 PROCEDURE — 3044F PR MOST RECENT HEMOGLOBIN A1C LEVEL <7.0%: ICD-10-PCS | Mod: HCNC,CPTII,S$GLB, | Performed by: INTERNAL MEDICINE

## 2023-08-30 PROCEDURE — 3075F SYST BP GE 130 - 139MM HG: CPT | Mod: HCNC,CPTII,S$GLB, | Performed by: INTERNAL MEDICINE

## 2023-08-30 PROCEDURE — 3060F POS MICROALBUMINURIA REV: CPT | Mod: HCNC,CPTII,S$GLB, | Performed by: INTERNAL MEDICINE

## 2023-08-30 PROCEDURE — 93010 ELECTROCARDIOGRAM REPORT: CPT | Mod: HCNC,,, | Performed by: INTERNAL MEDICINE

## 2023-08-30 PROCEDURE — 4010F PR ACE/ARB THEARPY RXD/TAKEN: ICD-10-PCS | Mod: HCNC,CPTII,S$GLB, | Performed by: INTERNAL MEDICINE

## 2023-08-30 PROCEDURE — 3066F NEPHROPATHY DOC TX: CPT | Mod: HCNC,CPTII,S$GLB, | Performed by: INTERNAL MEDICINE

## 2023-08-30 PROCEDURE — 1159F PR MEDICATION LIST DOCUMENTED IN MEDICAL RECORD: ICD-10-PCS | Mod: HCNC,CPTII,S$GLB, | Performed by: INTERNAL MEDICINE

## 2023-08-30 PROCEDURE — 3080F DIAST BP >= 90 MM HG: CPT | Mod: HCNC,CPTII,S$GLB, | Performed by: INTERNAL MEDICINE

## 2023-08-30 PROCEDURE — 93005 ELECTROCARDIOGRAM TRACING: CPT | Mod: HCNC

## 2023-08-30 PROCEDURE — 1160F RVW MEDS BY RX/DR IN RCRD: CPT | Mod: HCNC,CPTII,S$GLB, | Performed by: INTERNAL MEDICINE

## 2023-08-30 PROCEDURE — 3044F HG A1C LEVEL LT 7.0%: CPT | Mod: HCNC,CPTII,S$GLB, | Performed by: INTERNAL MEDICINE

## 2023-08-30 PROCEDURE — 4010F ACE/ARB THERAPY RXD/TAKEN: CPT | Mod: HCNC,CPTII,S$GLB, | Performed by: INTERNAL MEDICINE

## 2023-08-30 PROCEDURE — 3008F PR BODY MASS INDEX (BMI) DOCUMENTED: ICD-10-PCS | Mod: HCNC,CPTII,S$GLB, | Performed by: INTERNAL MEDICINE

## 2023-08-30 PROCEDURE — 99999 PR PBB SHADOW E&M-EST. PATIENT-LVL IV: ICD-10-PCS | Mod: PBBFAC,HCNC,, | Performed by: INTERNAL MEDICINE

## 2023-08-30 RX ORDER — METOPROLOL TARTRATE 50 MG/1
50 TABLET ORAL 2 TIMES DAILY
Qty: 180 TABLET | Refills: 1 | Status: SHIPPED | OUTPATIENT
Start: 2023-08-30 | End: 2024-03-25 | Stop reason: SDUPTHER

## 2023-08-30 RX ORDER — LISINOPRIL 10 MG/1
10 TABLET ORAL DAILY
Qty: 90 TABLET | Refills: 1 | Status: SHIPPED | OUTPATIENT
Start: 2023-08-30 | End: 2024-03-25 | Stop reason: SDUPTHER

## 2023-08-30 NOTE — PROGRESS NOTES
Subjective:   Patient ID:  Papa Pope is a 58 y.o. male who presents for cardiac consult of No chief complaint on file.        The patient came in today for cardiac consult of No chief complaint on file.      Papa Pope is a 58 y.o. male pt with AF, HTN, H LD, DM2 -h/o HHS, DDD, tobacco abuse presents for follow up CV eval.    2/2/22  \[facility] [place] [facility]\"" summary:   Papa Pope is a 56 year old male with history of DM, tobacco abuse, hypertension, and hyperlipidemia who presents to ED for further for elevated glucose that was noted on routine labs by PCP. Patient states his glucose has been <500 for almost a week. Admits to associated fatigue and weakness. He only takes metformin for his glucose. Tries to monitor his intake of carbohydrates and sugary foods. HgbA1c 8/2021 was 7.1.   Labs in ED reflect marked hypoglycemia with renal insufficiency and electrolyte abnormality. Serum bicarbonate wnl but has anion gap. S/p insulin IV and fluid resuscitation. Repeat BMP pending. EKG revealed atrial fibrillation with RVR converted to NSR with Cardizem IV x 1. Hospital medicine has been consulted for admission.   Hospital Course:   1/26 seen in the ER still on Cardizem gtt 5, Heparin gtt, HR 61-65 whilst there. Patient comfortable on room air  1/27 Patient resumed on his MTP 50 mg PO BID, tolerated Eliquis, got diabetic teaching, has demonstrated back insulin injection, This MD spent about 20 mins in room with him and daughter explaining the reason for Insulin on discharge, the types of insulin, what to look out for.  We will send on Levemir 18 U BID, Moderate SSI TID PRN (no nightly humalog for now), instructed to have short follow up with PCP. Daughter explains that his finger sticks transmit to PCP office automatically, this is good. He says he has Glucometer, strips, lancets at home (was given in his PCP this past Friday)  Patient examined and is stable for discharge    Pt here for initial CV eval after recent Afib episode. Is  taking Lopresor and Eliquis. BP today is elevated 130s/90s. HR 70s. ECHO with normal bi V function, LVH. He used to drink heavily but has quit. He is quitting/quit smoking.     22    Recent Readings 2022 2022 2022 6/15/2022 6/10/2022   SBP (mmHg) 128 141 123 136 126   DBP (mmHg) 88 94 75 77 76   Pulse 98 76 70 68 74     Nuclear stress neg for ischemia 2022. Holter neg for Afib 2022.     22  Pt has upcoming C scope with Dr. Barajas. Recent A1c improving to 6.8.     Recent Readings 2022   SBP (mmHg) 115 120 135 135 125   DBP (mmHg) 75 74 81 81 78   Pulse 106 75 70 72 66     BP and HR well controlled. He will need teeth extractions will have 2 removed every few months.   ECG - NSR    22  BP and Hr well controlled. BMI 24 - 208 lbs.     Recent Readings 2022   SBP (mmHg) 135 124 124 114 123   DBP (mmHg) 90 76 81 79 78   Pulse 78 74 68 71 72     Occ elevated DBP.     3/7/23    Recent Readings 3/5/2023 2023 2023 2023 2023   SBP (mmHg) 130 134 140 124 136   DBP (mmHg) 89 86 89 89 87   Pulse 72 67 57 89 70     BP and HR stable here but still elevated DBPs. Has more back pain at times.     8/30/23   2023 2023 2023 2023 8/15/2023                   Recent Readings   SBP (mmHg) 129 133 141  146 134  132  136 147   DBP (mmHg) 81 87 93  91 90  88  84 89   Pulse 67 71 70  73 88  66  70 60       BP occ elevated last week. Here 130/90. HR 86.           Patient feels no leg swelling, no PND,  no dizziness, no syncope, no CNS symptoms.    Patient has fairly good exercise tolerance.    Patient is compliant with medications.     - had CVD,  in 80s    HOLTER 2022  Conclusion       Predominant rhythm is sinus.  Heart rates varied between 51 and 124 BPM with an average of 76 BPM        Results for orders placed during the hospital encounter of 22    Nuclear  Stress - Cardiology Interpreted    Interpretation Summary    Normal myocardial perfusion scan. There is no evidence of myocardial ischemia or infarction.    The gated perfusion images showed an ejection fraction of 54% at rest. The gated perfusion images showed an ejection fraction of 58% post stress.    There is normal wall motion at rest and post stress.    LV cavity size is normal at rest and normal at stress.    The EKG portion of this study is negative for ischemia.    The patient reported no chest pain during the stress test.    There were no arrhythmias during stress.      Results for orders placed during the hospital encounter of 01/25/22    Echo    Interpretation Summary  · Concentric hypertrophy and low normal systolic function.  · The estimated ejection fraction is 50%.  · Normal left ventricular diastolic function.  · With normal right ventricular systolic function.      Past Medical History:   Diagnosis Date    Acute renal failure 1/25/2022    Arthritis     Atrial fibrillation with RVR 1/25/2022    Back pain     Hyperlipidemia     Hypertension     Pneumonia     Polyneuropathy     Tobacco dependence     Type 2 diabetes mellitus without complication, without long-term current use of insulin 8/4/2021    Uncomplicated alcohol dependence 5/9/2023       Past Surgical History:   Procedure Laterality Date    COLONOSCOPY N/A 8/22/2017    Procedure: COLONOSCOPY;  Surgeon: Keo Cruz MD;  Location: [facility];  Service: Endoscopy;  Laterality: N/A;    COLONOSCOPY N/A 9/30/2022    Procedure: COLONOSCOPY 9/20--Malur hold coumadin, no bridge;  Surgeon: Noé Neff MD;  Location: [facility];  Service: General;  Laterality: N/A;    EPIDURAL STEROID INJECTION INTO CERVICAL SPINE N/A 11/1/2019    Procedure: C7/T1 IL SUKHJINDER;  Surgeon: Donnell Jordan MD;  Location: [facility];  Service: Pain Management;  Laterality: N/A;    STOMACH SURGERY      TRANSFORAMINAL EPIDURAL INJECTION OF STEROID Bilateral  "8/13/2019    Procedure: Injection,steroid,epidural,transforaminal approach;  Surgeon: Donnell Jordan MD;  Location: [facility];  Service: Pain Management;  Laterality: Bilateral;       Social History     Tobacco Use    Smoking status: Some Days     Current packs/day: 0.50     Types: Cigarettes    Smokeless tobacco: Never   Substance Use Topics    Alcohol use: Yes     Alcohol/week: 12.0 standard drinks of alcohol     Types: 12 Cans of beer per week     Comment: weekends only    Drug use: No       Family History   Problem Relation Age of Onset    Hypertension Father        Patient's Medications   New Prescriptions    No medications on file   Previous Medications    BLOOD SUGAR DIAGNOSTIC STRP    Use to check blood sugar twice a day    EMPAGLIFLOZIN (JARDIANCE) 25 MG TABLET    Take 1 tablet (25 mg total) by mouth every morning.    EZETIMIBE (ZETIA) 10 MG TABLET    Take 1 tablet (10 mg total) by mouth once daily.    HUMALOG KWIKPEN INSULIN 100 UNIT/ML PEN    Inject into the skin three times a day before each meal per sliding scale. If -200: 2 units; -250: 4 units; -300: 6 units; -350: 8 units; BG >350: 10 unit    INSULIN DETEMIR U-100, LEVEMIR, (LEVEMIR FLEXPEN) 100 UNIT/ML (3 ML) INPN PEN    Inject 24 Units into the skin 2 (two) times daily.    LISINOPRIL (PRINIVIL,ZESTRIL) 5 MG TABLET    Take 1 tablet (5 mg total) by mouth once daily.    METOPROLOL TARTRATE (LOPRESSOR) 50 MG TABLET    Take 1 tablet (50 mg total) by mouth once daily.    PEN NEEDLE, DIABETIC (BD CHRISTIANO 2ND GEN PEN NEEDLE) 32 GAUGE X 5/32" NDLE    Use five times daily    PREGABALIN (LYRICA) 75 MG CAPSULE    Take 1 capsule (75 mg total) by mouth 2 (two) times daily.    ROSUVASTATIN (CRESTOR) 40 MG TAB    Take 1 tablet (40 mg total) by mouth every evening.    SILDENAFIL (VIAGRA) 100 MG TABLET    Take 1 tablet (100 mg total) by mouth daily as needed for Erectile Dysfunction.    TRUE METRIX AIR GLUCOSE METER KIT    USE AS DIRECTED "    TRUEPLUS LANCETS 33 GAUGE [facility] [place]    USE  TO  CHECK  BLOOD  SUGAR TWICE DAILY    WARFARIN (COUMADIN) 5 MG TABLET    Take 1 and 1/2 tablets (7.5 mg total) on Mondays and Fridays; and one tablet (5 mg) all other days OR AS DIRECTED BY COUMADIN CLINIC.   Modified Medications    No medications on file   Discontinued Medications    No medications on file       Review of Systems   Constitutional:  Positive for malaise/fatigue.   HENT: Negative.     Eyes: Negative.    Respiratory:  Positive for shortness of breath.    Cardiovascular:  Positive for chest pain and palpitations.   Gastrointestinal: Negative.    Genitourinary: Negative.    Musculoskeletal:  Positive for back pain and joint pain.   Skin: Negative.    Neurological: Negative.    Endo/Heme/Allergies: Negative.    Psychiatric/Behavioral: Negative.     All 12 systems otherwise negative.      Wt Readings from Last 3 Encounters:   08/21/23 93.8 kg (206 lb 12.7 oz)   05/09/23 95.7 kg (210 lb 15.7 oz)   03/30/23 94.3 kg (208 lb)     Temp Readings from Last 3 Encounters:   09/30/22 98.9 °F (37.2 °C) (Skin)   08/07/22 98.3 °F (36.8 °C) (Oral)   01/27/22 97.3 °F (36.3 °C) (Oral)     BP Readings from Last 3 Encounters:   08/21/23 118/72   05/09/23 (!) 140/80   03/07/23 136/84     Pulse Readings from Last 3 Encounters:   08/21/23 (!) 56   05/09/23 62   03/07/23 70       There were no vitals taken for this visit.    Objective:   Physical Exam  Vitals and nursing note reviewed.   Constitutional:       General: He is not in acute distress.     Appearance: He is well-developed. He is not diaphoretic.   HENT:      Head: Normocephalic and atraumatic.      Nose: Nose normal.   Eyes:      General: No scleral icterus.     Conjunctiva/sclera: Conjunctivae normal.   Neck:      Thyroid: No thyromegaly.      Vascular: No JVD.   Cardiovascular:      Rate and Rhythm: Normal rate and regular rhythm.      Heart sounds: S1 normal and S2 normal. No murmur heard.     No friction rub. No gallop.  No S3 or S4 sounds.   Pulmonary:      Effort: Pulmonary effort is normal. No respiratory distress.      Breath sounds: Normal breath sounds. No stridor. No wheezing or rales.   Chest:      Chest wall: No tenderness.   Abdominal:      General: Bowel sounds are normal. There is no distension.      Palpations: Abdomen is soft. There is no mass.      Tenderness: There is no abdominal tenderness. There is no rebound.   Genitourinary:     Comments: Deferred  Musculoskeletal:         General: No tenderness or deformity. Normal range of motion.      Cervical back: Normal range of motion and neck supple.   Lymphadenopathy:      Cervical: No cervical adenopathy.   Skin:     General: Skin is warm and dry.      Coloration: Skin is not pale.      Findings: No erythema or rash.   Neurological:      Mental Status: He is alert and oriented to person, place, and time.      Motor: No abnormal muscle tone.      Coordination: Coordination normal.   Psychiatric:         Behavior: Behavior normal.         Thought Content: Thought content normal.         Judgment: Judgment normal.         Lab Results   Component Value Date     08/21/2023    K 4.5 08/21/2023     08/21/2023    CO2 26 08/21/2023    BUN 12 08/21/2023    CREATININE 1.0 08/21/2023     (H) 08/21/2023    HGBA1C 6.3 (H) 08/21/2023    AST 19 08/21/2023    ALT 18 08/21/2023    ALBUMIN 3.6 08/21/2023    PROT 6.9 08/21/2023    BILITOT 0.2 08/21/2023    WBC 5.13 08/21/2023    HGB 13.8 (L) 08/21/2023    HCT 42.2 08/21/2023    MCV 97 08/21/2023     08/21/2023    INR 3.0 08/21/2023    INR 7.7 (HH) 12/05/2022    TSH 1.758 08/21/2023    CHOL 138 08/21/2023    HDL 41 08/21/2023    LDLCALC 80.8 08/21/2023    TRIG 81 08/21/2023    BNP 47 01/25/2022     Assessment:      1. PAF (paroxysmal atrial fibrillation)    2. Long term (current) use of anticoagulants    3. Hypertension associated with diabetes    4. Family history of coronary artery disease    5. Palpitations     6. Hyperlipidemia due to type 2 diabetes mellitus    7. Erectile dysfunction, unspecified erectile dysfunction type    8. DDD (degenerative disc disease), lumbar    9. Smoker                Plan:   1. PAF, FH CAD   - change Eliquis to coumadin due to cost   - Nuclear stress neg for ischemia 2/2022.   - Holter neg for Afib 2/2022    2. HLD  - cont statin    3. Tobacco abuse, alc abuse  - needs alc cessation  - needs cessation    4. DDD, lumbar  - cont tx per PCP  - f/u pain - will have injections     5. DM2 A1c 11.4 --> 6.8 --> 7.1 --> 9.1 --> 6.3; ED  - viagra PRN   - cont tx  - improved sugars     6. HTN - elevated   -inc Lisinopril 10mg and cont BB    7. Preop CV eval -  teeth extractions with [facility]  - Low CV risk, cont BB  - ok to hold coumadin 5 days and resume post op as pt is in NSR  - needs close f/u with coumadin clinic    Thank you for allowing me to participate in this patient's care. Please do not hesitate to contact me with any questions or concerns. Consult note has been forwarded to the referral physician.     Denny Cunningham MD, [facility]  Cardiovascular Disease  [facility], [place]  [phone] (P)

## 2023-09-11 ENCOUNTER — ANTI-COAG VISIT (OUTPATIENT)
Dept: CARDIOLOGY | Facility: CLINIC | Age: 58
End: 2023-09-11
Payer: MEDICARE

## 2023-09-11 DIAGNOSIS — Z79.01 LONG TERM (CURRENT) USE OF ANTICOAGULANTS: Primary | ICD-10-CM

## 2023-09-11 DIAGNOSIS — I48.0 PAF (PAROXYSMAL ATRIAL FIBRILLATION): ICD-10-CM

## 2023-09-11 LAB — INR PPP: 1.3 (ref 2–3)

## 2023-09-11 PROCEDURE — 93793 PR ANTICOAGULANT MGMT FOR PT TAKING WARFARIN: ICD-10-PCS | Mod: HCNC,S$GLB,,

## 2023-09-11 PROCEDURE — 93793 ANTICOAG MGMT PT WARFARIN: CPT | Mod: HCNC,S$GLB,,

## 2023-09-11 PROCEDURE — 85610 PROTHROMBIN TIME: CPT | Mod: QW,HCNC,S$GLB, | Performed by: INTERNAL MEDICINE

## 2023-09-11 PROCEDURE — 85610 POCT INR: ICD-10-PCS | Mod: QW,HCNC,S$GLB, | Performed by: INTERNAL MEDICINE

## 2023-09-11 NOTE — PROGRESS NOTES
Patient's INR is subtherapeutic at 1.3.  Patient reports he missed two doses.  Patient reports he will be calling his dental office to schedule an appointment to have two teeth extracted.  Dr. Cunningham has cleared the patient to be off of warfarin for 5 days prior to extractions.  Patient will contact [facility] with appointment date.  Patient has been advised of increased risk of clotting; signs/symptoms of clotting and need to go to ED if he experiences any. Patient reports he is not having any signs/symptoms of clotting.  Sent to PharmD for dosing/instructions.

## 2023-09-11 NOTE — PROGRESS NOTES
INR not at goal. Medications, chart, and patient findings reviewed. See calendar for adjustments to dose and follow up plan.  Boost and resume dose.  Repeat INR in 1 week.  We had been lowering dose due to very high elevations from ETOH.  May need dose increase.

## 2023-09-19 ENCOUNTER — ANTI-COAG VISIT (OUTPATIENT)
Dept: CARDIOLOGY | Facility: CLINIC | Age: 58
End: 2023-09-19
Payer: MEDICARE

## 2023-09-19 DIAGNOSIS — Z79.01 LONG TERM (CURRENT) USE OF ANTICOAGULANTS: Primary | ICD-10-CM

## 2023-09-19 DIAGNOSIS — I48.0 PAF (PAROXYSMAL ATRIAL FIBRILLATION): ICD-10-CM

## 2023-09-19 LAB — INR PPP: 1.9 (ref 2–3)

## 2023-09-19 PROCEDURE — 85610 POCT INR: ICD-10-PCS | Mod: QW,HCNC,S$GLB, | Performed by: INTERNAL MEDICINE

## 2023-09-19 PROCEDURE — 93793 ANTICOAG MGMT PT WARFARIN: CPT | Mod: HCNC,S$GLB,,

## 2023-09-19 PROCEDURE — 85610 PROTHROMBIN TIME: CPT | Mod: QW,HCNC,S$GLB, | Performed by: INTERNAL MEDICINE

## 2023-09-19 PROCEDURE — 93793 PR ANTICOAGULANT MGMT FOR PT TAKING WARFARIN: ICD-10-PCS | Mod: HCNC,S$GLB,,

## 2023-09-19 NOTE — PROGRESS NOTES
Patient's INR is slightly sub-therapeutic at 1.9.  Patient reports he missed a dose.  Advised of increased risk of clotting; signs/symptoms of clotting and need to go to ED if he experiences any.  Patient reports he is not having any signs/symptoms of clotting.  Patient advised not to consume any foods containing vitamin K.  Instructions given: Continue warfarin 7.5 mg on Fridays and Mondays; and 5 mg all other days.  Recheck on 10/5/23 with other appointment.  Calendar reviewed with patient.  Patient verbalizes understanding.

## 2023-09-23 ENCOUNTER — IMMUNIZATION (OUTPATIENT)
Dept: INTERNAL MEDICINE | Facility: CLINIC | Age: 58
End: 2023-09-23
Payer: MEDICARE

## 2023-09-23 PROCEDURE — 90686 IIV4 VACC NO PRSV 0.5 ML IM: CPT | Mod: HCNC,S$GLB,, | Performed by: FAMILY MEDICINE

## 2023-09-23 PROCEDURE — G0008 ADMIN INFLUENZA VIRUS VAC: HCPCS | Mod: HCNC,S$GLB,, | Performed by: FAMILY MEDICINE

## 2023-09-23 PROCEDURE — 90686 FLU VACCINE (QUAD) GREATER THAN OR EQUAL TO 3YO PRESERVATIVE FREE IM: ICD-10-PCS | Mod: HCNC,S$GLB,, | Performed by: FAMILY MEDICINE

## 2023-09-23 PROCEDURE — G0008 FLU VACCINE (QUAD) GREATER THAN OR EQUAL TO 3YO PRESERVATIVE FREE IM: ICD-10-PCS | Mod: HCNC,S$GLB,, | Performed by: FAMILY MEDICINE

## 2023-09-28 ENCOUNTER — TELEPHONE (OUTPATIENT)
Dept: CARDIOLOGY | Facility: CLINIC | Age: 58
End: 2023-09-28
Payer: MEDICARE

## 2023-09-28 NOTE — TELEPHONE ENCOUNTER
Returned call to Eugenio with [employer]. No answer;       ----- Message from Wendi Jean sent at 9/27/2023  4:28 PM CDT -----  Contact: Eugenio/ [employer]  Eugenio is calling to speak to the nurse regarding a follow up on a mobility back brace, please call     Thanks  LJ

## 2023-10-03 ENCOUNTER — TELEPHONE (OUTPATIENT)
Dept: CARDIOLOGY | Facility: CLINIC | Age: 58
End: 2023-10-03
Payer: MEDICARE

## 2023-10-03 NOTE — TELEPHONE ENCOUNTER
Returned call to [employer] regarding paperwork for pt back brace. I confirmed the fax number and the paperwork was refaxed. Once received it'll be reviewed.       ----- Message from Christy Thomas sent at 10/3/2023  4:07 PM CDT -----  Contact: [employer]\Hugh Reese called to check the status of the fax sent for the pts back brace. Please call her back at [phone].    Thanks  TS

## 2023-10-05 ENCOUNTER — OFFICE VISIT (OUTPATIENT)
Dept: PODIATRY | Facility: CLINIC | Age: 58
End: 2023-10-05
Payer: MEDICARE

## 2023-10-05 ENCOUNTER — ANTI-COAG VISIT (OUTPATIENT)
Dept: CARDIOLOGY | Facility: CLINIC | Age: 58
End: 2023-10-05
Payer: MEDICARE

## 2023-10-05 VITALS — HEIGHT: 78 IN | BODY MASS INDEX: 23.88 KG/M2 | WEIGHT: 206.38 LBS

## 2023-10-05 DIAGNOSIS — E11.49 TYPE II DIABETES MELLITUS WITH NEUROLOGICAL MANIFESTATIONS: ICD-10-CM

## 2023-10-05 DIAGNOSIS — L84 CALLUS OF FOOT: ICD-10-CM

## 2023-10-05 DIAGNOSIS — I48.0 PAF (PAROXYSMAL ATRIAL FIBRILLATION): ICD-10-CM

## 2023-10-05 DIAGNOSIS — E11.9 ENCOUNTER FOR COMPREHENSIVE DIABETIC FOOT EXAMINATION, TYPE 2 DIABETES MELLITUS: Primary | ICD-10-CM

## 2023-10-05 DIAGNOSIS — Z79.01 LONG TERM (CURRENT) USE OF ANTICOAGULANTS: Primary | ICD-10-CM

## 2023-10-05 DIAGNOSIS — B35.1 ONYCHOMYCOSIS: ICD-10-CM

## 2023-10-05 LAB — INR PPP: 2.9 (ref 2–3)

## 2023-10-05 PROCEDURE — 3066F PR DOCUMENTATION OF TREATMENT FOR NEPHROPATHY: ICD-10-PCS | Mod: HCNC,CPTII,S$GLB, | Performed by: PODIATRIST

## 2023-10-05 PROCEDURE — 85610 PROTHROMBIN TIME: CPT | Mod: QW,HCNC,S$GLB, | Performed by: INTERNAL MEDICINE

## 2023-10-05 PROCEDURE — 3044F HG A1C LEVEL LT 7.0%: CPT | Mod: HCNC,CPTII,S$GLB, | Performed by: PODIATRIST

## 2023-10-05 PROCEDURE — 99214 PR OFFICE/OUTPT VISIT, EST, LEVL IV, 30-39 MIN: ICD-10-PCS | Mod: 25,HCNC,S$GLB, | Performed by: PODIATRIST

## 2023-10-05 PROCEDURE — 99999 PR PBB SHADOW E&M-EST. PATIENT-LVL III: ICD-10-PCS | Mod: PBBFAC,HCNC,, | Performed by: PODIATRIST

## 2023-10-05 PROCEDURE — 3044F PR MOST RECENT HEMOGLOBIN A1C LEVEL <7.0%: ICD-10-PCS | Mod: HCNC,CPTII,S$GLB, | Performed by: PODIATRIST

## 2023-10-05 PROCEDURE — 3008F BODY MASS INDEX DOCD: CPT | Mod: HCNC,CPTII,S$GLB, | Performed by: PODIATRIST

## 2023-10-05 PROCEDURE — 93793 ANTICOAG MGMT PT WARFARIN: CPT | Mod: HCNC,S$GLB,,

## 2023-10-05 PROCEDURE — 99214 OFFICE O/P EST MOD 30 MIN: CPT | Mod: 25,HCNC,S$GLB, | Performed by: PODIATRIST

## 2023-10-05 PROCEDURE — 4010F ACE/ARB THERAPY RXD/TAKEN: CPT | Mod: HCNC,CPTII,S$GLB, | Performed by: PODIATRIST

## 2023-10-05 PROCEDURE — 99999 PR PBB SHADOW E&M-EST. PATIENT-LVL III: CPT | Mod: PBBFAC,HCNC,, | Performed by: PODIATRIST

## 2023-10-05 PROCEDURE — 85610 POCT INR: ICD-10-PCS | Mod: QW,HCNC,S$GLB, | Performed by: INTERNAL MEDICINE

## 2023-10-05 PROCEDURE — 93793 PR ANTICOAGULANT MGMT FOR PT TAKING WARFARIN: ICD-10-PCS | Mod: HCNC,S$GLB,,

## 2023-10-05 PROCEDURE — 4010F PR ACE/ARB THEARPY RXD/TAKEN: ICD-10-PCS | Mod: HCNC,CPTII,S$GLB, | Performed by: PODIATRIST

## 2023-10-05 PROCEDURE — 3060F POS MICROALBUMINURIA REV: CPT | Mod: HCNC,CPTII,S$GLB, | Performed by: PODIATRIST

## 2023-10-05 PROCEDURE — 3060F PR POS MICROALBUMINURIA RESULT DOCUMENTED/REVIEW: ICD-10-PCS | Mod: HCNC,CPTII,S$GLB, | Performed by: PODIATRIST

## 2023-10-05 PROCEDURE — 3008F PR BODY MASS INDEX (BMI) DOCUMENTED: ICD-10-PCS | Mod: HCNC,CPTII,S$GLB, | Performed by: PODIATRIST

## 2023-10-05 PROCEDURE — 3066F NEPHROPATHY DOC TX: CPT | Mod: HCNC,CPTII,S$GLB, | Performed by: PODIATRIST

## 2023-10-05 RX ORDER — CICLOPIROX 80 MG/ML
SOLUTION TOPICAL
Qty: 6.6 ML | Refills: 3 | Status: SHIPPED | OUTPATIENT
Start: 2023-10-05

## 2023-10-05 NOTE — PROGRESS NOTES
Patient's INR is therapeutic at 2.9.  Patient reports no changes.  Instructions given: Continue warfarin 7.5 mg on Fridays and Mondays; and 5 mg all other days.  Recheck on 10/23/23.  Calendar reviewed with patient.  Patient verbalizes understanding.

## 2023-10-05 NOTE — PROGRESS NOTES
Subjective:     Patient ID: Papa Pope is a 58 y.o. male.    Chief Complaint: Nail Care (Nail Care (Right foot pain rates pain 7/10, diabetic pt wearing tennis shoes, last seen Pcp Dr. Castano 08/21/23))    Papa is a 58 y.o. male who presents to the clinic upon referral from Dr. Sarah harrington. provider found  for evaluation and treatment of diabetic feet. Papa has a past medical history of Acute renal failure (1/25/2022), Arthritis, Atrial fibrillation with RVR (1/25/2022), Back pain, Hyperlipidemia, Hypertension, Pneumonia, Polyneuropathy, Tobacco dependence, Type 2 diabetes mellitus without complication, without long-term current use of insulin (8/4/2021), and Uncomplicated alcohol dependence (5/9/2023). Patient relates no major problem with feet. Only complaints today consist of pain at the bottom of feet and numbness in toes. Patient rates pain 8/10. Patient states pain is mostly at nighttime.     PCP: Prachi Castano MD    Date Last Seen by PCP: 08/21/2023    Current shoe gear: Tennis shoes    Hemoglobin A1C   Date Value Ref Range Status   08/21/2023 6.3 (H) 4.0 - 5.6 % Final     Comment:     ADA Screening Guidelines:  5.7-6.4%  Consistent with prediabetes  >or=6.5%  Consistent with diabetes    High levels of fetal hemoglobin interfere with the HbA1C  assay. Heterozygous hemoglobin variants (HbS, HgC, etc)do  not significantly interfere with this assay.   However, presence of multiple variants may affect accuracy.     06/13/2023 7.1 (H) 4.0 - 5.6 % Final     Comment:     ADA Screening Guidelines:  5.7-6.4%  Consistent with prediabetes  >or=6.5%  Consistent with diabetes    High levels of fetal hemoglobin interfere with the HbA1C  assay. Heterozygous hemoglobin variants (HbS, HgC, etc)do  not significantly interfere with this assay.   However, presence of multiple variants may affect accuracy.     02/20/2023 9.1 (H) 4.0 - 5.6 % Final     Comment:     ADA Screening Guidelines:  5.7-6.4%  Consistent with  "prediabetes  >or=6.5%  Consistent with diabetes    High levels of fetal hemoglobin interfere with the HbA1C  assay. Heterozygous hemoglobin variants (HbS, HgC, etc)do  not significantly interfere with this assay.   However, presence of multiple variants may affect accuracy.                  Patient Active Problem List   Diagnosis    Hyperlipidemia due to type 2 diabetes mellitus    Hypertension associated with diabetes    DDD (degenerative disc disease), lumbar    History of colon polyps    PAF (paroxysmal atrial fibrillation)    On Coumadin for atrial fibrillation    Type 2 diabetes mellitus with microalbuminuria, with long-term current use of insulin    Diabetic polyneuropathy associated with type 2 diabetes mellitus    Cigarette nicotine dependence without complication    Uncomplicated alcohol dependence    Abnormality of gait and mobility    Erectile dysfunction associated with type 2 diabetes mellitus       Medication List with Changes/Refills   New Medications    CICLOPIROX (PENLAC) 8 % SOLN    Apply to nails once daily   Current Medications    BLOOD SUGAR DIAGNOSTIC STRP    Use to check blood sugar twice a day    EMPAGLIFLOZIN (JARDIANCE) 25 MG TABLET    Take 1 tablet (25 mg total) by mouth every morning.    EZETIMIBE (ZETIA) 10 MG TABLET    Take 1 tablet (10 mg total) by mouth once daily.    HUMALOG KWIKPEN INSULIN 100 UNIT/ML PEN    Inject into the skin three times a day before each meal per sliding scale. If -200: 2 units; -250: 4 units; -300: 6 units; -350: 8 units; BG >350: 10 unit    INSULIN DETEMIR U-100, LEVEMIR, (LEVEMIR FLEXPEN) 100 UNIT/ML (3 ML) INPN PEN    Inject 24 Units into the skin 2 (two) times daily.    LISINOPRIL 10 MG TABLET    Take 1 tablet (10 mg total) by mouth once daily.    METOPROLOL TARTRATE (LOPRESSOR) 50 MG TABLET    Take 1 tablet (50 mg total) by mouth 2 (two) times daily.    PEN NEEDLE, DIABETIC (BD CHRISTIANO 2ND GEN PEN NEEDLE) 32 GAUGE X 5/32" NDLE    Use " five times daily    PREGABALIN (LYRICA) 75 MG CAPSULE    Take 1 capsule (75 mg total) by mouth 2 (two) times daily.    ROSUVASTATIN (CRESTOR) 40 MG TAB    Take 1 tablet (40 mg total) by mouth every evening.    SILDENAFIL (VIAGRA) 100 MG TABLET    Take 1 tablet (100 mg total) by mouth daily as needed for Erectile Dysfunction.    TRUE METRIX AIR GLUCOSE METER KIT    USE AS DIRECTED    TRUEPLUS LANCETS 33 GAUGE MISC    USE  TO  CHECK  BLOOD  SUGAR TWICE DAILY    WARFARIN (COUMADIN) 5 MG TABLET    Take 1 and 1/2 tablets (7.5 mg total) on Mondays and Fridays; and one tablet (5 mg) all other days OR AS DIRECTED BY COUMADIN CLINIC.       Review of patient's allergies indicates:  No Known Allergies    Past Surgical History:   Procedure Laterality Date    COLONOSCOPY N/A 8/22/2017    Procedure: COLONOSCOPY;  Surgeon: Keo Cruz MD;  Location: [facility] ENDO;  Service: Endoscopy;  Laterality: N/A;    COLONOSCOPY N/A 9/30/2022    Procedure: COLONOSCOPY 9/20--Malur hold coumadin, no bridge;  Surgeon: Noé Neff MD;  Location: [facility];  Service: General;  Laterality: N/A;    EPIDURAL STEROID INJECTION INTO CERVICAL SPINE N/A 11/1/2019    Procedure: C7/T1 IL SUKHJINDER;  Surgeon: Donnell Jordan MD;  Location: [facility] PAIN T;  Service: Pain Management;  Laterality: N/A;    STOMACH SURGERY      TRANSFORAMINAL EPIDURAL INJECTION OF STEROID Bilateral 8/13/2019    Procedure: Injection,steroid,epidural,transforaminal approach;  Surgeon: Donnell Jordan MD;  Location: [facility] PAIN MGT;  Service: Pain Management;  Laterality: Bilateral;       Family History   Problem Relation Age of Onset    Hypertension Father        Social History     Socioeconomic History    Marital status:    Tobacco Use    Smoking status: Some Days     Current packs/day: 0.50     Types: Cigarettes    Smokeless tobacco: Never   Substance and Sexual Activity    Alcohol use: Yes     Alcohol/week: 12.0 standard drinks of alcohol     Types: 12 Cans of beer  "per week     Comment: weekends only    Drug use: No    Sexual activity: Yes     Social Determinants of Health     Financial Resource Strain: Medium Risk (5/9/2023)    Overall Financial Resource Strain (CARDIA)     Difficulty of Paying Living Expenses: Somewhat hard   Food Insecurity: No Food Insecurity (5/9/2023)    Hunger Vital Sign     Worried About Running Out of Food in the Last Year: Never true     Ran Out of Food in the Last Year: Never true   Transportation Needs: No Transportation Needs (5/9/2023)    PRAPARE - Transportation     Lack of Transportation (Medical): No     Lack of Transportation (Non-Medical): No   Physical Activity: Inactive (5/9/2023)    Exercise Vital Sign     Days of Exercise per Week: 0 days     Minutes of Exercise per Session: 0 min   Stress: No Stress Concern Present (5/9/2023)    [employer] [place] [employer] - Occupational Stress Questionnaire     Feeling of Stress : Only a little   Social Connections: Socially Integrated (5/9/2023)    Social Connection and Isolation Panel [NHANES]     Frequency of Communication with Friends and Family: More than three times a week     Frequency of Social Gatherings with Friends and Family: Once a week     Attends Confucianist Services: More than 4 times per year     Active Member of Clubs or Organizations: Yes     Attends Club or Organization Meetings: More than 4 times per year     Marital Status:    Housing Stability: Low Risk  (5/9/2023)    Housing Stability Vital Sign     Unable to Pay for Housing in the Last Year: No     Number of Places Lived in the Last Year: 1     Unstable Housing in the Last Year: No       Vitals:    10/05/23 0802   Weight: 93.6 kg (206 lb 5.6 oz)   Height: 6' 6" (1.981 m)   PainSc:   7       Hemoglobin A1C   Date Value Ref Range Status   08/21/2023 6.3 (H) 4.0 - 5.6 % Final     Comment:     ADA Screening Guidelines:  5.7-6.4%  Consistent with prediabetes  >or=6.5%  Consistent with diabetes    High levels of " fetal hemoglobin interfere with the HbA1C  assay. Heterozygous hemoglobin variants (HbS, HgC, etc)do  not significantly interfere with this assay.   However, presence of multiple variants may affect accuracy.     06/13/2023 7.1 (H) 4.0 - 5.6 % Final     Comment:     ADA Screening Guidelines:  5.7-6.4%  Consistent with prediabetes  >or=6.5%  Consistent with diabetes    High levels of fetal hemoglobin interfere with the HbA1C  assay. Heterozygous hemoglobin variants (HbS, HgC, etc)do  not significantly interfere with this assay.   However, presence of multiple variants may affect accuracy.     02/20/2023 9.1 (H) 4.0 - 5.6 % Final     Comment:     ADA Screening Guidelines:  5.7-6.4%  Consistent with prediabetes  >or=6.5%  Consistent with diabetes    High levels of fetal hemoglobin interfere with the HbA1C  assay. Heterozygous hemoglobin variants (HbS, HgC, etc)do  not significantly interfere with this assay.   However, presence of multiple variants may affect accuracy.         Review of Systems   Constitutional:  Negative for chills and fever.   Respiratory:  Negative for shortness of breath.    Cardiovascular:  Negative for chest pain, palpitations, orthopnea, claudication and leg swelling.   Gastrointestinal:  Negative for diarrhea, nausea and vomiting.   Musculoskeletal:  Negative for joint pain.   Skin:  Negative for rash.   Neurological:  Positive for tingling.   Psychiatric/Behavioral: Negative.               Objective:   PHYSICAL EXAM: Apperance: Alert and orient in no distress,well developed, and with good attention to grooming and body habits  Patient presents ambulating in tennis shoes.   LOWER EXTREMITY EXAM:  VASCULAR: Dorsalis pedis pulses 2/4 bilateral and Posterior Tibial pulses 2/4 bilateral. Capillary fill time <4 seconds bilateral. No edema observed bilateral. Varicosities absent bilateral. Skin temperature of the lower extremities is warm to warm, proximal to distal. Hair growth WNL  bilateral.  DERMATOLOGICAL: No skin rashes, subcutaneous nodules, lesions, or ulcers observed bilateral. Nails 1,2,3,4,5 bilateral normal length, thickened, and discolored with subungual debris. Minimal hyperkeratotic tissue noted to bilateral medial hallux.  Webspaces 1,2,3,4 bilateral clean, dry and without evidence of break in skin integrity.   NEUROLOGICAL: Light touch, sharp-dull, proprioception all present and equal bilaterally.  Vibratory sensation diminished at left hallux, intact at right hallux. Protective sensation absent at 3/10 sites as tested with a [place] 5.07 monofilament.   MUSCULOSKELETAL: Muscle strength is 5/5 for foot inverters, everters, plantarflexors, and dorsiflexors. Muscle tone is normal. Mild bunions noted bilateral.       Assessment:   Encounter for comprehensive diabetic foot examination, type 2 diabetes mellitus    Onychomycosis    Callus of foot    Type II diabetes mellitus with neurological manifestations    Other orders  -     ciclopirox (PENLAC) 8 % Soln; Apply to nails once daily  Dispense: 6.6 mL; Refill: 3      Plan:   Encounter for comprehensive diabetic foot examination, type 2 diabetes mellitus    Onychomycosis    Callus of foot    Type II diabetes mellitus with neurological manifestations    Other orders  -     ciclopirox (PENLAC) 8 % Soln; Apply to nails once daily  Dispense: 6.6 mL; Refill: 3        I counseled the patient on his conditions, regarding findings of my examination, my impressions, and usual treatment plan.   This visit spent on counseling and coordination of care.  Appointment spent on education about the diabetic foot, neuropathy, and prevention of limb loss.  Shoe inspection. Diabetic Foot Education. Patient reminded of the importance of good nutrition and blood sugar control to help prevent podiatric complications of diabetes. Patient instructed on proper foot hygeine. We discussed wearing proper shoe gear, daily foot inspections, never walking  without protective shoe gear, never putting sharp instruments to feet.    The patient and I reviewed the types of shoes he should be wearing, my recommendation includes generally the best time of the day for a shoe fitting is the afternoon, shoes with a wide toe box, very good cushion, and tennis shoes with removable inner soles.The patient and I reviewed my recommendations for over-the-counter orthotic inserts.   Prescription written for topical Penlac to be applied to nail once daily.   Discussed with patient treatments for neuropathy consisting of topical or oral medication.  Recommendations given for over-the-counter medicine such as [employer] [place] [employer] .   Patient  will continue to monitor the areas daily, inspect feet, wear protective shoe gear when ambulatory, moisturizer to maintain skin integrity. Patient reminded of the importance of good nutrition and blood sugar control to help prevent podiatric complications of diabetes.  Patient to return 6 months or sooner if needed.       Kali Zambrano DPM  [facility]

## 2023-10-10 ENCOUNTER — TELEPHONE (OUTPATIENT)
Dept: CARDIOLOGY | Facility: CLINIC | Age: 58
End: 2023-10-10
Payer: MEDICARE

## 2023-10-10 NOTE — TELEPHONE ENCOUNTER
Returned call to [employer] regarding an request for pt. Pt last note,labs, and a signature from Dr. Cunningham are needed. Will give request to Dr. Ba.      ----- Message from Noy Castro RN sent at 10/9/2023  5:03 PM CDT -----  Contact: Ilana    ----- Message -----  From: Gorge Corrales  Sent: 10/9/2023   3:57 PM CDT  To: Marisa Baldwin Staff    Ilana with [email or web address] is calling to get clarity on orders that were faxed overs  mobility back brace for patient. Please give Ilana a call back at [phone]

## 2023-10-23 ENCOUNTER — ANTI-COAG VISIT (OUTPATIENT)
Dept: CARDIOLOGY | Facility: CLINIC | Age: 58
End: 2023-10-23
Payer: MEDICARE

## 2023-10-23 DIAGNOSIS — I48.0 PAF (PAROXYSMAL ATRIAL FIBRILLATION): ICD-10-CM

## 2023-10-23 DIAGNOSIS — Z79.01 LONG TERM (CURRENT) USE OF ANTICOAGULANTS: Primary | ICD-10-CM

## 2023-10-23 LAB — INR PPP: 1.8 (ref 2–3)

## 2023-10-23 PROCEDURE — 85610 PROTHROMBIN TIME: CPT | Mod: QW,HCNC,S$GLB, | Performed by: INTERNAL MEDICINE

## 2023-10-23 PROCEDURE — 93793 ANTICOAG MGMT PT WARFARIN: CPT | Mod: HCNC,S$GLB,,

## 2023-10-23 PROCEDURE — 85610 POCT INR: ICD-10-PCS | Mod: QW,HCNC,S$GLB, | Performed by: INTERNAL MEDICINE

## 2023-10-23 PROCEDURE — 93793 PR ANTICOAGULANT MGMT FOR PT TAKING WARFARIN: ICD-10-PCS | Mod: HCNC,S$GLB,,

## 2023-10-23 NOTE — PROGRESS NOTES
Patient's INR is sub-therapeutic at 1.8.  Patient reports he missed a dose of warfarin.  Advised patient of increased risk of clotting; signs/symptoms of clotting and need to go to ED if he experiences any.  Patient reports his not having any signs/symptoms of clotting.  Instructions given:  will give boosted dose of warfarin 10 mg today-10/23/23; then resume warfarin 7.5 mg on Fridays and Mondays; and 5 mg all other days.  Recheck on 11/6/23 with other appointment.  Calendar reviewed with patient.  Patient verbalizes understanding.

## 2023-11-06 ENCOUNTER — ANTI-COAG VISIT (OUTPATIENT)
Dept: CARDIOLOGY | Facility: CLINIC | Age: 58
End: 2023-11-06
Payer: MEDICARE

## 2023-11-06 DIAGNOSIS — I48.0 PAF (PAROXYSMAL ATRIAL FIBRILLATION): ICD-10-CM

## 2023-11-06 DIAGNOSIS — Z79.01 LONG TERM (CURRENT) USE OF ANTICOAGULANTS: Primary | ICD-10-CM

## 2023-11-06 LAB — INR PPP: 3.3 (ref 2–3)

## 2023-11-06 PROCEDURE — 85610 POCT INR: ICD-10-PCS | Mod: QW,HCNC,S$GLB, | Performed by: INTERNAL MEDICINE

## 2023-11-06 PROCEDURE — 85610 PROTHROMBIN TIME: CPT | Mod: QW,HCNC,S$GLB, | Performed by: INTERNAL MEDICINE

## 2023-11-06 PROCEDURE — 93793 PR ANTICOAGULANT MGMT FOR PT TAKING WARFARIN: ICD-10-PCS | Mod: HCNC,S$GLB,,

## 2023-11-06 PROCEDURE — 93793 ANTICOAG MGMT PT WARFARIN: CPT | Mod: HCNC,S$GLB,,

## 2023-11-06 NOTE — PROGRESS NOTES
Patient's INR is supra-therapeutic at 3.3.  Patient reports he followed previous dosing instructions.  Patient reports he drank alcoholic beverages on 11/5/23.  Advised of increased risk of bleeding;sign/symptoms of bleeding and need to go to ED if he experiences any.  Patient reports he is not having any signs/symptoms of bleeding.  Instructions given: Take warfarin 5 mg today-11/6/23; then re-challenge warfarin 7.5 mg on Fridays and Mondays; and 5 mg all other days.  Recheck on 11/21/23.  Calendar reviewed with patient.  Patient verbalizes understanding.

## 2023-11-07 ENCOUNTER — TELEPHONE (OUTPATIENT)
Dept: INTERNAL MEDICINE | Facility: CLINIC | Age: 58
End: 2023-11-07
Payer: MEDICARE

## 2023-11-07 NOTE — TELEPHONE ENCOUNTER
----- Message from Vianney Gannon sent at 11/7/2023  3:14 PM CST -----  Contact: I AM AT is requesting a call in regards to the status of an order that was sent over for an mobility back brace. Please call office back at [phone]. Thanks KB

## 2023-11-10 ENCOUNTER — TELEPHONE (OUTPATIENT)
Dept: INTERNAL MEDICINE | Facility: CLINIC | Age: 58
End: 2023-11-10
Payer: MEDICARE

## 2023-11-10 NOTE — TELEPHONE ENCOUNTER
----- Message from Steven Martinez sent at 11/10/2023  3:01 PM CST -----  Contact: [employer] is calling in regards to checking the status of fax that sent on 11/01. Please call back at [phone]                                Thanks  KT

## 2023-11-10 NOTE — TELEPHONE ENCOUNTER
Representative with [employer] inquired about fax sent on 11/1/2023. Per rep a new fax will be sent to office. Fax number verified.//ddw

## 2023-11-14 ENCOUNTER — TELEPHONE (OUTPATIENT)
Dept: INTERNAL MEDICINE | Facility: CLINIC | Age: 58
End: 2023-11-14
Payer: MEDICARE

## 2023-11-14 NOTE — TELEPHONE ENCOUNTER
Notified [employer] that provider does not sign orders for back brace. Per provider pt would need to see other. Inquired if pt wanted to have a referral sent to ortho for eval/tx and order of back brace. Pt declined.//ddw

## 2023-11-14 NOTE — TELEPHONE ENCOUNTER
----- Message from Wendi Jean sent at 11/14/2023  3:02 PM CST -----  Contact: [employer]/ [employer]  Hugh is calling to speak to the nurse to follow up for a mobility back brace, please call her at     Thanks  LJ

## 2023-11-22 ENCOUNTER — TELEPHONE (OUTPATIENT)
Dept: INTERNAL MEDICINE | Facility: CLINIC | Age: 58
End: 2023-11-22
Payer: MEDICARE

## 2023-11-22 NOTE — TELEPHONE ENCOUNTER
Spoke with Who@. Informed her a call was placed on 11/14/2023 stating the provider declined to sign the order for back brace. Per representative the note was in the computer but order was not canceled. States order has been canceled.//ddw

## 2023-11-22 NOTE — TELEPHONE ENCOUNTER
----- Message from Killian Quiroga sent at 11/22/2023 12:30 PM CST -----  Contact: [facility]  Evans with [employer] states that they will need something in writing stated that patient denied orders. Please fax over [phone].         Thanks  DD

## 2023-11-24 ENCOUNTER — ANTI-COAG VISIT (OUTPATIENT)
Dept: CARDIOLOGY | Facility: CLINIC | Age: 58
End: 2023-11-24
Payer: MEDICARE

## 2023-11-24 DIAGNOSIS — I48.0 PAF (PAROXYSMAL ATRIAL FIBRILLATION): ICD-10-CM

## 2023-11-24 DIAGNOSIS — Z79.01 LONG TERM (CURRENT) USE OF ANTICOAGULANTS: Primary | ICD-10-CM

## 2023-11-24 LAB — INR PPP: 3.7 (ref 2–3)

## 2023-11-24 PROCEDURE — 85610 PROTHROMBIN TIME: CPT | Mod: QW,HCNC,S$GLB, | Performed by: STUDENT IN AN ORGANIZED HEALTH CARE EDUCATION/TRAINING PROGRAM

## 2023-11-24 PROCEDURE — 85610 POCT INR: ICD-10-PCS | Mod: QW,HCNC,S$GLB, | Performed by: STUDENT IN AN ORGANIZED HEALTH CARE EDUCATION/TRAINING PROGRAM

## 2023-11-24 PROCEDURE — 93793 PR ANTICOAGULANT MGMT FOR PT TAKING WARFARIN: ICD-10-PCS | Mod: HCNC,S$GLB,,

## 2023-11-24 PROCEDURE — 93793 ANTICOAG MGMT PT WARFARIN: CPT | Mod: HCNC,S$GLB,,

## 2023-11-24 NOTE — PROGRESS NOTES
Patient's INR is supra-therapeutic at 3.7.  Patient reports he followed previous instructions.  Patient reports consumption of alcoholic beverages.  Re-educated on increased risk of bleeding with consumption of alcohol; signs/symptoms of bleeding and need to go to ED if he experiences any.  Patient reports no signs/symptoms of bleeding.  Instructions given: hold warfarin dose today-11/24/23; then decrease overall maintenance dose to warfarin 7.5 mg on Mondays; and 5 mg all other days.  Recheck on 12/4/23.  Calendar reviewed with patient.  Patient verbalizes understanding.

## 2023-11-24 NOTE — PROGRESS NOTES
Chart reviewed, agree with LPN plan.  Agree with dose decrease since ETOH is not decreasing.       Critical Care H&P/Consult    Assessment/Plan:  51 YO woman with a history of of leptomeningeal cyst removal in the 1980's, with persisting cranial defect.  Today she underwent titanium mesh cranioplasty with Dr. Christian.  Procedure uncomplicated, but did take longer than anticipated due to significant scarring and soft tissue adhesions to brain and severe bone resorption with defects.  Anesthesia course uncomplicated, total of 1500 ml crystalloid given with EBL of ~400 ml.  Delivered extubated to 2L NICU in stable condition.    Hospital Problems:  1. Cranial defect s/p leptomeningeal cyst removal, now POD #0 s/p titanium mesh cranioplasty  2. Depression    Plan:  1. Antibiotic prophylaxis while drains in place per neurosurgery - Ancef and vancomycin  2. AED prophylaxis per neurosurgery - Keppra 1000 mg BID x 5 days  3. Pain management with fentanyl and PRN Norco  4. Repeat CT head in AM per neurosurgery  5. Regular diet  6. Saline lock IVF when taking PO  7. Dash catheter out  8. Mechanical DVT prophylaxis    Best Practices:  - DVT prophylaxis: Mechanical only  - GI prophylaxis: n/a  - Glycemic control: Intrinsic  - LDA: PIV  - Nutrition: Regular diet    Level of service: 37181    Jeremy F Siegrist, MD      HPI:   51 YO woman with a history of of leptomeningeal cyst removal in the 1980's, with persisting cranial defect.  Today she underwent titanium mesh cranioplasty with Dr. Christian.  Procedure uncomplicated, but did take longer than anticipated due to significant scarring and soft tissue adhesions to brain and severe bone resorption with defects.  Anesthesia course uncomplicated, total of 1500 ml crystalloid given with EBL of ~400 ml.  Delivered extubated to 2L NICU in stable condition.    Currently complains of mild pain, but feels quite warm and insists on taking off sheets and blankets.  Review of intraop meds indicate that she received ephedrine, hydromorphone shortly before arrival.      Past Medical History:    Diagnosis Date   • Abnormal mammogram 04/15/2015   • Acute frontal sinusitis 10/23/2015   • Acute mastitis of right breast 08/01/2013   • Anxiety and depression    • Depressive disorder 11/19/2014   • Gilbert syndrome    • History of staph infection    • Infected insect bite 08/16/2015   • Leptomeningeal cyst 05/26/2017   • Menorrhagia with regular cycle 02/23/2017   • Migraines    • Skull defect 05/08/2017   • Staph infection    • Tick bite 07/22/2016   • Wears eyeglasses       Past Surgical History:   Procedure Laterality Date   • ------------OTHER-------------  1980    left meningeal cyst   • BIOPSY OF BREAST, NEEDLE CORE  05/13/2013    right breast   • BIOPSY OF BREAST, NEEDLE CORE  04/23/2013    left breast   • CRANIOTOMY  age 13    for leptomenigeal cyst   • D AND C  1999      Prescriptions Prior to Admission   Medication Sig Dispense Refill   • mupirocin (BACTROBAN) 2 % ointment Apply topically 2 times daily. Apply to each nares.Ointment should be applied pre-op for 5 days, on day of surgery, and for 5 days 22 g 0   • levonorgestrel (MIRENA, 52 MG,) 20 MCG/24HR IUD 1 each by Intrauterine route once. Inserted 2-23-17     • PARoxetine (PAXIL) 20 MG tablet Take 2 tablets by mouth daily. 180 tablet 3   • Multiple Vitamins-Calcium (ONE-A-DAY WOMENS PO) Take 1 tablet by mouth daily.       ALLERGIES:   Allergen Reactions   • Bandage [Adhesive]       Social History   Substance Use Topics   • Smoking status: Never Smoker   • Smokeless tobacco: Never Used   • Alcohol use 1.2 oz/week     2 Standard drinks or equivalent per week      Comment: 2x/week, 1-2 drinks      Family History   Problem Relation Age of Onset   • High blood pressure Mother    • Arthritis Brother      rheumatoid        Review of Systems:  Pertinent items are noted in history of present illness, otherwise a 14 point comprehensive review of systems was negative    Objective:  Vital signs in last 24 hours:  Temp:  [97.5 °F (36.4 °C)] 97.5 °F (36.4  °C)  Pulse:  [79] 79  Resp:  [18] 18  BP: (119)/(62) 119/62  FiO2 (%):  [21.2 %-100 %] 97 %    Gen: Somnolent but easily arousable, post-anesthetic state.  HEENT: Sclera clear, pupils constricted but equal and reactive.  Incision along the left hairline and temporal area c/d/i.    Neck: Supple without adenopathy.    Chest: Clear anteriorly, work of breathing normal.  CV: RRR without m/r/g.  Abd: Soft, NT, ND.  Active bowel sounds.  Ext: Warm and well-perfused.  Skin mildly flushed.    Neuro: Easily awakens, oriented x 3.  ASHTON, normal muscle bulk and tone, strength 5/5.  CN 2-12 intact.    Labs and imaging reviewed in the EMR.  Pertinent results as noted above.

## 2023-12-04 ENCOUNTER — ANTI-COAG VISIT (OUTPATIENT)
Dept: CARDIOLOGY | Facility: CLINIC | Age: 58
End: 2023-12-04
Payer: MEDICARE

## 2023-12-04 DIAGNOSIS — Z79.01 LONG TERM (CURRENT) USE OF ANTICOAGULANTS: Primary | ICD-10-CM

## 2023-12-04 DIAGNOSIS — I48.91 ATRIAL FIBRILLATION, UNSPECIFIED TYPE: ICD-10-CM

## 2023-12-04 DIAGNOSIS — I48.0 PAF (PAROXYSMAL ATRIAL FIBRILLATION): ICD-10-CM

## 2023-12-04 LAB — INR PPP: 2.6 (ref 2–3)

## 2023-12-04 PROCEDURE — 93793 PR ANTICOAGULANT MGMT FOR PT TAKING WARFARIN: ICD-10-PCS | Mod: HCNC,S$GLB,,

## 2023-12-04 PROCEDURE — 93793 ANTICOAG MGMT PT WARFARIN: CPT | Mod: HCNC,S$GLB,,

## 2023-12-04 PROCEDURE — 85610 POCT INR: ICD-10-PCS | Mod: QW,HCNC,S$GLB, | Performed by: INTERNAL MEDICINE

## 2023-12-04 PROCEDURE — 85610 PROTHROMBIN TIME: CPT | Mod: QW,HCNC,S$GLB, | Performed by: INTERNAL MEDICINE

## 2023-12-04 NOTE — PROGRESS NOTES
Patient's INR is therapeutic at 2.6.  Patient reports he followed previous instructions.  Patient reports no changes.  Instructions given: Continue warfarin 7.5 mg on Mondays; and 5 mg all other days. Recheck on 12/18/23.  Calendar reviewed with patient.  Patient verbalizes understanding.

## 2023-12-07 ENCOUNTER — TELEPHONE (OUTPATIENT)
Dept: INTERNAL MEDICINE | Facility: CLINIC | Age: 58
End: 2023-12-07
Payer: MEDICARE

## 2023-12-07 NOTE — TELEPHONE ENCOUNTER
----- Message from Jahaira Adams sent at 12/6/2023  5:28 PM CST -----  Type:  Patient Returning Call    Who Called: pt   Who Left Message for Patient: pt  Does the patient know what this is regarding?:pt need a call about his appt on Friday   Would the patient rather a call back or a response via [facility]?  CALL   Best Call Back Number: [phone]  Additional Information:  call back

## 2023-12-07 NOTE — TELEPHONE ENCOUNTER
----- Message from Jahaira Adams sent at 12/6/2023  5:28 PM CST -----  Type:  Patient Returning Call    Who Called: pt   Who Left Message for Patient: pt  Does the patient know what this is regarding?:pt need a call about his appt on Friday   Would the patient rather a call back or a response via [facility]?  CALL   Best Call Back Number: [phone]  Additional Information:  call back

## 2023-12-18 ENCOUNTER — OFFICE VISIT (OUTPATIENT)
Dept: INTERNAL MEDICINE | Facility: CLINIC | Age: 58
End: 2023-12-18
Payer: MEDICARE

## 2023-12-18 ENCOUNTER — OFFICE VISIT (OUTPATIENT)
Dept: OPHTHALMOLOGY | Facility: CLINIC | Age: 58
End: 2023-12-18
Payer: MEDICARE

## 2023-12-18 ENCOUNTER — LAB VISIT (OUTPATIENT)
Dept: LAB | Facility: HOSPITAL | Age: 58
End: 2023-12-18
Attending: INTERNAL MEDICINE
Payer: MEDICARE

## 2023-12-18 ENCOUNTER — ANTI-COAG VISIT (OUTPATIENT)
Dept: CARDIOLOGY | Facility: CLINIC | Age: 58
End: 2023-12-18
Payer: MEDICARE

## 2023-12-18 VITALS
BODY MASS INDEX: 24.03 KG/M2 | SYSTOLIC BLOOD PRESSURE: 142 MMHG | HEIGHT: 78 IN | WEIGHT: 207.69 LBS | DIASTOLIC BLOOD PRESSURE: 78 MMHG | RESPIRATION RATE: 18 BRPM | HEART RATE: 66 BPM | OXYGEN SATURATION: 98 %

## 2023-12-18 DIAGNOSIS — I15.2 HYPERTENSION ASSOCIATED WITH DIABETES: ICD-10-CM

## 2023-12-18 DIAGNOSIS — E11.59 HYPERTENSION ASSOCIATED WITH DIABETES: ICD-10-CM

## 2023-12-18 DIAGNOSIS — R80.9 TYPE 2 DIABETES MELLITUS WITH MICROALBUMINURIA, WITH LONG-TERM CURRENT USE OF INSULIN: Primary | ICD-10-CM

## 2023-12-18 DIAGNOSIS — I48.91 ON COUMADIN FOR ATRIAL FIBRILLATION: ICD-10-CM

## 2023-12-18 DIAGNOSIS — I48.0 PAF (PAROXYSMAL ATRIAL FIBRILLATION): ICD-10-CM

## 2023-12-18 DIAGNOSIS — Z79.01 ON COUMADIN FOR ATRIAL FIBRILLATION: ICD-10-CM

## 2023-12-18 DIAGNOSIS — E11.29 TYPE 2 DIABETES MELLITUS WITH MICROALBUMINURIA, WITH LONG-TERM CURRENT USE OF INSULIN: Primary | ICD-10-CM

## 2023-12-18 DIAGNOSIS — E11.29 TYPE 2 DIABETES MELLITUS WITH MICROALBUMINURIA, WITH LONG-TERM CURRENT USE OF INSULIN: ICD-10-CM

## 2023-12-18 DIAGNOSIS — E11.36 DIABETIC CATARACT: ICD-10-CM

## 2023-12-18 DIAGNOSIS — R80.9 TYPE 2 DIABETES MELLITUS WITH MICROALBUMINURIA, WITH LONG-TERM CURRENT USE OF INSULIN: ICD-10-CM

## 2023-12-18 DIAGNOSIS — H52.223 REGULAR ASTIGMATISM WITH PRESBYOPIA, BILATERAL: ICD-10-CM

## 2023-12-18 DIAGNOSIS — I48.0 PAF (PAROXYSMAL ATRIAL FIBRILLATION): Primary | ICD-10-CM

## 2023-12-18 DIAGNOSIS — Z79.4 TYPE 2 DIABETES MELLITUS WITH MICROALBUMINURIA, WITH LONG-TERM CURRENT USE OF INSULIN: Primary | ICD-10-CM

## 2023-12-18 DIAGNOSIS — H52.4 REGULAR ASTIGMATISM WITH PRESBYOPIA, BILATERAL: ICD-10-CM

## 2023-12-18 DIAGNOSIS — Z79.4 TYPE 2 DIABETES MELLITUS WITH MICROALBUMINURIA, WITH LONG-TERM CURRENT USE OF INSULIN: ICD-10-CM

## 2023-12-18 DIAGNOSIS — E11.9 DIABETES MELLITUS WITHOUT COMPLICATION: Primary | ICD-10-CM

## 2023-12-18 DIAGNOSIS — Z79.01 LONG TERM (CURRENT) USE OF ANTICOAGULANTS: ICD-10-CM

## 2023-12-18 LAB
INR PPP: 2 (ref 0.8–1.2)
PROTHROMBIN TIME: 20.9 SEC (ref 9–12.5)

## 2023-12-18 PROCEDURE — 3078F DIAST BP <80 MM HG: CPT | Mod: HCNC,CPTII,S$GLB, | Performed by: FAMILY MEDICINE

## 2023-12-18 PROCEDURE — 4010F ACE/ARB THERAPY RXD/TAKEN: CPT | Mod: HCNC,CPTII,S$GLB, | Performed by: OPTOMETRIST

## 2023-12-18 PROCEDURE — 3060F PR POS MICROALBUMINURIA RESULT DOCUMENTED/REVIEW: ICD-10-PCS | Mod: HCNC,CPTII,S$GLB, | Performed by: FAMILY MEDICINE

## 2023-12-18 PROCEDURE — 4010F PR ACE/ARB THEARPY RXD/TAKEN: ICD-10-PCS | Mod: HCNC,CPTII,S$GLB, | Performed by: FAMILY MEDICINE

## 2023-12-18 PROCEDURE — 1160F RVW MEDS BY RX/DR IN RCRD: CPT | Mod: HCNC,CPTII,S$GLB, | Performed by: FAMILY MEDICINE

## 2023-12-18 PROCEDURE — 3066F NEPHROPATHY DOC TX: CPT | Mod: HCNC,CPTII,S$GLB, | Performed by: OPTOMETRIST

## 2023-12-18 PROCEDURE — 99999 PR PBB SHADOW E&M-EST. PATIENT-LVL III: CPT | Mod: PBBFAC,HCNC,, | Performed by: OPTOMETRIST

## 2023-12-18 PROCEDURE — 3008F BODY MASS INDEX DOCD: CPT | Mod: HCNC,CPTII,S$GLB, | Performed by: FAMILY MEDICINE

## 2023-12-18 PROCEDURE — 3044F HG A1C LEVEL LT 7.0%: CPT | Mod: HCNC,CPTII,S$GLB, | Performed by: OPTOMETRIST

## 2023-12-18 PROCEDURE — 4010F PR ACE/ARB THEARPY RXD/TAKEN: ICD-10-PCS | Mod: HCNC,CPTII,S$GLB, | Performed by: OPTOMETRIST

## 2023-12-18 PROCEDURE — 99213 PR OFFICE/OUTPT VISIT, EST, LEVL III, 20-29 MIN: ICD-10-PCS | Mod: HCNC,S$GLB,, | Performed by: FAMILY MEDICINE

## 2023-12-18 PROCEDURE — 3078F PR MOST RECENT DIASTOLIC BLOOD PRESSURE < 80 MM HG: ICD-10-PCS | Mod: HCNC,CPTII,S$GLB, | Performed by: FAMILY MEDICINE

## 2023-12-18 PROCEDURE — 1159F PR MEDICATION LIST DOCUMENTED IN MEDICAL RECORD: ICD-10-PCS | Mod: HCNC,CPTII,S$GLB, | Performed by: FAMILY MEDICINE

## 2023-12-18 PROCEDURE — 1159F PR MEDICATION LIST DOCUMENTED IN MEDICAL RECORD: ICD-10-PCS | Mod: HCNC,CPTII,S$GLB, | Performed by: OPTOMETRIST

## 2023-12-18 PROCEDURE — 1159F MED LIST DOCD IN RCRD: CPT | Mod: HCNC,CPTII,S$GLB, | Performed by: OPTOMETRIST

## 2023-12-18 PROCEDURE — 92015 DETERMINE REFRACTIVE STATE: CPT | Mod: HCNC,S$GLB,, | Performed by: OPTOMETRIST

## 2023-12-18 PROCEDURE — 3066F NEPHROPATHY DOC TX: CPT | Mod: HCNC,CPTII,S$GLB, | Performed by: FAMILY MEDICINE

## 2023-12-18 PROCEDURE — 4010F ACE/ARB THERAPY RXD/TAKEN: CPT | Mod: HCNC,CPTII,S$GLB, | Performed by: FAMILY MEDICINE

## 2023-12-18 PROCEDURE — 99999 PR PBB SHADOW E&M-EST. PATIENT-LVL III: ICD-10-PCS | Mod: PBBFAC,HCNC,, | Performed by: OPTOMETRIST

## 2023-12-18 PROCEDURE — 92014 COMPRE OPH EXAM EST PT 1/>: CPT | Mod: HCNC,S$GLB,, | Performed by: OPTOMETRIST

## 2023-12-18 PROCEDURE — 3044F PR MOST RECENT HEMOGLOBIN A1C LEVEL <7.0%: ICD-10-PCS | Mod: HCNC,CPTII,S$GLB, | Performed by: FAMILY MEDICINE

## 2023-12-18 PROCEDURE — 92014 PR EYE EXAM, EST PATIENT,COMPREHESV: ICD-10-PCS | Mod: HCNC,S$GLB,, | Performed by: OPTOMETRIST

## 2023-12-18 PROCEDURE — 3044F PR MOST RECENT HEMOGLOBIN A1C LEVEL <7.0%: ICD-10-PCS | Mod: HCNC,CPTII,S$GLB, | Performed by: OPTOMETRIST

## 2023-12-18 PROCEDURE — 3044F HG A1C LEVEL LT 7.0%: CPT | Mod: HCNC,CPTII,S$GLB, | Performed by: FAMILY MEDICINE

## 2023-12-18 PROCEDURE — 3060F POS MICROALBUMINURIA REV: CPT | Mod: HCNC,CPTII,S$GLB, | Performed by: FAMILY MEDICINE

## 2023-12-18 PROCEDURE — 3077F PR MOST RECENT SYSTOLIC BLOOD PRESSURE >= 140 MM HG: ICD-10-PCS | Mod: HCNC,CPTII,S$GLB, | Performed by: FAMILY MEDICINE

## 2023-12-18 PROCEDURE — 85610 PROTHROMBIN TIME: CPT | Mod: HCNC | Performed by: INTERNAL MEDICINE

## 2023-12-18 PROCEDURE — 3060F PR POS MICROALBUMINURIA RESULT DOCUMENTED/REVIEW: ICD-10-PCS | Mod: HCNC,CPTII,S$GLB, | Performed by: OPTOMETRIST

## 2023-12-18 PROCEDURE — 99213 OFFICE O/P EST LOW 20 MIN: CPT | Mod: HCNC,S$GLB,, | Performed by: FAMILY MEDICINE

## 2023-12-18 PROCEDURE — 36415 COLL VENOUS BLD VENIPUNCTURE: CPT | Mod: HCNC | Performed by: INTERNAL MEDICINE

## 2023-12-18 PROCEDURE — 1159F MED LIST DOCD IN RCRD: CPT | Mod: HCNC,CPTII,S$GLB, | Performed by: FAMILY MEDICINE

## 2023-12-18 PROCEDURE — 2022F DILAT RTA XM EVC RTNOPTHY: CPT | Mod: HCNC,CPTII,S$GLB, | Performed by: OPTOMETRIST

## 2023-12-18 PROCEDURE — 3066F PR DOCUMENTATION OF TREATMENT FOR NEPHROPATHY: ICD-10-PCS | Mod: HCNC,CPTII,S$GLB, | Performed by: FAMILY MEDICINE

## 2023-12-18 PROCEDURE — 3066F PR DOCUMENTATION OF TREATMENT FOR NEPHROPATHY: ICD-10-PCS | Mod: HCNC,CPTII,S$GLB, | Performed by: OPTOMETRIST

## 2023-12-18 PROCEDURE — 99999 PR PBB SHADOW E&M-EST. PATIENT-LVL IV: ICD-10-PCS | Mod: PBBFAC,HCNC,, | Performed by: FAMILY MEDICINE

## 2023-12-18 PROCEDURE — 3077F SYST BP >= 140 MM HG: CPT | Mod: HCNC,CPTII,S$GLB, | Performed by: FAMILY MEDICINE

## 2023-12-18 PROCEDURE — 2022F PR DILATED RETINAL EYE EXAM WITH INTERP/REVIEW: ICD-10-PCS | Mod: HCNC,CPTII,S$GLB, | Performed by: OPTOMETRIST

## 2023-12-18 PROCEDURE — 99999 PR PBB SHADOW E&M-EST. PATIENT-LVL IV: CPT | Mod: PBBFAC,HCNC,, | Performed by: FAMILY MEDICINE

## 2023-12-18 PROCEDURE — 92015 PR REFRACTION: ICD-10-PCS | Mod: HCNC,S$GLB,, | Performed by: OPTOMETRIST

## 2023-12-18 PROCEDURE — 3008F PR BODY MASS INDEX (BMI) DOCUMENTED: ICD-10-PCS | Mod: HCNC,CPTII,S$GLB, | Performed by: FAMILY MEDICINE

## 2023-12-18 PROCEDURE — 3060F POS MICROALBUMINURIA REV: CPT | Mod: HCNC,CPTII,S$GLB, | Performed by: OPTOMETRIST

## 2023-12-18 PROCEDURE — 1160F PR REVIEW ALL MEDS BY PRESCRIBER/CLIN PHARMACIST DOCUMENTED: ICD-10-PCS | Mod: HCNC,CPTII,S$GLB, | Performed by: FAMILY MEDICINE

## 2023-12-18 RX ORDER — INSULIN GLARGINE 300 U/ML
20 INJECTION, SOLUTION SUBCUTANEOUS 2 TIMES DAILY
Qty: 4 ML | Refills: 11 | Status: SHIPPED | OUTPATIENT
Start: 2023-12-18 | End: 2024-01-25 | Stop reason: SDUPTHER

## 2023-12-18 NOTE — PROGRESS NOTES
HPI     Diabetic Eye Exam            Comments: Patient here today for yearly DM eye exam          Comments    Diagnosed with diabetes in 2022  Lab Results       Component                Value               Date                       HGBA1C                   6.3 (H)             08/21/2023            Vision changes since last eye exam?: None noticed  Wears glasses left at home     Any eye pain today: No    Other ocular symptoms: Burning eyes    Interested in contact lens fitting today? No    1. DM  2. NSC OU          Last edited by Jamilah Alcaraz, PCT on 12/18/2023  8:09 AM.            Assessment /Plan     For exam results, see Encounter Report.    Diabetes mellitus without complication  1 years, last A1c 6.3 There was no diabetic retinopathy present on either eye on examination today. Recommend good blood pressure control, strict blood glucose control, and good cholesterol control.  Continue close care with Dr. Castano regarding diabetes.    Diabetic cataract  Cataracts are not visually significant and not affecting activities of daily living. Annual observation is recommended at this time. Patient to call or return to clinic with any significant change in vision prior to next visit.    Regular astigmatism with presbyopia, bilateral  Eyeglass Final Rx       Eyeglass Final Rx         Sphere Cylinder Axis Add    Right -0.75 +1.00 004 +2.50    Left +0.25 +0.75 164 +2.50      Type: PAL    Expiration Date: 12/18/2024                      RTC 1 yr for dilated eye exam or sooner if any changes to vision.   Discussed above and answered questions.

## 2023-12-18 NOTE — PROGRESS NOTES
INR at goal-2.0. Medications and chart reviewed. No changes noted to necessitate adjustment of warfarin or follow-up plan. See calendar.  Patient contacted.  Dose reviewed.  Repeat INR in 1 month.

## 2023-12-18 NOTE — PROGRESS NOTES
"Subjective:       Patient ID: Papa Pope is a 58 y.o. male.    Chief Complaint: Med Change Request    58-year-old  male patient with Patient Active Problem List:     Hyperlipidemia due to type 2 diabetes mellitus     Hypertension associated with diabetes     DDD (degenerative disc disease), lumbar     History of colon polyps     PAF (paroxysmal atrial fibrillation)     On Coumadin for atrial fibrillation     Type 2 diabetes mellitus with microalbuminuria, with long-term current use of insulin     Diabetic polyneuropathy associated with type 2 diabetes mellitus     Cigarette nicotine dependence without complication     Uncomplicated alcohol dependence     Abnormality of gait and mobility     Erectile dysfunction associated with type 2 diabetes mellitus  Here reports that patient has been taking Levemir 24 units twice daily and Humalog as per sliding scale but was informed that starting 2024 Levemir is no longer covered by his insurance and alternatives included is-Lantus, Tresiba and Toujeo  Blood glucose levels has been stable  Has been taking his medications regularly and denies any chest pain or difficulty breathing with palpitations      Review of Systems   Endocrine: Negative for polydipsia and polyuria.   Neurological:  Negative for weakness and numbness.         BP (!) 142/78 (BP Location: Right arm, Patient Position: Sitting, BP Method: Large (Manual))   Pulse 66   Resp 18   Ht 6' 6" (1.981 m)   Wt 94.2 kg (207 lb 10.8 oz)   SpO2 98%   BMI 24.00 kg/m²   Objective:      Physical Exam  Constitutional:       Appearance: He is well-developed.   HENT:      Head: Normocephalic and atraumatic.   Cardiovascular:      Rate and Rhythm: Normal rate and regular rhythm.      Heart sounds: Normal heart sounds. No murmur heard.  Pulmonary:      Effort: Pulmonary effort is normal.      Breath sounds: Normal breath sounds. No wheezing.   Abdominal:      General: Bowel sounds are normal.      " Palpations: Abdomen is soft.      Tenderness: There is no abdominal tenderness.   Skin:     General: Skin is warm and dry.      Findings: No rash.   Neurological:      Mental Status: He is alert and oriented to person, place, and time.   Psychiatric:         Mood and Affect: Mood normal.           Assessment/Plan:   1. Type 2 diabetes mellitus with microalbuminuria, with long-term current use of insulin  -     insulin glargine, TOUJEO, (TOUJEO SOLOSTAR U-300 INSULIN) 300 unit/mL (1.5 mL) InPn pen; Inject 20 Units into the skin 2 (two) times a day.  Dispense: 4 mL; Refill: 11  Will change Levemir to Toujeo insulin 20 units twice  Daily  Noted stable A1c  Strict lifestyle changes recommended with 1800 ADA low-fat and low-cholesterol diet and exercise 30 minutes daily      2. Hypertension associated with diabetes  Blood pressure is stable currently on lisinopril 10 mg and metoprolol 50 mg twice daily    3. PAF (paroxysmal atrial fibrillation)  4. On Coumadin for atrial fibrillation  Followed by Cardiology currently on warfarin

## 2024-01-18 ENCOUNTER — ANTI-COAG VISIT (OUTPATIENT)
Dept: CARDIOLOGY | Facility: CLINIC | Age: 59
End: 2024-01-18
Payer: MEDICARE

## 2024-01-18 DIAGNOSIS — Z79.01 LONG TERM (CURRENT) USE OF ANTICOAGULANTS: Primary | ICD-10-CM

## 2024-01-18 DIAGNOSIS — I48.0 PAF (PAROXYSMAL ATRIAL FIBRILLATION): ICD-10-CM

## 2024-01-18 LAB — INR PPP: 1.4 (ref 2–3)

## 2024-01-18 PROCEDURE — 85610 PROTHROMBIN TIME: CPT | Mod: QW,HCNC,S$GLB, | Performed by: INTERNAL MEDICINE

## 2024-01-18 PROCEDURE — 93793 ANTICOAG MGMT PT WARFARIN: CPT | Mod: HCNC,S$GLB,,

## 2024-01-18 NOTE — PROGRESS NOTES
INR not at goal. Medications, chart, and patient findings reviewed. See calendar for adjustments to dose and follow up plan.  Boost and resume dose.

## 2024-01-18 NOTE — PROGRESS NOTES
Patient's INR is sub-therapeutic at 1.4.  Patient reports he missed two doses and ate some peanuts. Advised patient of increased risk of clotting; signs/symptoms of clotting and need to go to ED if he experiences any. Patient reports he is not having any signs/symptoms of clotting.  Sent to PharmD for dosing/instructions.

## 2024-01-25 ENCOUNTER — PATIENT MESSAGE (OUTPATIENT)
Dept: ADMINISTRATIVE | Facility: OTHER | Age: 59
End: 2024-01-25
Payer: MEDICARE

## 2024-01-25 DIAGNOSIS — Z79.4 TYPE 2 DIABETES MELLITUS WITH MICROALBUMINURIA, WITH LONG-TERM CURRENT USE OF INSULIN: ICD-10-CM

## 2024-01-25 DIAGNOSIS — E11.29 TYPE 2 DIABETES MELLITUS WITH MICROALBUMINURIA, WITH LONG-TERM CURRENT USE OF INSULIN: ICD-10-CM

## 2024-01-25 DIAGNOSIS — R80.9 TYPE 2 DIABETES MELLITUS WITH MICROALBUMINURIA, WITH LONG-TERM CURRENT USE OF INSULIN: ICD-10-CM

## 2024-01-26 RX ORDER — INSULIN GLARGINE 300 U/ML
20 INJECTION, SOLUTION SUBCUTANEOUS 2 TIMES DAILY
Qty: 4 ML | Refills: 3 | Status: SHIPPED | OUTPATIENT
Start: 2024-01-26 | End: 2024-02-16

## 2024-01-26 NOTE — TELEPHONE ENCOUNTER
Care Due:                  Date            Visit Type   Department     Provider  --------------------------------------------------------------------------------                                EP -                              PRIMARY      [facility] INTER[facility]  Prachi Mainampati  Last Visit: 12-      CARE ([facility])   MEDICINE       Eyad  Next Visit: None Scheduled  None         None Found                                                            Last  Test          Frequency    Reason                     Performed    Due Date  --------------------------------------------------------------------------------    HBA1C.......  6 months...  empagliflozin, insulin...  08- 02-    [facility] Embedded Care Due Messages. Reference number: [id number].   1/25/2024 6:19:59 PM CST

## 2024-02-01 ENCOUNTER — ANTI-COAG VISIT (OUTPATIENT)
Dept: CARDIOLOGY | Facility: CLINIC | Age: 59
End: 2024-02-01
Payer: MEDICARE

## 2024-02-01 DIAGNOSIS — I48.0 PAF (PAROXYSMAL ATRIAL FIBRILLATION): ICD-10-CM

## 2024-02-01 DIAGNOSIS — Z79.01 LONG TERM (CURRENT) USE OF ANTICOAGULANTS: Primary | ICD-10-CM

## 2024-02-01 LAB — INR PPP: 1.6 (ref 2–3)

## 2024-02-01 PROCEDURE — 85610 PROTHROMBIN TIME: CPT | Mod: QW,HCNC,S$GLB, | Performed by: INTERNAL MEDICINE

## 2024-02-01 PROCEDURE — 93793 ANTICOAG MGMT PT WARFARIN: CPT | Mod: HCNC,S$GLB,,

## 2024-02-01 NOTE — PROGRESS NOTES
Pt has previously been advised of drinking ETOH, bingeing, then stopping.  We were concerned for INR drop off is dose was lowered too much without the same amount of ETOH.  Pt is not at risk for clotting/stoke.  We will boost & increase weekly dose, but will need close follow up as his EOTH has changed before.

## 2024-02-01 NOTE — PROGRESS NOTES
Patient's INR is sub-therapeutic at 1.6. Patient reports he followed previous instructions.  Patient reports he is drinking less frequently. Advised of increased risk of clotting; signs/symptoms of clotting and need to go to ED if he experiences any. Patient reports he is not having any signs/symptoms of clotting.  Sent to PharmD for dosing/instructions.

## 2024-02-12 ENCOUNTER — ANTI-COAG VISIT (OUTPATIENT)
Dept: CARDIOLOGY | Facility: CLINIC | Age: 59
End: 2024-02-12
Payer: MEDICARE

## 2024-02-12 DIAGNOSIS — Z79.01 LONG TERM (CURRENT) USE OF ANTICOAGULANTS: Primary | ICD-10-CM

## 2024-02-12 DIAGNOSIS — I48.0 PAF (PAROXYSMAL ATRIAL FIBRILLATION): ICD-10-CM

## 2024-02-12 LAB — INR PPP: 1.6 (ref 2–3)

## 2024-02-12 PROCEDURE — 85610 PROTHROMBIN TIME: CPT | Mod: QW,HCNC,S$GLB, | Performed by: INTERNAL MEDICINE

## 2024-02-12 PROCEDURE — 93793 ANTICOAG MGMT PT WARFARIN: CPT | Mod: HCNC,S$GLB,,

## 2024-02-12 NOTE — PROGRESS NOTES
Patent's INR is sub-therapeutic at 1.6. Patient reports he followed previous instructions. Patient reports he ate peanut butter twice last week. Re-educated patient on Coumadin Diet and need to keep consistent. Advised patient of increased risk of clotting; signs/symptoms of clotting and need to go to ED if he experiences any. Patient reports he is not having any signs/symptoms of clotting. Instructions given: Will give increased dose of warfarin 12.5 mg today 2/12/24; then resume warfarin   7.5 mg on Wednesdays and Mondays; and 5 mg all other days.  Recheck on 2/26/24. Calendar reviewed with patient. Patient verbalizes understanding.

## 2024-02-12 NOTE — PROGRESS NOTES
Chart reviewed, agree with LPN plan.  May need further dose increase.  Pt has decreased ETOH in take.

## 2024-02-16 ENCOUNTER — OFFICE VISIT (OUTPATIENT)
Dept: INTERNAL MEDICINE | Facility: CLINIC | Age: 59
End: 2024-02-16
Payer: MEDICARE

## 2024-02-16 VITALS
SYSTOLIC BLOOD PRESSURE: 146 MMHG | HEIGHT: 78 IN | HEART RATE: 64 BPM | BODY MASS INDEX: 23.88 KG/M2 | TEMPERATURE: 97 F | RESPIRATION RATE: 18 BRPM | WEIGHT: 206.38 LBS | DIASTOLIC BLOOD PRESSURE: 90 MMHG

## 2024-02-16 DIAGNOSIS — Z00.00 ENCOUNTER FOR PREVENTIVE HEALTH EXAMINATION: Primary | ICD-10-CM

## 2024-02-16 DIAGNOSIS — E11.69 HYPERLIPIDEMIA DUE TO TYPE 2 DIABETES MELLITUS: ICD-10-CM

## 2024-02-16 DIAGNOSIS — F17.210 CIGARETTE NICOTINE DEPENDENCE WITHOUT COMPLICATION: ICD-10-CM

## 2024-02-16 DIAGNOSIS — E11.42 DIABETIC POLYNEUROPATHY ASSOCIATED WITH TYPE 2 DIABETES MELLITUS: ICD-10-CM

## 2024-02-16 DIAGNOSIS — I15.2 HYPERTENSION ASSOCIATED WITH DIABETES: ICD-10-CM

## 2024-02-16 DIAGNOSIS — R26.9 ABNORMALITY OF GAIT AND MOBILITY: ICD-10-CM

## 2024-02-16 DIAGNOSIS — N18.32 TYPE 2 DIABETES MELLITUS WITH STAGE 3B CHRONIC KIDNEY DISEASE, WITH LONG-TERM CURRENT USE OF INSULIN: ICD-10-CM

## 2024-02-16 DIAGNOSIS — Z79.4 TYPE 2 DIABETES MELLITUS WITH STAGE 3B CHRONIC KIDNEY DISEASE, WITH LONG-TERM CURRENT USE OF INSULIN: ICD-10-CM

## 2024-02-16 DIAGNOSIS — M51.36 DDD (DEGENERATIVE DISC DISEASE), LUMBAR: ICD-10-CM

## 2024-02-16 DIAGNOSIS — I48.0 PAF (PAROXYSMAL ATRIAL FIBRILLATION): ICD-10-CM

## 2024-02-16 DIAGNOSIS — E11.22 TYPE 2 DIABETES MELLITUS WITH STAGE 3B CHRONIC KIDNEY DISEASE, WITH LONG-TERM CURRENT USE OF INSULIN: ICD-10-CM

## 2024-02-16 DIAGNOSIS — E78.5 HYPERLIPIDEMIA DUE TO TYPE 2 DIABETES MELLITUS: ICD-10-CM

## 2024-02-16 DIAGNOSIS — I48.91 ON COUMADIN FOR ATRIAL FIBRILLATION: ICD-10-CM

## 2024-02-16 DIAGNOSIS — Z86.010 HISTORY OF COLON POLYPS: ICD-10-CM

## 2024-02-16 DIAGNOSIS — E11.59 HYPERTENSION ASSOCIATED WITH DIABETES: ICD-10-CM

## 2024-02-16 DIAGNOSIS — Z79.01 ON COUMADIN FOR ATRIAL FIBRILLATION: ICD-10-CM

## 2024-02-16 PROCEDURE — 3072F LOW RISK FOR RETINOPATHY: CPT | Mod: HCNC,CPTII,S$GLB, | Performed by: NURSE PRACTITIONER

## 2024-02-16 PROCEDURE — 1160F RVW MEDS BY RX/DR IN RCRD: CPT | Mod: HCNC,CPTII,S$GLB, | Performed by: NURSE PRACTITIONER

## 2024-02-16 PROCEDURE — 1159F MED LIST DOCD IN RCRD: CPT | Mod: HCNC,CPTII,S$GLB, | Performed by: NURSE PRACTITIONER

## 2024-02-16 PROCEDURE — 3077F SYST BP >= 140 MM HG: CPT | Mod: HCNC,CPTII,S$GLB, | Performed by: NURSE PRACTITIONER

## 2024-02-16 PROCEDURE — G0439 PPPS, SUBSEQ VISIT: HCPCS | Mod: HCNC,S$GLB,, | Performed by: NURSE PRACTITIONER

## 2024-02-16 PROCEDURE — 99999 PR PBB SHADOW E&M-EST. PATIENT-LVL V: CPT | Mod: PBBFAC,HCNC,, | Performed by: NURSE PRACTITIONER

## 2024-02-16 PROCEDURE — 3080F DIAST BP >= 90 MM HG: CPT | Mod: HCNC,CPTII,S$GLB, | Performed by: NURSE PRACTITIONER

## 2024-02-16 NOTE — PROGRESS NOTES
"  Papa Pope presented for a  Medicare AWV and comprehensive Health Risk Assessment today. The following components were reviewed and updated:    Medical history  Family History  Social history  Allergies and Current Medications  Health Risk Assessment  Health Maintenance  Care Team         ** See Completed Assessments for Annual Wellness Visit within the encounter summary.**         The following assessments were completed:  Living Situation  CAGE  Depression Screening  Timed Get Up and Go  Whisper Test  Cognitive Function Screening  Nutrition Screening  ADL Screening  PAQ Screening        Vitals:    02/16/24 1359   BP: (!) 146/90   Pulse: 64   Resp: 18   Temp: 97.4 °F (36.3 °C)   Weight: 93.6 kg (206 lb 5.6 oz)   Height: 6' 6" (1.981 m)     Body mass index is 23.85 kg/m².  Physical Exam  Vitals and nursing note reviewed.   Constitutional:       General: He is not in acute distress.     Appearance: He is well-developed.   HENT:      Head: Normocephalic and atraumatic.   Eyes:      Pupils: Pupils are equal, round, and reactive to light.   Neck:      Vascular: No carotid bruit.   Cardiovascular:      Rate and Rhythm: Normal rate. Rhythm irregular.      Pulses: Normal pulses.      Heart sounds: Normal heart sounds. No murmur heard.     No gallop.   Pulmonary:      Effort: Pulmonary effort is normal.      Breath sounds: Normal breath sounds.   Abdominal:      General: Bowel sounds are normal. There is no distension.      Palpations: Abdomen is soft.      Tenderness: There is no abdominal tenderness.   Musculoskeletal:         General: Normal range of motion.   Skin:     General: Skin is warm and dry.   Neurological:      Motor: Weakness present. No abnormal muscle tone.      Comments: Legs  Use of cane   Psychiatric:         Speech: Speech normal.         Behavior: Behavior normal.         Thought Content: Thought content normal.         Judgment: Judgment normal.     Current Outpatient Medications   Medication " "Instructions    blood sugar diagnostic Strp Use to check blood sugar twice a day    ciclopirox (PENLAC) 8 % Soln Apply to nails once daily    empagliflozin (JARDIANCE) 25 mg, Oral, Every morning    ezetimibe (ZETIA) 10 mg, Oral, Daily    insulin aspart U-100 (NOVOLOG FLEXPEN U-100 INSULIN) 100 unit/mL (3 mL) InPn pen Inject into the skin three times a day before each meal per sliding scale. If -200: 2 units; -250: 4 units; -300: 6 units; -350: 8 units; BG >350: 10 unit    insulin glargine, TOUJEO, (TOUJEO SOLOSTAR U-300 INSULIN) 300 unit/mL (1.5 mL) InPn pen Inject 20 units into the skin in the morning and 22 units in the evening. Dose change.    lisinopriL 10 mg, Oral, Daily    metoprolol tartrate (LOPRESSOR) 50 mg, Oral, 2 times daily    pen needle, diabetic (BD CHRISTIANO 2ND GEN PEN NEEDLE) 32 gauge x 5/32" Ndle Use five times daily    pregabalin (LYRICA) 75 mg, Oral, 2 times daily    rosuvastatin (CRESTOR) 40 mg, Oral, Nightly    TRUE METRIX AIR GLUCOSE METER kit USE AS DIRECTED    TRUEPLUS LANCETS 33 gauge Misc USE  TO  CHECK  BLOOD  SUGAR TWICE DAILY    warfarin (COUMADIN) 5 MG tablet Take 1 and 1/2 tablets (7.5 mg total) on Mondays and Fridays; and one tablet (5 mg) all other days OR AS DIRECTED BY COUMADIN CLINIC.             Diagnoses and health risks identified today and associated recommendations/orders:    1. Encounter for preventive health examination  Review for Opioid Screening: Patient does not have rx for Opioids.  Review for Substance Use Disorders: Patient does not use substance.    Review the need for vaccines    2. Hypertension associated with diabetes  Chronic-  BP elevated STATES HE MISSED A DOSE OF LOPRESSOR THIS AM  Reinforced compliance  Followed by PCP/.CARD      3. Type 2 diabetes mellitus with stage 3b chronic kidney disease, with long-term current use of insulin   HEMOGLOBIN A1C 2/26/ 2023  Chronic and Ongoing.  SEE MED LIST  Discussed and recommend  low fat/carb/chol " diet. Cardio exercise as tolerated. Life style modifications.  Continue current treatment plan as previously prescribed with your PCP    4. Diabetic polyneuropathy associated with type 2 diabetes mellitus  Chronic and Ongoing on Lyrica. Continue current treatment plan as previously prescribed with your PCP    5. Hyperlipidemia due to type 2 diabetes mellitus  Chronic/ Monitored/Stable on Crestor  as directed.  Discussed and recommend  low fat/carb/chol diet. Cardio exercise as tolerated. Life style modifications.  Followed by PCP/ card     6. PAF (paroxysmal atrial fibrillation)  Chronic/ Monitored/Stable on Lopressor and Coumadin  as directed.  Followed by card    7. On Coumadin for atrial fibrillation  Chronic/ Monitored/Stable- PT/iNR scheduled for 2/26/ 2024  DENIES S/S OF BLEEDING   Followed by card Md     8. Cigarette nicotine dependence without complication  Chronic and Ongoing.   SMOKES .05 PPD  Ambulatory referral/consult to Smoking Cessation Program for patches    9. DDD (degenerative disc disease), lumbar  Chronic and Ongoing with Lyrica. Continue current treatment plan as previously prescribed with your PCP    10. Abnormality of gait and mobility  Chronic and Ongoing. DUE TO LUMBAR - USE OF CANE - 1 FALL IN 23-   Continue current treatment plan as previously prescribed with your PCP    11. History of colon polyps  Colonoscopy    Due date: 9/30/2027   Last completion date: 9/30/2022    Frequency: Every 5 Years   Follow-up changed to Colonoscopy - Every 5 Years by Interface, Lab In  seven (Provider Recommendation - Last colonoscopy result recommends 5 year follow up)        Provided Papa with a 5-10 year written screening schedule and personal prevention plan. Recommendations were developed using the USPHS age appropriate recommendations. Education, counseling, and referrals were provided as needed. After Visit Summary printed and given to patient which includes a list of additional screenings\tests  needed.    Follow up in about 1 year (around 2/16/2025).    Juliann Owens NP

## 2024-02-16 NOTE — PATIENT INSTRUCTIONS
Counseling and Referral of Other Preventative  (Italic type indicates deductible and co-insurance are waived)    Patient Name: Papa Pope  Today's Date: 2/16/2024    Health Maintenance       Date Due Completion Date    Pneumococcal Vaccines (Age 0-64) (2 of 2 - PCV) 11/09/2012 11/9/2011    Shingles Vaccine (1 of 2) Never done ---    TETANUS VACCINE 11/09/2021 11/9/2011    COVID-19 Vaccine (3 - 2023-24 season) 09/01/2023 4/15/2021    Hemoglobin A1c 02/21/2024 8/21/2023    PROSTATE-SPECIFIC ANTIGEN 02/20/2024 2/20/2023    Diabetes Urine Screening 08/21/2024 8/21/2023    Lipid Panel 08/21/2024 8/21/2023    Foot Exam 10/05/2024 10/5/2023 (Done)    Override on 10/5/2023: Done    Override on 3/30/2023: Done    Override on 1/25/2022: Done    Eye Exam 12/18/2024 12/18/2023    Colorectal Cancer Screening 09/30/2027 9/30/2022        No orders of the defined types were placed in this encounter.      The following information is provided to all patients.  This information is to help you find resources for any of the problems found today that may be affecting your health:                  Living healthy guide: www.[facility].[place].gov      Understanding Diabetes: www.diabetes.org      Eating healthy: www.cdc.gov/healthyweight      CDC home safety checklist: www.cdc.gov/steadi/patient.html      Agency on Aging: www.goea.[place].[facility]      Alcoholics anonymous (AA): www.aa.org      Physical Activity: www.jalil.nih.gov/oo0dqpb      Tobacco use: www.quitwithusla.org

## 2024-02-19 ENCOUNTER — PATIENT MESSAGE (OUTPATIENT)
Dept: ADMINISTRATIVE | Facility: HOSPITAL | Age: 59
End: 2024-02-19
Payer: MEDICARE

## 2024-02-21 ENCOUNTER — PATIENT OUTREACH (OUTPATIENT)
Dept: ADMINISTRATIVE | Facility: HOSPITAL | Age: 59
End: 2024-02-21
Payer: MEDICARE

## 2024-02-26 ENCOUNTER — LAB VISIT (OUTPATIENT)
Dept: LAB | Facility: HOSPITAL | Age: 59
End: 2024-02-26
Attending: NURSE PRACTITIONER
Payer: MEDICARE

## 2024-02-26 ENCOUNTER — ANTI-COAG VISIT (OUTPATIENT)
Dept: CARDIOLOGY | Facility: CLINIC | Age: 59
End: 2024-02-26
Payer: MEDICARE

## 2024-02-26 DIAGNOSIS — I48.0 PAF (PAROXYSMAL ATRIAL FIBRILLATION): ICD-10-CM

## 2024-02-26 DIAGNOSIS — N18.32 TYPE 2 DIABETES MELLITUS WITH STAGE 3B CHRONIC KIDNEY DISEASE, WITH LONG-TERM CURRENT USE OF INSULIN: ICD-10-CM

## 2024-02-26 DIAGNOSIS — E11.22 TYPE 2 DIABETES MELLITUS WITH STAGE 3B CHRONIC KIDNEY DISEASE, WITH LONG-TERM CURRENT USE OF INSULIN: ICD-10-CM

## 2024-02-26 DIAGNOSIS — Z79.01 LONG TERM (CURRENT) USE OF ANTICOAGULANTS: Primary | ICD-10-CM

## 2024-02-26 DIAGNOSIS — Z79.4 TYPE 2 DIABETES MELLITUS WITH STAGE 3B CHRONIC KIDNEY DISEASE, WITH LONG-TERM CURRENT USE OF INSULIN: ICD-10-CM

## 2024-02-26 LAB
ESTIMATED AVG GLUCOSE: 192 MG/DL (ref 68–131)
HBA1C MFR BLD: 8.3 % (ref 4–5.6)
INR PPP: 2.1 (ref 2–3)

## 2024-02-26 PROCEDURE — 85610 PROTHROMBIN TIME: CPT | Mod: QW,HCNC,S$GLB, | Performed by: INTERNAL MEDICINE

## 2024-02-26 PROCEDURE — 36415 COLL VENOUS BLD VENIPUNCTURE: CPT | Mod: HCNC | Performed by: NURSE PRACTITIONER

## 2024-02-26 PROCEDURE — 83036 HEMOGLOBIN GLYCOSYLATED A1C: CPT | Mod: HCNC | Performed by: NURSE PRACTITIONER

## 2024-02-26 PROCEDURE — 93793 ANTICOAG MGMT PT WARFARIN: CPT | Mod: HCNC,S$GLB,,

## 2024-02-26 NOTE — PROGRESS NOTES
Patient's INR is therapeutic at 2.1. Patient reports he followed previous instructions. Patient reports no changes. Instructions given: continue warfarin 7.5 mg on Wednesdays and Sundays; and 5 mg all other days. Recheck on 3/11/24. Calendar reviewed with patient. Patient verbalizes understanding.

## 2024-03-11 ENCOUNTER — ANTI-COAG VISIT (OUTPATIENT)
Dept: CARDIOLOGY | Facility: CLINIC | Age: 59
End: 2024-03-11
Payer: MEDICARE

## 2024-03-11 DIAGNOSIS — I48.0 PAF (PAROXYSMAL ATRIAL FIBRILLATION): ICD-10-CM

## 2024-03-11 DIAGNOSIS — Z79.01 LONG TERM (CURRENT) USE OF ANTICOAGULANTS: Primary | ICD-10-CM

## 2024-03-11 LAB — INR PPP: 2.4 (ref 2–3)

## 2024-03-11 PROCEDURE — 85610 PROTHROMBIN TIME: CPT | Mod: QW,HCNC,S$GLB, | Performed by: INTERNAL MEDICINE

## 2024-03-11 PROCEDURE — 93793 ANTICOAG MGMT PT WARFARIN: CPT | Mod: HCNC,S$GLB,,

## 2024-03-11 NOTE — PROGRESS NOTES
Patient's INR is therapeutic at 2.4. Patient reports no changes. Instructions given: Continue warfarin 7.5 mg on Mondays and Wednesdays; and 5 mg all other days. Recheck on 4/5/24 with other appointment. Calendar reviewed with patient. Patient verbalizes understanding.

## 2024-03-13 DIAGNOSIS — I48.0 PAF (PAROXYSMAL ATRIAL FIBRILLATION): ICD-10-CM

## 2024-03-22 NOTE — PROGRESS NOTES
Subjective:   Patient ID:  Papa Pope is a 58 y.o. male who presents for cardiac consult of Medication Refill (warfrain and pregabalin.), Dizziness, and Fatigue        The patient came in today for cardiac consult of Medication Refill (warfrain and pregabalin.), Dizziness, and Fatigue      Papa Pope is a 58 y.o. male pt with AF, HTN, H LD, DM2 -h/o HHS, DDD, tobacco abuse presents for follow up CV eval.    2/2/22  hospitals summary:   Papa Pope is a 56 year old male with history of DM, tobacco abuse, hypertension, and hyperlipidemia who presents to ED for further for elevated glucose that was noted on routine labs by PCP. Patient states his glucose has been <500 for almost a week. Admits to associated fatigue and weakness. He only takes metformin for his glucose. Tries to monitor his intake of carbohydrates and sugary foods. HgbA1c 8/2021 was 7.1.   Labs in ED reflect marked hypoglycemia with renal insufficiency and electrolyte abnormality. Serum bicarbonate wnl but has anion gap. S/p insulin IV and fluid resuscitation. Repeat BMP pending. EKG revealed atrial fibrillation with RVR converted to NSR with Cardizem IV x 1. Hospital medicine has been consulted for admission.   Hospital Course:   1/26 seen in the ER still on Cardizem gtt 5, Heparin gtt, HR 61-65 whilst there. Patient comfortable on room air  1/27 Patient resumed on his MTP 50 mg PO BID, tolerated Eliquis, got diabetic teaching, has demonstrated back insulin injection, This MD spent about 20 mins in room with him and daughter explaining the reason for Insulin on discharge, the types of insulin, what to look out for.  We will send on Levemir 18 U BID, Moderate SSI TID PRN (no nightly humalog for now), instructed to have short follow up with PCP. Daughter explains that his finger sticks transmit to PCP office automatically, this is good. He says he has Glucometer, strips, lancets at home (was given in his PCP this past Friday)  Patient examined and is  stable for discharge    Pt here for initial CV eval after recent Afib episode. Is taking Lopresor and Eliquis. BP today is elevated 130s/90s. HR 70s. ECHO with normal bi V function, LVH. He used to drink heavily but has quit. He is quitting/quit smoking.     22    Recent Readings 2022 2022 2022 6/15/2022 6/10/2022   SBP (mmHg) 128 141 123 136 126   DBP (mmHg) 88 94 75 77 76   Pulse 98 76 70 68 74     Nuclear stress neg for ischemia 2022. Holter neg for Afib 2022.     22  Pt has upcoming C scope with Dr. Barajas. Recent A1c improving to 6.8.     Recent Readings 2022   SBP (mmHg) 115 120 135 135 125   DBP (mmHg) 75 74 81 81 78   Pulse 106 75 70 72 66     BP and HR well controlled. He will need teeth extractions will have 2 removed every few months.   ECG - NSR    22  BP and Hr well controlled. BMI 24 - 208 lbs.     Recent Readings 2022   SBP (mmHg) 135 124 124 114 123   DBP (mmHg) 90 76 81 79 78   Pulse 78 74 68 71 72     Occ elevated DBP.     3/7/23    Recent Readings 3/5/2023 2023 2023 2023 2023   SBP (mmHg) 130 134 140 124 136   DBP (mmHg) 89 86 89 89 87   Pulse 72 67 57 89 70     BP and HR stable here but still elevated DBPs. Has more back pain at times.     8/30/23   2023 2023 2023 2023 8/15/2023                   Recent Readings   SBP (mmHg) 129 133 141  146 134  132  136 147   DBP (mmHg) 81 87 93  91 90  88  84 89   Pulse 67 71 70  73 88  66  70 60       BP occ elevated last week. Here 130/90. HR 86.     3/25/24  BP elevated 150/98. He has more joint/back pain.   He has been out of Lyrica.     ECG - sinus dipika V rate 58       Patient has fairly good exercise tolerance.    Patient is compliant with medications.    FH - had CVD,  in 80s    HOLTER 2022  Conclusion       Predominant rhythm is sinus.  Heart rates varied between 51 and 124 BPM  with an average of 76 BPM        Results for orders placed during the hospital encounter of 02/22/22    Nuclear Stress - Cardiology Interpreted    Interpretation Summary    Normal myocardial perfusion scan. There is no evidence of myocardial ischemia or infarction.    The gated perfusion images showed an ejection fraction of 54% at rest. The gated perfusion images showed an ejection fraction of 58% post stress.    There is normal wall motion at rest and post stress.    LV cavity size is normal at rest and normal at stress.    The EKG portion of this study is negative for ischemia.    The patient reported no chest pain during the stress test.    There were no arrhythmias during stress.      Results for orders placed during the hospital encounter of 01/25/22    Echo    Interpretation Summary  · Concentric hypertrophy and low normal systolic function.  · The estimated ejection fraction is 50%.  · Normal left ventricular diastolic function.  · With normal right ventricular systolic function.      Past Medical History:   Diagnosis Date    Acute renal failure 1/25/2022    Arthritis     Atrial fibrillation with RVR 1/25/2022    Back pain     Hyperlipidemia     Hypertension     Pneumonia     Polyneuropathy     Tobacco dependence     Type 2 diabetes mellitus without complication, without long-term current use of insulin 8/4/2021    Uncomplicated alcohol dependence 5/9/2023       Past Surgical History:   Procedure Laterality Date    COLONOSCOPY N/A 8/22/2017    Procedure: COLONOSCOPY;  Surgeon: Keo Cruz MD;  Location: [facility] ENDO;  Service: Endoscopy;  Laterality: N/A;    COLONOSCOPY N/A 9/30/2022    Procedure: COLONOSCOPY 9/20--[place] hold coumadin, no bridge;  Surgeon: Noé Neff MD;  Location: [facility] ENDO;  Service: General;  Laterality: N/A;    EPIDURAL STEROID INJECTION INTO CERVICAL SPINE N/A 11/1/2019    Procedure: C7/T1 IL SUKHJINDER;  Surgeon: Donnell Jordan MD;  Location: [facility] PAIN MGT;  Service: Pain  "Management;  Laterality: N/A;    STOMACH SURGERY      TRANSFORAMINAL EPIDURAL INJECTION OF STEROID Bilateral 8/13/2019    Procedure: Injection,steroid,epidural,transforaminal approach;  Surgeon: Donnell Jordan MD;  Location: [facility] PAIN T;  Service: Pain Management;  Laterality: Bilateral;       Social History     Tobacco Use    Smoking status: Some Days     Current packs/day: 0.50     Types: Cigarettes    Smokeless tobacco: Never   Substance Use Topics    Alcohol use: Yes     Alcohol/week: 12.0 standard drinks of alcohol     Types: 12 Cans of beer per week     Comment: weekends only    Drug use: No       Family History   Problem Relation Age of Onset    Hypertension Father        Patient's Medications   New Prescriptions    No medications on file   Previous Medications    BLOOD SUGAR DIAGNOSTIC STRP    Use to check blood sugar twice a day    CICLOPIROX (PENLAC) 8 % SOLN    Apply to nails once daily    EMPAGLIFLOZIN (JARDIANCE) 25 MG TABLET    Take 1 tablet (25 mg total) by mouth every morning.    EZETIMIBE (ZETIA) 10 MG TABLET    Take 1 tablet (10 mg total) by mouth once daily.    INSULIN ASPART U-100 (NOVOLOG FLEXPEN U-100 INSULIN) 100 UNIT/ML (3 ML) INPN PEN    Inject into the skin three times a day before each meal per sliding scale. If -200: 2 units; -250: 4 units; -300: 6 units; -350: 8 units; BG >350: 10 unit    INSULIN GLARGINE, TOUJEO, (TOUJEO SOLOSTAR U-300 INSULIN) 300 UNIT/ML (1.5 ML) INPN PEN    Inject 20 units into the skin in the morning and 22 units in the evening. Dose change.    PEN NEEDLE, DIABETIC (BD CHRISTIANO 2ND GEN PEN NEEDLE) 32 GAUGE X 5/32" NDLE    Use five times daily    PREGABALIN (LYRICA) 75 MG CAPSULE    Take 1 capsule (75 mg total) by mouth 2 (two) times daily.    ROSUVASTATIN (CRESTOR) 40 MG TAB    Take 1 tablet (40 mg total) by mouth every evening.    TRUE METRIX AIR GLUCOSE METER KIT    USE AS DIRECTED    TRUEPLUS LANCETS 33 GAUGE MISC    USE  TO  CHECK  BLOOD  " "SUGAR TWICE DAILY    WARFARIN (COUMADIN) 5 MG TABLET    Take 1 and 1/2 tablets (7.5 mg total) on Mondays and Fridays; and one tablet (5 mg) all other days OR AS DIRECTED BY COUMADIN CLINIC.   Modified Medications    Modified Medication Previous Medication    LISINOPRIL (PRINIVIL,ZESTRIL) 40 MG TABLET lisinopriL 10 MG tablet       Take 1 tablet (40 mg total) by mouth once daily.    Take 1 tablet (10 mg total) by mouth once daily.    METOPROLOL TARTRATE (LOPRESSOR) 50 MG TABLET metoprolol tartrate (LOPRESSOR) 50 MG tablet       Take 1 tablet (50 mg total) by mouth 2 (two) times daily.    Take 1 tablet (50 mg total) by mouth 2 (two) times daily.   Discontinued Medications    No medications on file       Review of Systems   Constitutional:  Positive for malaise/fatigue.   HENT: Negative.     Eyes: Negative.    Respiratory:  Positive for shortness of breath.    Cardiovascular:  Positive for chest pain and palpitations.   Gastrointestinal: Negative.    Genitourinary: Negative.    Musculoskeletal:  Positive for back pain and joint pain.   Skin: Negative.    Neurological: Negative.    Endo/Heme/Allergies: Negative.    Psychiatric/Behavioral: Negative.     All 12 systems otherwise negative.      Wt Readings from Last 3 Encounters:   03/25/24 95.4 kg (210 lb 5.1 oz)   02/16/24 93.6 kg (206 lb 5.6 oz)   12/18/23 94.2 kg (207 lb 10.8 oz)     Temp Readings from Last 3 Encounters:   02/16/24 97.4 °F (36.3 °C)   09/30/22 98.9 °F (37.2 °C) (Skin)   08/07/22 98.3 °F (36.8 °C) (Oral)     BP Readings from Last 3 Encounters:   03/25/24 (!) 150/98   02/16/24 (!) 146/90   12/18/23 (!) 142/78     Pulse Readings from Last 3 Encounters:   03/25/24 60   02/16/24 64   12/18/23 66       BP (!) 150/98 (BP Location: Right arm, Patient Position: Sitting, BP Method: Medium (Manual))   Pulse 60   Ht 6' 6" (1.981 m)   Wt 95.4 kg (210 lb 5.1 oz)   SpO2 98%   BMI 24.30 kg/m²     Objective:   Physical Exam  Vitals and nursing note reviewed. "   Constitutional:       General: He is not in acute distress.     Appearance: He is well-developed. He is not diaphoretic.   HENT:      Head: Normocephalic and atraumatic.      Nose: Nose normal.   Eyes:      General: No scleral icterus.     Conjunctiva/sclera: Conjunctivae normal.   Neck:      Thyroid: No thyromegaly.      Vascular: No JVD.   Cardiovascular:      Rate and Rhythm: Normal rate and regular rhythm.      Heart sounds: S1 normal and S2 normal. No murmur heard.     No friction rub. No gallop. No S3 or S4 sounds.   Pulmonary:      Effort: Pulmonary effort is normal. No respiratory distress.      Breath sounds: Normal breath sounds. No stridor. No wheezing or rales.   Chest:      Chest wall: No tenderness.   Abdominal:      General: Bowel sounds are normal. There is no distension.      Palpations: Abdomen is soft. There is no mass.      Tenderness: There is no abdominal tenderness. There is no rebound.   Genitourinary:     Comments: Deferred  Musculoskeletal:         General: No tenderness or deformity. Normal range of motion.      Cervical back: Normal range of motion and neck supple.   Lymphadenopathy:      Cervical: No cervical adenopathy.   Skin:     General: Skin is warm and dry.      Coloration: Skin is not pale.      Findings: No erythema or rash.   Neurological:      Mental Status: He is alert and oriented to person, place, and time.      Motor: No abnormal muscle tone.      Coordination: Coordination normal.   Psychiatric:         Behavior: Behavior normal.         Thought Content: Thought content normal.         Judgment: Judgment normal.         Lab Results   Component Value Date     08/21/2023    K 4.5 08/21/2023     08/21/2023    CO2 26 08/21/2023    BUN 12 08/21/2023    CREATININE 1.0 08/21/2023     (H) 08/21/2023    HGBA1C 8.3 (H) 02/26/2024    AST 19 08/21/2023    ALT 18 08/21/2023    ALBUMIN 3.6 08/21/2023    PROT 6.9 08/21/2023    BILITOT 0.2 08/21/2023    WBC 5.13  08/21/2023    HGB 13.8 (L) 08/21/2023    HCT 42.2 08/21/2023    MCV 97 08/21/2023     08/21/2023    INR 2.4 03/11/2024    INR 2.0 (H) 12/18/2023    TSH 1.758 08/21/2023    CHOL 138 08/21/2023    HDL 41 08/21/2023    LDLCALC 80.8 08/21/2023    TRIG 81 08/21/2023    BNP 47 01/25/2022     Assessment:      1. DDD (degenerative disc disease), lumbar    2. Arthralgia, unspecified joint    3. PAF (paroxysmal atrial fibrillation)    4. Long term (current) use of anticoagulants    5. Hypertension associated with diabetes    6. Family history of coronary artery disease    7. Palpitations    8. Hyperlipidemia due to type 2 diabetes mellitus                  Plan:   1. PAF, FH CAD  - in NSR   - change Eliquis to coumadin due to cost   - Nuclear stress neg for ischemia 2/2022.   - Holter neg for Afib 2/2022    2. HLD  - cont statin    3. Tobacco abuse, alc abuse  - needs alc cessation  - needs cessation    4. DDD, lumbar  - cont tx per PCP  - f/u pain - will have injections  - referral     5. DM2 A1c 11.4 --> 6.8 --> 7.1 --> 9.1 --> 6.3 -> 8.3; ED  - viagra PRN   - cont tx  - improved sugars     6. HTN - elevated   -inc Lisinopril 10mg  --> inc to Lisinoopril 40mg and cont BB    7. Preop CV eval -  teeth extractions with [facility]  - Low CV risk, cont BB  - ok to hold coumadin 5 days and resume post op as pt is in NSR  - needs close f/u with coumadin clinic    Visit today included increased complexity associated with the care of the episodic problem HTN addressed and managing the longitudinal care of the patient due to the serious and/or complex managed problem(s) .      Thank you for allowing me to participate in this patient's care. Please do not hesitate to contact me with any questions or concerns. Consult note has been forwarded to the referral physician.     Denny Cunningham MD, [facility]  Cardiovascular Disease  [facility], [place]  [phone] (P)

## 2024-03-25 ENCOUNTER — HOSPITAL ENCOUNTER (OUTPATIENT)
Dept: CARDIOLOGY | Facility: HOSPITAL | Age: 59
Discharge: HOME | End: 2024-03-25
Attending: INTERNAL MEDICINE
Payer: MEDICARE

## 2024-03-25 ENCOUNTER — OFFICE VISIT (OUTPATIENT)
Dept: CARDIOLOGY | Facility: CLINIC | Age: 59
End: 2024-03-25
Payer: MEDICARE

## 2024-03-25 VITALS
HEART RATE: 60 BPM | WEIGHT: 210.31 LBS | HEIGHT: 78 IN | DIASTOLIC BLOOD PRESSURE: 98 MMHG | BODY MASS INDEX: 24.33 KG/M2 | SYSTOLIC BLOOD PRESSURE: 150 MMHG | OXYGEN SATURATION: 98 %

## 2024-03-25 DIAGNOSIS — Z79.01 LONG TERM (CURRENT) USE OF ANTICOAGULANTS: ICD-10-CM

## 2024-03-25 DIAGNOSIS — E11.59 HYPERTENSION ASSOCIATED WITH DIABETES: ICD-10-CM

## 2024-03-25 DIAGNOSIS — M51.36 DDD (DEGENERATIVE DISC DISEASE), LUMBAR: Primary | ICD-10-CM

## 2024-03-25 DIAGNOSIS — R00.2 PALPITATIONS: ICD-10-CM

## 2024-03-25 DIAGNOSIS — I15.2 HYPERTENSION ASSOCIATED WITH DIABETES: ICD-10-CM

## 2024-03-25 DIAGNOSIS — Z82.49 FAMILY HISTORY OF CORONARY ARTERY DISEASE: ICD-10-CM

## 2024-03-25 DIAGNOSIS — I48.0 PAF (PAROXYSMAL ATRIAL FIBRILLATION): ICD-10-CM

## 2024-03-25 DIAGNOSIS — E11.69 HYPERLIPIDEMIA DUE TO TYPE 2 DIABETES MELLITUS: ICD-10-CM

## 2024-03-25 DIAGNOSIS — E78.5 HYPERLIPIDEMIA DUE TO TYPE 2 DIABETES MELLITUS: ICD-10-CM

## 2024-03-25 DIAGNOSIS — M25.50 ARTHRALGIA, UNSPECIFIED JOINT: ICD-10-CM

## 2024-03-25 LAB
OHS QRS DURATION: 86 MS
OHS QTC CALCULATION: 416 MS

## 2024-03-25 PROCEDURE — 99214 OFFICE O/P EST MOD 30 MIN: CPT | Mod: HCNC,25,S$GLB, | Performed by: INTERNAL MEDICINE

## 2024-03-25 PROCEDURE — 1160F RVW MEDS BY RX/DR IN RCRD: CPT | Mod: HCNC,CPTII,S$GLB, | Performed by: INTERNAL MEDICINE

## 2024-03-25 PROCEDURE — 1159F MED LIST DOCD IN RCRD: CPT | Mod: HCNC,CPTII,S$GLB, | Performed by: INTERNAL MEDICINE

## 2024-03-25 PROCEDURE — 99999 PR PBB SHADOW E&M-EST. PATIENT-LVL IV: CPT | Mod: PBBFAC,HCNC,, | Performed by: INTERNAL MEDICINE

## 2024-03-25 PROCEDURE — 3077F SYST BP >= 140 MM HG: CPT | Mod: HCNC,CPTII,S$GLB, | Performed by: INTERNAL MEDICINE

## 2024-03-25 PROCEDURE — 93005 ELECTROCARDIOGRAM TRACING: CPT | Mod: HCNC

## 2024-03-25 PROCEDURE — 3008F BODY MASS INDEX DOCD: CPT | Mod: HCNC,CPTII,S$GLB, | Performed by: INTERNAL MEDICINE

## 2024-03-25 PROCEDURE — 3080F DIAST BP >= 90 MM HG: CPT | Mod: HCNC,CPTII,S$GLB, | Performed by: INTERNAL MEDICINE

## 2024-03-25 PROCEDURE — 3052F HG A1C>EQUAL 8.0%<EQUAL 9.0%: CPT | Mod: HCNC,CPTII,S$GLB, | Performed by: INTERNAL MEDICINE

## 2024-03-25 PROCEDURE — 93010 ELECTROCARDIOGRAM REPORT: CPT | Mod: HCNC,,, | Performed by: INTERNAL MEDICINE

## 2024-03-25 PROCEDURE — 3072F LOW RISK FOR RETINOPATHY: CPT | Mod: HCNC,CPTII,S$GLB, | Performed by: INTERNAL MEDICINE

## 2024-03-25 RX ORDER — METOPROLOL TARTRATE 50 MG/1
50 TABLET ORAL 2 TIMES DAILY
Qty: 180 TABLET | Refills: 1 | Status: SHIPPED | OUTPATIENT
Start: 2024-03-25 | End: 2025-03-25

## 2024-03-25 RX ORDER — LISINOPRIL 40 MG/1
40 TABLET ORAL DAILY
Qty: 90 TABLET | Refills: 1 | Status: SHIPPED | OUTPATIENT
Start: 2024-03-25 | End: 2025-03-25

## 2024-03-26 DIAGNOSIS — E11.42 DIABETIC POLYNEUROPATHY ASSOCIATED WITH TYPE 2 DIABETES MELLITUS: ICD-10-CM

## 2024-03-26 RX ORDER — PREGABALIN 75 MG/1
CAPSULE ORAL
Qty: 60 CAPSULE | Refills: 3 | Status: SHIPPED | OUTPATIENT
Start: 2024-03-26

## 2024-03-26 NOTE — TELEPHONE ENCOUNTER
No care due was identified.  [facility] Embedded Care Due Messages. Reference number: [id number].   3/26/2024 3:30:24 PM CDT  
96

## 2024-03-27 DIAGNOSIS — Z79.01 LONG TERM (CURRENT) USE OF ANTICOAGULANTS: ICD-10-CM

## 2024-03-27 RX ORDER — WARFARIN SODIUM 5 MG/1
TABLET ORAL
Qty: 35 TABLET | Refills: 6 | Status: SHIPPED | OUTPATIENT
Start: 2024-03-27

## 2024-04-05 ENCOUNTER — ANTI-COAG VISIT (OUTPATIENT)
Dept: CARDIOLOGY | Facility: CLINIC | Age: 59
End: 2024-04-05
Payer: MEDICARE

## 2024-04-05 ENCOUNTER — LAB VISIT (OUTPATIENT)
Dept: LAB | Facility: HOSPITAL | Age: 59
End: 2024-04-05
Attending: INTERNAL MEDICINE
Payer: MEDICARE

## 2024-04-05 ENCOUNTER — OFFICE VISIT (OUTPATIENT)
Dept: PODIATRY | Facility: CLINIC | Age: 59
End: 2024-04-05
Payer: MEDICARE

## 2024-04-05 VITALS — HEIGHT: 78 IN | BODY MASS INDEX: 24.33 KG/M2 | WEIGHT: 210.31 LBS

## 2024-04-05 DIAGNOSIS — E11.9 ENCOUNTER FOR COMPREHENSIVE DIABETIC FOOT EXAMINATION, TYPE 2 DIABETES MELLITUS: Primary | ICD-10-CM

## 2024-04-05 DIAGNOSIS — I48.0 PAF (PAROXYSMAL ATRIAL FIBRILLATION): Primary | ICD-10-CM

## 2024-04-05 DIAGNOSIS — B35.1 ONYCHOMYCOSIS: ICD-10-CM

## 2024-04-05 DIAGNOSIS — E11.49 TYPE II DIABETES MELLITUS WITH NEUROLOGICAL MANIFESTATIONS: ICD-10-CM

## 2024-04-05 DIAGNOSIS — L84 CALLUS OF FOOT: ICD-10-CM

## 2024-04-05 DIAGNOSIS — Z79.01 LONG TERM (CURRENT) USE OF ANTICOAGULANTS: ICD-10-CM

## 2024-04-05 LAB
INR PPP: 1 (ref 0.8–1.2)
PROTHROMBIN TIME: 10.9 SEC (ref 9–12.5)

## 2024-04-05 PROCEDURE — 4010F ACE/ARB THERAPY RXD/TAKEN: CPT | Mod: HCNC,CPTII,S$GLB, | Performed by: PODIATRIST

## 2024-04-05 PROCEDURE — 99999 PR PBB SHADOW E&M-EST. PATIENT-LVL III: CPT | Mod: PBBFAC,HCNC,, | Performed by: PODIATRIST

## 2024-04-05 PROCEDURE — 3072F LOW RISK FOR RETINOPATHY: CPT | Mod: HCNC,CPTII,S$GLB, | Performed by: PODIATRIST

## 2024-04-05 PROCEDURE — 99213 OFFICE O/P EST LOW 20 MIN: CPT | Mod: HCNC,S$GLB,, | Performed by: PODIATRIST

## 2024-04-05 PROCEDURE — 1159F MED LIST DOCD IN RCRD: CPT | Mod: HCNC,CPTII,S$GLB, | Performed by: PODIATRIST

## 2024-04-05 PROCEDURE — 85610 PROTHROMBIN TIME: CPT | Mod: HCNC | Performed by: INTERNAL MEDICINE

## 2024-04-05 PROCEDURE — 1160F RVW MEDS BY RX/DR IN RCRD: CPT | Mod: HCNC,CPTII,S$GLB, | Performed by: PODIATRIST

## 2024-04-05 PROCEDURE — 3008F BODY MASS INDEX DOCD: CPT | Mod: HCNC,CPTII,S$GLB, | Performed by: PODIATRIST

## 2024-04-05 PROCEDURE — 36415 COLL VENOUS BLD VENIPUNCTURE: CPT | Mod: HCNC | Performed by: INTERNAL MEDICINE

## 2024-04-05 PROCEDURE — 3052F HG A1C>EQUAL 8.0%<EQUAL 9.0%: CPT | Mod: HCNC,CPTII,S$GLB, | Performed by: PODIATRIST

## 2024-04-05 PROCEDURE — 93793 ANTICOAG MGMT PT WARFARIN: CPT | Mod: S$GLB,,,

## 2024-04-05 NOTE — PROGRESS NOTES
Subjective:     Patient ID: Papa Pope is a 58 y.o. male.    Chief Complaint: Nail Care (6 month nail care. Pt rates pain 7/10, pt is diabetic, pt last seen pcp Juliann Owens NP 2/16/24/)    Papa is a 58 y.o. male who presents to the clinic upon referral from Dr. Sarah harrington. provider found  for evaluation and treatment of diabetic feet. Papa has a past medical history of Acute renal failure (1/25/2022), Arthritis, Atrial fibrillation with RVR (1/25/2022), Back pain, Hyperlipidemia, Hypertension, Pneumonia, Polyneuropathy, Tobacco dependence, Type 2 diabetes mellitus without complication, without long-term current use of insulin (8/4/2021), and Uncomplicated alcohol dependence (5/9/2023). Patient relates no major problem with feet. Only complaints today consist of pain at the bottom of feet and numbness in toes. Patient rates pain 7/10. Patient states pain is mostly at nighttime.     PCP: Prachi Castano MD    Date Last Seen by PCP: 02/16/2024    Current shoe gear: Tennis shoes    Hemoglobin A1C   Date Value Ref Range Status   02/26/2024 8.3 (H) 4.0 - 5.6 % Final     Comment:     ADA Screening Guidelines:  5.7-6.4%  Consistent with prediabetes  >or=6.5%  Consistent with diabetes    High levels of fetal hemoglobin interfere with the HbA1C  assay. Heterozygous hemoglobin variants (HbS, HgC, etc)do  not significantly interfere with this assay.   However, presence of multiple variants may affect accuracy.     08/21/2023 6.3 (H) 4.0 - 5.6 % Final     Comment:     ADA Screening Guidelines:  5.7-6.4%  Consistent with prediabetes  >or=6.5%  Consistent with diabetes    High levels of fetal hemoglobin interfere with the HbA1C  assay. Heterozygous hemoglobin variants (HbS, HgC, etc)do  not significantly interfere with this assay.   However, presence of multiple variants may affect accuracy.     06/13/2023 7.1 (H) 4.0 - 5.6 % Final     Comment:     ADA Screening Guidelines:  5.7-6.4%  Consistent with  prediabetes  >or=6.5%  Consistent with diabetes    High levels of fetal hemoglobin interfere with the HbA1C  assay. Heterozygous hemoglobin variants (HbS, HgC, etc)do  not significantly interfere with this assay.   However, presence of multiple variants may affect accuracy.                  Patient Active Problem List   Diagnosis    Hyperlipidemia due to type 2 diabetes mellitus    Hypertension associated with diabetes    DDD (degenerative disc disease), lumbar    History of colon polyps    PAF (paroxysmal atrial fibrillation)    On Coumadin for atrial fibrillation    Type 2 diabetes mellitus with stage 3b chronic kidney disease, with long-term current use of insulin    Diabetic polyneuropathy associated with type 2 diabetes mellitus    Cigarette nicotine dependence without complication    Uncomplicated alcohol dependence    Abnormality of gait and mobility    Erectile dysfunction associated with type 2 diabetes mellitus       Medication List with Changes/Refills   Current Medications    BLOOD SUGAR DIAGNOSTIC STRP    Use to check blood sugar twice a day    CICLOPIROX (PENLAC) 8 % SOLN    Apply to nails once daily    EMPAGLIFLOZIN (JARDIANCE) 25 MG TABLET    Take 1 tablet (25 mg total) by mouth every morning.    EZETIMIBE (ZETIA) 10 MG TABLET    Take 1 tablet (10 mg total) by mouth once daily.    INSULIN ASPART U-100 (NOVOLOG FLEXPEN U-100 INSULIN) 100 UNIT/ML (3 ML) INPN PEN    Inject into the skin three times a day before each meal per sliding scale. If -200: 2 units; -250: 4 units; -300: 6 units; -350: 8 units; BG >350: 10 unit    INSULIN GLARGINE, TOUJEO, (TOUJEO SOLOSTAR U-300 INSULIN) 300 UNIT/ML (1.5 ML) INPN PEN    Inject 20 units into the skin in the morning and 22 units in the evening. Dose change.    LISINOPRIL (PRINIVIL,ZESTRIL) 40 MG TABLET    Take 1 tablet (40 mg total) by mouth once daily.    METOPROLOL TARTRATE (LOPRESSOR) 50 MG TABLET    Take 1 tablet (50 mg total) by mouth 2  "(two) times daily.    PEN NEEDLE, DIABETIC (BD CHRISTIANO 2ND GEN PEN NEEDLE) 32 GAUGE X 5/32" NDLE    Use five times daily    PREGABALIN (LYRICA) 75 MG CAPSULE    TAKE 1 CAPSULE(75 MG) BY MOUTH TWICE DAILY    ROSUVASTATIN (CRESTOR) 40 MG TAB    Take 1 tablet (40 mg total) by mouth every evening.    TRUE METRIX AIR GLUCOSE METER KIT    USE AS DIRECTED    TRUEPLUS LANCETS 33 GAUGE MISC    USE  TO  CHECK  BLOOD  SUGAR TWICE DAILY    WARFARIN (COUMADIN) 5 MG TABLET    Take 1 and 1/2 tablets (7.5 mg total) on Mondays and Wednesdays; and one tablet (5 mg) all other days OR AS DIRECTED BY COUMADIN CLINIC.       Review of patient's allergies indicates:  No Known Allergies    Past Surgical History:   Procedure Laterality Date    COLONOSCOPY N/A 8/22/2017    Procedure: COLONOSCOPY;  Surgeon: Keo Cruz MD;  Location: [facility] ENDO;  Service: Endoscopy;  Laterality: N/A;    COLONOSCOPY N/A 9/30/2022    Procedure: COLONOSCOPY 9/20--Malur hold coumadin, no bridge;  Surgeon: Noé Neff MD;  Location: [facility];  Service: General;  Laterality: N/A;    EPIDURAL STEROID INJECTION INTO CERVICAL SPINE N/A 11/1/2019    Procedure: C7/T1 IL SUKHJINDER;  Surgeon: Donnell Jordan MD;  Location: [facility] PAIN MGT;  Service: Pain Management;  Laterality: N/A;    STOMACH SURGERY      TRANSFORAMINAL EPIDURAL INJECTION OF STEROID Bilateral 8/13/2019    Procedure: Injection,steroid,epidural,transforaminal approach;  Surgeon: Donnell Jordan MD;  Location: [facility] PAIN MGT;  Service: Pain Management;  Laterality: Bilateral;       Family History   Problem Relation Age of Onset    Hypertension Father        Social History     Socioeconomic History    Marital status:    Tobacco Use    Smoking status: Some Days     Current packs/day: 0.50     Types: Cigarettes    Smokeless tobacco: Never   Substance and Sexual Activity    Alcohol use: Yes     Alcohol/week: 12.0 standard drinks of alcohol     Types: 12 Cans of beer per week     Comment: weekends " "only    Drug use: No    Sexual activity: Yes     Social Determinants of Health     Financial Resource Strain: Medium Risk (5/9/2023)    Overall Financial Resource Strain (CARDIA)     Difficulty of Paying Living Expenses: Somewhat hard   Food Insecurity: No Food Insecurity (5/9/2023)    Hunger Vital Sign     Worried About Running Out of Food in the Last Year: Never true     Ran Out of Food in the Last Year: Never true   Transportation Needs: No Transportation Needs (5/9/2023)    PRAPARE - Transportation     Lack of Transportation (Medical): No     Lack of Transportation (Non-Medical): No   Physical Activity: Inactive (5/9/2023)    Exercise Vital Sign     Days of Exercise per Week: 0 days     Minutes of Exercise per Session: 0 min   Stress: No Stress Concern Present (5/9/2023)    [employer] [place] [employer] - Occupational Stress Questionnaire     Feeling of Stress : Only a little   Social Connections: Socially Integrated (5/9/2023)    Social Connection and Isolation Panel [NHANES]     Frequency of Communication with Friends and Family: More than three times a week     Frequency of Social Gatherings with Friends and Family: Once a week     Attends Taoism Services: More than 4 times per year     Active Member of Clubs or Organizations: Yes     Attends Club or Organization Meetings: More than 4 times per year     Marital Status:    Housing Stability: Low Risk  (5/9/2023)    Housing Stability Vital Sign     Unable to Pay for Housing in the Last Year: No     Number of Places Lived in the Last Year: 1     Unstable Housing in the Last Year: No       Vitals:    04/05/24 0803   Weight: 95.4 kg (210 lb 5.1 oz)   Height: 6' 6" (1.981 m)   PainSc:   7       Hemoglobin A1C   Date Value Ref Range Status   02/26/2024 8.3 (H) 4.0 - 5.6 % Final     Comment:     ADA Screening Guidelines:  5.7-6.4%  Consistent with prediabetes  >or=6.5%  Consistent with diabetes    High levels of fetal hemoglobin interfere with " the HbA1C  assay. Heterozygous hemoglobin variants (HbS, HgC, etc)do  not significantly interfere with this assay.   However, presence of multiple variants may affect accuracy.     08/21/2023 6.3 (H) 4.0 - 5.6 % Final     Comment:     ADA Screening Guidelines:  5.7-6.4%  Consistent with prediabetes  >or=6.5%  Consistent with diabetes    High levels of fetal hemoglobin interfere with the HbA1C  assay. Heterozygous hemoglobin variants (HbS, HgC, etc)do  not significantly interfere with this assay.   However, presence of multiple variants may affect accuracy.     06/13/2023 7.1 (H) 4.0 - 5.6 % Final     Comment:     ADA Screening Guidelines:  5.7-6.4%  Consistent with prediabetes  >or=6.5%  Consistent with diabetes    High levels of fetal hemoglobin interfere with the HbA1C  assay. Heterozygous hemoglobin variants (HbS, HgC, etc)do  not significantly interfere with this assay.   However, presence of multiple variants may affect accuracy.         Review of Systems   Constitutional:  Negative for chills and fever.   Respiratory:  Negative for shortness of breath.    Cardiovascular:  Negative for chest pain, palpitations, orthopnea, claudication and leg swelling.   Gastrointestinal:  Negative for diarrhea, nausea and vomiting.   Musculoskeletal:  Negative for joint pain.   Skin:  Negative for rash.   Neurological:  Positive for tingling.   Psychiatric/Behavioral: Negative.               Objective:   PHYSICAL EXAM: Apperance: Alert and orient in no distress,well developed, and with good attention to grooming and body habits  Patient presents ambulating in tennis shoes.   LOWER EXTREMITY EXAM:  VASCULAR: Dorsalis pedis pulses 2/4 bilateral and Posterior Tibial pulses 2/4 bilateral. Capillary fill time <4 seconds bilateral. No edema observed bilateral. Varicosities absent bilateral. Skin temperature of the lower extremities is warm to warm, proximal to distal. Hair growth WNL bilateral.  DERMATOLOGICAL: No skin rashes,  subcutaneous nodules, lesions, or ulcers observed bilateral. Nails 1,2,3,4,5 bilateral normal length, thickened, and discolored with subungual debris. Minimal hyperkeratotic tissue noted to bilateral medial hallux.  Webspaces 1,2,3,4 bilateral clean, dry and without evidence of break in skin integrity.   NEUROLOGICAL: Light touch, sharp-dull, proprioception all present and equal bilaterally.  Vibratory sensation diminished at left hallux, intact at right hallux. Protective sensation absent at 3/10 sites as tested with a Salmon-Ronald 5.07 monofilament.   MUSCULOSKELETAL: Muscle strength is 5/5 for foot inverters, everters, plantarflexors, and dorsiflexors. Muscle tone is normal. Mild bunions noted bilateral.       Assessment:   Encounter for comprehensive diabetic foot examination, type 2 diabetes mellitus    Onychomycosis    Callus of foot    Type II diabetes mellitus with neurological manifestations      Plan:   Encounter for comprehensive diabetic foot examination, type 2 diabetes mellitus    Onychomycosis    Callus of foot    Type II diabetes mellitus with neurological manifestations    I counseled the patient on his conditions, regarding findings of my examination, my impressions, and usual treatment plan.   This visit spent on counseling and coordination of care.  Appointment spent on education about the diabetic foot, neuropathy, and prevention of limb loss.  Shoe inspection. Diabetic Foot Education. Patient reminded of the importance of good nutrition and blood sugar control to help prevent podiatric complications of diabetes. Patient instructed on proper foot hygeine. We discussed wearing proper shoe gear, daily foot inspections, never walking without protective shoe gear, never putting sharp instruments to feet.    The patient and I reviewed the types of shoes he should be wearing, my recommendation includes generally the best time of the day for a shoe fitting is the afternoon, shoes with a wide toe  box, very good cushion, and tennis shoes with removable inner soles.The patient and I reviewed my recommendations for over-the-counter orthotic inserts.   Patient to continue topical Penlac to be applied to nail once daily.   Discussed with patient treatments for neuropathy consisting of topical or oral medication.  Recommendations given for over-the-counter medicine such as [employer] [place] [employer] .   Patient  will continue to monitor the areas daily, inspect feet, wear protective shoe gear when ambulatory, moisturizer to maintain skin integrity. Patient reminded of the importance of good nutrition and blood sugar control to help prevent podiatric complications of diabetes.  Patient to return 6 months or sooner if needed.       Kali Zambrano DPM  [facility]

## 2024-04-05 NOTE — PROGRESS NOTES
INR not at goal-1.0. Medications, chart, and patient findings reviewed. See calendar for adjustments to dose and follow up plan.  Pt reports running out of medication. Warfarin was refilled by MD on 3/27/24.   He says he missed 2 doses this week, but also states he just picked up his medication this morning.  Regardless, there have been some missed doses, so we will boost today with 10 mg - 2 tablets, then return to previous dose.  He will repeat INR in 1.5 weeks.  Compliance is imperative for INR to recover, explained to patient.  ER with any s/s of clotting or stroke.  GINGER CC scheduled.

## 2024-04-22 ENCOUNTER — ANTI-COAG VISIT (OUTPATIENT)
Dept: CARDIOLOGY | Facility: CLINIC | Age: 59
End: 2024-04-22
Payer: MEDICARE

## 2024-04-22 DIAGNOSIS — I48.0 PAF (PAROXYSMAL ATRIAL FIBRILLATION): ICD-10-CM

## 2024-04-22 DIAGNOSIS — Z79.01 LONG TERM (CURRENT) USE OF ANTICOAGULANTS: Primary | ICD-10-CM

## 2024-04-22 LAB — INR PPP: 2.7 (ref 2–3)

## 2024-04-22 PROCEDURE — 85610 PROTHROMBIN TIME: CPT | Mod: QW,HCNC,S$GLB, | Performed by: INTERNAL MEDICINE

## 2024-04-22 PROCEDURE — 93793 ANTICOAG MGMT PT WARFARIN: CPT | Mod: HCNC,S$GLB,,

## 2024-04-22 NOTE — PROGRESS NOTES
Patient's INR is therapeutic at 2.7. Patient reports he followed previous instructions. Patient reports no changes. Instructions given: Continue warfarin 7.5 mg on Mondays and Wednesdays ; and 5 mg all other days. Recheck on 5/20/24. Calendar reviewed with patient. Patient verbalizes understanding.

## 2024-04-23 ENCOUNTER — TELEPHONE (OUTPATIENT)
Dept: PAIN MEDICINE | Facility: CLINIC | Age: 59
End: 2024-04-23
Payer: MEDICARE

## 2024-04-25 ENCOUNTER — TELEPHONE (OUTPATIENT)
Dept: PAIN MEDICINE | Facility: CLINIC | Age: 59
End: 2024-04-25
Payer: MEDICARE

## 2024-05-20 ENCOUNTER — PATIENT MESSAGE (OUTPATIENT)
Dept: OTHER | Facility: OTHER | Age: 59
End: 2024-05-20
Payer: MEDICARE

## 2024-05-20 ENCOUNTER — ANTI-COAG VISIT (OUTPATIENT)
Dept: CARDIOLOGY | Facility: CLINIC | Age: 59
End: 2024-05-20
Payer: MEDICARE

## 2024-05-20 DIAGNOSIS — Z79.01 LONG TERM (CURRENT) USE OF ANTICOAGULANTS: Primary | ICD-10-CM

## 2024-05-20 DIAGNOSIS — I48.0 PAF (PAROXYSMAL ATRIAL FIBRILLATION): ICD-10-CM

## 2024-05-20 LAB
CTP QC/QA: YES
INR PPP: 1.3 (ref 2–3)

## 2024-05-20 PROCEDURE — 85610 PROTHROMBIN TIME: CPT | Mod: QW,HCNC,S$GLB, | Performed by: INTERNAL MEDICINE

## 2024-05-20 PROCEDURE — 93793 ANTICOAG MGMT PT WARFARIN: CPT | Mod: HCNC,S$GLB,,

## 2024-05-20 NOTE — PROGRESS NOTES
Patient's INR is sub-therapeutic at 1.3. Patient reports he missed two doses of warfarin. Re-educated patient on Coumadin Therapy. Advised patient on increased risk of clotting; signs/symptoms of clotting and cl to go to ED if he experiences any. Patient reports he is not having any signs/symptoms of clotting. Sent to PharmD for dosing/instructions.

## 2024-05-20 NOTE — PROGRESS NOTES
INR not at goal. Medications, chart, and patient findings reviewed. See calendar for adjustments to dose and follow up plan.  Missed doses reported.  Boost today with 15 mg then resume dose.  Pt was in range previously on this dose, encourage not missing doses.  Repeat INR in 2 weeks.

## 2024-06-03 ENCOUNTER — ANTI-COAG VISIT (OUTPATIENT)
Dept: CARDIOLOGY | Facility: CLINIC | Age: 59
End: 2024-06-03
Payer: MEDICARE

## 2024-06-03 DIAGNOSIS — Z79.01 LONG TERM (CURRENT) USE OF ANTICOAGULANTS: Primary | ICD-10-CM

## 2024-06-03 DIAGNOSIS — I48.0 PAF (PAROXYSMAL ATRIAL FIBRILLATION): ICD-10-CM

## 2024-06-03 LAB
CTP QC/QA: YES
INR PPP: 1.1 (ref 2–3)

## 2024-06-03 PROCEDURE — 93793 ANTICOAG MGMT PT WARFARIN: CPT | Mod: HCNC,S$GLB,,

## 2024-06-03 PROCEDURE — 85610 PROTHROMBIN TIME: CPT | Mod: QW,HCNC,S$GLB, | Performed by: STUDENT IN AN ORGANIZED HEALTH CARE EDUCATION/TRAINING PROGRAM

## 2024-06-03 NOTE — PROGRESS NOTES
INR not at goal. Medications, chart, and patient findings reviewed. See calendar for adjustments to dose and follow up plan.  INR has been subtherapeutic x 2, we will boost aggressively today and tomorrow.  He will need to return in 1 week.  Pt must avoid missing doses to prevent clotting/stroke.

## 2024-06-03 NOTE — PROGRESS NOTES
Patient's INR is sub-therapeutic at 1.1. Patient reports he missed two doses of warfarin. Re-educated patient on Coumadin Therapy. Advised patient on increased risk of clotting; signs/symptoms of clotting and need to go to ED if he experiences any. Patient reports he is not having any signs/symptoms of clotting. Sent to PharmD for dosing/instructions.

## 2024-06-10 ENCOUNTER — ANTI-COAG VISIT (OUTPATIENT)
Dept: CARDIOLOGY | Facility: CLINIC | Age: 59
End: 2024-06-10
Payer: MEDICARE

## 2024-06-10 DIAGNOSIS — I48.0 PAF (PAROXYSMAL ATRIAL FIBRILLATION): ICD-10-CM

## 2024-06-10 DIAGNOSIS — Z79.01 LONG TERM (CURRENT) USE OF ANTICOAGULANTS: Primary | ICD-10-CM

## 2024-06-10 LAB
CTP QC/QA: YES
INR PPP: 2.2 (ref 2–3)

## 2024-06-10 PROCEDURE — 93793 ANTICOAG MGMT PT WARFARIN: CPT | Mod: HCNC,S$GLB,,

## 2024-06-10 PROCEDURE — 85610 PROTHROMBIN TIME: CPT | Mod: QW,HCNC,S$GLB, | Performed by: INTERNAL MEDICINE

## 2024-06-10 NOTE — PROGRESS NOTES
Patient's INR is therapeutic at 2.2. Patient report she followed previous instructions. Patient reports no changes. Instructions given: Continue warfarin 7.5 mg on Mondays and Wednesdays; and 5 mg all other days. Recheck on 6/26/24 with other appointments. Calendar reviewed with patient. Patient verbalizes understanding.

## 2024-06-24 ENCOUNTER — PATIENT OUTREACH (OUTPATIENT)
Dept: ADMINISTRATIVE | Facility: HOSPITAL | Age: 59
End: 2024-06-24
Payer: MEDICARE

## 2024-06-26 ENCOUNTER — LAB VISIT (OUTPATIENT)
Dept: LAB | Facility: HOSPITAL | Age: 59
End: 2024-06-26
Attending: FAMILY MEDICINE
Payer: MEDICARE

## 2024-06-26 ENCOUNTER — ANTI-COAG VISIT (OUTPATIENT)
Dept: CARDIOLOGY | Facility: CLINIC | Age: 59
End: 2024-06-26
Payer: MEDICARE

## 2024-06-26 ENCOUNTER — OFFICE VISIT (OUTPATIENT)
Dept: CARDIOLOGY | Facility: CLINIC | Age: 59
End: 2024-06-26
Payer: MEDICARE

## 2024-06-26 VITALS
SYSTOLIC BLOOD PRESSURE: 128 MMHG | WEIGHT: 203.5 LBS | OXYGEN SATURATION: 99 % | BODY MASS INDEX: 23.51 KG/M2 | HEART RATE: 63 BPM | DIASTOLIC BLOOD PRESSURE: 82 MMHG

## 2024-06-26 DIAGNOSIS — M25.50 ARTHRALGIA, UNSPECIFIED JOINT: ICD-10-CM

## 2024-06-26 DIAGNOSIS — I48.0 PAF (PAROXYSMAL ATRIAL FIBRILLATION): ICD-10-CM

## 2024-06-26 DIAGNOSIS — R00.2 PALPITATIONS: ICD-10-CM

## 2024-06-26 DIAGNOSIS — E11.65 TYPE 2 DIABETES MELLITUS WITH HYPERGLYCEMIA, WITHOUT LONG-TERM CURRENT USE OF INSULIN: ICD-10-CM

## 2024-06-26 DIAGNOSIS — E11.59 HYPERTENSION ASSOCIATED WITH DIABETES: ICD-10-CM

## 2024-06-26 DIAGNOSIS — Z79.01 LONG TERM (CURRENT) USE OF ANTICOAGULANTS: Primary | ICD-10-CM

## 2024-06-26 DIAGNOSIS — Z82.49 FAMILY HISTORY OF CORONARY ARTERY DISEASE: ICD-10-CM

## 2024-06-26 DIAGNOSIS — I48.0 PAF (PAROXYSMAL ATRIAL FIBRILLATION): Primary | ICD-10-CM

## 2024-06-26 DIAGNOSIS — M51.36 DDD (DEGENERATIVE DISC DISEASE), LUMBAR: ICD-10-CM

## 2024-06-26 DIAGNOSIS — I15.2 HYPERTENSION ASSOCIATED WITH DIABETES: ICD-10-CM

## 2024-06-26 LAB
CTP QC/QA: YES
ESTIMATED AVG GLUCOSE: 160 MG/DL (ref 68–131)
HBA1C MFR BLD: 7.2 % (ref 4–5.6)
INR PPP: 2 (ref 2–3)

## 2024-06-26 PROCEDURE — 83036 HEMOGLOBIN GLYCOSYLATED A1C: CPT | Mod: HCNC | Performed by: FAMILY MEDICINE

## 2024-06-26 PROCEDURE — 36415 COLL VENOUS BLD VENIPUNCTURE: CPT | Mod: HCNC | Performed by: FAMILY MEDICINE

## 2024-06-26 PROCEDURE — 99999 PR PBB SHADOW E&M-EST. PATIENT-LVL III: CPT | Mod: PBBFAC,HCNC,, | Performed by: INTERNAL MEDICINE

## 2024-06-26 PROCEDURE — 85610 PROTHROMBIN TIME: CPT | Mod: QW,HCNC,S$GLB, | Performed by: INTERNAL MEDICINE

## 2024-06-26 NOTE — PROGRESS NOTES
Patient's INR is therapeutic at 2.0. Patient reports no changes. Instructions given: Continue warfarin 7.5 mg on Wednesdays and Mondays; and 5 mg all other days. Recheck on 7/24/24. Calendar reviewed with patient. Patient verbalizes understanding.

## 2024-06-26 NOTE — PROGRESS NOTES
Subjective:   Patient ID:  Papa Pope is a 59 y.o. male who presents for cardiac consult of No chief complaint on file.        The patient came in today for cardiac consult of No chief complaint on file.      Papa Pope is a 59 y.o. male pt with AF, HTN, H LD, DM2 -h/o HHS, DDD, tobacco abuse presents for follow up CV eval.    3/25/24  BP elevated 150/98. He has more joint/back pain.   He has been out of Lyrica.     ECG - sinus dipika V rate 58       24  BP and HR stable, improved now. Had a fall recently but tripped now doing well.     Patient has fairly good exercise tolerance.    Patient is compliant with medications.    FH - had CVD,  in 80s    HOLTER 2022  Conclusion       Predominant rhythm is sinus.  Heart rates varied between 51 and 124 BPM with an average of 76 BPM        Results for orders placed during the hospital encounter of 22    Nuclear Stress - Cardiology Interpreted    Interpretation Summary    Normal myocardial perfusion scan. There is no evidence of myocardial ischemia or infarction.    The gated perfusion images showed an ejection fraction of 54% at rest. The gated perfusion images showed an ejection fraction of 58% post stress.    There is normal wall motion at rest and post stress.    LV cavity size is normal at rest and normal at stress.    The EKG portion of this study is negative for ischemia.    The patient reported no chest pain during the stress test.    There were no arrhythmias during stress.      Results for orders placed during the hospital encounter of 22    Echo    Interpretation Summary  · Concentric hypertrophy and low normal systolic function.  · The estimated ejection fraction is 50%.  · Normal left ventricular diastolic function.  · With normal right ventricular systolic function.      Past Medical History:   Diagnosis Date    Acute renal failure 2022    Arthritis     Atrial fibrillation with RVR 2022    Back pain     Hyperlipidemia      Hypertension     Pneumonia     Polyneuropathy     Tobacco dependence     Type 2 diabetes mellitus without complication, without long-term current use of insulin 8/4/2021    Uncomplicated alcohol dependence 5/9/2023       Past Surgical History:   Procedure Laterality Date    COLONOSCOPY N/A 8/22/2017    Procedure: COLONOSCOPY;  Surgeon: Keo Cruz MD;  Location: [facility] ENDO;  Service: Endoscopy;  Laterality: N/A;    COLONOSCOPY N/A 9/30/2022    Procedure: COLONOSCOPY 9/20--[place] hold coumadin, no bridge;  Surgeon: Noé Neff MD;  Location: [facility] ENDO;  Service: General;  Laterality: N/A;    EPIDURAL STEROID INJECTION INTO CERVICAL SPINE N/A 11/1/2019    Procedure: C7/T1 IL SUKHJINDER;  Surgeon: Donnell Jordan MD;  Location: [facility] PAIN MGT;  Service: Pain Management;  Laterality: N/A;    STOMACH SURGERY      TRANSFORAMINAL EPIDURAL INJECTION OF STEROID Bilateral 8/13/2019    Procedure: Injection,steroid,epidural,transforaminal approach;  Surgeon: Donnell Jordan MD;  Location: [facility]IN [facility]GT;  Service: Pain Management;  Laterality: Bilateral;       Social History     Tobacco Use    Smoking status: Some Days     Current packs/day: 0.50     Types: Cigarettes    Smokeless tobacco: Never   Substance Use Topics    Alcohol use: Yes     Alcohol/week: 12.0 standard drinks of alcohol     Types: 12 Cans of beer per week     Comment: weekends only    Drug use: No       Family History   Problem Relation Name Age of Onset    Hypertension Father         Patient's Medications   New Prescriptions    No medications on file   Previous Medications    BLOOD SUGAR DIAGNOSTIC STRP    Use to check blood sugar twice a day    CICLOPIROX (PENLAC) 8 % SOLN    Apply to nails once daily    EMPAGLIFLOZIN (JARDIANCE) 25 MG TABLET    Take 1 tablet (25 mg total) by mouth every morning.    EZETIMIBE (ZETIA) 10 MG TABLET    Take 1 tablet (10 mg total) by mouth once daily.    INSULIN ASPART U-100 (NOVOLOG FLEXPEN U-100 INSULIN) 100 UNIT/ML (3  "ML) INPN PEN    Inject into the skin three times a day before each meal per sliding scale. If -200: 2 units; -250: 4 units; -300: 6 units; -350: 8 units; BG >350: 10 unit    INSULIN GLARGINE, TOUJEO, (TOUJEO SOLOSTAR U-300 INSULIN) 300 UNIT/ML (1.5 ML) INPN PEN    Inject 24 Units into the skin 2 (two) times daily.    LISINOPRIL (PRINIVIL,ZESTRIL) 40 MG TABLET    Take 1 tablet (40 mg total) by mouth once daily.    METOPROLOL TARTRATE (LOPRESSOR) 50 MG TABLET    Take 1 tablet (50 mg total) by mouth 2 (two) times daily.    PEN NEEDLE, DIABETIC (BD CHRISTIANO 2ND GEN PEN NEEDLE) 32 GAUGE X 5/32" NDLE    Use five times daily    PREGABALIN (LYRICA) 75 MG CAPSULE    TAKE 1 CAPSULE(75 MG) BY MOUTH TWICE DAILY    ROSUVASTATIN (CRESTOR) 40 MG TAB    Take 1 tablet (40 mg total) by mouth every evening.    TRUE METRIX AIR GLUCOSE METER KIT    USE AS DIRECTED    TRUEPLUS LANCETS 33 GAUGE MISC    USE  TO  CHECK  BLOOD  SUGAR TWICE DAILY    WARFARIN (COUMADIN) 5 MG TABLET    Take 1 and 1/2 tablets (7.5 mg total) on Mondays and Wednesdays; and one tablet (5 mg) all other days OR AS DIRECTED BY COUMADIN CLINIC.   Modified Medications    No medications on file   Discontinued Medications    No medications on file       Review of Systems   Constitutional:  Positive for malaise/fatigue.   HENT: Negative.     Eyes: Negative.    Respiratory:  Positive for shortness of breath.    Cardiovascular:  Positive for chest pain and palpitations.   Gastrointestinal: Negative.    Genitourinary: Negative.    Musculoskeletal:  Positive for back pain and joint pain.   Skin: Negative.    Neurological: Negative.    Endo/Heme/Allergies: Negative.    Psychiatric/Behavioral: Negative.     All 12 systems otherwise negative.      Wt Readings from Last 3 Encounters:   06/26/24 92.3 kg (203 lb 7.8 oz)   04/05/24 95.4 kg (210 lb 5.1 oz)   03/25/24 95.4 kg (210 lb 5.1 oz)     Temp Readings from Last 3 Encounters:   02/16/24 97.4 °F (36.3 °C) "   09/30/22 98.9 °F (37.2 °C) (Skin)   08/07/22 98.3 °F (36.8 °C) (Oral)     BP Readings from Last 3 Encounters:   06/26/24 128/82   03/25/24 (!) 150/98   02/16/24 (!) 146/90     Pulse Readings from Last 3 Encounters:   06/26/24 63   03/25/24 60   02/16/24 64       /82 (BP Location: Left arm, Patient Position: Sitting, BP Method: Medium (Manual))   Pulse 63   Wt 92.3 kg (203 lb 7.8 oz)   SpO2 99%   BMI 23.51 kg/m²     Objective:   Physical Exam  Vitals and nursing note reviewed.   Constitutional:       General: He is not in acute distress.     Appearance: He is well-developed. He is not diaphoretic.   HENT:      Head: Normocephalic and atraumatic.      Nose: Nose normal.   Eyes:      General: No scleral icterus.     Conjunctiva/sclera: Conjunctivae normal.   Neck:      Thyroid: No thyromegaly.      Vascular: No JVD.   Cardiovascular:      Rate and Rhythm: Normal rate and regular rhythm.      Heart sounds: S1 normal and S2 normal. No murmur heard.     No friction rub. No gallop. No S3 or S4 sounds.   Pulmonary:      Effort: Pulmonary effort is normal. No respiratory distress.      Breath sounds: Normal breath sounds. No stridor. No wheezing or rales.   Chest:      Chest wall: No tenderness.   Abdominal:      General: Bowel sounds are normal. There is no distension.      Palpations: Abdomen is soft. There is no mass.      Tenderness: There is no abdominal tenderness. There is no rebound.   Genitourinary:     Comments: Deferred  Musculoskeletal:         General: No tenderness or deformity. Normal range of motion.      Cervical back: Normal range of motion and neck supple.   Lymphadenopathy:      Cervical: No cervical adenopathy.   Skin:     General: Skin is warm and dry.      Coloration: Skin is not pale.      Findings: No erythema or rash.   Neurological:      Mental Status: He is alert and oriented to person, place, and time.      Motor: No abnormal muscle tone.      Coordination: Coordination normal.    Psychiatric:         Behavior: Behavior normal.         Thought Content: Thought content normal.         Judgment: Judgment normal.         Lab Results   Component Value Date     08/21/2023    K 4.5 08/21/2023     08/21/2023    CO2 26 08/21/2023    BUN 12 08/21/2023    CREATININE 1.0 08/21/2023     (H) 08/21/2023    HGBA1C 8.3 (H) 02/26/2024    AST 19 08/21/2023    ALT 18 08/21/2023    ALBUMIN 3.6 08/21/2023    PROT 6.9 08/21/2023    BILITOT 0.2 08/21/2023    WBC 5.13 08/21/2023    HGB 13.8 (L) 08/21/2023    HCT 42.2 08/21/2023    MCV 97 08/21/2023     08/21/2023    INR 2.0 06/26/2024    INR 1.0 04/05/2024    TSH 1.758 08/21/2023    CHOL 138 08/21/2023    HDL 41 08/21/2023    LDLCALC 80.8 08/21/2023    TRIG 81 08/21/2023    BNP 47 01/25/2022     Assessment:      1. PAF (paroxysmal atrial fibrillation)    2. DDD (degenerative disc disease), lumbar    3. Arthralgia, unspecified joint    4. Family history of coronary artery disease    5. Hypertension associated with diabetes    6. Palpitations            Plan:   1. PAF, FH CAD  - in NSR   - change Eliquis to coumadin due to cost   - Nuclear stress neg for ischemia 2/2022.   - Holter neg for Afib 2/2022    2. HLD  - cont statin    3. Tobacco abuse, alc abuse  - needs alc cessation  - needs cessation    4. DDD, lumbar  - cont tx per PCP  - f/u pain - will have injections  - referral     5. DM2 A1c 11.4 --> 6.8 --> 7.1 --> 9.1 --> 6.3 -> 8.3; ED  - viagra PRN   - cont tx  - improved sugars     6. HTN - elevated   -inc Lisinopril 10mg  --> inc to Lisinoopril 40mg and cont BB    7. Preop CV eval -  teeth extractions with [facility]  - Low CV risk, cont BB  - ok to hold coumadin 5 days and resume post op as pt is in NSR  - needs close f/u with coumadin clinic    Visit today included increased complexity associated with the care of the episodic problem HTN addressed and managing the longitudinal care of the patient due to the  serious and/or complex managed problem(s) .      Thank you for allowing me to participate in this patient's care. Please do not hesitate to contact me with any questions or concerns. Consult note has been forwarded to the referral physician.     Denny Cunningham MD, [facility]  Cardiovascular Disease  [facility] [place]  [phone] (P)

## 2024-07-11 NOTE — PROGRESS NOTES
Interventional Pain Established Patient Clinic Visit    Chief Pain Complaint:  Low back pain with BLE radicular pain  Neck pain with left arm pain       Interval history 07/15/2024  Patient presents for follow-up for lower back and leg pain.  Today reports pain in a bandlike distribution in the lower back which can radiate down the posterior aspects of bilateral lower extremities in L5-S2 distribution to the calf.  Pain is constant and today is rated a 10/10.  Pain can be exacerbated with standing and with ambulation.  Patient is able to ambulate approximately 5 minutes with the assistive device, cane before requiring rest.  Patient has continued Lyrica medication from PCP, Dr. Castano in his taking 75 mg twice daily with no noticeable improvement in his pain.  Today he has also requesting an adjuvant muscle relaxer to help with his pain.  He is continued physician directed physical therapy exercises over the last 8 weeks from 05/15/2024 through 07/15/2024 with marginal improvement in his symptoms.  He does endorse associated weakness in the lower extremities associated with his pain.      Interval history 11/16/2022  Mr. Hargrove is a 57-year-old gentleman with past medical history significant for atrial fibrillation, hyperlipidemia, hypertension, type 2 diabetes, nicotine dependence who presents to establish care, previous Dr. Jordan patient.    Today patient reports lower back and leg pain.  Pain has been present for several years without inciting accident injury or trauma.  Pain is intermittent and today is rated an 8/10.  Patient reports pain in a bandlike distribution in the lower back which radiates down the posterior aspects of bilateral lower extremities in L5-S1 distribution to the calf.  Pain is described as tingling and tight in nature.  Pain is exacerbated when moving from sitting to standing and with standing and ambulation.  Patient reports he is able to stand approximately 5 minutes before requiring rest.   Patient does endorse associated weakness in the lower extremities associated with his pain.  Pain is improved with prior transforaminal epidural steroid injection as well as prior prescription of Lortab medication.  Patient is actively involved in physical therapy weekly with improvement in range of motion.  Patient denies any meaningful relief from gabapentin, which she is taking 600 mg twice daily.    Patient reports significant motor weakness.  Patient denies night fever/night sweats, urinary incontinence, bowel incontinence, significant weight loss, and loss of sensations.      Pain Disability Index Review:         7/15/2024     8:01 AM 9/12/2019     9:00 AM 8/8/2019     8:00 AM   Last 3 PDI Scores   Pain Disability Index (PDI) 46 48 27       Non-Pharmacologic Treatments:  Physical Therapy/Home Exercise: yes  Ice/Heat:yes  TENS: no  Acupuncture: no  Massage: no  Chiropractic: no    Other: no      Pain Medications:  - Adjuvant Medications: Neurontin (Gabapentin)  - Anti-Coagulants: Coumadin ( Warfarin)    Pain Procedures:  -11/01/2019:  C7-T1 interlaminar epidural steroid injection; Dr. Jordan  -08/13/2019: Bilateral S1 transforaminal epidural steroid injection; Dr. Jordan    Past Medical History:   Diagnosis Date    Acute renal failure 1/25/2022    Arthritis     Atrial fibrillation with RVR 1/25/2022    Back pain     Hyperlipidemia     Hypertension     Pneumonia     Polyneuropathy     Tobacco dependence     Type 2 diabetes mellitus without complication, without long-term current use of insulin 8/4/2021    Uncomplicated alcohol dependence 5/9/2023       Review of patient's allergies indicates:  No Known Allergies    Current Outpatient Medications on File Prior to Visit   Medication Sig Dispense Refill    blood sugar diagnostic Strp Use to check blood sugar twice a day 100 each 6    ciclopirox (PENLAC) 8 % Soln Apply to nails once daily 6.6 mL 3    empagliflozin (JARDIANCE) 25 mg tablet Take 1 tablet (25 mg total)  "by mouth every morning. 90 tablet 1    ezetimibe (ZETIA) 10 mg tablet Take 1 tablet (10 mg total) by mouth once daily. 90 tablet 1    insulin aspart U-100 (NOVOLOG FLEXPEN U-100 INSULIN) 100 unit/mL (3 mL) InPn pen Inject into the skin three times a day before each meal per sliding scale. If -200: 2 units; -250: 4 units; -300: 6 units; -350: 8 units; BG >350: 10 unit 15 mL 1    insulin glargine, TOUJEO, (TOUJEO SOLOSTAR U-300 INSULIN) 300 unit/mL (1.5 mL) InPn pen Inject 24 Units into the skin 2 (two) times daily. 6 mL 2    lisinopriL (PRINIVIL,ZESTRIL) 40 MG tablet Take 1 tablet (40 mg total) by mouth once daily. 90 tablet 1    metoprolol tartrate (LOPRESSOR) 50 MG tablet Take 1 tablet (50 mg total) by mouth 2 (two) times daily. 180 tablet 1    pen needle, diabetic (BD CHRISTIANO 2ND GEN PEN NEEDLE) 32 gauge x 5/32" Ndle Use five times daily 100 each 0    rosuvastatin (CRESTOR) 40 MG Tab Take 1 tablet (40 mg total) by mouth every evening. 90 tablet 1    TRUE METRIX AIR GLUCOSE METER kit USE AS DIRECTED 1 each 0    TRUEPLUS LANCETS 33 gauge Misc USE  TO  CHECK  BLOOD  SUGAR TWICE DAILY 200 each 0    warfarin (COUMADIN) 5 MG tablet Take 1 and 1/2 tablets (7.5 mg total) on Mondays and Wednesdays; and one tablet (5 mg) all other days OR AS DIRECTED BY COUMADIN CLINIC. 35 tablet 6    [DISCONTINUED] pregabalin (LYRICA) 75 MG capsule TAKE 1 CAPSULE(75 MG) BY MOUTH TWICE DAILY 60 capsule 3     No current facility-administered medications on file prior to visit.       Past Surgical History:   Procedure Laterality Date    COLONOSCOPY N/A 8/22/2017    Procedure: COLONOSCOPY;  Surgeon: Keo Cruz MD;  Location: [facility] ENDO;  Service: Endoscopy;  Laterality: N/A;    COLONOSCOPY N/A 9/30/2022    Procedure: COLONOSCOPY 9/20--[place] hold coumadin, no bridge;  Surgeon: Noé Neff MD;  Location: [facility] ENDO;  Service: General;  Laterality: N/A;    EPIDURAL STEROID INJECTION INTO CERVICAL SPINE N/A " 11/1/2019    Procedure: C7/T1 IL SUKHJINDER;  Surgeon: Donnell Jordan MD;  Location: [facility] PAIN MGT;  Service: Pain Management;  Laterality: N/A;    STOMACH SURGERY      TRANSFORAMINAL EPIDURAL INJECTION OF STEROID Bilateral 8/13/2019    Procedure: Injection,steroid,epidural,transforaminal approach;  Surgeon: Donnell Jordan MD;  Location: [facility] PAIN [facility]GT;  Service: Pain Management;  Laterality: Bilateral;       Interval hx: Dr. Serrano: 2/4/21  Initial History of Present Illness:  08/08/2019: Dr. Jordan:   This patient is a 59 y.o. male who presents today complaining of the above noted pain/s. The patient describes the pain as follows.  Mr. Pope is a new patient to clinic with complaints of low back pain which radiates in his bilateral lower extremities in addition to neck pain which radiates in his bilateral upper extremities.  Currently he rates his pain as 10/10 which is constant throughout the day reports having symptoms that radiate down the posterior right leg into the bottom of the foot in the lateral aspect of foot in the S1 distribution in the same pattern in the left leg.  He reports having some weakness in his right lower extremity and reports having left arm numbness.  He finds that his symptoms are worse when he sits for long periods and is improved with activity.  He denies having had surgery, injections, physical therapy for his cervical and lumbar spines.  He reports mostly thing a throbbing sensation in his lower extremities and upper extremities. He denies having bowel and bladder difficulties.  He has been started on gabapentin taking 600 mg twice daily which he has found to be helpful.  There was no inciting event for these complaints.    Previous Therapy:  Medications:gabapentin  Injections:  bilateral S1 transforaminal epidural steroid injections on 8/13/19 with 30% pain relief  Surgeries:  None   Physical Therapy:  None    Past Surgical History:   Procedure Laterality Date    COLONOSCOPY N/A 8/22/2017  "   Procedure: COLONOSCOPY;  Surgeon: Keo Cruz MD;  Location: [facility];  Service: Endoscopy;  Laterality: N/A;    COLONOSCOPY N/A 9/30/2022    Procedure: COLONOSCOPY 9/20--[place] hold coumadin, no bridge;  Surgeon: Noé Neff MD;  Location: [facility] ENDO;  Service: General;  Laterality: N/A;    EPIDURAL STEROID INJECTION INTO CERVICAL SPINE N/A 11/1/2019    Procedure: C7/T1 IL SUKHJINDER;  Surgeon: Donnell Jordan MD;  Location: [facility] PAIN T;  Service: Pain Management;  Laterality: N/A;    STOMACH SURGERY      TRANSFORAMINAL EPIDURAL INJECTION OF STEROID Bilateral 8/13/2019    Procedure: Injection,steroid,epidural,transforaminal approach;  Surgeon: Donnell Jordan MD;  Location: [facility] PAIN T;  Service: Pain Management;  Laterality: Bilateral;       Imaging / Labs / Studies (reviewed on 7/15/2024):        XR Lumbar Spine 2/3/21  FINDINGS:  There is minimal grade 1 anterolisthesis of L4 on L5.  No fracture or pars defect.  Mild L4-L5 degenerative disc disease.  Inferior lumbar spine predominant facet arthropathy.  No osseous erosion or suspicious osseous lesion.  Sacroiliac joints are unremarkable.  Unchanged metallic foreign body is present within the lower back soft tissues.    Review of Systems:  Constitutional: Negative for fever.   Eyes: Negative for blurred vision.   Respiratory: Negative for cough and wheezing.    Cardiovascular: Negative for chest pain and orthopnea.   Gastrointestinal: Negative for constipation, diarrhea, nausea and vomiting.   Genitourinary: Negative for dysuria.   Musculoskeletal: Positive for back pain, joint pain and neck pain.   Skin: Negative for itching and rash.   Neurological: Positive for weakness.   Endo/Heme/Allergies: Does not bruise/bleed easily.         Physical Exam:  Vitals:  /74   Pulse 62   Resp 17   Ht 6' 6" (1.981 m)   Wt 91 kg (200 lb 9.9 oz)   BMI 23.18 kg/m²   (reviewed on 7/15/2024)    Physical Exam  GENERAL: Well appearing, in no acute distress, " alert and oriented x3.  PSYCH:  Mood and affect appropriate.  SKIN: Skin color, texture, turgor normal, no rashes or lesions.  HEAD/FACE:  Normocephalic, atraumatic. Cranial nerves grossly intact.  CV: RRR with palpation of the radial artery.  PULM: No evidence of respiratory difficulty, symmetric chest rise.  GI:  Soft and non-tender.    BACK: Straight leg raising in the sitting and supine positions is negative to radicular pain.  pain to palpation over the facet joints of the lumbar spine or spinous processes. Normal range of motion without pain reproduction.  EXTREMITIES: Peripheral joint ROM is full and pain free without obvious instability or laxity in all four extremities. No deformities, edema, or skin discoloration. Good capillary refill.  MUSCULOSKELETAL: Able to stand on heels & toes.   Shoulder, hip, and knee provocative maneuvers are negative.  There is no pain with palpation over the sacroiliac joints bilaterally.  Gaenslen's, Distraction/Compression and  FABERs test is negative.  Facet loading test is negative bilaterally.   Bilateral upper and lower extremity strength is normal and symmetric.  No atrophy or tone abnormalities are noted.    RIGHT Lower extremity: Hip flexion 5/5, Hip Abduction 5/5, Hip Adduction 5/5, Knee extension 5/5, Knee flexion 5/5, Ankle dorsiflexion5/5, Extensor hallucis longus 5/5, Ankle plantarflexion 5/5  LEFT Lower extremity:  Hip flexion 5/5, Hip Abduction 5/5,Hip Adduction 5/5, Knee extension 5/5, Knee flexion 5/5, Ankle dorsiflexion 5/5, Extensor hallucis longus 5/5, Ankle plantarflexion 5/5  -Normal testing knee (patellar) jerk and ankle (achilles) jerk    NEURO: Bilateral upper and lower extremity coordination and muscle stretch reflexes are physiologic and symmetric. No loss of sensation is noted.  GAIT: normal.    Assessment/ Plan:  1. Lumbar radiculopathy, chronic  MRI Lumbar Spine Without Contrast      2. DDD (degenerative disc disease), lumbar        3. Lumbar  stenosis with neurogenic claudication              1. Interventional:  We have previously discussed bilateral L5-S1 transforaminal epidural steroid injection to see if this helps with lumbar radiculopathy.  We will 1st review updated lumbar MRI.    Anticoagulation:  Yes Coumadin  -secondary prophylaxis: Paroxysmal atrial fibrillation  --cardiologist:           2. Pharmacologic:       --I will start the patient on a Lyrica titration to see if this helps with neuropathic pain.  We discussed increasing the dose gradually to reach a therapeutic goal according to the following algorithm.  We discussed potential side effects of this medication which may include drowsiness,dizziness, dry mouth, constipation or peripheral edema.  -Lyrica 150 mg b.i.d.  -90 day supply given    -DC gabapentin secondary to inefficacy      3. Rehabilitative:  --We discussed continuing at home physician directed physical therapy to help manage the patient/s painful condition. The patient was counseled that muscle strengthening will improve the long term prognosis in regards to pain and may also help increase range of motion and mobility. They were told that one of the goals of physical therapy is that they learn how to do the exercises so that they can do them independently at home daily upon completion.        4. Diagnostic:  Diagnostic imaging (MRI lumbar spine 2016) reviewed and discussed with the patient.  Updated lumbar spine to better evaluate pain and weakness    5. Consult: prior consult to Neurosurgery for severe canal stenosis but surgical intervention not desired at this time    6. Follow up:  4-6 weeks to review MRI      The above plan and management options were discussed at length with patient. Patient is in agreement with the above and verbalized understanding.    - I discussed the goals of interventional chronic pain management with the patient on today's visit. We discussed a multimodal and systematic approach to pain.   This includes diagnostic and therapeutic injections, adjuvant pharmacologic treatment, physical therapy, and at times psychiatry.  I emphasized the importance of regular exercise, core strengthening and stretching, diet and weight loss as a cornerstone of long-term pain management.    - This condition does not require this patient to take time off of work, and the primary goal of our Pain Management services is to improve the patient's functional capacity.  - Patient Questions: Answered all of the patient's questions regarding diagnoses, therapy, treatment and next steps      Venkatesh Dhillon MD

## 2024-07-15 ENCOUNTER — OFFICE VISIT (OUTPATIENT)
Dept: PAIN MEDICINE | Facility: CLINIC | Age: 59
End: 2024-07-15
Payer: MEDICARE

## 2024-07-15 VITALS
WEIGHT: 200.63 LBS | HEIGHT: 78 IN | DIASTOLIC BLOOD PRESSURE: 74 MMHG | BODY MASS INDEX: 23.21 KG/M2 | RESPIRATION RATE: 17 BRPM | SYSTOLIC BLOOD PRESSURE: 134 MMHG | HEART RATE: 62 BPM

## 2024-07-15 DIAGNOSIS — M48.062 LUMBAR STENOSIS WITH NEUROGENIC CLAUDICATION: ICD-10-CM

## 2024-07-15 DIAGNOSIS — M54.16 LUMBAR RADICULOPATHY, CHRONIC: Primary | ICD-10-CM

## 2024-07-15 DIAGNOSIS — M51.36 DDD (DEGENERATIVE DISC DISEASE), LUMBAR: ICD-10-CM

## 2024-07-15 PROCEDURE — 3075F SYST BP GE 130 - 139MM HG: CPT | Mod: CPTII,S$GLB,, | Performed by: ANESTHESIOLOGY

## 2024-07-15 PROCEDURE — 3008F BODY MASS INDEX DOCD: CPT | Mod: CPTII,S$GLB,, | Performed by: ANESTHESIOLOGY

## 2024-07-15 PROCEDURE — 3072F LOW RISK FOR RETINOPATHY: CPT | Mod: CPTII,S$GLB,, | Performed by: ANESTHESIOLOGY

## 2024-07-15 PROCEDURE — 99214 OFFICE O/P EST MOD 30 MIN: CPT | Mod: S$GLB,,, | Performed by: ANESTHESIOLOGY

## 2024-07-15 PROCEDURE — 3051F HG A1C>EQUAL 7.0%<8.0%: CPT | Mod: CPTII,S$GLB,, | Performed by: ANESTHESIOLOGY

## 2024-07-15 PROCEDURE — 3078F DIAST BP <80 MM HG: CPT | Mod: CPTII,S$GLB,, | Performed by: ANESTHESIOLOGY

## 2024-07-15 PROCEDURE — 1160F RVW MEDS BY RX/DR IN RCRD: CPT | Mod: CPTII,S$GLB,, | Performed by: ANESTHESIOLOGY

## 2024-07-15 PROCEDURE — 1159F MED LIST DOCD IN RCRD: CPT | Mod: CPTII,S$GLB,, | Performed by: ANESTHESIOLOGY

## 2024-07-15 PROCEDURE — 99999 PR PBB SHADOW E&M-EST. PATIENT-LVL IV: CPT | Mod: PBBFAC,HCNC,, | Performed by: ANESTHESIOLOGY

## 2024-07-15 PROCEDURE — 4010F ACE/ARB THERAPY RXD/TAKEN: CPT | Mod: CPTII,S$GLB,, | Performed by: ANESTHESIOLOGY

## 2024-07-15 RX ORDER — PREGABALIN 150 MG/1
150 CAPSULE ORAL 2 TIMES DAILY
Qty: 180 CAPSULE | Refills: 0 | Status: SHIPPED | OUTPATIENT
Start: 2024-07-15 | End: 2024-10-13

## 2024-07-15 RX ORDER — CYCLOBENZAPRINE HCL 10 MG
TABLET ORAL
Qty: 60 TABLET | Refills: 0 | Status: SHIPPED | OUTPATIENT
Start: 2024-07-15

## 2024-07-22 ENCOUNTER — TELEPHONE (OUTPATIENT)
Dept: PODIATRY | Facility: CLINIC | Age: 59
End: 2024-07-22
Payer: MEDICARE

## 2024-07-22 ENCOUNTER — HOSPITAL ENCOUNTER (OUTPATIENT)
Dept: RADIOLOGY | Facility: HOSPITAL | Age: 59
Discharge: HOME | End: 2024-07-22
Attending: ANESTHESIOLOGY
Payer: MEDICARE

## 2024-07-22 DIAGNOSIS — M54.16 LUMBAR RADICULOPATHY, CHRONIC: ICD-10-CM

## 2024-07-22 PROCEDURE — 72148 MRI LUMBAR SPINE W/O DYE: CPT | Mod: TC

## 2024-07-22 PROCEDURE — 72148 MRI LUMBAR SPINE W/O DYE: CPT | Mod: 26,,, | Performed by: RADIOLOGY

## 2024-07-22 NOTE — TELEPHONE ENCOUNTER
----- Message from Annelise Hi sent at 7/22/2024  1:29 PM CDT -----  Regarding: Missed call  Type:  Patient Returning Call    Who Called:Papa   Who Left Message for Patient:Marcella   Does the patient know what this is regarding?:Yes  Would the patient rather a call back or a response via [facility]? Call back   Best Call Back Number:[phone]  Additional Information:

## 2024-07-22 NOTE — TELEPHONE ENCOUNTER
Called pt to reschedule appointment on 10/4/2024. Pt didn't answer left voice message asking pt to call back to schedule.

## 2024-07-24 ENCOUNTER — ANTI-COAG VISIT (OUTPATIENT)
Dept: CARDIOLOGY | Facility: CLINIC | Age: 59
End: 2024-07-24
Payer: MEDICARE

## 2024-07-24 DIAGNOSIS — Z79.01 LONG TERM (CURRENT) USE OF ANTICOAGULANTS: Primary | ICD-10-CM

## 2024-07-24 DIAGNOSIS — I48.0 PAF (PAROXYSMAL ATRIAL FIBRILLATION): ICD-10-CM

## 2024-07-24 LAB
CTP QC/QA: YES
INR PPP: 1.1 (ref 2–3)

## 2024-07-24 PROCEDURE — 93793 ANTICOAG MGMT PT WARFARIN: CPT | Mod: S$GLB,,,

## 2024-07-24 PROCEDURE — 85610 PROTHROMBIN TIME: CPT | Mod: QW,S$GLB,, | Performed by: INTERNAL MEDICINE

## 2024-07-24 NOTE — PROGRESS NOTES
Patient's INR is sub-therapeutic at 1.1. Patient reports he may have missed two doses last week. Patient reports he has had two close family members pass since last week. Advised patient of increased risk of clotting; signs/symptoms of clotting and need to go to ED if he experiences any. Patient reports he is not having any signs/symptoms of clotting. Sent to PharmD for dosing/instructions.

## 2024-07-24 NOTE — PROGRESS NOTES
INR not at goal. Medications, chart, and patient findings reviewed. See calendar for adjustments to dose and follow up plan.  INR is at baseline.  We will boost his dose x 2 days then resume per calendar.  Repeat INR on Monday with other appointment at the [facility].  ER with any s/s of clotting or stroke.

## 2024-07-26 ENCOUNTER — PATIENT MESSAGE (OUTPATIENT)
Dept: PAIN MEDICINE | Facility: CLINIC | Age: 59
End: 2024-07-26
Payer: MEDICARE

## 2024-07-26 NOTE — PROGRESS NOTES
Interventional Pain Established Patient Clinic Visit    Chief Pain Complaint:  Chief Complaint   Patient presents with    Low-back Pain   Low back pain with BLE radicular pain  Neck pain with left arm pain     Interval History (7/29/2024):  Papa Pope presents today for follow-up visit to review MRI.  Patient was last seen on 7/15/2024.  At that visit, plan was also to increase Lyrica, which he does feel is helping some, but it does cause drowsiness.  Patient reports pain as 8/10 today.    - Schedule Bilateral L4/5 TF SUKHJINDER.  Increase Lyrica    Interval history 07/15/2024  Patient presents for follow-up for lower back and leg pain.  Today reports pain in a bandlike distribution in the lower back which can radiate down the posterior aspects of bilateral lower extremities in L5-S2 distribution to the calf.  Pain is constant and today is rated a 10/10.  Pain can be exacerbated with standing and with ambulation.  Patient is able to ambulate approximately 5 minutes with the assistive device, cane before requiring rest.  Patient has continued Lyrica medication from PCP, Dr. Castano in his taking 75 mg twice daily with no noticeable improvement in his pain.  Today he has also requesting an adjuvant muscle relaxer to help with his pain.  He is continued physician directed physical therapy exercises over the last 8 weeks from 05/15/2024 through 07/15/2024 with marginal improvement in his symptoms.  He does endorse associated weakness in the lower extremities associated with his pain.    Interval history 11/16/2022 - initial appointment with Dr. Dhillon:  Mr. Hargrove is a 57-year-old gentleman with past medical history significant for atrial fibrillation, hyperlipidemia, hypertension, type 2 diabetes, nicotine dependence who presents to [facility] care, previous Dr. Jordan patient.  Today patient reports lower back and leg pain.  Pain has been present for several years without inciting accident injury or trauma.  Pain is intermittent  and today is rated an 8/10.  Patient reports pain in a bandlike distribution in the lower back which radiates down the posterior aspects of bilateral lower extremities in L5-S1 distribution to the calf.  Pain is described as tingling and tight in nature.  Pain is exacerbated when moving from sitting to standing and with standing and ambulation.  Patient reports he is able to stand approximately 5 minutes before requiring rest.  Patient does endorse associated weakness in the lower extremities associated with his pain.  Pain is improved with prior transforaminal epidural steroid injection as well as prior prescription of Lortab medication.  Patient is actively involved in physical therapy weekly with improvement in range of motion.  Patient denies any meaningful relief from gabapentin, which she is taking 600 mg twice daily.  Patient reports significant motor weakness.  Patient denies night fever/night sweats, urinary incontinence, bowel incontinence, significant weight loss, and loss of sensations.    Non-Pharmacologic Treatments:  Physical Therapy/Home Exercise: yes  Ice/Heat:yes  TENS: no  Acupuncture: no  Massage: no  Chiropractic: no    Other: no      Pain Medications:  - Adjuvant Medications: Neurontin (Gabapentin)  - Anti-Coagulants: Coumadin ( Warfarin)    Pain Procedures:  -11/01/2019:  C7-T1 interlaminar epidural steroid injection; Dr. Jordan  -08/13/2019: Bilateral S1 transforaminal epidural steroid injection; Dr. Jordan    ______________________________________________________________________    Interval History (2/4/21) - Dr. Serrano:   C/o low back pain and leg pain. Not interested in surgery. Continuing gabapentin.    Interval History (9/12/19): Patient was seen on 8/13/19. At that time he underwent bilateral S1 transforaminal epidural steroid injection.  The patient reports that he is/was better following the procedure.  he reports 30% pain relief.  The changes lasted 4 weeks so far.  The changes have  continued through this visit.  He is mainly complaining today of neck pain going into left shoulder and proximal arm.    Initial History of Present Illness:  08/08/2019: Dr. Jordan:   This patient is a 54 y.o. male who presents today complaining of the above noted pain/s. The patient describes the pain as follows.  Mr. Pope is a new patient to clinic with complaints of low back pain which radiates in his bilateral lower extremities in addition to neck pain which radiates in his bilateral upper extremities.  Currently he rates his pain as 10/10 which is constant throughout the day reports having symptoms that radiate down the posterior right leg into the bottom of the foot in the lateral aspect of foot in the S1 distribution in the same pattern in the left leg.  He reports having some weakness in his right lower extremity and reports having left arm numbness.  He finds that his symptoms are worse when he sits for long periods and is improved with activity.  He denies having had surgery, injections, physical therapy for his cervical and lumbar spines.  He reports mostly thing a throbbing sensation in his lower extremities and upper extremities. He denies having bowel and bladder difficulties.  He has been started on gabapentin taking 600 mg twice daily which he has found to be helpful.  There was no inciting event for these complaints.          Imaging / Labs / Studies (reviewed on 7/29/2024):    07/23/2024 MRI Lumbar Spine Without Contrast  COMPARISON:  Plain x-ray 02/03/2021.    FINDINGS:  The distal cord and conus reveal normal signal and morphology.    There is normal alignment of the lumbar vertebra.  A congenitally small spinal canal is present.    There are multilevel degenerative changes most pronounced at the L4-5 disc level.    T12-L1: Minor disc bulge and facet arthrosis.    L1-2:     Minor disc bulge and facet arthrosis.    L2-3:     Mild broad-based disc bulge and facet arthrosis.  Mild central and  foraminal stenosis at L2-3.    L3-4:     Moderate broad-based disc bulge with mild-to-moderate facet arthrosis.  Moderate central canal stenosis at L3-4 and bilateral L3-4 foraminal stenosis, right worse than left.    L4-5:     Moderate disc degeneration with disc narrowing and desiccation and endplate signal changes at L4-5.  Broad-based disc bulge with moderate to severe facet arthrosis.  Severe central canal stenosis at L4-5 with moderate to severe left and severe right L4-5 foraminal stenosis.    L5-S1:    Mild disc degeneration with disc desiccation, disc bulge and posterior annular fissure at L5-S1.    Impression:   Congenitally small spinal canal.  Moderate L4-5 disc degeneration with advanced facet arthrosis with severe central canal stenosis.  High-grade foraminal stenosis.  Less pronounced degenerative changes at L2-3 and L3-4 with mild central and moderate central canal stenosis, respectively.  See above.          XR Lumbar Spine 2/3/21  FINDINGS:  There is minimal grade 1 anterolisthesis of L4 on L5.  No fracture or pars defect.  Mild L4-L5 degenerative disc disease.  Inferior lumbar spine predominant facet arthropathy.  No osseous erosion or suspicious osseous lesion.  Sacroiliac joints are unremarkable.  Unchanged metallic foreign body is present within the lower back soft tissues.        Review of Systems:  Constitutional: Negative for fever.   Eyes: Negative for blurred vision.   Respiratory: Negative for cough and wheezing.    Cardiovascular: Negative for chest pain and orthopnea.   Gastrointestinal: Negative for constipation, diarrhea, nausea and vomiting.   Genitourinary: Negative for dysuria.   Musculoskeletal: Positive for back pain, joint pain and neck pain.   Skin: Negative for itching and rash.   Neurological: Positive for weakness.   Endo/Heme/Allergies: Does not bruise/bleed easily.         Physical Exam:  Vitals:    07/29/24 0807   BP: (!) 132/91   Pulse: 71   Resp: 17   Weight: 93 kg (205  "lb 0.4 oz)   Height: 6' 6" (1.981 m)   PainSc:   8    Body mass index is 23.69 kg/m².   (reviewed on 7/29/2024)    GENERAL: Well appearing, in no acute distress, alert and oriented x3.  PSYCH:  Mood and affect appropriate.  SKIN: Skin color, texture, turgor normal, no rashes or lesions.  HEAD/FACE:  Normocephalic, atraumatic. Cranial nerves grossly intact.  PULM: No evidence of respiratory difficulty, symmetric chest rise.  GI:  Soft and non-tender.    BACK: Straight leg raising in the sitting and supine positions is negative to radicular pain.  pain to palpation over the facet joints of the lumbar spine or spinous processes. Normal range of motion without pain reproduction.  EXTREMITIES: Peripheral joint ROM is full and pain free without obvious instability or laxity in all four extremities. No deformities, edema, or skin discoloration. Good capillary refill.  MUSCULOSKELETAL: Able to stand on heels & toes.   Shoulder, hip, and knee provocative maneuvers are negative.  There is no pain with palpation over the sacroiliac joints bilaterally.  Gaenslen's, Distraction/Compression and  FABERs test is negative.  Facet loading test is negative bilaterally.   Bilateral upper and lower extremity strength is normal and symmetric.  No atrophy or tone abnormalities are noted.    RIGHT Lower extremity: Hip flexion 5/5, Hip Abduction 5/5, Hip Adduction 5/5, Knee extension 5/5, Knee flexion 5/5, Ankle dorsiflexion5/5, Extensor hallucis longus 5/5, Ankle plantarflexion 5/5  LEFT Lower extremity:  Hip flexion 5/5, Hip Abduction 5/5,Hip Adduction 5/5, Knee extension 5/5, Knee flexion 5/5, Ankle dorsiflexion 5/5, Extensor hallucis longus 5/5, Ankle plantarflexion 5/5  -Normal testing knee (patellar) jerk and ankle (achilles) jerk    NEURO: Bilateral upper and lower extremity coordination and muscle stretch reflexes are physiologic and symmetric. No loss of sensation is noted.  GAIT:  Ambulates with cane, " unsteady.          Assessment/ Plan:      ICD-10-CM ICD-9-CM    1. Bilateral lumbar radiculopathy  M54.16 724.4 Case Request-RAD/Other Procedure Area: Bilateral L4/5 TF SUKHJINDER      2. DDD (degenerative disc disease), lumbar  M51.36 722.52       3. Lumbar stenosis with neurogenic claudication  M48.062 724.03       4. Degenerative lumbar spinal stenosis  M48.061 724.02 Case Request-RAD/Other Procedure Area: Bilateral L4/5 TF SUKHJINDER      5. Vertebrogenic low back pain  M54.51 724.2       6. Defect of endplate of vertebra  Q76.49 756.19       7. Annular tear of lumbar disc  M51.36 722.52                1. Interventional:   - Schedule Bilateral L4/5 TF SUKHJINDER. Patient is taking warfarin (Coumadin) as 2° prevention; he will have to stop 5 days+ INR <1.3 prior to procedure.  Will get clearance from  Cardiology.      - Can also consider L4-5 intracept procedure for vertebrogenic low back pain.     Anticoagulation:  Yes Coumadin -secondary prophylaxis: Paroxysmal atrial fibrillation --cardiologist:           2. Pharmacologic:   -Refill Lyrica (pregabalin) 150mg BID - increased last visit. to see if this helps with neuropathic pain.  We will hold off on increase as it does cause drowsiness.  We previously discussed potential side effects of this medication which may include drowsiness,dizziness, dry mouth, constipation or peripheral edema.  Previously failed gabapentin secondary to inefficacy    - LA  reviewed and appropriate.      3. Rehabilitative:  --We discussed continuing at home physician directed physical therapy to help manage the patient/s painful condition. The patient was counseled that muscle strengthening will improve the long term prognosis in regards to pain and may also help increase range of motion and mobility.    4. Diagnostic:  7/23/2024 MRI Lumbar reviewed with patient: Congenitally small spinal canal.  Moderate L4-5 disc degeneration with advanced facet arthrosis with severe central canal  stenosis.  High-grade foraminal stenosis.  Less pronounced degenerative changes at L2-3 and L3-4 with mild central and moderate central canal stenosis, respectively.      5. Consult: Previously offered consult to Neurosurgery for severe canal stenosis, but surgical intervention not desired at this time.    6. Follow up:  4 weeks post-procedure     Future Appointments   Date Time Provider Department Center   7/29/2024  9:00 AM Annie Rosas LPN [facility] COUMAD [facility]   8/26/2024  8:00 AM Prachi Castano MD [facility] IM [facility]   10/4/2024  8:00 AM Kali Zambrano DPM [facility] POD [facility]   12/27/2024  8:20 AM Denny Cunningham MD [facility] CARDIO [facility]        - Patient Questions: Answered all of the patient's questions regarding diagnosis, therapy, and treatment.    - This condition does not require this patient to take time off of work, and the primary goal of our Pain Management services is to improve the patient's functional capacity.   - I discussed the risks, benefits, and alternatives to potential treatment options. All questions and concerns were fully addressed today in clinic.         Flora Bruce PA-C  Interventional Pain Management - [facility]    Disclaimer:  This note was prepared using voice recognition system and is likely to have sound alike errors that may have been overlooked even after proof reading.  Please call me with any questions.

## 2024-07-29 ENCOUNTER — TELEPHONE (OUTPATIENT)
Dept: PAIN MEDICINE | Facility: CLINIC | Age: 59
End: 2024-07-29
Payer: MEDICARE

## 2024-07-29 ENCOUNTER — ANTI-COAG VISIT (OUTPATIENT)
Dept: CARDIOLOGY | Facility: CLINIC | Age: 59
End: 2024-07-29
Payer: MEDICARE

## 2024-07-29 ENCOUNTER — OFFICE VISIT (OUTPATIENT)
Dept: PAIN MEDICINE | Facility: CLINIC | Age: 59
End: 2024-07-29
Payer: MEDICARE

## 2024-07-29 VITALS
HEIGHT: 78 IN | BODY MASS INDEX: 23.72 KG/M2 | RESPIRATION RATE: 17 BRPM | DIASTOLIC BLOOD PRESSURE: 91 MMHG | HEART RATE: 71 BPM | SYSTOLIC BLOOD PRESSURE: 132 MMHG | WEIGHT: 205 LBS

## 2024-07-29 DIAGNOSIS — M48.062 LUMBAR STENOSIS WITH NEUROGENIC CLAUDICATION: ICD-10-CM

## 2024-07-29 DIAGNOSIS — Q76.49 DEFECT OF ENDPLATE OF VERTEBRA: ICD-10-CM

## 2024-07-29 DIAGNOSIS — Z79.01 ANTICOAGULATED: Primary | ICD-10-CM

## 2024-07-29 DIAGNOSIS — M51.36 DDD (DEGENERATIVE DISC DISEASE), LUMBAR: ICD-10-CM

## 2024-07-29 DIAGNOSIS — I48.0 PAF (PAROXYSMAL ATRIAL FIBRILLATION): ICD-10-CM

## 2024-07-29 DIAGNOSIS — M54.51 VERTEBROGENIC LOW BACK PAIN: ICD-10-CM

## 2024-07-29 DIAGNOSIS — M54.16 BILATERAL LUMBAR RADICULOPATHY: Primary | ICD-10-CM

## 2024-07-29 DIAGNOSIS — Z79.01 LONG TERM (CURRENT) USE OF ANTICOAGULANTS: Primary | ICD-10-CM

## 2024-07-29 DIAGNOSIS — M51.36 ANNULAR TEAR OF LUMBAR DISC: ICD-10-CM

## 2024-07-29 DIAGNOSIS — M48.061 DEGENERATIVE LUMBAR SPINAL STENOSIS: ICD-10-CM

## 2024-07-29 LAB
CTP QC/QA: YES
INR PPP: 1.9 (ref 2–3)

## 2024-07-29 PROCEDURE — 4010F ACE/ARB THERAPY RXD/TAKEN: CPT | Mod: CPTII,S$GLB,, | Performed by: PHYSICIAN ASSISTANT

## 2024-07-29 PROCEDURE — 3080F DIAST BP >= 90 MM HG: CPT | Mod: CPTII,S$GLB,, | Performed by: PHYSICIAN ASSISTANT

## 2024-07-29 PROCEDURE — 93793 ANTICOAG MGMT PT WARFARIN: CPT | Mod: S$GLB,,,

## 2024-07-29 PROCEDURE — 3075F SYST BP GE 130 - 139MM HG: CPT | Mod: CPTII,S$GLB,, | Performed by: PHYSICIAN ASSISTANT

## 2024-07-29 PROCEDURE — 3072F LOW RISK FOR RETINOPATHY: CPT | Mod: CPTII,S$GLB,, | Performed by: PHYSICIAN ASSISTANT

## 2024-07-29 PROCEDURE — 1159F MED LIST DOCD IN RCRD: CPT | Mod: CPTII,S$GLB,, | Performed by: PHYSICIAN ASSISTANT

## 2024-07-29 PROCEDURE — 1160F RVW MEDS BY RX/DR IN RCRD: CPT | Mod: CPTII,S$GLB,, | Performed by: PHYSICIAN ASSISTANT

## 2024-07-29 PROCEDURE — 99999 PR PBB SHADOW E&M-EST. PATIENT-LVL IV: CPT | Mod: PBBFAC,,, | Performed by: PHYSICIAN ASSISTANT

## 2024-07-29 PROCEDURE — 99214 OFFICE O/P EST MOD 30 MIN: CPT | Mod: S$GLB,,, | Performed by: PHYSICIAN ASSISTANT

## 2024-07-29 PROCEDURE — 85610 PROTHROMBIN TIME: CPT | Mod: QW,S$GLB,, | Performed by: INTERNAL MEDICINE

## 2024-07-29 PROCEDURE — 3008F BODY MASS INDEX DOCD: CPT | Mod: CPTII,S$GLB,, | Performed by: PHYSICIAN ASSISTANT

## 2024-07-29 PROCEDURE — 3051F HG A1C>EQUAL 7.0%<8.0%: CPT | Mod: CPTII,S$GLB,, | Performed by: PHYSICIAN ASSISTANT

## 2024-07-29 NOTE — PROGRESS NOTES
Patient's INR is slightly sub-therapeutic at 1.9. Patient reports he followed previous instructions. Patient reports he has been smoking cigarettes due to stress from loss of three family members this month. Advised patient to discontinue tobacco due to the decreased effect of warfarin with tobacco use.  Patient states he will discontinue use of tobacco. Advised patient of increased risk of clotting; signs/symptoms of clotting and need to go to ED if he experiences any. Patient reports he is not having any signs/symptoms of clotting. Instructions given: continue warfarin 7.5 mg on Mondays and Wednesdays; and 5 mg all other days. Recheck on 8/8/24. Calendar reviewed with patient. Patient verbalizes understanding.

## 2024-07-29 NOTE — TELEPHONE ENCOUNTER
Cardiac Clearance sent to Dr Cunningham, waiting on response before scheduling injection.    .David Petersen [facility]

## 2024-07-29 NOTE — PATIENT INSTRUCTIONS
Here are some brief explanations of conditions on MRI:    When the disc bulge is large enough and herniates out toward the spinal canal, it puts pressure on the sensitive spinal nerves, causing pain - including lumbar radiculopathy/ sciatica, a sharp, often shooting pain that extends from the buttocks down the back of one leg [or cervical radiculopathy when shooting from the neck into the arm]. Numbness, tingling, and weakness in one extremity is also common.  If the herniated disc is not pressing on a nerve, the patient may experience a neck or back ache or even no pain at all.       Spinal stenosis is a narrowing of the spaces within your spine, which can put pressure on the nerves that travel through the spine.  - central stenosis (area around the spinal cord) - can cause leg pain with walking, along with low back pain [or in terms of cervical issues: arm pain, along with neck pain]  - neural foraminal stenosis (area on the sides where nerves come out to come together to form large nerve that goes down your arm or leg) - can cause leg pain [or neck pain] as well.                   Facet joint syndrome is an arthritis-like condition of the spine that can be a significant source of back and neck pain. It is caused by degenerative changes to the joints between the spine bones. The cartilage inside the facet joint can break down and become inflamed, triggering pain signals in nearby nerve endings.          Modic (endplate) changes - Modic changes (also known as degenerative endplate changes) are of unclear clinical significance. They refer to specific signal changes in the adjacent bone marrow on a spine MRI.  Modic changes in the lumbar spine are not associated with clinically significant axial low back pain severity or patient disability. The prevalence of Modic changes increases with age and appears to be associated with degenerative disc  changes.            ______________________________________________________________________           ______________________________________________________________________      Exercises to Strengthen Your Lower Back  Strong lower back and abdominal muscles work together to support your spine. The exercises below will help strengthen the lower back. It is important that you begin exercising slowly and increase levels gradually.  Always begin any exercise program with stretching. If you feel pain while doing any of these exercises, stop and talk to your doctor about a more specific exercise program that better suits your condition.   Low back stretch  The point of stretching is to make you more flexible and increase your range of motion. Stretch only as much as you are able. Stretch slowly. Do not push your stretch to the limit. If at any point you feel pain while stretching, this is your (temporary) limit.  Lie on your back with your knees bent and both feet on the ground.  Slowly raise your left knee to your chest as you flatten your lower back against the floor. Hold for 5 seconds.  Relax and repeat the exercise with your right knee.  Do 10 of these exercises for each leg.  Repeat hugging both knees to your chest at the same time.  Building lower back strength  Start your exercise routine with 10 to 30 minutes a day, 1 to 3 times a day.  Initial exercises  Lying on your back:  Ankle pumps: Move your foot up and down, towards your head, and then away. Repeat 10 times with each foot.  Heel slides: Slowly bend your knee, drawing the heel of your foot towards you. Then slide your heel/foot from you, straightening your knee. Do not lift your foot off the floor (this is not a leg lift).  Abdominal contraction: Bend your knees and put your hands on your stomach. Tighten your stomach muscles. Hold for 5 seconds, then relax. Repeat 10 times.  Straight leg raise: Bend one leg at the knee and keep the other leg straight.  Tighten your stomach muscles. Slowly lift your straight leg 6 to 12 inches off the floor and hold for up to 5 seconds. Repeat 10 times on each side.  Standing:  Wall squats: Stand with your back against the wall. Move your feet about 12 inches away from the wall. Tighten your stomach muscles, and slowly bend your knees until they are at about a 45 degree angle. Do not go down too far. Hold about 5 seconds. Then slowly return to your starting position. Repeat 10 times.  Heel raises: Stand facing the wall. Slowly raise the heels of your feet up and down, while keeping your toes on the floor. If you have trouble balancing, you can touch the wall with your hands. Repeat 10 times.  More advanced exercises  When you feel comfortable enough, try these exercises.  Kneeling lumbar extension: Begin on your hands and knees. At the same time, raise and straighten your right arm and left leg until they are parallel to the ground. Hold for 2 seconds and come back slowly to a starting position. Repeat with left arm and right leg, alternating 10 times.  Prone lumbar extension: Lie face down, arms extended overhead, palms on the floor. At the same time, raise your right arm and left leg as high as comfortably possible. Hold for 10 seconds and slowly return to start. Repeat with left arm and right leg, alternating 10 times. Gradually build up to 20 times. (Advanced: Repeat this exercise raising both arms and both legs a few inches off the floor at the same time. Hold for 5 seconds and release.)  Pelvic tilt: Lie on the floor on your back with your knees bent at 90 degrees. Your feet should be flat on the floor. Inhale, exhale, then slowly contract your abdominal muscles bringing your navel toward your spine. Let your pelvis rock back until your lower back is flat on the floor. Hold for 10 seconds while breathing smoothly.  Abdominal crunch: Perform a pelvic tilt (above) flattening your lower back against the floor. Holding the  tension in your abdominal muscles, take another breath and raise your shoulder blades off the ground (this is not a full sit-up). Keep your head in line with your body (dont bend your neck forward). Hold for 2 seconds, then slowly lower.  © 8408-2088 [employer]. [address] [place] [address], [place] [address]. All rights reserved. This information is not intended as a substitute for professional medical care. Always follow your healthcare professional's instructions.

## 2024-07-29 NOTE — TELEPHONE ENCOUNTER
----- Message from Flora Bruce PA-C sent at 7/29/2024  8:34 AM CDT -----  Pain Provider: Alejo    Case: - Schedule Bilateral L4/5 TF SUKHJINDER.     Patient is taking warfarin (Coumadin) as 2° prevention; he will have to stop 5 days+ INR <1.3 prior to procedure.  Will get clearance from  Cardiology.          4 weeks post-SUKHJINDER   #please offer virtual visit   Flora Bruce PA-C

## 2024-08-05 ENCOUNTER — TELEPHONE (OUTPATIENT)
Dept: PAIN MEDICINE | Facility: CLINIC | Age: 59
End: 2024-08-05
Payer: MEDICARE

## 2024-08-05 ENCOUNTER — TELEPHONE (OUTPATIENT)
Dept: CARDIOLOGY | Facility: CLINIC | Age: 59
End: 2024-08-05
Payer: MEDICARE

## 2024-08-05 DIAGNOSIS — Z01.818 PRE-OP TESTING: Primary | ICD-10-CM

## 2024-08-08 ENCOUNTER — HOSPITAL ENCOUNTER (OUTPATIENT)
Dept: CARDIOLOGY | Facility: HOSPITAL | Age: 59
Discharge: HOME | End: 2024-08-08
Attending: INTERNAL MEDICINE
Payer: MEDICARE

## 2024-08-08 ENCOUNTER — ANTI-COAG VISIT (OUTPATIENT)
Dept: CARDIOLOGY | Facility: CLINIC | Age: 59
End: 2024-08-08
Payer: MEDICARE

## 2024-08-08 DIAGNOSIS — I48.0 PAF (PAROXYSMAL ATRIAL FIBRILLATION): ICD-10-CM

## 2024-08-08 DIAGNOSIS — Z79.01 LONG TERM (CURRENT) USE OF ANTICOAGULANTS: Primary | ICD-10-CM

## 2024-08-08 DIAGNOSIS — Z01.818 PRE-OP TESTING: ICD-10-CM

## 2024-08-08 LAB
CTP QC/QA: YES
INR PPP: 1.5 (ref 2–3)
OHS QRS DURATION: 76 MS
OHS QTC CALCULATION: 423 MS

## 2024-08-08 PROCEDURE — 93010 ELECTROCARDIOGRAM REPORT: CPT | Mod: ,,, | Performed by: INTERNAL MEDICINE

## 2024-08-08 PROCEDURE — 93005 ELECTROCARDIOGRAM TRACING: CPT

## 2024-08-08 PROCEDURE — 85610 PROTHROMBIN TIME: CPT | Mod: QW,S$GLB,, | Performed by: INTERNAL MEDICINE

## 2024-08-19 ENCOUNTER — ANTI-COAG VISIT (OUTPATIENT)
Dept: CARDIOLOGY | Facility: CLINIC | Age: 59
End: 2024-08-19
Payer: MEDICARE

## 2024-08-19 ENCOUNTER — TELEPHONE (OUTPATIENT)
Dept: PODIATRY | Facility: CLINIC | Age: 59
End: 2024-08-19
Payer: MEDICARE

## 2024-08-19 DIAGNOSIS — Z79.01 LONG TERM (CURRENT) USE OF ANTICOAGULANTS: Primary | ICD-10-CM

## 2024-08-19 DIAGNOSIS — I48.0 PAF (PAROXYSMAL ATRIAL FIBRILLATION): ICD-10-CM

## 2024-08-19 LAB
CTP QC/QA: YES
INR PPP: 3 (ref 2–3)

## 2024-08-19 PROCEDURE — 85610 PROTHROMBIN TIME: CPT | Mod: QW,S$GLB,, | Performed by: INTERNAL MEDICINE

## 2024-08-19 PROCEDURE — 93793 ANTICOAG MGMT PT WARFARIN: CPT | Mod: S$GLB,,,

## 2024-08-19 NOTE — PROGRESS NOTES
Patient's INR is therapeutic at 3.0--higher end. Patient reports he followed previous instructions. Patient reports no changes. Instructions given: Continue warfarin 7.5 mg on Mondays and Fridays; and 5 mg all other days. Recheck on 8/26/24 with other appointment. Patient verbalizes understanding.

## 2024-08-19 NOTE — TELEPHONE ENCOUNTER
Called pt to inform him to call his cardiologist to see if he can get taken off warfarin before his dentist appt on Thursday.

## 2024-08-26 ENCOUNTER — OFFICE VISIT (OUTPATIENT)
Dept: INTERNAL MEDICINE | Facility: CLINIC | Age: 59
End: 2024-08-26
Payer: MEDICARE

## 2024-08-26 ENCOUNTER — ANTI-COAG VISIT (OUTPATIENT)
Dept: CARDIOLOGY | Facility: CLINIC | Age: 59
End: 2024-08-26
Payer: MEDICARE

## 2024-08-26 VITALS
BODY MASS INDEX: 23.83 KG/M2 | RESPIRATION RATE: 18 BRPM | HEIGHT: 78 IN | TEMPERATURE: 97 F | DIASTOLIC BLOOD PRESSURE: 86 MMHG | OXYGEN SATURATION: 98 % | WEIGHT: 205.94 LBS | HEART RATE: 74 BPM | SYSTOLIC BLOOD PRESSURE: 136 MMHG

## 2024-08-26 DIAGNOSIS — E78.5 HYPERLIPIDEMIA DUE TO TYPE 2 DIABETES MELLITUS: ICD-10-CM

## 2024-08-26 DIAGNOSIS — E11.22 TYPE 2 DIABETES MELLITUS WITH STAGE 3B CHRONIC KIDNEY DISEASE, WITH LONG-TERM CURRENT USE OF INSULIN: ICD-10-CM

## 2024-08-26 DIAGNOSIS — Z79.4 TYPE 2 DIABETES MELLITUS WITH STAGE 3B CHRONIC KIDNEY DISEASE, WITH LONG-TERM CURRENT USE OF INSULIN: ICD-10-CM

## 2024-08-26 DIAGNOSIS — E11.42 DIABETIC POLYNEUROPATHY ASSOCIATED WITH TYPE 2 DIABETES MELLITUS: ICD-10-CM

## 2024-08-26 DIAGNOSIS — I48.0 PAF (PAROXYSMAL ATRIAL FIBRILLATION): ICD-10-CM

## 2024-08-26 DIAGNOSIS — Z86.010 HISTORY OF COLON POLYPS: ICD-10-CM

## 2024-08-26 DIAGNOSIS — N18.32 TYPE 2 DIABETES MELLITUS WITH STAGE 3B CHRONIC KIDNEY DISEASE, WITH LONG-TERM CURRENT USE OF INSULIN: ICD-10-CM

## 2024-08-26 DIAGNOSIS — E11.69 ERECTILE DYSFUNCTION ASSOCIATED WITH TYPE 2 DIABETES MELLITUS: ICD-10-CM

## 2024-08-26 DIAGNOSIS — I15.2 HYPERTENSION ASSOCIATED WITH DIABETES: ICD-10-CM

## 2024-08-26 DIAGNOSIS — I48.91 ON COUMADIN FOR ATRIAL FIBRILLATION: ICD-10-CM

## 2024-08-26 DIAGNOSIS — E11.59 HYPERTENSION ASSOCIATED WITH DIABETES: ICD-10-CM

## 2024-08-26 DIAGNOSIS — Z12.5 ENCOUNTER FOR SCREENING FOR MALIGNANT NEOPLASM OF PROSTATE: ICD-10-CM

## 2024-08-26 DIAGNOSIS — Z79.01 ON COUMADIN FOR ATRIAL FIBRILLATION: ICD-10-CM

## 2024-08-26 DIAGNOSIS — N52.1 ERECTILE DYSFUNCTION ASSOCIATED WITH TYPE 2 DIABETES MELLITUS: ICD-10-CM

## 2024-08-26 DIAGNOSIS — M51.36 DDD (DEGENERATIVE DISC DISEASE), LUMBAR: ICD-10-CM

## 2024-08-26 DIAGNOSIS — Z79.01 LONG TERM (CURRENT) USE OF ANTICOAGULANTS: Primary | ICD-10-CM

## 2024-08-26 DIAGNOSIS — F17.210 CIGARETTE NICOTINE DEPENDENCE WITHOUT COMPLICATION: ICD-10-CM

## 2024-08-26 DIAGNOSIS — Z00.00 ROUTINE GENERAL MEDICAL EXAMINATION AT A HEALTH CARE FACILITY: Primary | ICD-10-CM

## 2024-08-26 DIAGNOSIS — F17.200 NEEDS SMOKING CESSATION EDUCATION: ICD-10-CM

## 2024-08-26 DIAGNOSIS — E11.69 HYPERLIPIDEMIA DUE TO TYPE 2 DIABETES MELLITUS: ICD-10-CM

## 2024-08-26 PROBLEM — F10.20 UNCOMPLICATED ALCOHOL DEPENDENCE: Status: RESOLVED | Noted: 2023-05-09 | Resolved: 2024-08-26

## 2024-08-26 LAB
ALBUMIN SERPL BCP-MCNC: 3.6 G/DL (ref 3.5–5.2)
ALBUMIN/CREAT UR: 90.9 UG/MG (ref 0–30)
ALP SERPL-CCNC: 75 U/L (ref 55–135)
ALT SERPL W/O P-5'-P-CCNC: 22 U/L (ref 10–44)
ANION GAP SERPL CALC-SCNC: 14 MMOL/L (ref 8–16)
AST SERPL-CCNC: 23 U/L (ref 10–40)
BACTERIA #/AREA URNS HPF: NORMAL /HPF
BASOPHILS # BLD AUTO: 0.02 K/UL (ref 0–0.2)
BASOPHILS NFR BLD: 0.3 % (ref 0–1.9)
BILIRUB SERPL-MCNC: 0.3 MG/DL (ref 0.1–1)
BILIRUB UR QL STRIP: NEGATIVE
BUN SERPL-MCNC: 20 MG/DL (ref 6–20)
CALCIUM SERPL-MCNC: 9.3 MG/DL (ref 8.7–10.5)
CHLORIDE SERPL-SCNC: 109 MMOL/L (ref 95–110)
CHOLEST SERPL-MCNC: 157 MG/DL (ref 120–199)
CHOLEST/HDLC SERPL: 3.4 {RATIO} (ref 2–5)
CLARITY UR: CLEAR
CO2 SERPL-SCNC: 18 MMOL/L (ref 23–29)
COLOR UR: ABNORMAL
COMPLEXED PSA SERPL-MCNC: 0.82 NG/ML (ref 0–4)
CREAT SERPL-MCNC: 1.5 MG/DL (ref 0.5–1.4)
CREAT UR-MCNC: 44 MG/DL (ref 23–375)
CTP QC/QA: YES
DIFFERENTIAL METHOD BLD: ABNORMAL
EOSINOPHIL # BLD AUTO: 0.1 K/UL (ref 0–0.5)
EOSINOPHIL NFR BLD: 1.5 % (ref 0–8)
ERYTHROCYTE [DISTWIDTH] IN BLOOD BY AUTOMATED COUNT: 12.7 % (ref 11.5–14.5)
EST. GFR  (NO RACE VARIABLE): 53.6 ML/MIN/1.73 M^2
ESTIMATED AVG GLUCOSE: 163 MG/DL (ref 68–131)
GLUCOSE SERPL-MCNC: 150 MG/DL (ref 70–110)
GLUCOSE UR QL STRIP: ABNORMAL
HBA1C MFR BLD: 7.3 % (ref 4–5.6)
HCT VFR BLD AUTO: 45.3 % (ref 40–54)
HDLC SERPL-MCNC: 46 MG/DL (ref 40–75)
HDLC SERPL: 29.3 % (ref 20–50)
HGB BLD-MCNC: 14.5 G/DL (ref 14–18)
HGB UR QL STRIP: NEGATIVE
IMM GRANULOCYTES # BLD AUTO: 0 K/UL (ref 0–0.04)
IMM GRANULOCYTES NFR BLD AUTO: 0 % (ref 0–0.5)
INR PPP: 3.1 (ref 2–3)
KETONES UR QL STRIP: NEGATIVE
LDLC SERPL CALC-MCNC: 93.4 MG/DL (ref 63–159)
LEUKOCYTE ESTERASE UR QL STRIP: NEGATIVE
LYMPHOCYTES # BLD AUTO: 1.4 K/UL (ref 1–4.8)
LYMPHOCYTES NFR BLD: 22.9 % (ref 18–48)
MCH RBC QN AUTO: 32.6 PG (ref 27–31)
MCHC RBC AUTO-ENTMCNC: 32 G/DL (ref 32–36)
MCV RBC AUTO: 102 FL (ref 82–98)
MICROALBUMIN UR DL<=1MG/L-MCNC: 40 UG/ML
MICROSCOPIC COMMENT: NORMAL
MONOCYTES # BLD AUTO: 0.5 K/UL (ref 0.3–1)
MONOCYTES NFR BLD: 8.6 % (ref 4–15)
NEUTROPHILS # BLD AUTO: 4 K/UL (ref 1.8–7.7)
NEUTROPHILS NFR BLD: 66.7 % (ref 38–73)
NITRITE UR QL STRIP: NEGATIVE
NONHDLC SERPL-MCNC: 111 MG/DL
NRBC BLD-RTO: 0 /100 WBC
PH UR STRIP: 6 [PH] (ref 5–8)
PLATELET # BLD AUTO: 143 K/UL (ref 150–450)
PMV BLD AUTO: 11.2 FL (ref 9.2–12.9)
POTASSIUM SERPL-SCNC: 4.1 MMOL/L (ref 3.5–5.1)
PROT SERPL-MCNC: 6.8 G/DL (ref 6–8.4)
PROT UR QL STRIP: NEGATIVE
RBC # BLD AUTO: 4.45 M/UL (ref 4.6–6.2)
RBC #/AREA URNS HPF: 0 /HPF (ref 0–4)
SODIUM SERPL-SCNC: 141 MMOL/L (ref 136–145)
SP GR UR STRIP: 1.01 (ref 1–1.03)
SQUAMOUS #/AREA URNS HPF: 1 /HPF
TRIGL SERPL-MCNC: 88 MG/DL (ref 30–150)
TSH SERPL DL<=0.005 MIU/L-ACNC: 2.09 UIU/ML (ref 0.4–4)
URN SPEC COLLECT METH UR: ABNORMAL
WBC # BLD AUTO: 6.03 K/UL (ref 3.9–12.7)
WBC #/AREA URNS HPF: 1 /HPF (ref 0–5)
YEAST URNS QL MICRO: NORMAL

## 2024-08-26 PROCEDURE — 82570 ASSAY OF URINE CREATININE: CPT | Mod: HCNC | Performed by: FAMILY MEDICINE

## 2024-08-26 PROCEDURE — 81000 URINALYSIS NONAUTO W/SCOPE: CPT | Mod: HCNC | Performed by: FAMILY MEDICINE

## 2024-08-26 PROCEDURE — 1160F RVW MEDS BY RX/DR IN RCRD: CPT | Mod: HCNC,CPTII,S$GLB, | Performed by: FAMILY MEDICINE

## 2024-08-26 PROCEDURE — 80053 COMPREHEN METABOLIC PANEL: CPT | Mod: HCNC | Performed by: FAMILY MEDICINE

## 2024-08-26 PROCEDURE — 80061 LIPID PANEL: CPT | Mod: HCNC | Performed by: FAMILY MEDICINE

## 2024-08-26 PROCEDURE — 3051F HG A1C>EQUAL 7.0%<8.0%: CPT | Mod: HCNC,CPTII,S$GLB, | Performed by: FAMILY MEDICINE

## 2024-08-26 PROCEDURE — 83036 HEMOGLOBIN GLYCOSYLATED A1C: CPT | Mod: HCNC | Performed by: FAMILY MEDICINE

## 2024-08-26 PROCEDURE — 93793 ANTICOAG MGMT PT WARFARIN: CPT | Mod: HCNC,S$GLB,,

## 2024-08-26 PROCEDURE — 3072F LOW RISK FOR RETINOPATHY: CPT | Mod: HCNC,CPTII,S$GLB, | Performed by: FAMILY MEDICINE

## 2024-08-26 PROCEDURE — 99999 PR PBB SHADOW E&M-EST. PATIENT-LVL IV: CPT | Mod: PBBFAC,HCNC,, | Performed by: FAMILY MEDICINE

## 2024-08-26 PROCEDURE — 4010F ACE/ARB THERAPY RXD/TAKEN: CPT | Mod: HCNC,CPTII,S$GLB, | Performed by: FAMILY MEDICINE

## 2024-08-26 PROCEDURE — 3079F DIAST BP 80-89 MM HG: CPT | Mod: HCNC,CPTII,S$GLB, | Performed by: FAMILY MEDICINE

## 2024-08-26 PROCEDURE — 1159F MED LIST DOCD IN RCRD: CPT | Mod: HCNC,CPTII,S$GLB, | Performed by: FAMILY MEDICINE

## 2024-08-26 PROCEDURE — 99396 PREV VISIT EST AGE 40-64: CPT | Mod: HCNC,S$GLB,, | Performed by: FAMILY MEDICINE

## 2024-08-26 PROCEDURE — 3075F SYST BP GE 130 - 139MM HG: CPT | Mod: HCNC,CPTII,S$GLB, | Performed by: FAMILY MEDICINE

## 2024-08-26 PROCEDURE — 84153 ASSAY OF PSA TOTAL: CPT | Mod: HCNC | Performed by: FAMILY MEDICINE

## 2024-08-26 PROCEDURE — 84443 ASSAY THYROID STIM HORMONE: CPT | Mod: HCNC | Performed by: FAMILY MEDICINE

## 2024-08-26 PROCEDURE — 3008F BODY MASS INDEX DOCD: CPT | Mod: HCNC,CPTII,S$GLB, | Performed by: FAMILY MEDICINE

## 2024-08-26 PROCEDURE — 85610 PROTHROMBIN TIME: CPT | Mod: QW,HCNC,S$GLB, | Performed by: INTERNAL MEDICINE

## 2024-08-26 PROCEDURE — 85025 COMPLETE CBC W/AUTO DIFF WBC: CPT | Mod: HCNC | Performed by: FAMILY MEDICINE

## 2024-08-26 RX ORDER — EZETIMIBE 10 MG/1
10 TABLET ORAL DAILY
Qty: 90 TABLET | Refills: 1 | Status: SHIPPED | OUTPATIENT
Start: 2024-08-26

## 2024-08-26 RX ORDER — ROSUVASTATIN CALCIUM 40 MG/1
40 TABLET, COATED ORAL NIGHTLY
Qty: 90 TABLET | Refills: 1 | Status: SHIPPED | OUTPATIENT
Start: 2024-08-26

## 2024-08-26 NOTE — PROGRESS NOTES
"Subjective:       Patient ID: Papa Pope is a 59 y.o. male.    Chief Complaint: Annual Exam    59-year-old  male patient with Patient Active Problem List:     Hyperlipidemia due to type 2 diabetes mellitus     Hypertension associated with diabetes     DDD (degenerative disc disease), lumbar     History of colon polyps     PAF (paroxysmal atrial fibrillation)     On Coumadin for atrial fibrillation     Type 2 diabetes mellitus with stage 3b chronic kidney disease, with long-term current use of insulin     Diabetic polyneuropathy associated with type 2 diabetes mellitus     Cigarette nicotine dependence without complication     Abnormality of gait and mobility     Erectile dysfunction associated with type 2 diabetes mellitus  Here for routine annual physicals  Patient reports that he has been taking his medications regularly and blood glucose levels has been stable  Denies of any chest tightness or difficulty breathing with palpitations and requesting few refills today        Review of Systems   Constitutional:  Negative for appetite change and fatigue.   Eyes:  Negative for visual disturbance.   Respiratory:  Negative for shortness of breath.    Cardiovascular:  Negative for chest pain, palpitations and leg swelling.   Gastrointestinal:  Negative for abdominal pain, nausea and vomiting.   Endocrine: Negative for polydipsia and polyuria.   Musculoskeletal:  Negative for myalgias.   Skin:  Negative for rash.   Neurological:  Positive for numbness. Negative for weakness and headaches.   Psychiatric/Behavioral:  Negative for sleep disturbance.          /86 (BP Location: Right arm, Patient Position: Sitting, BP Method: Large (Manual))   Pulse 74   Temp 97 °F (36.1 °C) (Tympanic)   Resp 18   Ht 6' 6" (1.981 m)   Wt 93.4 kg (205 lb 14.6 oz)   SpO2 98%   BMI 23.80 kg/m²   Objective:      Physical Exam  Constitutional:       Appearance: He is well-developed.   HENT:      Head: Normocephalic and " atraumatic.   Cardiovascular:      Rate and Rhythm: Normal rate and regular rhythm.      Heart sounds: Normal heart sounds. No murmur heard.  Pulmonary:      Effort: Pulmonary effort is normal.      Breath sounds: Normal breath sounds. No wheezing.   Abdominal:      General: Bowel sounds are normal.      Palpations: Abdomen is soft.      Tenderness: There is no abdominal tenderness.   Skin:     General: Skin is warm and dry.      Findings: No rash.   Neurological:      Mental Status: He is alert and oriented to person, place, and time.   Psychiatric:         Mood and Affect: Mood normal.           Assessment/Plan:   1. Routine general medical examination at a health care facility  -     Urinalysis, Reflex to Urine Culture Urine, Clean Catch; Future; Expected date: 08/26/2024  -     Hemoglobin A1C; Future; Expected date: 08/26/2024  -     TSH; Future; Expected date: 08/26/2024  -     Lipid Panel; Future; Expected date: 08/26/2024  -     Comprehensive Metabolic Panel; Future; Expected date: 08/26/2024  -     CBC Auto Differential; Future; Expected date: 08/26/2024  -     Microalbumin/Creatinine Ratio, Urine; Future; Expected date: 08/26/2024  -     PSA, Screening; Future; Expected date: 08/26/2024  Vital signs stable today.  Clinical exam stable  Continue lifestyle modifications with low-fat and low-cholesterol diet and exercise 30 minutes daily  Patient due for Prevnar shingles and tetanus vaccination, encouraged to consider getting it at outside pharmacy    2. Hypertension associated with diabetes  -     Urinalysis, Reflex to Urine Culture Urine, Clean Catch; Future; Expected date: 08/26/2024  -     TSH; Future; Expected date: 08/26/2024  -     Lipid Panel; Future; Expected date: 08/26/2024  -     Comprehensive Metabolic Panel; Future; Expected date: 08/26/2024  Blood pressure is stable currently on lisinopril 40 mg and metoprolol 50 mg twice daily  Restrict salt intake and eat low-fat and low-cholesterol diet    3.  Hyperlipidemia due to type 2 diabetes mellitus  -     Lipid Panel; Future; Expected date: 08/26/2024  -     ezetimibe (ZETIA) 10 mg tablet; Take 1 tablet (10 mg total) by mouth once daily.  Dispense: 90 tablet; Refill: 1  -     rosuvastatin (CRESTOR) 40 MG Tab; Take 1 tablet (40 mg total) by mouth every evening.  Dispense: 90 tablet; Refill: 1  -     Lipids Digital Medicine (LDMP) Enrollment Order  -     Lipids Digital Medicine (LDMP): Assign Onboarding Questionnaires  Currently taking Zetia 10 mg and Crestor 40 mg, refills given today    4. Type 2 diabetes mellitus with stage 3b chronic kidney disease, with long-term current use of insulin  -     Hemoglobin A1C; Future; Expected date: 08/26/2024  -     CBC Auto Differential; Future; Expected date: 08/26/2024  -     Microalbumin/Creatinine Ratio, Urine; Future; Expected date: 08/26/2024  -     empagliflozin (JARDIANCE) 25 mg tablet; Take 1 tablet (25 mg total) by mouth every morning.  Dispense: 90 tablet; Refill: 1  Currently on Jardiance 25 mg, NovoLog as per sliding scale and Toujeo 24 units twice daily  Encouraged to drink adequate fluids and avoid over-the-counter NSAIDs    5. Diabetic polyneuropathy associated with type 2 diabetes mellitus  Stable on Lyrica 150 mg twice daily    6. PAF (paroxysmal atrial fibrillation)  7. On Coumadin for atrial fibrillation  Currently on warfarin    8. DDD (degenerative disc disease), lumbar  Stable and asymptomatic, taking Flexeril as needed    9. Cigarette nicotine dependence without complication  Encouraged to continue to work on quitting smoking, has been down to half pack of cigarettes daily from 3 packs    10. Erectile dysfunction associated with type 2 diabetes mellitus  Stable and asymptomatic    11. History of colon polyps  Overview:  Colonoscopy 8/22/2017      12. Encounter for screening for malignant neoplasm of prostate  -     PSA, Screening; Future; Expected date: 08/26/2024

## 2024-08-26 NOTE — PROGRESS NOTES
Patient's INR is supra-therapeutic at 3.1.  Patient reports he followed previous instructions. Patient reports he consumed an alcoholic beverage. Advised patient of increased risk of bleeding; signs/symptoms of bleeding and need to go to ED if he experiences any. Patient reports he is not having any signs/symptoms of bleeding. Instructions given: Take warfarin 5 mg today 8/26/24; then re-challenge warfarin 7.5 mg on Wednesdays and Mondays; and 5 mg all other days. Recheck on 9/9/24. Calendar reviewed with patient. Patient verbalizes understanding.

## 2024-09-09 ENCOUNTER — ANTI-COAG VISIT (OUTPATIENT)
Dept: CARDIOLOGY | Facility: CLINIC | Age: 59
End: 2024-09-09
Payer: MEDICARE

## 2024-09-09 DIAGNOSIS — Z79.01 LONG TERM (CURRENT) USE OF ANTICOAGULANTS: Primary | ICD-10-CM

## 2024-09-09 DIAGNOSIS — I48.0 PAF (PAROXYSMAL ATRIAL FIBRILLATION): ICD-10-CM

## 2024-09-09 LAB
CTP QC/QA: YES
INR PPP: 2.9 (ref 2–3)

## 2024-09-09 PROCEDURE — 93793 ANTICOAG MGMT PT WARFARIN: CPT | Mod: HCNC,S$GLB,,

## 2024-09-09 PROCEDURE — 85610 PROTHROMBIN TIME: CPT | Mod: QW,HCNC,S$GLB, | Performed by: INTERNAL MEDICINE

## 2024-09-19 ENCOUNTER — TELEPHONE (OUTPATIENT)
Dept: SMOKING CESSATION | Facility: CLINIC | Age: 59
End: 2024-09-19
Payer: MEDICARE

## 2024-09-21 ENCOUNTER — IMMUNIZATION (OUTPATIENT)
Dept: INTERNAL MEDICINE | Facility: CLINIC | Age: 59
End: 2024-09-21
Payer: MEDICARE

## 2024-09-21 DIAGNOSIS — Z23 NEED FOR VACCINATION: Primary | ICD-10-CM

## 2024-09-21 PROCEDURE — G0008 ADMIN INFLUENZA VIRUS VAC: HCPCS | Mod: HCNC,S$GLB,, | Performed by: INTERNAL MEDICINE

## 2024-09-21 PROCEDURE — 90656 IIV3 VACC NO PRSV 0.5 ML IM: CPT | Mod: HCNC,S$GLB,, | Performed by: INTERNAL MEDICINE

## 2024-09-23 ENCOUNTER — TELEPHONE (OUTPATIENT)
Dept: SMOKING CESSATION | Facility: CLINIC | Age: 59
End: 2024-09-23
Payer: MEDICARE

## 2024-09-23 NOTE — TELEPHONE ENCOUNTER
Call to patient for scheduled & confirmed 1 pm appointment. Patient requested to reschedule due to he doesn't have a ride & had to reschedule his 2 pm coumadin appointment. CTTS offered for a virtual appointment; however patient declined stating he isn't  feeling good for a virtual. Rescheduled for 10/8/2024 at 8:00 am.

## 2024-09-25 ENCOUNTER — ANTI-COAG VISIT (OUTPATIENT)
Dept: CARDIOLOGY | Facility: CLINIC | Age: 59
End: 2024-09-25
Payer: MEDICARE

## 2024-09-25 DIAGNOSIS — Z79.01 LONG TERM (CURRENT) USE OF ANTICOAGULANTS: Primary | ICD-10-CM

## 2024-09-25 DIAGNOSIS — I48.0 PAF (PAROXYSMAL ATRIAL FIBRILLATION): ICD-10-CM

## 2024-09-25 LAB
CTP QC/QA: YES
INR PPP: 3.9 (ref 2–3)

## 2024-09-25 PROCEDURE — 85610 PROTHROMBIN TIME: CPT | Mod: QW,HCNC,S$GLB, | Performed by: INTERNAL MEDICINE

## 2024-09-25 PROCEDURE — 93793 ANTICOAG MGMT PT WARFARIN: CPT | Mod: HCNC,S$GLB,,

## 2024-09-25 NOTE — PROGRESS NOTES
Patient's INR is supra-therapeutic at 3.9. Patient reports he followed previous dosing/instructions. Patient reports he had alcoholic beverage this weekend. Advised of increased risk of bleeding; signs/symptoms of bleeding and need to go to ED if he experiences any. Patient reports he is not having any signs/symptoms of bleeding. Instructions given: Hold warfarin dosing today 9/25/24; then re-challenge warfarin 7.5 mg on Mondays and Wednesdays; and 5 mg all other days. Recheck on 10/8/24 wit other appointment. Calendar reviewed with patient. Patient verbalizes understanding.

## 2024-10-07 ENCOUNTER — TELEPHONE (OUTPATIENT)
Dept: SMOKING CESSATION | Facility: CLINIC | Age: 59
End: 2024-10-07
Payer: MEDICARE

## 2024-10-08 ENCOUNTER — CLINICAL SUPPORT (OUTPATIENT)
Dept: SMOKING CESSATION | Facility: CLINIC | Age: 59
End: 2024-10-08

## 2024-10-08 ENCOUNTER — ANTI-COAG VISIT (OUTPATIENT)
Dept: CARDIOLOGY | Facility: CLINIC | Age: 59
End: 2024-10-08
Payer: MEDICARE

## 2024-10-08 ENCOUNTER — PATIENT MESSAGE (OUTPATIENT)
Dept: SMOKING CESSATION | Facility: CLINIC | Age: 59
End: 2024-10-08
Payer: MEDICARE

## 2024-10-08 DIAGNOSIS — Z79.01 LONG TERM (CURRENT) USE OF ANTICOAGULANTS: Primary | ICD-10-CM

## 2024-10-08 DIAGNOSIS — F17.200 NICOTINE DEPENDENCE: Primary | ICD-10-CM

## 2024-10-08 DIAGNOSIS — I48.0 PAF (PAROXYSMAL ATRIAL FIBRILLATION): ICD-10-CM

## 2024-10-08 LAB
CTP QC/QA: YES
INR PPP: 3.6 (ref 2–3)

## 2024-10-08 PROCEDURE — 93793 ANTICOAG MGMT PT WARFARIN: CPT | Mod: HCNC,S$GLB,,

## 2024-10-08 PROCEDURE — 85610 PROTHROMBIN TIME: CPT | Mod: QW,HCNC,S$GLB, | Performed by: INTERNAL MEDICINE

## 2024-10-08 PROCEDURE — 99999 PR PBB SHADOW E&M-EST. PATIENT-LVL II: CPT | Mod: PBBFAC,,, | Performed by: SPEECH-LANGUAGE PATHOLOGIST

## 2024-10-08 RX ORDER — IBUPROFEN 200 MG
1 TABLET ORAL DAILY
Qty: 30 PATCH | Refills: 0 | Status: SHIPPED | OUTPATIENT
Start: 2024-10-08

## 2024-10-08 NOTE — PROGRESS NOTES
Patient's INR is supra-therapeutic at 3.6. Patient reports he followed previous dosing/instructions. Patient reports he had alcoholic beverage this weekend. Re-educated patient on Coumadin Diet and need to keep consistent. Advised of increased risk of bleeding; signs/symptoms of bleeding and need to go to ED if he experiences any. Patient reports he is not having any signs/symptoms of bleeding. Instructions given: Hold warfarin dosing today 10/8/24; then re-challenge warfarin 7.5 mg on  Wednesdays and Mondays ; and 5 mg all other days. Recheck on 10/29/24 with other appointment. Calendar reviewed with patient. Patient verbalizes understanding.

## 2024-10-08 NOTE — PROGRESS NOTES
At SOC, patient reports smoking 10 cpd. Assessed CO with patient reporting smoking 1 prior. CO 30. Discussed the role of tobacco cessation program, role of NRT & behavioral changes to assist the patient to reach the goal of being tobacco free. The patient reports willing to utilize 21 mg patches & took some 2 mg, 4 mg gum (uncoated & coated) & 4 mg lozenge samples & will let CTTS know which item he wants ordered & how the coated vs uncoated impacts his blood sugar levels & will return for a follow up visit.  Education & instruction on the role of the NRT, usage & proper placement of the patch/gum technique/dosage instructions. Patient provided a written handout on usage of the NRT. The patient verbalized understanding & willingness to apply. Patient instructed to call CTTS anytime. Follow up visit set with the patient for 10/29/2024 at 8:00 am. Patient reports a goal of being smoke free.

## 2024-10-23 DIAGNOSIS — R80.9 TYPE 2 DIABETES MELLITUS WITH MICROALBUMINURIA, WITH LONG-TERM CURRENT USE OF INSULIN: ICD-10-CM

## 2024-10-23 DIAGNOSIS — E11.29 TYPE 2 DIABETES MELLITUS WITH MICROALBUMINURIA, WITH LONG-TERM CURRENT USE OF INSULIN: ICD-10-CM

## 2024-10-23 DIAGNOSIS — Z79.4 TYPE 2 DIABETES MELLITUS WITH MICROALBUMINURIA, WITH LONG-TERM CURRENT USE OF INSULIN: ICD-10-CM

## 2024-10-23 RX ORDER — INSULIN GLARGINE 300 [IU]/ML
28 INJECTION, SOLUTION SUBCUTANEOUS 2 TIMES DAILY
Qty: 4 PEN | Refills: 3 | Status: SHIPPED | OUTPATIENT
Start: 2024-10-23 | End: 2024-10-25 | Stop reason: RX

## 2024-10-23 NOTE — TELEPHONE ENCOUNTER
No care due was identified.  [facility] Embedded Care Due Messages. Reference number: [id number].   10/23/2024 3:26:29 PM CDT

## 2024-10-23 NOTE — TELEPHONE ENCOUNTER
----- Message from Gabo sent at 10/23/2024  2:45 PM CDT -----  Contact: Papa  .Type:  RX Refill Request    Who Called:  Papa   Refill or New Rx: Refill    RX Name and Strength: insulin glargine, TOUJEO, (TOUJEO SOLOSTAR U-300 INSULIN) 300 unit/mL (1.5 mL) InPn pen  How is the patient currently taking it? (ex. 1XDay):  Inject 28 Units into the skin 2 (two) times daily.  Is this a 30 day or 90 day RX:   Preferred Pharmacy with phone number: .[id number] [place], [place] - [address] AT [facility] [facility] [address]  Phone: [phone] Fax: [phone]  Local or Mail Order: Local   Ordering Provider: Prachi Castano  Would the patient rather a call back or a response via [facility]?    Best Call Back Number: .    Additional Information:  Pt states he is completely out of the medication     Thanks

## 2024-10-25 ENCOUNTER — TELEPHONE (OUTPATIENT)
Dept: INTERNAL MEDICINE | Facility: CLINIC | Age: 59
End: 2024-10-25
Payer: MEDICARE

## 2024-10-25 ENCOUNTER — PATIENT MESSAGE (OUTPATIENT)
Dept: INTERNAL MEDICINE | Facility: CLINIC | Age: 59
End: 2024-10-25
Payer: MEDICARE

## 2024-10-25 DIAGNOSIS — Z79.4 TYPE 2 DIABETES MELLITUS WITH STAGE 3B CHRONIC KIDNEY DISEASE, WITH LONG-TERM CURRENT USE OF INSULIN: Primary | ICD-10-CM

## 2024-10-25 DIAGNOSIS — E11.22 TYPE 2 DIABETES MELLITUS WITH STAGE 3B CHRONIC KIDNEY DISEASE, WITH LONG-TERM CURRENT USE OF INSULIN: Primary | ICD-10-CM

## 2024-10-25 DIAGNOSIS — N18.32 TYPE 2 DIABETES MELLITUS WITH STAGE 3B CHRONIC KIDNEY DISEASE, WITH LONG-TERM CURRENT USE OF INSULIN: Primary | ICD-10-CM

## 2024-10-25 RX ORDER — INSULIN GLARGINE 100 [IU]/ML
28 INJECTION, SOLUTION SUBCUTANEOUS DAILY
Qty: 9 ML | Refills: 5 | Status: SHIPPED | OUTPATIENT
Start: 2024-10-25 | End: 2025-10-25

## 2024-10-25 NOTE — TELEPHONE ENCOUNTER
----- Message from Jenna sent at 10/25/2024  2:10 PM CDT -----  Contact: [phone]  Patient is returning a phone call.    Who left a message for the patient: Yessenia Franks LPN     Does patient know what this is regarding:  missed call, wants to speak with nurse and not a portal message    Would you like a call back, or a response through your [facility] portal?:   call    Comments:

## 2024-10-25 NOTE — TELEPHONE ENCOUNTER
----- Message from Eugenia sent at 10/25/2024  7:08 AM CDT -----  Contact: Papa Hoyos is calling in regards to his insurance does not cover this medication insulin glargine, TOUJEO, (TOUJEO SOLOSTAR U-300 INSULIN) 300 unit/mL (1.5 mL) InPn pen and can she sent something that his insurance cover and he stated that he is out.please call back at .[phone]      .  [id number] - [place] - [id number] AT [facility]  [address]  [place] [address]  Phone: [phone] Fax: [phone]        Thanks  EVELYN

## 2024-10-25 NOTE — TELEPHONE ENCOUNTER
Attempted to reach pt via telephone per his request. I was unable to reach pt but, I did send pt a message in NPM in regards to his insulin changes./LF

## 2024-10-29 ENCOUNTER — TELEPHONE (OUTPATIENT)
Dept: SMOKING CESSATION | Facility: CLINIC | Age: 59
End: 2024-10-29
Payer: MEDICARE

## 2024-10-29 ENCOUNTER — ANTI-COAG VISIT (OUTPATIENT)
Dept: CARDIOLOGY | Facility: CLINIC | Age: 59
End: 2024-10-29
Payer: MEDICARE

## 2024-10-29 DIAGNOSIS — Z79.01 LONG TERM (CURRENT) USE OF ANTICOAGULANTS: Primary | ICD-10-CM

## 2024-10-29 DIAGNOSIS — I48.0 PAF (PAROXYSMAL ATRIAL FIBRILLATION): ICD-10-CM

## 2024-10-29 LAB
CTP QC/QA: YES
INR PPP: 2.5 (ref 2–3)

## 2024-10-29 PROCEDURE — 85610 PROTHROMBIN TIME: CPT | Mod: QW,HCNC,S$GLB, | Performed by: INTERNAL MEDICINE

## 2024-10-29 RX ORDER — WARFARIN SODIUM 5 MG/1
TABLET ORAL
Qty: 35 TABLET | Refills: 11 | Status: SHIPPED | OUTPATIENT
Start: 2024-10-29

## 2024-10-30 ENCOUNTER — TELEPHONE (OUTPATIENT)
Dept: SMOKING CESSATION | Facility: CLINIC | Age: 59
End: 2024-10-30
Payer: MEDICARE

## 2024-11-18 ENCOUNTER — ANTI-COAG VISIT (OUTPATIENT)
Dept: CARDIOLOGY | Facility: CLINIC | Age: 59
End: 2024-11-18
Payer: MEDICARE

## 2024-11-18 DIAGNOSIS — Z79.01 LONG TERM (CURRENT) USE OF ANTICOAGULANTS: Primary | ICD-10-CM

## 2024-11-18 DIAGNOSIS — I48.0 PAF (PAROXYSMAL ATRIAL FIBRILLATION): ICD-10-CM

## 2024-11-18 LAB
CTP QC/QA: YES
INR PPP: 1.3 (ref 2–3)

## 2024-11-18 PROCEDURE — 93793 ANTICOAG MGMT PT WARFARIN: CPT | Mod: HCNC,S$GLB,,

## 2024-11-18 PROCEDURE — 85610 PROTHROMBIN TIME: CPT | Mod: QW,HCNC,S$GLB, | Performed by: INTERNAL MEDICINE

## 2024-11-18 NOTE — PROGRESS NOTES
INR not at goal. Medications, chart, and patient findings reviewed. See calendar for adjustments to dose and follow up plan.  Boost with 10 mg today and tomorrow.  Repeat INR In 1 week.  ER with any s/s of clotting or stroke.

## 2024-11-18 NOTE — PROGRESS NOTES
Patient's INR is sub-therapeutic at 1.3. Patient reports he missed two doses. Advised patient of increased risk of clotting; sign/symptoms of clotting and need to go to ED if he experiences any. Patient reports he is not having any signs/symptoms of clotting. Sent to PharmD for dosing/instructions.

## 2024-11-25 ENCOUNTER — ANTI-COAG VISIT (OUTPATIENT)
Dept: CARDIOLOGY | Facility: CLINIC | Age: 59
End: 2024-11-25
Payer: MEDICARE

## 2024-11-25 DIAGNOSIS — I48.0 PAF (PAROXYSMAL ATRIAL FIBRILLATION): ICD-10-CM

## 2024-11-25 DIAGNOSIS — Z79.01 LONG TERM (CURRENT) USE OF ANTICOAGULANTS: Primary | ICD-10-CM

## 2024-11-25 LAB
CTP QC/QA: YES
INR PPP: 1.8 (ref 2–3)

## 2024-11-25 PROCEDURE — 85610 PROTHROMBIN TIME: CPT | Mod: QW,HCNC,S$GLB, | Performed by: INTERNAL MEDICINE

## 2024-11-25 PROCEDURE — 93793 ANTICOAG MGMT PT WARFARIN: CPT | Mod: HCNC,S$GLB,,

## 2024-11-25 NOTE — PROGRESS NOTES
Patient's INR is sub-therapeutic at 1.8. Patient confirms he followed previous instructions. Patient reports no changes. Advised of increased risk of clotting; signs/symptoms of clotting and need to go to ED if he experiences any. Patient reports he if not having any signs/symptoms of clotting. Instructions given: Will give boosted dose today-11/25/24 of warfarin 10 mg; then resume warfarin 7.5 mg on Wednesdays and Mondays; and 5 mg all other days. Recheck on 12/9/24. Calendar reviewed with patient. Patient verbalizes understanding.

## 2024-12-09 ENCOUNTER — ANTI-COAG VISIT (OUTPATIENT)
Dept: CARDIOLOGY | Facility: CLINIC | Age: 59
End: 2024-12-09
Payer: MEDICARE

## 2024-12-09 DIAGNOSIS — I48.0 PAF (PAROXYSMAL ATRIAL FIBRILLATION): ICD-10-CM

## 2024-12-09 DIAGNOSIS — Z79.01 LONG TERM (CURRENT) USE OF ANTICOAGULANTS: Primary | ICD-10-CM

## 2024-12-09 LAB
CTP QC/QA: YES
INR PPP: 1.9 (ref 2–3)

## 2024-12-09 PROCEDURE — 93793 ANTICOAG MGMT PT WARFARIN: CPT | Mod: HCNC,S$GLB,,

## 2024-12-09 PROCEDURE — 85610 PROTHROMBIN TIME: CPT | Mod: QW,HCNC,S$GLB, | Performed by: INTERNAL MEDICINE

## 2024-12-09 NOTE — PROGRESS NOTES
Patient's INR is sub-therapeutic at 1.9. Patient confirms he followed previous instructions. Patient reports he ate cabbage yesterday. Re-educated patient on Coumadin Diet and need to keep diet consistent. Advised of increased risk of clotting; signs/symptoms of clotting and need to go to ED if he experiences any. Patient reports he is not having any signs/symptoms of clotting. Instructions given:  Will give boosted dose of warfarin 10 mg today 12/9/24; then increase overall dosage to warfarin 7.5 mg on Wednesdays, Fridays and Mondays; and 5 mg all other days. Recheck on 12/17/24. Calendar reviewed with patient. Patient verbalizes understanding.

## 2024-12-17 ENCOUNTER — ANTI-COAG VISIT (OUTPATIENT)
Dept: CARDIOLOGY | Facility: CLINIC | Age: 59
End: 2024-12-17
Payer: MEDICARE

## 2024-12-17 DIAGNOSIS — I48.0 PAF (PAROXYSMAL ATRIAL FIBRILLATION): ICD-10-CM

## 2024-12-17 DIAGNOSIS — Z79.01 LONG TERM (CURRENT) USE OF ANTICOAGULANTS: Primary | ICD-10-CM

## 2024-12-17 LAB
CTP QC/QA: YES
INR PPP: 3.1 (ref 2–3)

## 2024-12-17 PROCEDURE — 85610 PROTHROMBIN TIME: CPT | Mod: QW,HCNC,S$GLB, | Performed by: INTERNAL MEDICINE

## 2024-12-17 PROCEDURE — 93793 ANTICOAG MGMT PT WARFARIN: CPT | Mod: HCNC,S$GLB,,

## 2024-12-17 NOTE — PROGRESS NOTES
Patient's INR is supra-therapeutic at 3.1. Patient confirms he followed previous instructions. Patient reports he consumed alcoholic beverages this weekend. Advise patient of increased risk of bleeding; signs/symptoms of bleeding and need to go to ED if he experiences any. Patient reports he is not having any signs/symptoms of bleeding. Instructions given: take warfarin 2.5 mg today 12/17/24; then re-challenge warfarin 7.5 mg on Wednesdays, Fridays and Mondays; and 5 mg allo ther days. Recheck on 12/27/24. Calendar reviewed with patient. Patient verbalizes understanding.

## 2024-12-27 ENCOUNTER — OFFICE VISIT (OUTPATIENT)
Dept: CARDIOLOGY | Facility: CLINIC | Age: 59
End: 2024-12-27
Payer: MEDICARE

## 2024-12-27 ENCOUNTER — ANTI-COAG VISIT (OUTPATIENT)
Dept: CARDIOLOGY | Facility: CLINIC | Age: 59
End: 2024-12-27
Payer: MEDICARE

## 2024-12-27 VITALS
HEIGHT: 78 IN | HEART RATE: 76 BPM | WEIGHT: 202.38 LBS | SYSTOLIC BLOOD PRESSURE: 120 MMHG | DIASTOLIC BLOOD PRESSURE: 80 MMHG | BODY MASS INDEX: 23.42 KG/M2

## 2024-12-27 DIAGNOSIS — M51.362 DEGENERATION OF INTERVERTEBRAL DISC OF LUMBAR REGION WITH DISCOGENIC BACK PAIN AND LOWER EXTREMITY PAIN: ICD-10-CM

## 2024-12-27 DIAGNOSIS — F17.200 SMOKER: ICD-10-CM

## 2024-12-27 DIAGNOSIS — E78.5 HYPERLIPIDEMIA DUE TO TYPE 2 DIABETES MELLITUS: ICD-10-CM

## 2024-12-27 DIAGNOSIS — I48.0 PAF (PAROXYSMAL ATRIAL FIBRILLATION): ICD-10-CM

## 2024-12-27 DIAGNOSIS — E11.69 HYPERLIPIDEMIA DUE TO TYPE 2 DIABETES MELLITUS: ICD-10-CM

## 2024-12-27 DIAGNOSIS — R00.2 PALPITATIONS: ICD-10-CM

## 2024-12-27 DIAGNOSIS — I48.0 PAF (PAROXYSMAL ATRIAL FIBRILLATION): Primary | ICD-10-CM

## 2024-12-27 DIAGNOSIS — I15.2 HYPERTENSION ASSOCIATED WITH DIABETES: ICD-10-CM

## 2024-12-27 DIAGNOSIS — E11.59 HYPERTENSION ASSOCIATED WITH DIABETES: ICD-10-CM

## 2024-12-27 DIAGNOSIS — M25.50 ARTHRALGIA, UNSPECIFIED JOINT: ICD-10-CM

## 2024-12-27 DIAGNOSIS — N52.9 ERECTILE DYSFUNCTION, UNSPECIFIED ERECTILE DYSFUNCTION TYPE: ICD-10-CM

## 2024-12-27 DIAGNOSIS — Z79.01 LONG TERM (CURRENT) USE OF ANTICOAGULANTS: Primary | ICD-10-CM

## 2024-12-27 DIAGNOSIS — Z82.49 FAMILY HISTORY OF CORONARY ARTERY DISEASE: ICD-10-CM

## 2024-12-27 DIAGNOSIS — Z79.01 LONG TERM (CURRENT) USE OF ANTICOAGULANTS: ICD-10-CM

## 2024-12-27 LAB
CTP QC/QA: YES
INR PPP: 4.2 (ref 2–3)

## 2024-12-27 PROCEDURE — 99999 PR PBB SHADOW E&M-EST. PATIENT-LVL III: CPT | Mod: PBBFAC,HCNC,, | Performed by: INTERNAL MEDICINE

## 2024-12-27 RX ORDER — METOPROLOL TARTRATE 50 MG/1
50 TABLET ORAL 2 TIMES DAILY
Qty: 180 TABLET | Refills: 1 | Status: SHIPPED | OUTPATIENT
Start: 2024-12-27 | End: 2025-12-27

## 2024-12-27 RX ORDER — LISINOPRIL 40 MG/1
40 TABLET ORAL DAILY
Qty: 90 TABLET | Refills: 1 | Status: SHIPPED | OUTPATIENT
Start: 2024-12-27 | End: 2025-12-27

## 2024-12-27 RX ORDER — EZETIMIBE 10 MG/1
10 TABLET ORAL DAILY
Qty: 90 TABLET | Refills: 1 | Status: SHIPPED | OUTPATIENT
Start: 2024-12-27

## 2024-12-27 NOTE — PROGRESS NOTES
Patient's INR is supra-therapeutic at 4.2. Patient confirms he followed previous instructions. Patient reports he consumed alcoholic beverages and ate cranberry sauce on 12/25/24. Re-educated patient on Coumadin Diet and advised not to consume any cranberry products and to discontinue ETOH products. Advised patient of increased risk of bleeding; signs/symptoms of bleeding and need to go to ED if he experiences any. Patient reports he is not having any signs/symptoms of bleeding. Sent to PharmD for dosing/instructions.

## 2024-12-27 NOTE — PROGRESS NOTES
Risk of bleeding and drinking ETOH has been explained to the patient numerous time.  His true weekly dose has been very difficult to capture since there are missed doses and alcohol intake.  Hold dose then resume per calendar.

## 2024-12-27 NOTE — PROGRESS NOTES
Subjective:   Patient ID:  Papa Pope is a 59 y.o. male who presents for cardiac consult of No chief complaint on file.        The patient came in today for cardiac consult of No chief complaint on file.      Papa Pope is a 59 y.o. male pt with PAF, HTN, HLD, DM2 -h/o HHS, DDD, tobacco use presents for follow up CV eval.    3/25/24  BP elevated 150/98. He has more joint/back pain.   He has been out of [place].   ECG - sinus dipika V rate 58     24  BP and HR stable, improved now. Had a fall recently but tripped now doing well.     24  Recent A1c improving. Lipids worse 2024.   BP and HR stable. BMI 23 - 202 lbs   No recently falls.     FH - had CVD,  in 80s    HOLTER 2022  Conclusion       Predominant rhythm is sinus.  Heart rates varied between 51 and 124 BPM with an average of 76 BPM        Results for orders placed during the hospital encounter of 22    Nuclear Stress - Cardiology Interpreted    Interpretation Summary    Normal myocardial perfusion scan. There is no evidence of myocardial ischemia or infarction.    The gated perfusion images showed an ejection fraction of 54% at rest. The gated perfusion images showed an ejection fraction of 58% post stress.    There is normal wall motion at rest and post stress.    LV cavity size is normal at rest and normal at stress.    The EKG portion of this study is negative for ischemia.    The patient reported no chest pain during the stress test.    There were no arrhythmias during stress.      Results for orders placed during the hospital encounter of 22    Echo    Interpretation Summary  · Concentric hypertrophy and low normal systolic function.  · The estimated ejection fraction is 50%.  · Normal left ventricular diastolic function.  · With normal right ventricular systolic function.      Past Medical History:   Diagnosis Date    Acute renal failure 2022    Arthritis     Atrial fibrillation with RVR 2022    Back pain      Hyperlipidemia     Hypertension     Pneumonia     Polyneuropathy     Tobacco dependence     Type 2 diabetes mellitus without complication, without long-term current use of insulin 8/4/2021    Uncomplicated alcohol dependence 5/9/2023       Past Surgical History:   Procedure Laterality Date    COLONOSCOPY N/A 8/22/2017    Procedure: COLONOSCOPY;  Surgeon: Keo Cruz MD;  Location: [facility] ENDO;  Service: Endoscopy;  Laterality: N/A;    COLONOSCOPY N/A 9/30/2022    Procedure: COLONOSCOPY 9/20--[place] hold coumadin, no bridge;  Surgeon: Noé Neff MD;  Location: [facility] ENDO;  Service: General;  Laterality: N/A;    EPIDURAL STEROID INJECTION INTO CERVICAL SPINE N/A 11/1/2019    Procedure: C7/T1 IL SUKHJINDER;  Surgeon: Donnell Jordan MD;  Location: [facility] PAIN MGT;  Service: Pain Management;  Laterality: N/A;    STOMACH SURGERY      TRANSFORAMINAL EPIDURAL INJECTION OF STEROID Bilateral 8/13/2019    Procedure: Injection,steroid,epidural,transforaminal approach;  Surgeon: Donnell Jordan MD;  Location: [facility] PAIN MGT;  Service: Pain Management;  Laterality: Bilateral;       Social History     Tobacco Use    Smoking status: Some Days     Current packs/day: 1.00     Average packs/day: 1.3 packs/day for 42.0 years (53.5 ttl pk-yrs)     Types: Cigarettes     Start date: 1983    Smokeless tobacco: Never    Tobacco comments:     10 CPD   Substance Use Topics    Alcohol use: Yes     Alcohol/week: 12.0 standard drinks of alcohol     Types: 12 Cans of beer per week     Comment: weekends only    Drug use: No       Family History   Problem Relation Name Age of Onset    Hypertension Father         Patient's Medications   New Prescriptions    No medications on file   Previous Medications    AMLODIPINE (NORVASC) 5 MG TABLET    Take 1 tablet (5 mg total) by mouth once daily.    BLOOD SUGAR DIAGNOSTIC STRP    Use to check blood sugar twice a day    CICLOPIROX (PENLAC) 8 % SOLN    Apply to nails once daily    CYCLOBENZAPRINE  "(FLEXERIL) 10 MG TABLET    Take 1/2 to 1 tab BID PRN muscle spasms. May cause drowsiness.    EMPAGLIFLOZIN (JARDIANCE) 25 MG TABLET    Take 1 tablet (25 mg total) by mouth every morning.    EZETIMIBE (ZETIA) 10 MG TABLET    Take 1 tablet (10 mg total) by mouth once daily.    INSULIN ASPART U-100 (NOVOLOG FLEXPEN U-100 INSULIN) 100 UNIT/ML (3 ML) INPN PEN    Inject into the skin three times a day before each meal per sliding scale. If -200: 2 units; -250: 4 units; -300: 6 units; -350: 8 units; BG >350: 10 unit    INSULIN GLARGINE U-100, LANTUS, (LANTUS SOLOSTAR U-100 INSULIN) 100 UNIT/ML (3 ML) INPN PEN    Inject 30 Units into the skin once daily.    LISINOPRIL (PRINIVIL,ZESTRIL) 40 MG TABLET    Take 1 tablet (40 mg total) by mouth once daily.    METOPROLOL TARTRATE (LOPRESSOR) 50 MG TABLET    Take 1 tablet (50 mg total) by mouth 2 (two) times daily.    NICOTINE (NICODERM CQ) 21 MG/24 HR    Place 1 patch onto the skin once daily.    PEN NEEDLE, DIABETIC (BD CHRISTIANO 2ND GEN PEN NEEDLE) 32 GAUGE X 5/32" NDLE    Use five times daily    PREGABALIN (LYRICA) 150 MG CAPSULE    Take 1 capsule (150 mg total) by mouth 2 (two) times daily.    ROSUVASTATIN (CRESTOR) 40 MG TAB    Take 1 tablet (40 mg total) by mouth every evening.    TRUE METRIX AIR GLUCOSE METER KIT    USE AS DIRECTED    [facility] LANCETS [facility]    USE  TO  CHECK  BLOOD  SUGAR TWICE DAILY    WARFARIN (COUMADIN) 5 MG TABLET    Take 1 and 1/2 tablets (7.5 mg total) on Mondays and Wednesdays; and one tablet (5 mg) all other days OR AS DIRECTED BY COUMADIN CLINIC.   Modified Medications    No medications on file   Discontinued Medications    No medications on file       Review of Systems   Constitutional:  Positive for malaise/fatigue.   HENT: Negative.     Eyes: Negative.    Respiratory:  Positive for shortness of breath.    Cardiovascular:  Positive for chest pain and palpitations.   Gastrointestinal: Negative.    Genitourinary: " "Negative.    Musculoskeletal:  Positive for back pain and joint pain.   Skin: Negative.    Neurological: Negative.    Endo/Heme/Allergies: Negative.    Psychiatric/Behavioral: Negative.     All 12 systems otherwise negative.      Wt Readings from Last 3 Encounters:   12/27/24 91.8 kg (202 lb 6.1 oz)   08/26/24 93.4 kg (205 lb 14.6 oz)   07/29/24 93 kg (205 lb 0.4 oz)     Temp Readings from Last 3 Encounters:   08/26/24 97 °F (36.1 °C) (Tympanic)   02/16/24 97.4 °F (36.3 °C)   09/30/22 98.9 °F (37.2 °C) (Skin)     BP Readings from Last 3 Encounters:   12/27/24 120/80   08/26/24 136/86   07/29/24 (!) 132/91     Pulse Readings from Last 3 Encounters:   12/27/24 76   08/26/24 74   07/29/24 71       /80 (BP Location: Right arm, Patient Position: Sitting)   Pulse 76   Ht 6' 6" (1.981 m)   Wt 91.8 kg (202 lb 6.1 oz)   BMI 23.39 kg/m²     Objective:   Physical Exam  Vitals and nursing note reviewed.   Constitutional:       General: He is not in acute distress.     Appearance: He is well-developed. He is not diaphoretic.   HENT:      Head: Normocephalic and atraumatic.      Nose: Nose normal.   Eyes:      General: No scleral icterus.     Conjunctiva/sclera: Conjunctivae normal.   Neck:      Thyroid: No thyromegaly.      Vascular: No JVD.   Cardiovascular:      Rate and Rhythm: Normal rate and regular rhythm.      Heart sounds: S1 normal and S2 normal. No murmur heard.     No friction rub. No gallop. No S3 or S4 sounds.   Pulmonary:      Effort: Pulmonary effort is normal. No respiratory distress.      Breath sounds: Normal breath sounds. No stridor. No wheezing or rales.   Chest:      Chest wall: No tenderness.   Abdominal:      General: Bowel sounds are normal. There is no distension.      Palpations: Abdomen is soft. There is no mass.      Tenderness: There is no abdominal tenderness. There is no rebound.   Genitourinary:     Comments: Deferred  Musculoskeletal:         General: No tenderness or deformity. Normal " range of motion.      Cervical back: Normal range of motion and neck supple.   Lymphadenopathy:      Cervical: No cervical adenopathy.   Skin:     General: Skin is warm and dry.      Coloration: Skin is not pale.      Findings: No erythema or rash.   Neurological:      Mental Status: He is alert and oriented to person, place, and time.      Motor: No abnormal muscle tone.      Coordination: Coordination normal.   Psychiatric:         Behavior: Behavior normal.         Thought Content: Thought content normal.         Judgment: Judgment normal.         Lab Results   Component Value Date     08/26/2024    K 4.1 08/26/2024     08/26/2024    CO2 18 (L) 08/26/2024    BUN 20 08/26/2024    CREATININE 1.5 (H) 08/26/2024     (H) 08/26/2024    HGBA1C 7.3 (H) 08/26/2024    AST 23 08/26/2024    ALT 22 08/26/2024    ALBUMIN 3.6 08/26/2024    PROT 6.8 08/26/2024    BILITOT 0.3 08/26/2024    WBC 6.03 08/26/2024    HGB 14.5 08/26/2024    HCT 45.3 08/26/2024     (H) 08/26/2024     (L) 08/26/2024    INR 4.2 (A) 12/27/2024    INR 1.0 04/05/2024    TSH 2.091 08/26/2024    CHOL 157 08/26/2024    HDL 46 08/26/2024    LDLCALC 93.4 08/26/2024    TRIG 88 08/26/2024    BNP 47 01/25/2022     Assessment:      1. PAF (paroxysmal atrial fibrillation)    2. Long term (current) use of anticoagulants    3. Family history of coronary artery disease    4. Palpitations    5. Hypertension associated with diabetes    6. Hyperlipidemia due to type 2 diabetes mellitus    7. Smoker    8. Erectile dysfunction, unspecified erectile dysfunction type    9. Arthralgia, unspecified joint    10. Degeneration of intervertebral disc of lumbar region with discogenic back pain and lower extremity pain              Plan:   1. PAF, FH CAD  - in NSR   - change Eliquis to coumadin due to cost   - Nuclear stress neg for ischemia 2/2022. , elevated INR today   - Holter neg for Afib 2/2022    2. HLD  - cont statin  - needs improvement in  lipids     3. Tobacco abuse, alc abuse  - needs alc cessation  - needs cessation    4. DDD, lumbar  - cont tx per PCP  - f/u pain - will have injections  - referral     5. DM2 A1c 11.4 --> 6.8 --> 7.1 --> 9.1 --> 6.3 -> 8.3 --> 7.3; ED  - viagra PRN   - cont tx  - improved sugars     6. HTN - elevated   -inc Lisinopril 10mg  --> inc to Lisinopril 40mg and cont BB    7. Preop CV eval -  teeth extractions with [facility] - deferred   - Low CV risk, cont BB  - ok to hold coumadin 5 days and resume post op as pt is in NSR  - needs close f/u with coumadin clinic    Visit today included increased complexity associated with the care of the episodic problem HTN addressed and managing the longitudinal care of the patient due to the serious and/or complex managed problem(s) .      Thank you for allowing me to participate in this patient's care. Please do not hesitate to contact me with any questions or concerns. Consult note has been forwarded to the referral physician.     Denny Cunningham MD, [facility]  Cardiovascular Disease  [facility], [place]  [phone] (P)

## 2025-01-10 ENCOUNTER — PATIENT OUTREACH (OUTPATIENT)
Dept: ADMINISTRATIVE | Facility: HOSPITAL | Age: 60
End: 2025-01-10
Payer: MEDICARE

## 2025-01-10 DIAGNOSIS — F17.210 CIGARETTE NICOTINE DEPENDENCE WITHOUT COMPLICATION: Primary | ICD-10-CM

## 2025-01-10 NOTE — PROGRESS NOTES
VBC OUTREACH: per chart review pt is overdue for DM eye exam, spoke with pt he agreed to scheduling appt, LDCT due pt agreed to scheduling, declined OPCM services.

## 2025-01-13 ENCOUNTER — PATIENT MESSAGE (OUTPATIENT)
Dept: ADMINISTRATIVE | Facility: CLINIC | Age: 60
End: 2025-01-13
Payer: MEDICARE

## 2025-01-16 ENCOUNTER — ANTI-COAG VISIT (OUTPATIENT)
Dept: CARDIOLOGY | Facility: CLINIC | Age: 60
End: 2025-01-16
Payer: MEDICARE

## 2025-01-16 DIAGNOSIS — Z79.01 LONG TERM (CURRENT) USE OF ANTICOAGULANTS: Primary | ICD-10-CM

## 2025-01-16 DIAGNOSIS — I48.0 PAF (PAROXYSMAL ATRIAL FIBRILLATION): ICD-10-CM

## 2025-01-16 LAB
CTP QC/QA: YES
INR PPP: 1.4 (ref 2–3)

## 2025-01-16 PROCEDURE — 85610 PROTHROMBIN TIME: CPT | Mod: QW,HCNC,S$GLB, | Performed by: INTERNAL MEDICINE

## 2025-01-16 PROCEDURE — 93793 ANTICOAG MGMT PT WARFARIN: CPT | Mod: HCNC,S$GLB,,

## 2025-01-16 NOTE — PROGRESS NOTES
Patient's INR is sub-therapeutic at 1.4. Patient reports he missed two doses of warfarin. Advised patient of increased risk of clotting; signs/symptoms of clotting and need to go to ED if he experiences any. Patient reports he is not having any signs/symptoms of clotting. Sent to PharmD for dosing/instructions.

## 2025-01-16 NOTE — PROGRESS NOTES
INR not at goal. Medications, chart, and patient findings reviewed. See calendar for adjustments to dose and follow up plan.  Pt has missed 2 doses - we will try a small boost today only then resume dose since INR tends to elevated with alcohol intake.  Repeat INR in 1 week.

## 2025-01-27 ENCOUNTER — ANTI-COAG VISIT (OUTPATIENT)
Dept: CARDIOLOGY | Facility: CLINIC | Age: 60
End: 2025-01-27
Payer: MEDICARE

## 2025-01-27 DIAGNOSIS — I48.0 PAF (PAROXYSMAL ATRIAL FIBRILLATION): ICD-10-CM

## 2025-01-27 DIAGNOSIS — Z79.01 LONG TERM (CURRENT) USE OF ANTICOAGULANTS: Primary | ICD-10-CM

## 2025-01-27 LAB
CTP QC/QA: YES
INR PPP: 2.5 (ref 2–3)

## 2025-01-27 PROCEDURE — 93793 ANTICOAG MGMT PT WARFARIN: CPT | Mod: HCNC,S$GLB,,

## 2025-01-27 PROCEDURE — 85610 PROTHROMBIN TIME: CPT | Mod: QW,HCNC,S$GLB, | Performed by: INTERNAL MEDICINE

## 2025-01-27 NOTE — PROGRESS NOTES
Patient's INR is therapeutic at 2.5. Patient reports no changes. Instructions given: Continue warfarin 7.5 mg on Mondays, Wednesdays and Fridays; and 5 mg all other days. Recheck on 2/10/25 with other appointment. Calendar reviewed with patient. Patient verbalizes understanding.

## 2025-02-10 ENCOUNTER — OFFICE VISIT (OUTPATIENT)
Dept: OPHTHALMOLOGY | Facility: CLINIC | Age: 60
End: 2025-02-10
Payer: MEDICARE

## 2025-02-10 ENCOUNTER — ANTI-COAG VISIT (OUTPATIENT)
Dept: CARDIOLOGY | Facility: CLINIC | Age: 60
End: 2025-02-10
Payer: MEDICARE

## 2025-02-10 DIAGNOSIS — H52.4 REGULAR ASTIGMATISM WITH PRESBYOPIA, BILATERAL: ICD-10-CM

## 2025-02-10 DIAGNOSIS — E11.9 DIABETES MELLITUS WITHOUT COMPLICATION: Primary | ICD-10-CM

## 2025-02-10 DIAGNOSIS — I48.0 PAF (PAROXYSMAL ATRIAL FIBRILLATION): ICD-10-CM

## 2025-02-10 DIAGNOSIS — Z79.01 LONG TERM (CURRENT) USE OF ANTICOAGULANTS: Primary | ICD-10-CM

## 2025-02-10 DIAGNOSIS — H25.813 COMBINED FORM OF AGE-RELATED CATARACT, BOTH EYES: ICD-10-CM

## 2025-02-10 DIAGNOSIS — H52.223 REGULAR ASTIGMATISM WITH PRESBYOPIA, BILATERAL: ICD-10-CM

## 2025-02-10 LAB
CTP QC/QA: YES
INR PPP: 4.5 (ref 2–3)

## 2025-02-10 PROCEDURE — 2023F DILAT RTA XM W/O RTNOPTHY: CPT | Mod: HCNC,CPTII,S$GLB, | Performed by: OPTOMETRIST

## 2025-02-10 PROCEDURE — 85610 PROTHROMBIN TIME: CPT | Mod: QW,HCNC,S$GLB, | Performed by: INTERNAL MEDICINE

## 2025-02-10 PROCEDURE — 93793 ANTICOAG MGMT PT WARFARIN: CPT | Mod: HCNC,S$GLB,,

## 2025-02-10 PROCEDURE — 92014 COMPRE OPH EXAM EST PT 1/>: CPT | Mod: HCNC,S$GLB,, | Performed by: OPTOMETRIST

## 2025-02-10 PROCEDURE — 99999 PR PBB SHADOW E&M-EST. PATIENT-LVL III: CPT | Mod: PBBFAC,HCNC,, | Performed by: OPTOMETRIST

## 2025-02-10 PROCEDURE — 1160F RVW MEDS BY RX/DR IN RCRD: CPT | Mod: HCNC,CPTII,S$GLB, | Performed by: OPTOMETRIST

## 2025-02-10 PROCEDURE — 1159F MED LIST DOCD IN RCRD: CPT | Mod: HCNC,CPTII,S$GLB, | Performed by: OPTOMETRIST

## 2025-02-10 NOTE — PROGRESS NOTES
INR not at goal. Medications, chart, and patient findings reviewed. See calendar for adjustments to dose and follow up plan.  Alcohol induced INR elevation.  Pt has been educated about the significant bleeding risks related to drinking and warfarin.  Hold warfarin today.  Stop/reduce alcohol.  Repeat INR in 2 weeks.

## 2025-02-10 NOTE — PROGRESS NOTES
Patient's INR is supra-therapeutic at 4.5. Patient confirms he followed previous instructions. Patient reports he consumed alcoholic beverages. Advised patient of increased risk of bleeding; signs/symptoms of bleeding and need to go to ED if he experiences any. Patient reports he is not having any signs/symptoms of bleeding. Sent to PharmD for dosing/instructions.

## 2025-02-10 NOTE — PROGRESS NOTES
HPI     Diabetic Eye Exam            Comments: Patient here today for yearly eye exam            Comments    Diagnosed with diabetes in 2022  Lab Results       Component                Value               Date                       HGBA1C                   7.3 (H)             08/26/2024            Vision changes since last eye exam?: Yes at distance  Wears Bifocal glasses full-time     Any eye pain today: No    Other ocular symptoms: Itchy eyes    Interested in contact lens fitting today? No    1. DM  2. NSC OU          Last edited by Jamilah Alcaraz, PCT on 2/10/2025  8:53 AM.            Assessment /Plan     For exam results, see Encounter Report.    1. Diabetes mellitus without complication  Last A1c 7.3 There was no diabetic retinopathy present on either eye on examination today. Recommend good blood pressure control, strict blood glucose control, and good cholesterol control.  Continue close care with Dr. Castano regarding diabetes.     2. Combined form of age-related cataract, both eyes  -     Ambulatory referral/consult to Ophthalmology; Future; Expected date: 02/17/2025  OD>OS   Cataract accounts for vision change. New Rx for glasses/contacts will not improve vision.   Refer to Dr. COOPER for cataract evaluation.      3. Regular astigmatism with presbyopia, bilateral  Eyeglass Final Rx       Eyeglass Final Rx         Sphere Cylinder Axis Add    Right +1.25 +1.00 160 +2.50    Left +1.00 +0.75 165 +2.50      Type: PAL    Expiration Date: 2/10/2026                     RTC with Dr. Adams for CE/IOL, sooner if changes to vision or worsening symptoms.  Discussed above and answered questions.

## 2025-02-17 ENCOUNTER — CLINICAL SUPPORT (OUTPATIENT)
Dept: SMOKING CESSATION | Facility: CLINIC | Age: 60
End: 2025-02-17

## 2025-02-17 DIAGNOSIS — F17.200 NICOTINE DEPENDENCE: Primary | ICD-10-CM

## 2025-02-17 PROCEDURE — 99407 BEHAV CHNG SMOKING > 10 MIN: CPT | Mod: ,,,

## 2025-02-17 NOTE — PROGRESS NOTES
Spoke with patient today in regard to smoking cessation progress for 3 month telephone follow up, he states not tobacco free. Patient states he was able to cut down on smoking and not ready to return to the program at this time but will call when ready. Informed patient of benefit period, future follow ups, and contact information if any further help or support is needed. Will complete smart form for 3 month follow up on Quit attempt #1.

## 2025-02-18 ENCOUNTER — DOCUMENTATION ONLY (OUTPATIENT)
Dept: OPHTHALMOLOGY | Facility: CLINIC | Age: 60
End: 2025-02-18

## 2025-02-18 ENCOUNTER — OFFICE VISIT (OUTPATIENT)
Dept: OPHTHALMOLOGY | Facility: CLINIC | Age: 60
End: 2025-02-18
Payer: MEDICARE

## 2025-02-18 DIAGNOSIS — Z79.4 TYPE 2 DIABETES MELLITUS WITHOUT COMPLICATION, WITH LONG-TERM CURRENT USE OF INSULIN: ICD-10-CM

## 2025-02-18 DIAGNOSIS — H25.11 NUCLEAR SCLEROSIS OF RIGHT EYE: Primary | ICD-10-CM

## 2025-02-18 DIAGNOSIS — H25.12 NUCLEAR SCLEROSIS OF LEFT EYE: ICD-10-CM

## 2025-02-18 DIAGNOSIS — E11.9 TYPE 2 DIABETES MELLITUS WITHOUT COMPLICATION, WITH LONG-TERM CURRENT USE OF INSULIN: ICD-10-CM

## 2025-02-18 RX ORDER — PREDNISOLONE ACETATE 10 MG/ML
1 SUSPENSION/ DROPS OPHTHALMIC EVERY 4 HOURS
Qty: 5 ML | Refills: 1 | Status: SHIPPED | OUTPATIENT
Start: 2025-02-18 | End: 2026-02-18

## 2025-02-18 NOTE — PROGRESS NOTES
HPI     Cataract     Additional comments: 1. DM dx 2022           Comments    C/o blurred vision , glare, trouble driving at night , difficulty reading,   and seeing the television over the past several years    Which eye is most affected? OD > OS    Activities of daily living impacted: Yes    Do you have difficulty, even with glasses, with the following activities?    Reading small print such as labels on medicine bottles, a telephone book,   or food labels.   Yes  Reading a newspaper or book?  Yes  Seeing steps, stairs, or curbs  No  Reading traffic signs, street signs, or store signs  N/A  Doing fine handwork like sewing, knitting, crocheting or carpentry?  N/A  Writing checks or filling out forms  N/A  Playing games such as bingo, [employer], card games or mahjong?  N/A  Watching television?  Yes  Driving at night?  Yes, doesn't drive anymore    Do you have any upcoming procedures or surgeries?  No    Have you had any eye surgeries including LASIK or PRK?  No (if so, what kind)    Do you have VA insurance?   No (if so, notify MD for potential toric lens)             Last edited by Lopez Owens on 2/18/2025  8:22 AM.            Assessment /Plan     For exam results, see Encounter Report.    Nuclear sclerosis of right eye- Visually Significant Cataract OU  Patient reports decreased vision consistent with the clinical amount of lenticular opacity, which reaches the level of visual significance and affects activities of daily living including reading and glare. Risks, benefits, and alternatives to cataract surgery were discussed.  Discussion of risks included possibility of infection as well as permanent vision loss.The pt expressed a desire to proceed with surgery with the potential for some reasonable degree of visual improvement. Recommended regular use of artificial tears and good lid hygiene to optimize surgical outcome.     Discussed IOL options and refractive outcomes for this patient.    [place] right eye,  Topical    Additional considerations:   +/- I-Ring    Will aim for Monofocal - Distance IOL    Post op gtts:   PF      Discussed that vision may be limited by:  Astigmatism >1D  *Discussed with patient he may be in glasses full time after surgery due to astigmatism, patient understands and wishes to proceed with monofocal lens     Explained that patient may need glasses after surgery.    RTC for POD#1      Nuclear sclerosis of left eye- Follow    Type 2 diabetes mellitus without complication, with long-term current use of insulin  -  last A1c 7.3   Strict BG control, f/u w/ PCP, and annual DFE  Stressed importance of DM control to preserve vision

## 2025-02-18 NOTE — PROGRESS NOTES
Short Stay Record    Diagnosis: Nuclear Sclerotic Cataract right    CC: Blurry Vision     HPI:  Papa Pope is a 59 y.o. male who presents for evaluation prior to ophthalmic surgery. No current complaints.     Past Medical History:   Diagnosis Date    Acute renal failure 1/25/2022    Arthritis     Atrial fibrillation with RVR 1/25/2022    Back pain     Hyperlipidemia     Hypertension     Pneumonia     Polyneuropathy     Tobacco dependence     Type 2 diabetes mellitus without complication, without long-term current use of insulin 8/4/2021    Uncomplicated alcohol dependence 5/9/2023     Past Surgical History:   Procedure Laterality Date    COLONOSCOPY N/A 8/22/2017    Procedure: COLONOSCOPY;  Surgeon: Keo Cruz MD;  Location: [facility];  Service: Endoscopy;  Laterality: N/A;    COLONOSCOPY N/A 9/30/2022    Procedure: COLONOSCOPY 9/20--[place] hold coumadin, no bridge;  Surgeon: Noé Neff MD;  Location: [facility];  Service: General;  Laterality: N/A;    EPIDURAL STEROID INJECTION INTO CERVICAL SPINE N/A 11/1/2019    Procedure: C7/T1 IL SUKHJINDER;  Surgeon: Donnell Jordan MD;  Location: [facility] PAIN [facility];  Service: Pain Management;  Laterality: N/A;    STOMACH SURGERY      TRANSFORAMINAL EPIDURAL INJECTION OF STEROID Bilateral 8/13/2019    Procedure: Injection,steroid,epidural,transforaminal approach;  Surgeon: Donnell Jordan MD;  Location: [facility] PAIN T;  Service: Pain Management;  Laterality: Bilateral;     Social History     Tobacco Use    Smoking status: Some Days     Current packs/day: 1.00     Average packs/day: 1.3 packs/day for 42.1 years (53.6 ttl pk-yrs)     Types: Cigarettes     Start date: 1983    Smokeless tobacco: Never    Tobacco comments:     10 CPD   Substance Use Topics    Alcohol use: Yes     Alcohol/week: 12.0 standard drinks of alcohol     Types: 12 Cans of beer per week     Comment: weekends only     Family History   Problem Relation Name Age of Onset    Hypertension Father        Review of patient's allergies indicates:  No Known Allergies    Current Medications[1]    Review of Systems:  10 Pt ROS negative except as stated in HPI    Physical Exam:  General Appearance:    A&Ox3, no distress, appears stated age   Head:    Normocephalic, without obvious abnormality, atraumatic   Eyes:    PERRL, EOM's intact   Back:     Symmetric, no curvature   Lungs:     respirations unlabored   Chest Wall:    No tenderness or deformity    Heart:  Abdomen:  Extremities:  Skin:    S1 and S2 present    Soft, non-tender    Extremities normal, atraumatic    Skin color, texture, turgor normal     Patient is stable for ophthalmic surgery under local and MAC.       _           [1]   Current Outpatient Medications:     amLODIPine (NORVASC) 5 MG tablet, Take 1 tablet (5 mg total) by mouth once daily., Disp: 90 tablet, Rfl: 0    blood sugar diagnostic Strp, Use to check blood sugar twice a day, Disp: 100 each, Rfl: 6    ciclopirox (PENLAC) 8 % Soln, Apply to nails once daily, Disp: 6.6 mL, Rfl: 3    cyclobenzaprine (FLEXERIL) 10 MG tablet, Take 1/2 to 1 tab BID PRN muscle spasms. May cause drowsiness., Disp: 60 tablet, Rfl: 0    empagliflozin (JARDIANCE) 25 mg tablet, Take 1 tablet (25 mg total) by mouth every morning., Disp: 90 tablet, Rfl: 1    ezetimibe (ZETIA) 10 mg tablet, Take 1 tablet (10 mg total) by mouth once daily., Disp: 90 tablet, Rfl: 1    insulin aspart U-100 (NOVOLOG FLEXPEN U-100 INSULIN) 100 unit/mL (3 mL) InPn pen, Inject into the skin three times a day before each meal per sliding scale. If -200: 2 units; -250: 4 units; -300: 6 units; -350: 8 units; BG >350: 10 unit, Disp: 15 mL, Rfl: 1    insulin glargine U-100, Lantus, (LANTUS SOLOSTAR U-100 INSULIN) 100 unit/mL (3 mL) InPn pen, Inject 30 Units into the skin once daily., Disp: 15 mL, Rfl: 2    lisinopriL (PRINIVIL,ZESTRIL) 40 MG tablet, Take 1 tablet (40 mg total) by mouth once daily., Disp: 90 tablet, Rfl: 1     "metoprolol tartrate (LOPRESSOR) 50 MG tablet, Take 1 tablet (50 mg total) by mouth 2 (two) times daily., Disp: 180 tablet, Rfl: 1    nicotine (NICODERM CQ) 21 mg/24 hr, Place 1 patch onto the skin once daily., Disp: 30 patch, Rfl: 0    pen needle, diabetic (BD CHRISTIANO 2ND GEN PEN NEEDLE) 32 gauge x 5/32" Ndle, Use five times daily, Disp: 100 each, Rfl: 0    prednisoLONE acetate (PRED FORTE) 1 % DrpS, Place 1 drop into the right eye every 4 (four) hours., Disp: 5 mL, Rfl: 1    pregabalin (LYRICA) 150 MG capsule, Take 1 capsule (150 mg total) by mouth 2 (two) times daily., Disp: 180 capsule, Rfl: 0    rosuvastatin (CRESTOR) 40 MG Tab, Take 1 tablet (40 mg total) by mouth every evening., Disp: 90 tablet, Rfl: 1    TRUE METRIX AIR GLUCOSE METER kit, USE AS DIRECTED, Disp: 1 each, Rfl: 0    TRUEPLUS LANCETS 33 gauge Misc, USE  TO  CHECK  BLOOD  SUGAR TWICE DAILY, Disp: 200 each, Rfl: 0    warfarin (COUMADIN) 5 MG tablet, Take 1 and 1/2 tablets (7.5 mg total) on Mondays and Wednesdays; and one tablet (5 mg) all other days OR AS DIRECTED BY COUMADIN CLINIC. (Patient taking differently: Take 1 and 1/2 tablets (7.5 mg total) on Mondays, Wednesdays and Fridays; and one tablet (5 mg) all other days OR AS DIRECTED BY COUMADIN CLINIC.), Disp: 35 tablet, Rfl: 11    "

## 2025-02-26 ENCOUNTER — OUTSIDE PLACE OF SERVICE (OUTPATIENT)
Dept: OPHTHALMOLOGY | Facility: CLINIC | Age: 60
End: 2025-02-26
Payer: MEDICARE

## 2025-02-27 ENCOUNTER — OFFICE VISIT (OUTPATIENT)
Dept: OPHTHALMOLOGY | Facility: CLINIC | Age: 60
End: 2025-02-27
Payer: MEDICARE

## 2025-02-27 ENCOUNTER — ANTI-COAG VISIT (OUTPATIENT)
Dept: CARDIOLOGY | Facility: CLINIC | Age: 60
End: 2025-02-27
Payer: MEDICARE

## 2025-02-27 DIAGNOSIS — Z98.41 CATARACT EXTRACTION STATUS OF EYE, RIGHT: ICD-10-CM

## 2025-02-27 DIAGNOSIS — I48.0 PAF (PAROXYSMAL ATRIAL FIBRILLATION): ICD-10-CM

## 2025-02-27 DIAGNOSIS — Z79.01 LONG TERM (CURRENT) USE OF ANTICOAGULANTS: Primary | ICD-10-CM

## 2025-02-27 DIAGNOSIS — Z98.890 POST-OPERATIVE STATE: Primary | ICD-10-CM

## 2025-02-27 LAB
CTP QC/QA: YES
INR PPP: 1.9 (ref 2–3)

## 2025-02-27 PROCEDURE — 93793 ANTICOAG MGMT PT WARFARIN: CPT | Mod: HCNC,S$GLB,,

## 2025-02-27 PROCEDURE — 85610 PROTHROMBIN TIME: CPT | Mod: QW,HCNC,S$GLB, | Performed by: INTERNAL MEDICINE

## 2025-02-27 PROCEDURE — 99999 PR PBB SHADOW E&M-EST. PATIENT-LVL III: CPT | Mod: PBBFAC,HCNC,, | Performed by: STUDENT IN AN ORGANIZED HEALTH CARE EDUCATION/TRAINING PROGRAM

## 2025-02-27 NOTE — PROGRESS NOTES
Patient's INR is sub-therapeutic at 1.9. Patient reports he forgot to take it last night-2/26/25. Advised patient of increased risk of clotting; signs/symptoms of clotting and need to go to ED if he experiences any. Patient reports he is not having any signs/symptoms of clotting. Instructions given: Will give boosted dose of warfarin 7.5 mg today-2/27/25; then resume warfarin 7.5 mg on Fridays, Mondays and Wednesdays; and 5 mg all other days. Recheck on 3/7/25 with other appointment. Calendar reviewed with patient. Patient verbalizes understanding.

## 2025-02-27 NOTE — PROGRESS NOTES
HPI     Post-op Evaluation            Comments: POD#1 s/p CEIOL OD. Has received appropriate post-op drops.   Denies any discomfort. No new ocular complaints.           Last edited by Lopez Owens on 2/27/2025  1:48 PM.            Assessment /Plan     For exam results, see Encounter Report.    Post-operative state  Cataract extraction status of eye, right- POD#1 S/P CEIOL OD Doing well. Mild edema    Continue gtts to operative eye:  PF QID      Reinstructed in importance of compliance with post-op instructions including medications, shield at bedtime, protective glasses during the day, and limitation of activities. Patient instructed to call immediately for increased pain, redness or vision loss.     RTC 1 week. MOCT if PH worse than 20/25

## 2025-03-02 DIAGNOSIS — E78.5 HYPERLIPIDEMIA DUE TO TYPE 2 DIABETES MELLITUS: ICD-10-CM

## 2025-03-02 DIAGNOSIS — E11.69 HYPERLIPIDEMIA DUE TO TYPE 2 DIABETES MELLITUS: ICD-10-CM

## 2025-03-02 RX ORDER — ROSUVASTATIN CALCIUM 40 MG/1
40 TABLET, COATED ORAL NIGHTLY
Qty: 90 TABLET | Refills: 1 | Status: SHIPPED | OUTPATIENT
Start: 2025-03-02

## 2025-03-02 NOTE — TELEPHONE ENCOUNTER
Care Due:                  Date            Visit Type   Department     Provider  --------------------------------------------------------------------------------                                EP -                              PRIMARY      [facility] INTERNAL  Prachi Mainampati  Last Visit: 08-      CARE ([facility])   MEDICINE       Eyad  Next Visit: None Scheduled  None         None Found                                                            Last  Test          Frequency    Reason                     Performed    Due Date  --------------------------------------------------------------------------------    HBA1C.......  6 months...  empagliflozin, insulin...  08- 02-    [facility] Embedded Care Due Messages. Reference number: [id number].   3/02/2025 4:18:08 PM CST

## 2025-03-03 NOTE — TELEPHONE ENCOUNTER
Provider Staff:  Action required for this patient    Requires labs      Please see care gap opportunities below in Care Due Message.    Thanks!  [facility]     Appointments      Date Provider   Last Visit   8/26/2024 Prachi Castano MD   Next Visit   Visit date not found Prachi Castano MD     Refill Decision Note   Papa Pope  is requesting a refill authorization.  Brief Assessment and Rationale for Refill:  Approve     Medication Therapy Plan:         Comments:     Note composed:9:17 PM 03/02/2025

## 2025-03-05 DIAGNOSIS — I15.2 HYPERTENSION ASSOCIATED WITH DIABETES: ICD-10-CM

## 2025-03-05 DIAGNOSIS — E11.59 HYPERTENSION ASSOCIATED WITH DIABETES: ICD-10-CM

## 2025-03-06 RX ORDER — AMLODIPINE BESYLATE 5 MG/1
5 TABLET ORAL
Qty: 90 TABLET | Refills: 1 | Status: SHIPPED | OUTPATIENT
Start: 2025-03-06

## 2025-03-06 NOTE — TELEPHONE ENCOUNTER
No care due was identified.  [facility] Embedded Care Due Messages. Reference number: [id number].   3/06/2025 8:58:26 AM CST

## 2025-03-06 NOTE — TELEPHONE ENCOUNTER
Refill Authorization Note     Refill Decision Note   Papa Pope  is requesting a refill authorization.  Brief Assessment and Rationale for Refill:  Approve     Medication Therapy Plan:       Medication Reconciliation Completed: No   Comments:     No Care Gaps recommended.     Note composed:9:09 AM 03/06/2025

## 2025-03-07 ENCOUNTER — DOCUMENTATION ONLY (OUTPATIENT)
Dept: OPHTHALMOLOGY | Facility: CLINIC | Age: 60
End: 2025-03-07

## 2025-03-07 ENCOUNTER — OFFICE VISIT (OUTPATIENT)
Dept: OPHTHALMOLOGY | Facility: CLINIC | Age: 60
End: 2025-03-07
Payer: MEDICARE

## 2025-03-07 ENCOUNTER — ANTI-COAG VISIT (OUTPATIENT)
Dept: CARDIOLOGY | Facility: CLINIC | Age: 60
End: 2025-03-07
Payer: MEDICARE

## 2025-03-07 DIAGNOSIS — Z79.01 LONG TERM (CURRENT) USE OF ANTICOAGULANTS: Primary | ICD-10-CM

## 2025-03-07 DIAGNOSIS — Z98.41 CATARACT EXTRACTION STATUS OF EYE, RIGHT: ICD-10-CM

## 2025-03-07 DIAGNOSIS — H25.12 NUCLEAR SCLEROSIS OF LEFT EYE: ICD-10-CM

## 2025-03-07 DIAGNOSIS — I48.0 PAF (PAROXYSMAL ATRIAL FIBRILLATION): ICD-10-CM

## 2025-03-07 DIAGNOSIS — Z98.890 POST-OPERATIVE STATE: Primary | ICD-10-CM

## 2025-03-07 LAB
CTP QC/QA: YES
INR PPP: 6.5 (ref 2–3)

## 2025-03-07 PROCEDURE — 99999 PR PBB SHADOW E&M-EST. PATIENT-LVL III: CPT | Mod: PBBFAC,HCNC,, | Performed by: STUDENT IN AN ORGANIZED HEALTH CARE EDUCATION/TRAINING PROGRAM

## 2025-03-07 RX ORDER — PREDNISOLONE ACETATE 10 MG/ML
1 SUSPENSION/ DROPS OPHTHALMIC 4 TIMES DAILY
Qty: 5 ML | Refills: 1 | Status: SHIPPED | OUTPATIENT
Start: 2025-03-07 | End: 2026-03-07

## 2025-03-07 NOTE — PROGRESS NOTES
Patient's INR is supra-therapeutic at 6.5. Patient confirms he followed previous instructions. Patient reports he ate grapefruit. Re-educated patient on Coumadin Diet. Advised patient of increased risk of bleeding; signs/symptoms of bleeding and need to go to ED if he experiences any. Patient reports he is not having any signs/symptoms of bleeding. Sent to PharmD for dosing/instructions.

## 2025-03-07 NOTE — PROGRESS NOTES
HPI     Post-op Evaluation            Comments: POD#1 s/p CEIOL OD. Has received appropriate post-op drops.   Denies any discomfort. No new ocular complaints.    Lab Results       Component                Value               Date                       HGBA1C                   7.3 (H)             08/26/2024                      Comments    1. DM dx 2022  2. PCIOL OD 2/26/25             Last edited by Felisa Farah on 3/7/2025  9:50 AM.            Assessment /Plan     For exam results, see Encounter Report.    Post-operative state  Cataract extraction status of eye, right- Impression/Plan  POW#1 S/P CEIOL OD : Doing well with no evidence of infection.     Continue gtts to operative eye:   Persistent inflammation. PF QID until next visit    Pt given and instructed in one week postop instructions. Can resume normal activitites and d/c eye shield. OTC reading glasses can be used until evaluated for final MR.     RTC after CEIOL in fellow eye      Nuclear sclerosis of left eye- Patient reports decreased vision in the fellow eye consistent with the clinical amount of lenticular opacity, which reaches the level of visual significance and affects activities of daily living including reading and glare. Risks, benefits, and alternatives to cataract surgery were discussed and pt desired to schedule cataract surgery. Pt was consented and the biometry and lens options were reviewed.     Discussed IOL options and refractive outcomes for this patient.    [place] left eye, Topical    Additional considerations:   +/- I-Ring    Will aim for Monofocal - Distance IOL    Post op gtts:   PF    Discussed that vision may be limited by:  Poor dilation    Explained that patient may need glasses after surgery.    RTC for POD#1

## 2025-03-07 NOTE — PROGRESS NOTES
INR not at goal. Medications, chart, and patient findings reviewed. See calendar for adjustments to dose and follow up plan.  STOP grapefruit and/or grapefruit juice.  Hold warfarin today and lower dose to 2.5 mg tomorrow.  Avoid ETOH intake this weekend.  ER with any s/s of bleeding, SOB, weakness, LH or fall with head strike.  Repeat INR next week.

## 2025-03-07 NOTE — PROGRESS NOTES
Short Stay Record    Diagnosis: Nuclear Sclerotic Cataract left    CC: Blurry Vision     HPI:  Papa Pope is a 59 y.o. male who presents for evaluation prior to ophthalmic surgery. No current complaints.     Past Medical History:   Diagnosis Date    Acute renal failure 1/25/2022    Arthritis     Atrial fibrillation with RVR 1/25/2022    Back pain     Hyperlipidemia     Hypertension     Pneumonia     Polyneuropathy     Tobacco dependence     Type 2 diabetes mellitus without complication, without long-term current use of insulin 8/4/2021    Uncomplicated alcohol dependence 5/9/2023     Past Surgical History:   Procedure Laterality Date    COLONOSCOPY N/A 8/22/2017    Procedure: COLONOSCOPY;  Surgeon: Keo Cruz MD;  Location: [facility];  Service: Endoscopy;  Laterality: N/A;    COLONOSCOPY N/A 9/30/2022    Procedure: COLONOSCOPY 9/20--[place] hold coumadin, no bridge;  Surgeon: Noé Neff MD;  Location: [facility];  Service: General;  Laterality: N/A;    EPIDURAL STEROID INJECTION INTO CERVICAL SPINE N/A 11/1/2019    Procedure: C7/T1 IL SUKHJINDER;  Surgeon: Donnell Jordan MD;  Location: [facility];  Service: Pain Management;  Laterality: N/A;    STOMACH SURGERY      TRANSFORAMINAL EPIDURAL INJECTION OF STEROID Bilateral 8/13/2019    Procedure: Injection,steroid,epidural,transforaminal approach;  Surgeon: Donnell Jordan MD;  Location: [facility] PAIN [facility];  Service: Pain Management;  Laterality: Bilateral;     Social History     Tobacco Use    Smoking status: Some Days     Current packs/day: 1.00     Average packs/day: 1.3 packs/day for 42.2 years (53.7 ttl pk-yrs)     Types: Cigarettes     Start date: 1983    Smokeless tobacco: Never    Tobacco comments:     10 CPD   Substance Use Topics    Alcohol use: Yes     Alcohol/week: 12.0 standard drinks of alcohol     Types: 12 Cans of beer per week     Comment: weekends only     Family History   Problem Relation Name Age of Onset    Hypertension Father        Review of patient's allergies indicates:  No Known Allergies    Current Medications[1]    Review of Systems:  10 Pt ROS negative except as stated in HPI    Physical Exam:  General Appearance:    A&Ox3, no distress, appears stated age   Head:    Normocephalic, without obvious abnormality, atraumatic   Eyes:    PERRL, EOM's intact   Back:     Symmetric, no curvature   Lungs:     respirations unlabored   Chest Wall:    No tenderness or deformity    Heart:  Abdomen:  Extremities:  Skin:    S1 and S2 present    Soft, non-tender    Extremities normal, atraumatic    Skin color, texture, turgor normal     Patient is stable for ophthalmic surgery under local and MAC.       _           [1]   Current Outpatient Medications:     amLODIPine (NORVASC) 5 MG tablet, TAKE 1 TABLET(5 MG) BY MOUTH DAILY, Disp: 90 tablet, Rfl: 1    blood sugar diagnostic Strp, Use to check blood sugar twice a day, Disp: 100 each, Rfl: 6    ciclopirox (PENLAC) 8 % Soln, Apply to nails once daily, Disp: 6.6 mL, Rfl: 3    cyclobenzaprine (FLEXERIL) 10 MG tablet, Take 1/2 to 1 tab BID PRN muscle spasms. May cause drowsiness., Disp: 60 tablet, Rfl: 0    empagliflozin (JARDIANCE) 25 mg tablet, Take 1 tablet (25 mg total) by mouth every morning., Disp: 90 tablet, Rfl: 1    ezetimibe (ZETIA) 10 mg tablet, Take 1 tablet (10 mg total) by mouth once daily., Disp: 90 tablet, Rfl: 1    insulin aspart U-100 (NOVOLOG FLEXPEN U-100 INSULIN) 100 unit/mL (3 mL) InPn pen, Inject into the skin three times a day before each meal per sliding scale. If -200: 2 units; -250: 4 units; -300: 6 units; -350: 8 units; BG >350: 10 unit, Disp: 15 mL, Rfl: 1    insulin glargine U-100, Lantus, (LANTUS SOLOSTAR U-100 INSULIN) 100 unit/mL (3 mL) InPn pen, Inject 30 Units into the skin once daily., Disp: 15 mL, Rfl: 2    lisinopriL (PRINIVIL,ZESTRIL) 40 MG tablet, Take 1 tablet (40 mg total) by mouth once daily., Disp: 90 tablet, Rfl: 1    metoprolol tartrate  "(LOPRESSOR) 50 MG tablet, Take 1 tablet (50 mg total) by mouth 2 (two) times daily., Disp: 180 tablet, Rfl: 1    nicotine (NICODERM CQ) 21 mg/24 hr, Place 1 patch onto the skin once daily., Disp: 30 patch, Rfl: 0    pen needle, diabetic (BD CHRISTIANO 2ND GEN PEN NEEDLE) 32 gauge x 5/32" Ndle, Use five times daily, Disp: 100 each, Rfl: 0    prednisoLONE acetate (PRED FORTE) 1 % DrpS, Place 1 drop into the right eye every 4 (four) hours., Disp: 5 mL, Rfl: 1    prednisoLONE acetate (PRED FORTE) 1 % DrpS, Place 1 drop into the left eye 4 (four) times daily., Disp: 5 mL, Rfl: 1    pregabalin (LYRICA) 150 MG capsule, Take 1 capsule (150 mg total) by mouth 2 (two) times daily., Disp: 180 capsule, Rfl: 0    rosuvastatin (CRESTOR) 40 MG Tab, TAKE 1 TABLET(40 MG) BY MOUTH EVERY EVENING, Disp: 90 tablet, Rfl: 1    TRUE METRIX AIR GLUCOSE METER kit, USE AS DIRECTED, Disp: 1 each, Rfl: 0    TRUEPLUS LANCETS 33 gauge Misc, USE  TO  CHECK  BLOOD  SUGAR TWICE DAILY, Disp: 200 each, Rfl: 0    warfarin (COUMADIN) 5 MG tablet, Take 1 and 1/2 tablets (7.5 mg total) on Mondays and Wednesdays; and one tablet (5 mg) all other days OR AS DIRECTED BY COUMADIN CLINIC. (Patient taking differently: Take 1 and 1/2 tablets (7.5 mg total) on Mondays, Wednesdays and Fridays; and one tablet (5 mg) all other days OR AS DIRECTED BY COUMADIN CLINIC.), Disp: 35 tablet, Rfl: 11    "

## 2025-03-12 ENCOUNTER — ANTI-COAG VISIT (OUTPATIENT)
Dept: CARDIOLOGY | Facility: CLINIC | Age: 60
End: 2025-03-12
Payer: MEDICARE

## 2025-03-12 DIAGNOSIS — I48.0 PAF (PAROXYSMAL ATRIAL FIBRILLATION): ICD-10-CM

## 2025-03-12 DIAGNOSIS — Z79.01 LONG TERM (CURRENT) USE OF ANTICOAGULANTS: Primary | ICD-10-CM

## 2025-03-12 LAB
CTP QC/QA: YES
INR PPP: 3.2 (ref 2–3)

## 2025-03-12 PROCEDURE — 85610 PROTHROMBIN TIME: CPT | Mod: QW,HCNC,S$GLB, | Performed by: INTERNAL MEDICINE

## 2025-03-12 PROCEDURE — 93793 ANTICOAG MGMT PT WARFARIN: CPT | Mod: HCNC,S$GLB,,

## 2025-03-12 NOTE — PROGRESS NOTES
Patient's INR is supra-therapeutic at 3.2. Patient confirms he followed previous instructions. Patient reports he remembered he had taken aspirin prior to last visit. Advised patient to discontinue taking aspirin. Advised patient of increased risk of bleeding; signs/symptoms of bleeding and need to go to ED if he experiences any. Patient reports he is not having any signs/symptoms of bleeding. Patient reports he is scheduled for cataract surgery on 3/26/25 and do not need to hold warfarin. Instructions given: Take warfarin 5 mg today 3/12/25; then re-challenge warfarin 7.5 mg on Fridays; Mondays and Wednesdays; and 5 mg all other days. Recheck on 3/24/25. Calendar reviewed with patient. Patient verbalizes understanding.

## 2025-03-24 ENCOUNTER — ANTI-COAG VISIT (OUTPATIENT)
Dept: CARDIOLOGY | Facility: CLINIC | Age: 60
End: 2025-03-24
Payer: MEDICARE

## 2025-03-24 DIAGNOSIS — Z79.01 LONG TERM (CURRENT) USE OF ANTICOAGULANTS: Primary | ICD-10-CM

## 2025-03-24 DIAGNOSIS — I48.0 PAF (PAROXYSMAL ATRIAL FIBRILLATION): ICD-10-CM

## 2025-03-24 LAB
CTP QC/QA: YES
INR PPP: 2.1 (ref 2–3)

## 2025-03-24 PROCEDURE — 93793 ANTICOAG MGMT PT WARFARIN: CPT | Mod: HCNC,S$GLB,,

## 2025-03-24 PROCEDURE — 85610 PROTHROMBIN TIME: CPT | Mod: QW,HCNC,S$GLB, | Performed by: INTERNAL MEDICINE

## 2025-03-24 NOTE — PROGRESS NOTES
Chart reviewed, agree with LPN plan.       Differential Diagnosis Foreign body of the right foot, cellulitis, rule out osteomyelitis, abscess

## 2025-03-24 NOTE — PROGRESS NOTES
Patient's INR is therapeutic at 2.1. Patient reports he followed previous instructions. Patient scheduled for cataract surgery on 3/26/25. Instructions given: Continue warfarin 7.5 mg on Mondays, Wednesdays and Fridays; and 5 mg all other days. Recheck on 4/8/25 with other appointment. Calendar reviewed with patient. Patient verbalizes understanding.

## 2025-03-25 ENCOUNTER — TELEPHONE (OUTPATIENT)
Dept: OPHTHALMOLOGY | Facility: CLINIC | Age: 60
End: 2025-03-25
Payer: MEDICARE

## 2025-03-27 ENCOUNTER — TELEPHONE (OUTPATIENT)
Dept: OPHTHALMOLOGY | Facility: CLINIC | Age: 60
End: 2025-03-27
Payer: MEDICARE

## 2025-04-03 DIAGNOSIS — I48.0 PAF (PAROXYSMAL ATRIAL FIBRILLATION): Primary | ICD-10-CM

## 2025-04-08 ENCOUNTER — RESULTS FOLLOW-UP (OUTPATIENT)
Dept: INTERNAL MEDICINE | Facility: CLINIC | Age: 60
End: 2025-04-08

## 2025-04-08 ENCOUNTER — HOSPITAL ENCOUNTER (OUTPATIENT)
Dept: RADIOLOGY | Facility: HOSPITAL | Age: 60
Discharge: HOME | End: 2025-04-08
Attending: FAMILY MEDICINE
Payer: MEDICARE

## 2025-04-08 ENCOUNTER — HOSPITAL ENCOUNTER (OUTPATIENT)
Dept: CARDIOLOGY | Facility: HOSPITAL | Age: 60
Discharge: HOME | End: 2025-04-08
Attending: INTERNAL MEDICINE
Payer: MEDICARE

## 2025-04-08 ENCOUNTER — OFFICE VISIT (OUTPATIENT)
Dept: CARDIOLOGY | Facility: CLINIC | Age: 60
End: 2025-04-08
Payer: MEDICARE

## 2025-04-08 ENCOUNTER — ANTI-COAG VISIT (OUTPATIENT)
Dept: CARDIOLOGY | Facility: CLINIC | Age: 60
End: 2025-04-08
Payer: MEDICARE

## 2025-04-08 VITALS
BODY MASS INDEX: 22.8 KG/M2 | OXYGEN SATURATION: 98 % | WEIGHT: 197.31 LBS | HEART RATE: 75 BPM | DIASTOLIC BLOOD PRESSURE: 84 MMHG | SYSTOLIC BLOOD PRESSURE: 140 MMHG

## 2025-04-08 VITALS — WEIGHT: 197.31 LBS | BODY MASS INDEX: 22.8 KG/M2

## 2025-04-08 DIAGNOSIS — M25.50 ARTHRALGIA, UNSPECIFIED JOINT: ICD-10-CM

## 2025-04-08 DIAGNOSIS — E11.69 HYPERLIPIDEMIA DUE TO TYPE 2 DIABETES MELLITUS: ICD-10-CM

## 2025-04-08 DIAGNOSIS — R00.2 PALPITATIONS: Primary | ICD-10-CM

## 2025-04-08 DIAGNOSIS — Z79.01 LONG TERM (CURRENT) USE OF ANTICOAGULANTS: ICD-10-CM

## 2025-04-08 DIAGNOSIS — Z79.01 LONG TERM (CURRENT) USE OF ANTICOAGULANTS: Primary | ICD-10-CM

## 2025-04-08 DIAGNOSIS — N52.9 ERECTILE DYSFUNCTION, UNSPECIFIED ERECTILE DYSFUNCTION TYPE: ICD-10-CM

## 2025-04-08 DIAGNOSIS — I48.0 PAF (PAROXYSMAL ATRIAL FIBRILLATION): ICD-10-CM

## 2025-04-08 DIAGNOSIS — E11.59 HYPERTENSION ASSOCIATED WITH DIABETES: ICD-10-CM

## 2025-04-08 DIAGNOSIS — F17.200 SMOKER: ICD-10-CM

## 2025-04-08 DIAGNOSIS — F17.210 CIGARETTE NICOTINE DEPENDENCE WITHOUT COMPLICATION: ICD-10-CM

## 2025-04-08 DIAGNOSIS — E78.5 HYPERLIPIDEMIA DUE TO TYPE 2 DIABETES MELLITUS: ICD-10-CM

## 2025-04-08 DIAGNOSIS — I15.2 HYPERTENSION ASSOCIATED WITH DIABETES: ICD-10-CM

## 2025-04-08 DIAGNOSIS — M51.362 DEGENERATION OF INTERVERTEBRAL DISC OF LUMBAR REGION WITH DISCOGENIC BACK PAIN AND LOWER EXTREMITY PAIN: ICD-10-CM

## 2025-04-08 DIAGNOSIS — Z82.49 FAMILY HISTORY OF CORONARY ARTERY DISEASE: ICD-10-CM

## 2025-04-08 LAB
CTP QC/QA: YES
INR PPP: 1.2 (ref 2–3)
OHS QRS DURATION: 78 MS
OHS QTC CALCULATION: 446 MS

## 2025-04-08 PROCEDURE — 71271 CT THORAX LUNG CANCER SCR C-: CPT | Mod: TC,HCNC

## 2025-04-08 PROCEDURE — 71271 CT THORAX LUNG CANCER SCR C-: CPT | Mod: 26,HCNC,, | Performed by: RADIOLOGY

## 2025-04-08 PROCEDURE — 85610 PROTHROMBIN TIME: CPT | Mod: QW,HCNC,S$GLB, | Performed by: INTERNAL MEDICINE

## 2025-04-08 PROCEDURE — 93010 ELECTROCARDIOGRAM REPORT: CPT | Mod: HCNC,,, | Performed by: INTERNAL MEDICINE

## 2025-04-08 PROCEDURE — 93005 ELECTROCARDIOGRAM TRACING: CPT | Mod: HCNC

## 2025-04-08 PROCEDURE — 99999 PR PBB SHADOW E&M-EST. PATIENT-LVL III: CPT | Mod: PBBFAC,HCNC,, | Performed by: INTERNAL MEDICINE

## 2025-04-08 RX ORDER — LISINOPRIL 40 MG/1
40 TABLET ORAL DAILY
Qty: 90 TABLET | Refills: 1 | Status: SHIPPED | OUTPATIENT
Start: 2025-04-08 | End: 2026-04-08

## 2025-04-08 RX ORDER — METOPROLOL TARTRATE 50 MG/1
50 TABLET ORAL 2 TIMES DAILY
Qty: 180 TABLET | Refills: 1 | Status: SHIPPED | OUTPATIENT
Start: 2025-04-08 | End: 2026-04-08

## 2025-04-08 RX ORDER — AMLODIPINE BESYLATE 5 MG/1
5 TABLET ORAL DAILY
Qty: 90 TABLET | Refills: 1 | Status: SHIPPED | OUTPATIENT
Start: 2025-04-08

## 2025-04-08 RX ORDER — ROSUVASTATIN CALCIUM 40 MG/1
40 TABLET, COATED ORAL NIGHTLY
Qty: 90 TABLET | Refills: 1 | Status: SHIPPED | OUTPATIENT
Start: 2025-04-08

## 2025-04-08 NOTE — PROGRESS NOTES
INR not at goal. Medications, chart, and patient findings reviewed. See calendar for adjustments to dose and follow up plan.  Pt should try to avoid missed doses.  Boost with 10 mg today then resume warfarin dose per calendar.  Repeat INR in 1 week.

## 2025-04-08 NOTE — PROGRESS NOTES
Subjective:   Patient ID:  Papa Pope is a 59 y.o. male who presents for cardiac consult of No chief complaint on file.        The patient came in today for cardiac consult of No chief complaint on file.      Papa Pope is a 59 y.o. male pt with PAF, HTN, HLD, DM2 -h/o HHS, DDD, tobacco use presents for follow up CV eval.    3/25/24  BP elevated 150/98. He has more joint/back pain.   He has been out of [place].   ECG - sinus dipika V rate 58     24  BP and HR stable, improved now. Had a fall recently but tripped now doing well.     24  Recent A1c improving. Lipids worse 2024.   BP and HR stable. BMI 23 - 202 lbs   No recently falls.     25  Pt had recent cataracts surgery scheduled last month but cancelled it.   He could not afford the copay then  BP mildly elevated. BMI 22 - 197   ECG - NSR      FH - had CVD,  in 80s    HOLTER 2022  Conclusion       Predominant rhythm is sinus.  Heart rates varied between 51 and 124 BPM with an average of 76 BPM        Results for orders placed during the hospital encounter of 22    Nuclear Stress - Cardiology Interpreted    Interpretation Summary    Normal myocardial perfusion scan. There is no evidence of myocardial ischemia or infarction.    The gated perfusion images showed an ejection fraction of 54% at rest. The gated perfusion images showed an ejection fraction of 58% post stress.    There is normal wall motion at rest and post stress.    LV cavity size is normal at rest and normal at stress.    The EKG portion of this study is negative for ischemia.    The patient reported no chest pain during the stress test.    There were no arrhythmias during stress.      Results for orders placed during the hospital encounter of 22    Echo    Interpretation Summary  · Concentric hypertrophy and low normal systolic function.  · The estimated ejection fraction is 50%.  · Normal left ventricular diastolic function.  · With normal right ventricular  systolic function.      Past Medical History:   Diagnosis Date    Acute renal failure 1/25/2022    Arthritis     Atrial fibrillation with RVR 1/25/2022    Back pain     Hyperlipidemia     Hypertension     Pneumonia     Polyneuropathy     Tobacco dependence     Type 2 diabetes mellitus without complication, without long-term current use of insulin 8/4/2021    Uncomplicated alcohol dependence 5/9/2023       Past Surgical History:   Procedure Laterality Date    COLONOSCOPY N/A 8/22/2017    Procedure: COLONOSCOPY;  Surgeon: Keo Cruz MD;  Location: [facility];  Service: Endoscopy;  Laterality: N/A;    COLONOSCOPY N/A 9/30/2022    Procedure: COLONOSCOPY 9/20--[place] hold coumadin, no bridge;  Surgeon: Noé Neff MD;  Location: [facility] ENDO;  Service: General;  Laterality: N/A;    EPIDURAL STEROID INJECTION INTO CERVICAL SPINE N/A 11/1/2019    Procedure: C7/T1 IL SUKHJINDER;  Surgeon: Donnell Jordan MD;  Location: [facility] PAIN [facility]GT;  Service: Pain Management;  Laterality: N/A;    STOMACH SURGERY      TRANSFORAMINAL EPIDURAL INJECTION OF STEROID Bilateral 8/13/2019    Procedure: Injection,steroid,epidural,transforaminal approach;  Surgeon: Donnell Jordan MD;  Location: [facility] PAIN [facility]GT;  Service: Pain Management;  Laterality: Bilateral;       Social History     Tobacco Use    Smoking status: Some Days     Current packs/day: 1.00     Average packs/day: 1.3 packs/day for 42.3 years (53.8 ttl pk-yrs)     Types: Cigarettes     Start date: 1983    Smokeless tobacco: Never    Tobacco comments:     10 CPD   Substance Use Topics    Alcohol use: Yes     Alcohol/week: 12.0 standard drinks of alcohol     Types: 12 Cans of beer per week     Comment: weekends only    Drug use: No       Family History   Problem Relation Name Age of Onset    Hypertension Father         Patient's Medications   New Prescriptions    No medications on file   Previous Medications    AMLODIPINE (NORVASC) 5 MG TABLET    TAKE 1 TABLET(5 MG) BY MOUTH DAILY     "BLOOD SUGAR DIAGNOSTIC STRP    Use to check blood sugar twice a day    CICLOPIROX (PENLAC) 8 % SOLN    Apply to nails once daily    CYCLOBENZAPRINE (FLEXERIL) 10 MG TABLET    Take 1/2 to 1 tab BID PRN muscle spasms. May cause drowsiness.    EMPAGLIFLOZIN (JARDIANCE) 25 MG TABLET    Take 1 tablet (25 mg total) by mouth every morning.    EZETIMIBE (ZETIA) 10 MG TABLET    Take 1 tablet (10 mg total) by mouth once daily.    INSULIN ASPART U-100 (NOVOLOG FLEXPEN U-100 INSULIN) 100 UNIT/ML (3 ML) INPN PEN    Inject into the skin three times a day before each meal per sliding scale. If -200: 2 units; -250: 4 units; -300: 6 units; -350: 8 units; BG >350: 10 unit    INSULIN GLARGINE U-100, LANTUS, (LANTUS SOLOSTAR U-100 INSULIN) 100 UNIT/ML (3 ML) INPN PEN    Inject 30 Units into the skin once daily.    LISINOPRIL (PRINIVIL,ZESTRIL) 40 MG TABLET    Take 1 tablet (40 mg total) by mouth once daily.    METOPROLOL TARTRATE (LOPRESSOR) 50 MG TABLET    Take 1 tablet (50 mg total) by mouth 2 (two) times daily.    NICOTINE (NICODERM CQ) 21 MG/24 HR    Place 1 patch onto the skin once daily.    PEN NEEDLE, DIABETIC (BD CHRISTIANO 2ND GEN PEN NEEDLE) 32 GAUGE X 5/32" NDLE    Use five times daily    PREDNISOLONE ACETATE (PRED FORTE) 1 % DRPS    Place 1 drop into the right eye every 4 (four) hours.    PREDNISOLONE ACETATE (PRED FORTE) 1 % DRPS    Place 1 drop into the left eye 4 (four) times daily.    PREGABALIN (LYRICA) 150 MG CAPSULE    Take 1 capsule (150 mg total) by mouth 2 (two) times daily.    ROSUVASTATIN (CRESTOR) 40 MG TAB    TAKE 1 TABLET(40 MG) BY MOUTH EVERY EVENING    TRUE METRIX AIR GLUCOSE METER KIT    USE AS DIRECTED    TRUEPLUS LANCETS 33 GAUGE MISC    USE  TO  CHECK  BLOOD  SUGAR TWICE DAILY    WARFARIN (COUMADIN) 5 MG TABLET    Take 1 and 1/2 tablets (7.5 mg total) on Mondays and Wednesdays; and one tablet (5 mg) all other days OR AS DIRECTED BY COUMADIN CLINIC.   Modified Medications    No " medications on file   Discontinued Medications    No medications on file       Review of Systems   Constitutional:  Positive for malaise/fatigue.   HENT: Negative.     Eyes: Negative.    Respiratory:  Positive for shortness of breath.    Cardiovascular:  Positive for chest pain and palpitations.   Gastrointestinal: Negative.    Genitourinary: Negative.    Musculoskeletal:  Positive for back pain and joint pain.   Skin: Negative.    Neurological: Negative.    Endo/Heme/Allergies: Negative.    Psychiatric/Behavioral: Negative.     All 12 systems otherwise negative.      Wt Readings from Last 3 Encounters:   12/27/24 91.8 kg (202 lb 6.1 oz)   08/26/24 93.4 kg (205 lb 14.6 oz)   07/29/24 93 kg (205 lb 0.4 oz)     Temp Readings from Last 3 Encounters:   08/26/24 97 °F (36.1 °C) (Tympanic)   02/16/24 97.4 °F (36.3 °C)   09/30/22 98.9 °F (37.2 °C) (Skin)     BP Readings from Last 3 Encounters:   12/27/24 120/80   08/26/24 136/86   07/29/24 (!) 132/91     Pulse Readings from Last 3 Encounters:   12/27/24 76   08/26/24 74   07/29/24 71       There were no vitals taken for this visit.    Objective:   Physical Exam  Vitals and nursing note reviewed.   Constitutional:       General: He is not in acute distress.     Appearance: He is well-developed. He is not diaphoretic.   HENT:      Head: Normocephalic and atraumatic.      Nose: Nose normal.   Eyes:      General: No scleral icterus.     Conjunctiva/sclera: Conjunctivae normal.   Neck:      Thyroid: No thyromegaly.      Vascular: No JVD.   Cardiovascular:      Rate and Rhythm: Normal rate and regular rhythm.      Heart sounds: S1 normal and S2 normal. No murmur heard.     No friction rub. No gallop. No S3 or S4 sounds.   Pulmonary:      Effort: Pulmonary effort is normal. No respiratory distress.      Breath sounds: Normal breath sounds. No stridor. No wheezing or rales.   Chest:      Chest wall: No tenderness.   Abdominal:      General: Bowel sounds are normal. There is no  distension.      Palpations: Abdomen is soft. There is no mass.      Tenderness: There is no abdominal tenderness. There is no rebound.   Genitourinary:     Comments: Deferred  Musculoskeletal:         General: No tenderness or deformity. Normal range of motion.      Cervical back: Normal range of motion and neck supple.   Lymphadenopathy:      Cervical: No cervical adenopathy.   Skin:     General: Skin is warm and dry.      Coloration: Skin is not pale.      Findings: No erythema or rash.   Neurological:      Mental Status: He is alert and oriented to person, place, and time.      Motor: No abnormal muscle tone.      Coordination: Coordination normal.   Psychiatric:         Behavior: Behavior normal.         Thought Content: Thought content normal.         Judgment: Judgment normal.         Lab Results   Component Value Date     08/26/2024    K 4.1 08/26/2024     08/26/2024    CO2 18 (L) 08/26/2024    BUN 20 08/26/2024    CREATININE 1.5 (H) 08/26/2024     (H) 08/26/2024    HGBA1C 7.3 (H) 08/26/2024    AST 23 08/26/2024    ALT 22 08/26/2024    ALBUMIN 3.6 08/26/2024    PROT 6.8 08/26/2024    BILITOT 0.3 08/26/2024    WBC 6.03 08/26/2024    HGB 14.5 08/26/2024    HCT 45.3 08/26/2024     (H) 08/26/2024     (L) 08/26/2024    INR 2.1 03/24/2025    INR 1.0 04/05/2024    TSH 2.091 08/26/2024    CHOL 157 08/26/2024    HDL 46 08/26/2024    LDLCALC 93.4 08/26/2024    TRIG 88 08/26/2024    BNP 47 01/25/2022     Assessment:      1. PAF (paroxysmal atrial fibrillation)              Plan:   1. PAF, FH CAD  - in NSR   - change Eliquis to coumadin due to cost   - Nuclear stress neg for ischemia 2/2022. , elevated INR today   - Holter neg for Afib 2/2022  - INR low this AM     2. HLD  - cont statin  - needs improvement in lipids     3. Tobacco abuse, alc abuse  - needs alc cessation  - needs cessation    4. DDD, lumbar  - cont tx per PCP  - f/u pain - will have injections  - referral     5. DM2 A1c  11.4 --> 6.8 --> 7.1 --> 9.1 --> 6.3 -> 8.3 --> 7.3; ED  - viagra PRN   - cont tx  - improved sugars     6. HTN - elevated occ   -inc Lisinopril 10mg  --> inc to Lisinopril 40mg and cont BB    7. Preop CV eval -  teeth extractions with [facility] - deferred   - Low CV risk, cont BB  - ok to hold coumadin 5 days and resume post op as pt is in NSR  - needs close f/u with coumadin clinic    Visit today included increased complexity associated with the care of the episodic problem HTN addressed and managing the longitudinal care of the patient due to the serious and/or complex managed problem(s) .      Thank you for allowing me to participate in this patient's care. Please do not hesitate to contact me with any questions or concerns. Consult note has been forwarded to the referral physician.     Denny Cunningham MD, [facility]  Cardiovascular Disease  [facility], [place]  [phone] (P)

## 2025-04-14 ENCOUNTER — CLINICAL SUPPORT (OUTPATIENT)
Dept: SMOKING CESSATION | Facility: CLINIC | Age: 60
End: 2025-04-14
Payer: MEDICARE

## 2025-04-14 DIAGNOSIS — F17.200 NICOTINE DEPENDENCE: Primary | ICD-10-CM

## 2025-04-14 PROCEDURE — 99407 BEHAV CHNG SMOKING > 10 MIN: CPT | Mod: HCNC,S$GLB,,

## 2025-04-15 ENCOUNTER — LAB VISIT (OUTPATIENT)
Dept: LAB | Facility: HOSPITAL | Age: 60
End: 2025-04-15
Attending: FAMILY MEDICINE
Payer: MEDICARE

## 2025-04-15 ENCOUNTER — TELEPHONE (OUTPATIENT)
Dept: INTERNAL MEDICINE | Facility: CLINIC | Age: 60
End: 2025-04-15
Payer: MEDICARE

## 2025-04-15 ENCOUNTER — ANTI-COAG VISIT (OUTPATIENT)
Dept: CARDIOLOGY | Facility: CLINIC | Age: 60
End: 2025-04-15
Payer: MEDICARE

## 2025-04-15 ENCOUNTER — RESULTS FOLLOW-UP (OUTPATIENT)
Dept: INTERNAL MEDICINE | Facility: CLINIC | Age: 60
End: 2025-04-15

## 2025-04-15 DIAGNOSIS — Z79.4 TYPE 2 DIABETES MELLITUS WITH MICROALBUMINURIA, WITH LONG-TERM CURRENT USE OF INSULIN: Primary | ICD-10-CM

## 2025-04-15 DIAGNOSIS — E11.65 TYPE 2 DIABETES MELLITUS WITH HYPERGLYCEMIA, WITHOUT LONG-TERM CURRENT USE OF INSULIN: ICD-10-CM

## 2025-04-15 DIAGNOSIS — R80.9 TYPE 2 DIABETES MELLITUS WITH MICROALBUMINURIA, WITH LONG-TERM CURRENT USE OF INSULIN: Primary | ICD-10-CM

## 2025-04-15 DIAGNOSIS — I48.0 PAF (PAROXYSMAL ATRIAL FIBRILLATION): ICD-10-CM

## 2025-04-15 DIAGNOSIS — Z79.01 LONG TERM (CURRENT) USE OF ANTICOAGULANTS: Primary | ICD-10-CM

## 2025-04-15 DIAGNOSIS — E11.29 TYPE 2 DIABETES MELLITUS WITH MICROALBUMINURIA, WITH LONG-TERM CURRENT USE OF INSULIN: Primary | ICD-10-CM

## 2025-04-15 LAB
ALBUMIN/CREAT UR: 150 UG/MG
ANION GAP (OHS): 10 MMOL/L (ref 8–16)
BUN SERPL-MCNC: 16 MG/DL (ref 6–20)
CALCIUM SERPL-MCNC: 9.2 MG/DL (ref 8.7–10.5)
CHLORIDE SERPL-SCNC: 102 MMOL/L (ref 95–110)
CO2 SERPL-SCNC: 25 MMOL/L (ref 23–29)
CREAT SERPL-MCNC: 1.2 MG/DL (ref 0.5–1.4)
CREAT UR-MCNC: 80 MG/DL (ref 23–375)
CTP QC/QA: YES
EAG (OHS): 286 MG/DL (ref 68–131)
GFR SERPLBLD CREATININE-BSD FMLA CKD-EPI: >60 ML/MIN/1.73/M2
GLUCOSE SERPL-MCNC: 332 MG/DL (ref 70–110)
HBA1C MFR BLD: 11.6 % (ref 4–5.6)
INR PPP: 1.9 (ref 2–3)
MICROALBUMIN UR-MCNC: 120 UG/ML (ref ?–5000)
POTASSIUM SERPL-SCNC: 4.1 MMOL/L (ref 3.5–5.1)
SODIUM SERPL-SCNC: 137 MMOL/L (ref 136–145)

## 2025-04-15 PROCEDURE — 85610 PROTHROMBIN TIME: CPT | Mod: QW,HCNC,S$GLB, | Performed by: INTERNAL MEDICINE

## 2025-04-15 PROCEDURE — 82043 UR ALBUMIN QUANTITATIVE: CPT | Mod: HCNC

## 2025-04-15 PROCEDURE — 83036 HEMOGLOBIN GLYCOSYLATED A1C: CPT | Mod: HCNC

## 2025-04-15 PROCEDURE — 93793 ANTICOAG MGMT PT WARFARIN: CPT | Mod: HCNC,S$GLB,,

## 2025-04-15 PROCEDURE — 80048 BASIC METABOLIC PNL TOTAL CA: CPT | Mod: HCNC

## 2025-04-15 PROCEDURE — 36415 COLL VENOUS BLD VENIPUNCTURE: CPT | Mod: HCNC

## 2025-04-15 NOTE — PROGRESS NOTES
Patient's INR is slightly sub-therapeutic at 1.9.  Patient confirms he followed previous instructions.  Advised patient of increased risk of clotting; signs/symptoms of clotting and need to go to ED if he experiences any. Patient reports he is not having any signs/symptoms of clotting. Instructions given: Continue warfarin 7.5 mg on Wednesdays, Fridays and Mondays; and 5 mg all other days. Recheck on 4/23/25. Calendar reviewed with patient. Patient verbalizes understanding.

## 2025-04-23 ENCOUNTER — ANTI-COAG VISIT (OUTPATIENT)
Dept: CARDIOLOGY | Facility: CLINIC | Age: 60
End: 2025-04-23
Payer: MEDICARE

## 2025-04-23 DIAGNOSIS — Z79.01 LONG TERM (CURRENT) USE OF ANTICOAGULANTS: Primary | ICD-10-CM

## 2025-04-23 DIAGNOSIS — I48.0 PAF (PAROXYSMAL ATRIAL FIBRILLATION): ICD-10-CM

## 2025-04-23 LAB
CTP QC/QA: YES
INR PPP: 3.2 (ref 2–3)

## 2025-04-23 PROCEDURE — 93793 ANTICOAG MGMT PT WARFARIN: CPT | Mod: HCNC,S$GLB,,

## 2025-04-23 PROCEDURE — 85610 PROTHROMBIN TIME: CPT | Mod: QW,HCNC,S$GLB, | Performed by: INTERNAL MEDICINE

## 2025-04-23 NOTE — PROGRESS NOTES
Patient's INR is supra-therapeutic at 3.2. Patient confirms he followed previous/dosing instructions. Patient reports he consumed an alcoholic beverage a couple days ago. Advised of increased risk of bleeding; signs/symptoms of bleeding and need to go to ED if she experiences any. Patient reports he is not having any signs/symptoms of bleeding. Instructions given: warfarin 5 mg today 4/23/25; then re-challenge warfarin 7.5 mg on Fridays, Mondays and Wednesdays; and 5 mg all other days. Recheck on 5/7/25. Calendar reviewed with patient. Patient verbalizes understanding.

## 2025-04-23 NOTE — PROGRESS NOTES
Chart reviewed, agree with LPN plan.  Pt ahs been educated numerous times regarding alcohol and warfarin interaction and bleeding risk.

## 2025-04-29 ENCOUNTER — PATIENT MESSAGE (OUTPATIENT)
Dept: INTERNAL MEDICINE | Facility: CLINIC | Age: 60
End: 2025-04-29
Payer: MEDICARE

## 2025-04-30 ENCOUNTER — PATIENT MESSAGE (OUTPATIENT)
Dept: SMOKING CESSATION | Facility: CLINIC | Age: 60
End: 2025-04-30
Payer: MEDICARE

## 2025-04-30 ENCOUNTER — TELEPHONE (OUTPATIENT)
Dept: SMOKING CESSATION | Facility: CLINIC | Age: 60
End: 2025-04-30
Payer: MEDICARE

## 2025-04-30 NOTE — TELEPHONE ENCOUNTER
Call to patient to see if he is able to log in due to the patient stated he would. Voicemail left & text sent

## 2025-04-30 NOTE — TELEPHONE ENCOUNTER
Call to patient regarding scheduled & confirmed 1 pm appointment that he requested be set up as a virtual. Patient states he was asleep & asked how to log in. Instructions given & patient states he will log in.

## 2025-05-06 ENCOUNTER — TELEPHONE (OUTPATIENT)
Dept: PHARMACY | Facility: CLINIC | Age: 60
End: 2025-05-06
Payer: MEDICARE

## 2025-05-06 ENCOUNTER — RESULTS FOLLOW-UP (OUTPATIENT)
Dept: PHARMACY | Facility: CLINIC | Age: 60
End: 2025-05-06

## 2025-05-06 NOTE — TELEPHONE ENCOUNTER
Welcome letter has been sent via phone and letter to inform patient of PAP application process for Jardiance, Lantus Pens, and Novolog Pens. Follow-up will be made in approximately 5 to 10 business days.    Thank you  Manohar Kaplan  Pharmacy Patient Assistance Team

## 2025-05-06 NOTE — TELEPHONE ENCOUNTER
----- Message from Ekaterina Castelan sent at 5/5/2025  2:43 PM CDT -----  Regarding: Order for LUIS ALBERTO ARCHULETA,     Mr. Archuleta's case has been assigned to Manohar Kaplan @[phone]. Patient will be contacted in approximately 2 to 3 business days. Please ensure the chart reflects a current order for the requested   medication written by an [facility] provider. The following documentation may be required from the patient to begin the application process:    Proof of household income(such as social security award letter, pension statement or 3 consecutive pay stubs), Copy of all insurance cards (front and back), and Completed [facility]   authorization forms    Provider may review progress notes by typing pharmacy patient assistance in Epic search box.     Thank you,   [facility]  [address] [place] [address]  [address] [place] [address]  Fax: [phone]  Email: [email or web address]      ----- Message -----  From: Anya Bettencourt, PharmD  Sent: 5/5/2025  11:53 AM CDT  To: Pharmacy Patient Assistance Team  Subject: Order for LUIS ALBERTO ARCHULETA

## 2025-05-06 NOTE — LETTER
May 6, 2025    Papa HOANG Toshia  [address] [place] [address]             Dear Mr. Pope,    My name is Manohar Kaplan, and I am reaching out on behalf of [facility]s Pharmacy Patient Assistance Team regarding your request for medication assistance. Our goal is to help qualified [facility] patients obtain financial assistance for prescribed medications.    Please note that enrollment into available support may require the following documents:    Proof of household income(such as social security award letter, pension statement or 3 consecutive pay stubs), Copy of all insurance cards (front and back), and Completed [facility] authorization forms    If you still need assistance with your medications, please reach out to the phone number listed below. If we do not hear back from you, a second contact attempt will be made via mail or your [facility] portal in 5 to 10 business days.    Thank you for giving us the opportunity to assist you with your healthcare needs. We look forward to working with you.      Sincerely  Manohar Kaplan @[phone]  Pharmacy Patient Assistance Team  [address] [place] [address]  [id number] [place] [address]  Fax: [phone]  Email: [email or web address]

## 2025-05-07 ENCOUNTER — ANTI-COAG VISIT (OUTPATIENT)
Dept: CARDIOLOGY | Facility: CLINIC | Age: 60
End: 2025-05-07
Payer: MEDICARE

## 2025-05-07 DIAGNOSIS — I48.0 PAF (PAROXYSMAL ATRIAL FIBRILLATION): ICD-10-CM

## 2025-05-07 DIAGNOSIS — Z79.01 LONG TERM (CURRENT) USE OF ANTICOAGULANTS: Primary | ICD-10-CM

## 2025-05-07 LAB
CTP QC/QA: YES
INR PPP: 1.5 (ref 2–3)

## 2025-05-07 PROCEDURE — 85610 PROTHROMBIN TIME: CPT | Mod: QW,HCNC,S$GLB, | Performed by: INTERNAL MEDICINE

## 2025-05-07 NOTE — PROGRESS NOTES
Patient's INR is sub-therapeutic at 1.5. Patient reports he missed a dose of warfarin. Advised of increased risk of clotting; signs/symptoms of clotting and need to go to ED if he experiences any. Patient reports he is not having any signs/symptoms of clotting Instructions given: Will give boosted dose of warfarin 10 mg today 5/7/25; then resume warfarin 7.5 mg on Fridays, Mondays and Wednesdays; and 5 mg all other days. Recheck on 5/15/25. Calendar reviewed with patient. Patient verbalizes understanding.

## 2025-05-15 ENCOUNTER — ANTI-COAG VISIT (OUTPATIENT)
Dept: CARDIOLOGY | Facility: CLINIC | Age: 60
End: 2025-05-15
Payer: MEDICARE

## 2025-05-15 DIAGNOSIS — I48.0 PAF (PAROXYSMAL ATRIAL FIBRILLATION): ICD-10-CM

## 2025-05-15 DIAGNOSIS — Z79.01 LONG TERM (CURRENT) USE OF ANTICOAGULANTS: Primary | ICD-10-CM

## 2025-05-15 LAB
CTP QC/QA: YES
INR PPP: 3.2 (ref 2–3)

## 2025-05-15 PROCEDURE — 85610 PROTHROMBIN TIME: CPT | Mod: QW,HCNC,S$GLB, | Performed by: INTERNAL MEDICINE

## 2025-05-15 NOTE — PROGRESS NOTES
Patient's INR is supra-therapeutic at 3.2. Patient confirms he followed previous dosing/instructions. Patient reports he consumed an alcoholic drink this week. Advised patient of increased risk of bleeding; signs/symptoms of bleeding and need to go to ED if he experiences any. Patient reports he is not having any signs/symptoms of bleeding. Instructions given: Take warfarin 2.5 mg today 5/15/25; then re-challenge warfarin 7.5 mg on Fridays, Mondays and Wednesdays; and 5 mg all other days. Recheck on 5/29/25. Calendar reviewed with patient. Patient verbalizes understanding.

## 2025-05-29 ENCOUNTER — ANTI-COAG VISIT (OUTPATIENT)
Dept: CARDIOLOGY | Facility: CLINIC | Age: 60
End: 2025-05-29
Payer: MEDICARE

## 2025-05-29 DIAGNOSIS — Z79.01 LONG TERM (CURRENT) USE OF ANTICOAGULANTS: Primary | ICD-10-CM

## 2025-05-29 DIAGNOSIS — I48.0 PAF (PAROXYSMAL ATRIAL FIBRILLATION): ICD-10-CM

## 2025-05-29 LAB
CTP QC/QA: YES
INR PPP: 1.8 (ref 2–3)

## 2025-05-29 PROCEDURE — 85610 PROTHROMBIN TIME: CPT | Mod: QW,HCNC,S$GLB, | Performed by: INTERNAL MEDICINE

## 2025-05-29 NOTE — PROGRESS NOTES
Patient's INR is sub-therapeutic at 1.8. Patient reports he took lower dose than instructed on 5/23/25. Advised of increased risk of clotting; signs/symptoms of clotting and need to go to ED if he experiences any. Patient reports he is not having any signs/symptoms of clotting. Instructions given: Will give boosted dose of warfarin 7.5 mg today 5/29/25; then resume warfarin 7.5 mg on Fridays, Mondays and Wednesdays; and 5 mg all other days. Recheck on 6/12/25. Calendar reviewed with patient. Patient verbalizes understanding.

## 2025-06-12 ENCOUNTER — ANTI-COAG VISIT (OUTPATIENT)
Dept: CARDIOLOGY | Facility: CLINIC | Age: 60
End: 2025-06-12
Payer: MEDICARE

## 2025-06-12 DIAGNOSIS — I48.91 ATRIAL FIBRILLATION, UNSPECIFIED TYPE: ICD-10-CM

## 2025-06-12 DIAGNOSIS — I48.0 PAF (PAROXYSMAL ATRIAL FIBRILLATION): ICD-10-CM

## 2025-06-12 DIAGNOSIS — Z79.01 LONG TERM (CURRENT) USE OF ANTICOAGULANTS: Primary | ICD-10-CM

## 2025-06-12 LAB
CTP QC/QA: YES
INR PPP: 2.6 (ref 2–3)

## 2025-06-12 PROCEDURE — 85610 PROTHROMBIN TIME: CPT | Mod: QW,HCNC,S$GLB, | Performed by: INTERNAL MEDICINE

## 2025-06-12 NOTE — PROGRESS NOTES
Patient's INR is therapeutic at 2.6. Patient reports he followed previous instructions. Patient reports no changes. Instructions given: Continue warfarin 7.5 mg on Fridays, Mondays and Wednesdays; and 5 mg all other days. Recheck on 6/30/25 with other appointment. Calendar reviewed with patient. Patient verbalizes understanding.

## 2025-06-30 ENCOUNTER — OFFICE VISIT (OUTPATIENT)
Dept: INTERNAL MEDICINE | Facility: CLINIC | Age: 60
End: 2025-06-30
Payer: MEDICARE

## 2025-06-30 ENCOUNTER — ANTI-COAG VISIT (OUTPATIENT)
Dept: CARDIOLOGY | Facility: CLINIC | Age: 60
End: 2025-06-30
Payer: MEDICARE

## 2025-06-30 ENCOUNTER — LAB VISIT (OUTPATIENT)
Dept: LAB | Facility: HOSPITAL | Age: 60
End: 2025-06-30
Attending: INTERNAL MEDICINE
Payer: MEDICARE

## 2025-06-30 VITALS
BODY MASS INDEX: 23.11 KG/M2 | RESPIRATION RATE: 18 BRPM | SYSTOLIC BLOOD PRESSURE: 156 MMHG | OXYGEN SATURATION: 98 % | HEIGHT: 78 IN | DIASTOLIC BLOOD PRESSURE: 88 MMHG | TEMPERATURE: 97 F | WEIGHT: 199.75 LBS

## 2025-06-30 DIAGNOSIS — R26.9 ABNORMALITY OF GAIT AND MOBILITY: ICD-10-CM

## 2025-06-30 DIAGNOSIS — Z79.01 ON COUMADIN FOR ATRIAL FIBRILLATION: ICD-10-CM

## 2025-06-30 DIAGNOSIS — I48.0 PAF (PAROXYSMAL ATRIAL FIBRILLATION): Primary | ICD-10-CM

## 2025-06-30 DIAGNOSIS — Z79.01 LONG TERM (CURRENT) USE OF ANTICOAGULANTS: ICD-10-CM

## 2025-06-30 DIAGNOSIS — Z79.4 TYPE 2 DIABETES MELLITUS WITH STAGE 3B CHRONIC KIDNEY DISEASE, WITH LONG-TERM CURRENT USE OF INSULIN: ICD-10-CM

## 2025-06-30 DIAGNOSIS — E78.5 HYPERLIPIDEMIA DUE TO TYPE 2 DIABETES MELLITUS: ICD-10-CM

## 2025-06-30 DIAGNOSIS — I15.2 HYPERTENSION ASSOCIATED WITH DIABETES: ICD-10-CM

## 2025-06-30 DIAGNOSIS — N52.1 ERECTILE DYSFUNCTION ASSOCIATED WITH TYPE 2 DIABETES MELLITUS: ICD-10-CM

## 2025-06-30 DIAGNOSIS — I48.91 ATRIAL FIBRILLATION, UNSPECIFIED TYPE: ICD-10-CM

## 2025-06-30 DIAGNOSIS — N18.32 TYPE 2 DIABETES MELLITUS WITH STAGE 3B CHRONIC KIDNEY DISEASE, WITH LONG-TERM CURRENT USE OF INSULIN: ICD-10-CM

## 2025-06-30 DIAGNOSIS — E11.69 HYPERLIPIDEMIA DUE TO TYPE 2 DIABETES MELLITUS: ICD-10-CM

## 2025-06-30 DIAGNOSIS — I48.91 ON COUMADIN FOR ATRIAL FIBRILLATION: ICD-10-CM

## 2025-06-30 DIAGNOSIS — Z86.0100 HISTORY OF COLON POLYPS: ICD-10-CM

## 2025-06-30 DIAGNOSIS — M51.360 DEGENERATION OF INTERVERTEBRAL DISC OF LUMBAR REGION WITH DISCOGENIC BACK PAIN: ICD-10-CM

## 2025-06-30 DIAGNOSIS — Z00.00 ENCOUNTER FOR MEDICARE ANNUAL WELLNESS EXAM: Primary | ICD-10-CM

## 2025-06-30 DIAGNOSIS — E11.69 ERECTILE DYSFUNCTION ASSOCIATED WITH TYPE 2 DIABETES MELLITUS: ICD-10-CM

## 2025-06-30 DIAGNOSIS — E11.22 TYPE 2 DIABETES MELLITUS WITH STAGE 3B CHRONIC KIDNEY DISEASE, WITH LONG-TERM CURRENT USE OF INSULIN: ICD-10-CM

## 2025-06-30 DIAGNOSIS — E11.59 HYPERTENSION ASSOCIATED WITH DIABETES: ICD-10-CM

## 2025-06-30 DIAGNOSIS — E11.42 DIABETIC POLYNEUROPATHY ASSOCIATED WITH TYPE 2 DIABETES MELLITUS: ICD-10-CM

## 2025-06-30 DIAGNOSIS — F17.210 CIGARETTE NICOTINE DEPENDENCE WITHOUT COMPLICATION: ICD-10-CM

## 2025-06-30 DIAGNOSIS — I48.0 PAF (PAROXYSMAL ATRIAL FIBRILLATION): ICD-10-CM

## 2025-06-30 LAB
INR PPP: 1.4 (ref 0.8–1.2)
PROTHROMBIN TIME: 15.6 SECONDS (ref 9–12.5)

## 2025-06-30 PROCEDURE — 36415 COLL VENOUS BLD VENIPUNCTURE: CPT | Mod: HCNC

## 2025-06-30 PROCEDURE — 99999 PR PBB SHADOW E&M-EST. PATIENT-LVL IV: CPT | Mod: PBBFAC,HCNC,, | Performed by: NURSE PRACTITIONER

## 2025-06-30 PROCEDURE — 85610 PROTHROMBIN TIME: CPT | Mod: HCNC

## 2025-06-30 PROCEDURE — 93793 ANTICOAG MGMT PT WARFARIN: CPT | Mod: S$GLB,,,

## 2025-06-30 NOTE — PATIENT INSTRUCTIONS
Counseling and Referral of Other Preventative  (Italic type indicates deductible and co-insurance are waived)    Patient Name: Papa Pope  Today's Date: 6/30/2025    Health Maintenance       Date Due Completion Date    Pneumococcal Vaccines (Age 50+) (2 of 2 - PCV) 11/09/2012 11/9/2011    Shingles Vaccine (1 of 2) Never done ---    TETANUS VACCINE 11/09/2021 11/9/2011    COVID-19 Vaccine (3 - 2024-25 season) 09/01/2024 4/15/2021    Foot Exam 04/05/2025 4/5/2024 (Done)    Override on 4/5/2024: Done    Override on 10/5/2023: Done    Override on 3/30/2023: Done    Override on 1/25/2022: Done    Hemoglobin A1c 07/15/2025 4/15/2025    PROSTATE-SPECIFIC ANTIGEN 08/26/2025 8/26/2024    Lipid Panel 08/26/2025 8/26/2024    Diabetic Eye Exam 02/18/2026 2/18/2025    LDCT Lung Screen 04/08/2026 4/8/2025    Diabetes Urine Screening 04/15/2026 4/15/2025    Low Dose Statin 05/29/2026 5/29/2025    Colorectal Cancer Screening 09/30/2027 9/30/2022    RSV Vaccine (Age 60+ and Pregnant patients) (1 - 1-dose 75+ series) 05/04/2041 ---        No orders of the defined types were placed in this encounter.      The following information is provided to all patients.  This information is to help you find resources for any of the problems found today that may be affecting your health:                  Living healthy guide: www.[facility].[place].gov      Understanding Diabetes: www.diabetes.org      Eating healthy: www.cdc.gov/healthyweight      CDC home safety checklist: www.cdc.gov/steadi/patient.html      Agency on Aging: www.goea.[place].gov      Alcoholics anonymous (AA): www.aa.org      Physical Activity: www.jalil.nih.gov/wf1mqov      Tobacco use: www.quitwithusla.org

## 2025-06-30 NOTE — PROGRESS NOTES
INR is 1.4 - subtherapeutic.  Lab collected.  Patient reports 2 missed doses.  No s/s reported.  Confirms current warfarin dose.  Instructions given:  Will boost today's dose to 10 mg, tomorrow (7/01) to 7.5 mg (total 11.8%), then resume current dose of 7.5 mg every MWF and 5 mg all other days.  Advised on the importance of taking warfarin as directed to prevent future risk factors.  ER for any s/s of stroke.  INR recheck in 1 week - 7/08 ([facility] lab).  Patient repeated back instructions correctly and confirms understanding.

## 2025-06-30 NOTE — PROGRESS NOTES
"  Papa Pope presented for a  Medicare AWV and comprehensive Health Risk Assessment today. The following components were reviewed and updated:    Medical history  Family History  Social history  Allergies and Current Medications  Health Risk Assessment  Health Maintenance  Care Team         ** See Completed Assessments for Annual Wellness Visit within the encounter summary.**         The following assessments were completed:  Living Situation  CAGE  Depression Screening  Timed Get Up and Go  Whisper Test  Cognitive Function Screening  Nutrition Screening  ADL Screening  PAQ Screening      Opioid documentation:      Patient does not have a current opioid prescription.        Vitals:    06/30/25 0816   BP: (!) 156/88   Patient Position: Sitting   Resp: 18   Temp: 97 °F (36.1 °C)   SpO2: 98%   Weight: 90.6 kg (199 lb 11.8 oz)   Height: 6' 6" (1.981 m)     Body mass index is 23.08 kg/m².  Physical Exam  Vitals and nursing note reviewed.   Constitutional:       General: He is not in acute distress.     Appearance: He is well-developed.   HENT:      Head: Normocephalic and atraumatic.   Eyes:      Pupils: Pupils are equal, round, and reactive to light.   Neck:      Vascular: No carotid bruit.   Cardiovascular:      Rate and Rhythm: Normal rate. Rhythm irregular.      Pulses: Normal pulses.      Heart sounds: Normal heart sounds. No murmur heard.     No gallop.   Pulmonary:      Effort: Pulmonary effort is normal.      Breath sounds: Normal breath sounds.   Abdominal:      General: Bowel sounds are normal. There is no distension.      Palpations: Abdomen is soft.      Tenderness: There is no abdominal tenderness.   Musculoskeletal:         General: Normal range of motion.   Skin:     General: Skin is warm and dry.   Neurological:      Motor: Weakness present. No abnormal muscle tone.      Gait: Gait abnormal.      Comments: Rt leg weakness with cane   Psychiatric:         Speech: Speech normal.         Behavior: Behavior " "normal.         Thought Content: Thought content normal.         Judgment: Judgment normal.       Current Outpatient Medications   Medication Instructions    amLODIPine (NORVASC) 5 mg, Oral, 2 times daily    blood sugar diagnostic Strp Use to check blood sugar twice a day    ciclopirox (PENLAC) 8 % Soln Apply to nails once daily    cyclobenzaprine (FLEXERIL) 10 MG tablet Take 1/2 to 1 tab BID PRN muscle spasms. May cause drowsiness.    empagliflozin (JARDIANCE) 25 mg, Oral, Every morning    ezetimibe (ZETIA) 10 mg, Oral, Daily    insulin aspart U-100 (NOVOLOG FLEXPEN U-100 INSULIN) 100 unit/mL (3 mL) InPn pen Inject into the skin three times a day before each meal per sliding scale. If -200: 2 units; -250: 4 units; -300: 6 units; -350: 8 units; BG >350: 10 unit    LANTUS SOLOSTAR U-100 INSULIN 35 Units, Subcutaneous, Daily    lisinopriL (PRINIVIL,ZESTRIL) 40 mg, Oral, Daily    metFORMIN (GLUCOPHAGE-XR) 500 mg, Oral, 2 times daily with meals    metoprolol tartrate (LOPRESSOR) 50 mg, Oral, 2 times daily    nicotine (NICODERM CQ) 21 mg/24 hr 1 patch, Transdermal, Daily    pen needle, diabetic (BD CHRISTIANO 2ND GEN PEN NEEDLE) 32 gauge x 5/32" Ndle Use five times daily    prednisoLONE acetate (PRED FORTE) 1 % DrpS 1 drop, Right Eye, Every 4 hours    prednisoLONE acetate (PRED FORTE) 1 % DrpS 1 drop, Left Eye, 4 times daily    pregabalin (LYRICA) 150 mg, Oral, 2 times daily    rosuvastatin (CRESTOR) 40 mg, Oral, Nightly    TRUE METRIX AIR GLUCOSE METER kit USE AS DIRECTED    TRUEPLUS LANCETS 33 gauge Misc USE  TO  CHECK  BLOOD  SUGAR TWICE DAILY    warfarin (COUMADIN) 5 MG tablet Take 1 and 1/2 tablets (7.5 mg total) on Mondays and Wednesdays; and one tablet (5 mg) all other days OR AS DIRECTED BY COUMADIN CLINIC.             Diagnoses and health risks identified today and associated recommendations/orders:    1. Encounter for Medicare annual wellness exam    Decline all vaccines    2. PAF (paroxysmal " atrial fibrillation)  Chronic and Stable on Lopressor and Coumadin Continue current treatment plan as previously prescribed with your card    3. On Coumadin for atrial fibrillation  Chronic and Stable INR today. Continue current treatment plan as previously prescribed with your card    4. Type 2 diabetes mellitus with stage 3b chronic kidney disease, with long-term current use of insulin  emoglobin A1c 11.6 High  7.3 High  CM 7.2 High  CM 8.3 High      Chronic and Ongoing see meds list  .Labs scheduled 8/27/ 24  Discussed and recommend  low fat/carb/chol diet. Cardio exercise as tolerated. Life style modifications.   current treatment plan as previously prescribed with your PCP    5. Diabetic polyneuropathy associated with type 2 diabetes mellitus  Chronic and Stable on Lyrica. Continue current treatment plan as previously prescribed with your PCP    6. Cigarette nicotine dependence without complication  This problem is currently not controlled.  Ready to quit: tries  Counseling given:  given  Tobacco comments: 10 CPD  States somoking on and off - use the patch occasional    7. Hypertension associated with diabetes  Chronic and Stable on Norvasc. Continue current treatment plan as previously prescribed with your PCP    8. Degeneration of intervertebral disc of lumbar region with discogenic back pain  Chronic and Stable on Lyrica. Continue current treatment plan as previously prescribed with your PCP    9. Erectile dysfunction associated with type 2 diabetes mellitus  Chronic and Stable.   Has urine leakage ever interrupted your daily activites or sleep? No  Do you think you could use some help to better manage urine leakage?No  Continue current treatment plan as previously prescribed with your PCP    10. History of colon polyps  Next due on 9/30/2027  Followed by PCP    11. Abnormality of gait and mobility  Chronic and Stable  with cane. Continue current treatment plan as previously prescribed with your PCP    12.  Hyperlipidemia due to type 2 diabetes mellitus   Chronic and Stable on Zetia and Crestor. Continue current treatment plan as previously prescribed with your PCP- card    I offered to discuss advanced care planning, including how to pick a person who would make decisions for you if you were unable to make them for yourself, called a health care power of , and what kind of decisions you might make such as use of life sustaining treatments such as ventilators and tube feeding when faced with a life limiting illness recorded on a living will that they will need to know. (How you want to be cared for as you near the end of your natural life)     X Patient is interested in learning more about how to make advanced directives.  I provided them paperwork and offered to discuss this with them.      Provided Papa with a 5-10 year written screening schedule and personal prevention plan. Recommendations were developed using the USPSTF age appropriate recommendations. Education, counseling, and referrals were provided as needed. After Visit Summary printed and given to patient which includes a list of additional screenings\tests needed.    Follow up in about 1 year (around 6/30/2026).  Juliann Owens NP

## 2025-07-02 NOTE — PROGRESS NOTES
Patient ID: Papa Pope is a 60 y.o. male.  Patient's current PCP is Prachi Castano MD.   Collaborating Physician: THIERRY Darling MD    Chief Complaint: Diabetes Mellitus Consultation   HPI  Papa Pope is a 60 y.o. Black or  male presenting for a new consult with me, previously seen by ROHINI Haywood NP for diabetes. LOV 4/2023  Patient has been diagnosed with type 2 diabetes since 8/2021 .    Pertinent to decision making is/are the following comorbidities:  Microalbuminuria, PAF, HTN, HLD   Complications related to diabetes: nephropathy  Recent diabetes related hospitalizations: None   Personal history of pancreatitis: denies  Family history of pancreatic cancer in first-degree relative: denies  Family history of MTC/MEN/endocrine tumors: denies       Previous diabetes education: None recent   Current diet: BF - 2 servings grits, eggs; chicken (fried or baked) + salad; evening meal: fried fish sometimes baked fish + salad + fruit   Pasta on Sundays   Mostly Water, occasional regular soda   Activity level: no set regimen but active around house and yard, non-sedentary.   Occupation: ---       CURRENT DM MEDICATIONS:   Diabetes Medications              empagliflozin (JARDIANCE) 25 mg tablet Take 1 tablet (25 mg total) by mouth every morning.    Out x 2 days     insulin aspart U-100 (NOVOLOG FLEXPEN U-100 INSULIN) 100 unit/mL (3 mL) InPn pen Inject into the skin three times a day before each meal per sliding scale. If -200: 2 units; -250: 4 units; -300: 6 units; -350: 8 units; BG >350: 10 unit    insulin glargine U-100, Lantus, (LANTUS SOLOSTAR U-100 INSULIN) 100 unit/mL (3 mL) InPn pen Inject 35 Units into the skin once daily.    30 units every morning     metFORMIN (GLUCOPHAGE-XR) 500 MG ER 24hr tablet Take 1 tablet (500 mg total) by mouth 2 (two) times daily with meals.       Past failed treatment(s) include:   Trulicity -- cost     His blood sugar in the clinic today was:    Lab Results   Component Value Date    POCGLU 346 (A) 2025       Blood glucose testing is performed regularly. Patient is testing 2 times per day.  Meter/CGM: ---   Any episodes of hypoglycemia? No   Glucose trends:   Before lunch: 200's  At bedtime: 140 - 269   Fasting this mornin         Papa Pope presents to clinic today to discuss diabetes management.   Patient is new to me; previously saw STEPHANE Haywood with LOV .   Had recent A1c increase to 11.6% - previously in 7's. He notes his wife is very supportive and helps him stay on track with his diet; per diet recall, he eats a reasonable amount of carbs per meal (2-3 servings) and snacks on fruit (~2 servings). He is fairly active around his home and yard - he is unsure of any precipitating factors leading to the A1c increase. Notes consistency with insulin - both long acting and before meals. Takes Jardiance 25 mg without reported SE; endorses the Jardiance is costly. We discussed referral to [facility] to see if he would qualify for medication assistance and he is agreeable. He is currently checking BG BID before lunch and before bedtime. He is fasting this AM with . He was previously on Stacy, has been off for about 3 years but would like to restart CGM. His daughter uses Dexcom, so he would ideally like to use this as well so she can better assist him.     Labs --   A1c 11.6%  eGFR >60, + microalbuminuria -- on ACEI and SGLT2 inhibitor  LDL 93.4       Of note, Papa Pope is managed by the following specialists:   Cardiology -- PAF   Smoking cessation       Statin: Taking  ACE/ARB: Taking    Labs reviewed and are noted below.    Screening or Prevention Patient's value Goal Complete/Controlled?   HgA1C Testing and Control   Lab Results   Component Value Date    HGBA1C 11.6 (H) 04/15/2025      Annually/Less than 8% No   Lipid profile : 2024 Annually Yes   LDL control Lab Results   Component Value Date    LDLCALC 93.4 2024     "Annually/Less than 100 mg/dl  Yes   Nephropathy screening Lab Results   Component Value Date    MICALBCREAT 150.0 (H) 04/15/2025     Lab Results   Component Value Date    PROTEINUA Negative 08/26/2024    Annually Yes   Blood pressure BP Readings from Last 1 Encounters:   07/03/25 118/80    Less than 140/90 No   Dilated retinal exam : 02/18/2025 Annually Yes    Foot exam   : 04/05/2024 Annually No       Wt Readings from Last 3 Encounters:   07/03/25 0857 88.9 kg (195 lb 15.8 oz)   06/30/25 0816 90.6 kg (199 lb 11.8 oz)   04/08/25 0859 89.5 kg (197 lb 5 oz)         Lab Results   Component Value Date    TSH 2.091 08/26/2024    CALCIUM 9.2 04/15/2025     No results found for: "CPEPTIDE"  No results found for: "GLUTAMICACID"  Glucose   Date Value Ref Range Status   04/15/2025 332 (H) 70 - 110 mg/dL Final   08/26/2024 150 (H) 70 - 110 mg/dL Final     Anion Gap   Date Value Ref Range Status   04/15/2025 10 8 - 16 mmol/L Final     eGFR if    Date Value Ref Range Status   02/07/2022 >60 >60 mL/min/1.73 m^2 Final     eGFR if non    Date Value Ref Range Status   02/07/2022 56 (A) >60 mL/min/1.73 m^2 Final     Comment:     Calculation used to obtain the estimated glomerular filtration  rate (eGFR) is the CKD-EPI equation.              Review of patient's allergies indicates:  No Known Allergies  Social History[1]  Past Medical History:   Diagnosis Date    Acute renal failure 1/25/2022    Arthritis     Atrial fibrillation with RVR 1/25/2022    Back pain     Hyperlipidemia     Hypertension     Pneumonia     Polyneuropathy     Tobacco dependence     Type 2 diabetes mellitus without complication, without long-term current use of insulin 8/4/2021    Uncomplicated alcohol dependence 5/9/2023       Review of Systems   Constitutional:  Positive for weight loss. Negative for diaphoresis and malaise/fatigue.   Eyes:  Negative for blurred vision.   Respiratory:  Negative for shortness of breath.  "   Cardiovascular:  Negative for chest pain and leg swelling.   Gastrointestinal:  Negative for abdominal pain, constipation, diarrhea, heartburn, nausea and vomiting.   Genitourinary:  Negative for dysuria, frequency and urgency.   Musculoskeletal:  Negative for falls.   Skin:  Negative for rash.   Neurological:  Positive for weakness. Negative for dizziness and headaches.   Endo/Heme/Allergies:  Negative for polydipsia.   Psychiatric/Behavioral:  The patient is not nervous/anxious.          Physical Exam  Constitutional:       Appearance: Normal appearance. He is normal weight.   HENT:      Head: Normocephalic and atraumatic.   Eyes:      Pupils: Pupils are equal, round, and reactive to light.   Cardiovascular:      Rate and Rhythm: Rhythm irregular.      Pulses: Normal pulses.           Dorsalis pedis pulses are 2+ on the right side and 2+ on the left side.        Posterior tibial pulses are 2+ on the right side and 2+ on the left side.   Pulmonary:      Breath sounds: Normal breath sounds.   Abdominal:      General: Abdomen is flat. Bowel sounds are normal.      Palpations: Abdomen is soft.   Musculoskeletal:      Cervical back: Neck supple.      Right lower leg: No edema.      Left lower leg: No edema.   Feet:      Right foot:      Protective Sensation: 6 sites tested.  6 sites sensed.      Skin integrity: Callus present.      Toenail Condition: Right toenails are abnormally thick. Fungal disease present.     Left foot:      Protective Sensation: 6 sites tested.  6 sites sensed.      Toenail Condition: Left toenails are abnormally thick. Fungal disease present.     Comments: Left great toe amputation (accidental several years ago)   Skin:     General: Skin is warm and dry.   Neurological:      Mental Status: He is alert and oriented to person, place, and time.      Motor: Weakness present.      Gait: Gait abnormal.   Psychiatric:         Mood and Affect: Mood normal.         Behavior: Behavior normal.          Thought Content: Thought content normal.         Judgment: Judgment normal.             Assessment and Plan:   Diagnoses and all orders for this visit:    Type 2 diabetes mellitus with diabetic microalbuminuria, with long-term current use of insulin  -     POCT Glucose, Hand-Held Device    Hypertension associated with diabetes    Hyperlipidemia due to type 2 diabetes mellitus    Diabetic polyneuropathy associated with type 2 diabetes mellitus    PAF (paroxysmal atrial fibrillation)    Type 2 diabetes mellitus with microalbuminuria, with long-term current use of insulin  -     Ambulatory referral/consult to Diabetic Advanced Practice Providers (Medical Management)           Treatment plan for today's visit:   - Increase Lantus from 30 units to 36 units every morning   - Novolog 6 units 10-15 minutes before main meals containing carbohydrates    Administer 3 units before snacks (fruit)  - Restart Jardiance 25 mg daily -- I have sent a referral to [facility] to see if you qualify for medication cost assistance. They will contact you in the next few days to have you complete the application.       Sent orders for Dexcom G7 through Medicare. The company will be calling you to confirm before they send it out to you.     If Sirena is able to help you get started, you will need to download the Dexcom G7 Ra and Dexcom Clarity Ra on your phone. You will need to set up an account and then to share your data with our clinic, our clinic code is [email or web address]   Please let me know once you are sharing to make sure I can access your account.       Patient education:   - Carbohydrates: Limit to 30-45 grams with each meal. Never eat carbs by themselves - always add protein. Make snacks low carb or non-carb only.    - Blood sugar goals:   Fastin-130 mg/dl  2 hours after meal:  mg/dl  Before bed: 100-150 mg/dl    - Carry glucose tablets/soft peppermints/regular juice or Coke product with you at all times to  treat a low blood sugar episode (less than 70).  If your blood sugar is between 50-70, Chew 4 tablets or drink 1/2 cup of juice or regular Coke product.   If your blood sugar is below 50, double the treatment.   Re-check blood sugar in 15 minutes. If still low, repeat this.   Always call the clinic to give an update for any low blood sugar episodes.    - Exercise: Goal is 150 minutes per week, which is about 30 minutes/day, 5 days/week. Start slowly and increase as tolerated.        Follow up: 1 month    Visit today included increased complexity associated with the care of the episodic problems addressed and managing the longitudinal care of the patient due to the serious and/or complex managed problem(s)       Lauren O. Landry, FNP-C [facility]            [1]   Social History  Socioeconomic History    Marital status:    Tobacco Use    Smoking status: Some Days     Current packs/day: 1.00     Average packs/day: 1.3 packs/day for 42.5 years (54.0 ttl pk-yrs)     Types: Cigarettes     Start date: 1983    Smokeless tobacco: Never    Tobacco comments:     10 CPD   Substance and Sexual Activity    Alcohol use: Yes     Alcohol/week: 12.0 standard drinks of alcohol     Types: 12 Cans of beer per week     Comment: weekends only    Drug use: No    Sexual activity: Yes     Social Drivers of Health     Financial Resource Strain: Medium Risk (5/9/2023)    Overall Financial Resource Strain (CARDIA)     Difficulty of Paying Living Expenses: Somewhat hard   Food Insecurity: No Food Insecurity (1/10/2025)    Hunger Vital Sign     Worried About Running Out of Food in the Last Year: Never true     Ran Out of Food in the Last Year: Never true   Transportation Needs: No Transportation Needs (1/10/2025)    TRANSPORTATION NEEDS     Transportation : No   Physical Activity: Inactive (5/9/2023)    Exercise Vital Sign     Days of Exercise per Week: 0 days     Minutes of Exercise per Session: 0 min   Stress: No  Stress Concern Present (5/9/2023)    [employer] [place] [employer] - Occupational Stress Questionnaire     Feeling of Stress : Only a little   Housing Stability: Low Risk  (5/9/2023)    Housing Stability Vital Sign     Unable to Pay for Housing in the Last Year: No     Number of Places Lived in the Last Year: 1     Unstable Housing in the Last Year: No

## 2025-07-03 ENCOUNTER — TELEPHONE (OUTPATIENT)
Dept: DIABETES | Facility: CLINIC | Age: 60
End: 2025-07-03

## 2025-07-03 ENCOUNTER — OFFICE VISIT (OUTPATIENT)
Dept: DIABETES | Facility: CLINIC | Age: 60
End: 2025-07-03
Payer: MEDICARE

## 2025-07-03 VITALS
BODY MASS INDEX: 22.68 KG/M2 | HEIGHT: 78 IN | WEIGHT: 196 LBS | DIASTOLIC BLOOD PRESSURE: 80 MMHG | SYSTOLIC BLOOD PRESSURE: 118 MMHG | HEART RATE: 80 BPM

## 2025-07-03 DIAGNOSIS — E53.8 VITAMIN B12 DEFICIENCY: ICD-10-CM

## 2025-07-03 DIAGNOSIS — I48.0 PAF (PAROXYSMAL ATRIAL FIBRILLATION): ICD-10-CM

## 2025-07-03 DIAGNOSIS — B35.1 ONYCHOMYCOSIS: ICD-10-CM

## 2025-07-03 DIAGNOSIS — R80.9 TYPE 2 DIABETES MELLITUS WITH MICROALBUMINURIA, WITH LONG-TERM CURRENT USE OF INSULIN: ICD-10-CM

## 2025-07-03 DIAGNOSIS — E11.29 TYPE 2 DIABETES MELLITUS WITH MICROALBUMINURIA, WITH LONG-TERM CURRENT USE OF INSULIN: ICD-10-CM

## 2025-07-03 DIAGNOSIS — L84 CALLUS OF FOOT: ICD-10-CM

## 2025-07-03 DIAGNOSIS — Z79.4 TYPE 2 DIABETES MELLITUS WITH DIABETIC MICROALBUMINURIA, WITH LONG-TERM CURRENT USE OF INSULIN: Primary | ICD-10-CM

## 2025-07-03 DIAGNOSIS — R80.9 TYPE 2 DIABETES MELLITUS WITH DIABETIC MICROALBUMINURIA, WITH LONG-TERM CURRENT USE OF INSULIN: Primary | ICD-10-CM

## 2025-07-03 DIAGNOSIS — Z79.4 TYPE 2 DIABETES MELLITUS WITH MICROALBUMINURIA, WITH LONG-TERM CURRENT USE OF INSULIN: ICD-10-CM

## 2025-07-03 DIAGNOSIS — E11.42 DIABETIC POLYNEUROPATHY ASSOCIATED WITH TYPE 2 DIABETES MELLITUS: ICD-10-CM

## 2025-07-03 DIAGNOSIS — E11.69 HYPERLIPIDEMIA DUE TO TYPE 2 DIABETES MELLITUS: ICD-10-CM

## 2025-07-03 DIAGNOSIS — E78.5 HYPERLIPIDEMIA DUE TO TYPE 2 DIABETES MELLITUS: ICD-10-CM

## 2025-07-03 DIAGNOSIS — E55.9 VITAMIN D DEFICIENCY: ICD-10-CM

## 2025-07-03 DIAGNOSIS — I15.2 HYPERTENSION ASSOCIATED WITH DIABETES: ICD-10-CM

## 2025-07-03 DIAGNOSIS — E11.59 HYPERTENSION ASSOCIATED WITH DIABETES: ICD-10-CM

## 2025-07-03 DIAGNOSIS — E11.29 TYPE 2 DIABETES MELLITUS WITH DIABETIC MICROALBUMINURIA, WITH LONG-TERM CURRENT USE OF INSULIN: Primary | ICD-10-CM

## 2025-07-03 LAB — GLUCOSE SERPL-MCNC: 346 MG/DL (ref 70–110)

## 2025-07-03 PROCEDURE — 82962 GLUCOSE BLOOD TEST: CPT | Mod: HCNC,S$GLB,, | Performed by: NURSE PRACTITIONER

## 2025-07-03 PROCEDURE — 99999 PR PBB SHADOW E&M-EST. PATIENT-LVL V: CPT | Mod: PBBFAC,HCNC,, | Performed by: NURSE PRACTITIONER

## 2025-07-03 NOTE — TELEPHONE ENCOUNTER
----- Message from Yenifer Pappas NP sent at 7/3/2025 10:43 AM CDT -----  Please send order for Dexcom G7 sensors through [employer].     Thanks!

## 2025-07-03 NOTE — PATIENT INSTRUCTIONS
Treatment plan for today's visit:   - Increase Lantus from 30 units to 36 units every morning     - Novolog 6 units 10-15 minutes before main meals containing carbohydrates    Administer 3 units before snacks (fruit)    - Restart Jardiance 25 mg daily -- I have sent a referral to [facility] to see if you qualify for medication cost assistance. They will contact you in the next few days to have you complete the application.       Sent orders for Dexcom G7 through Medicare. The company will be calling you to confirm before they send it out to you.     If Sirena is able to help you get started, you will need to download the Dexcom G7 Ra and Dexcom Clarity Ra on your phone. You will need to set up an account and then to share your data with our clinic, our clinic code is [email or web address]   Please let me know once you are sharing to make sure I can access your account.       Patient education:   - Carbohydrates: Limit to 30-45 grams with each meal. Never eat carbs by themselves - always add protein. Make snacks low carb or non-carb only.    - Blood sugar goals:   Fastin-130 mg/dl  2 hours after meal:  mg/dl  Before bed: 100-150 mg/dl    - Carry glucose tablets/soft peppermints/regular juice or Coke product with you at all times to treat a low blood sugar episode (less than 70).  If your blood sugar is between 50-70, Chew 4 tablets or drink 1/2 cup of juice or regular Coke product.   If your blood sugar is below 50, double the treatment.   Re-check blood sugar in 15 minutes. If still low, repeat this.   Always call the clinic to give an update for any low blood sugar episodes.    - Exercise: Goal is 150 minutes per week, which is about 30 minutes/day, 5 days/week. Start slowly and increase as tolerated.

## 2025-07-08 ENCOUNTER — LAB VISIT (OUTPATIENT)
Dept: LAB | Facility: HOSPITAL | Age: 60
End: 2025-07-08
Attending: INTERNAL MEDICINE
Payer: MEDICARE

## 2025-07-08 ENCOUNTER — ANTI-COAG VISIT (OUTPATIENT)
Dept: CARDIOLOGY | Facility: CLINIC | Age: 60
End: 2025-07-08
Payer: MEDICARE

## 2025-07-08 DIAGNOSIS — E53.8 VITAMIN B12 DEFICIENCY: ICD-10-CM

## 2025-07-08 DIAGNOSIS — I48.0 PAF (PAROXYSMAL ATRIAL FIBRILLATION): Primary | ICD-10-CM

## 2025-07-08 DIAGNOSIS — R80.9 TYPE 2 DIABETES MELLITUS WITH DIABETIC MICROALBUMINURIA, WITH LONG-TERM CURRENT USE OF INSULIN: ICD-10-CM

## 2025-07-08 DIAGNOSIS — Z79.4 TYPE 2 DIABETES MELLITUS WITH DIABETIC MICROALBUMINURIA, WITH LONG-TERM CURRENT USE OF INSULIN: ICD-10-CM

## 2025-07-08 DIAGNOSIS — E11.29 TYPE 2 DIABETES MELLITUS WITH DIABETIC MICROALBUMINURIA, WITH LONG-TERM CURRENT USE OF INSULIN: ICD-10-CM

## 2025-07-08 DIAGNOSIS — Z79.01 LONG TERM (CURRENT) USE OF ANTICOAGULANTS: ICD-10-CM

## 2025-07-08 DIAGNOSIS — E55.9 VITAMIN D DEFICIENCY: ICD-10-CM

## 2025-07-08 DIAGNOSIS — I48.91 ATRIAL FIBRILLATION, UNSPECIFIED TYPE: ICD-10-CM

## 2025-07-08 LAB
25(OH)D3+25(OH)D2 SERPL-MCNC: 31 NG/ML (ref 30–96)
ABSOLUTE EOSINOPHIL (OHS): 0.09 K/UL
ABSOLUTE MONOCYTE (OHS): 0.51 K/UL (ref 0.3–1)
ABSOLUTE NEUTROPHIL COUNT (OHS): 3.28 K/UL (ref 1.8–7.7)
ALBUMIN SERPL BCP-MCNC: 3.8 G/DL (ref 3.5–5.2)
ALBUMIN/CREAT UR: 228.6 UG/MG
ALP SERPL-CCNC: 86 UNIT/L (ref 40–150)
ALT SERPL W/O P-5'-P-CCNC: 32 UNIT/L (ref 10–44)
ANION GAP (OHS): 10 MMOL/L (ref 8–16)
AST SERPL-CCNC: 25 UNIT/L (ref 11–45)
BASOPHILS # BLD AUTO: 0.02 K/UL
BASOPHILS NFR BLD AUTO: 0.3 %
BILIRUB SERPL-MCNC: 0.3 MG/DL (ref 0.1–1)
BUN SERPL-MCNC: 18 MG/DL (ref 6–20)
CALCIUM SERPL-MCNC: 8.6 MG/DL (ref 8.7–10.5)
CHLORIDE SERPL-SCNC: 104 MMOL/L (ref 95–110)
CHOLEST SERPL-MCNC: 157 MG/DL (ref 120–199)
CHOLEST/HDLC SERPL: 3.7 {RATIO} (ref 2–5)
CO2 SERPL-SCNC: 20 MMOL/L (ref 23–29)
CREAT SERPL-MCNC: 1.3 MG/DL (ref 0.5–1.4)
CREAT UR-MCNC: 49 MG/DL (ref 23–375)
EAG (OHS): 246 MG/DL (ref 68–131)
ERYTHROCYTE [DISTWIDTH] IN BLOOD BY AUTOMATED COUNT: 12 % (ref 11.5–14.5)
GFR SERPLBLD CREATININE-BSD FMLA CKD-EPI: >60 ML/MIN/1.73/M2
GLUCOSE SERPL-MCNC: 408 MG/DL (ref 70–110)
HBA1C MFR BLD: 10.2 % (ref 4–5.6)
HCT VFR BLD AUTO: 42.8 % (ref 40–54)
HDLC SERPL-MCNC: 43 MG/DL (ref 40–75)
HDLC SERPL: 27.4 % (ref 20–50)
HGB BLD-MCNC: 14.1 GM/DL (ref 14–18)
IMM GRANULOCYTES # BLD AUTO: 0.01 K/UL (ref 0–0.04)
IMM GRANULOCYTES NFR BLD AUTO: 0.2 % (ref 0–0.5)
INR PPP: 2 (ref 0.8–1.2)
LDLC SERPL CALC-MCNC: 89.4 MG/DL (ref 63–159)
LYMPHOCYTES # BLD AUTO: 1.9 K/UL (ref 1–4.8)
MCH RBC QN AUTO: 32.1 PG (ref 27–31)
MCHC RBC AUTO-ENTMCNC: 32.9 G/DL (ref 32–36)
MCV RBC AUTO: 98 FL (ref 82–98)
MICROALBUMIN UR-MCNC: 112 UG/ML (ref ?–5000)
NONHDLC SERPL-MCNC: 114 MG/DL
NUCLEATED RBC (/100WBC) (OHS): 0 /100 WBC
PLATELET # BLD AUTO: 132 K/UL (ref 150–450)
PMV BLD AUTO: 11.2 FL (ref 9.2–12.9)
POTASSIUM SERPL-SCNC: 3.9 MMOL/L (ref 3.5–5.1)
PROT SERPL-MCNC: 6.9 GM/DL (ref 6–8.4)
PROTHROMBIN TIME: 21.3 SECONDS (ref 9–12.5)
RBC # BLD AUTO: 4.39 M/UL (ref 4.6–6.2)
RELATIVE EOSINOPHIL (OHS): 1.5 %
RELATIVE LYMPHOCYTE (OHS): 32.7 % (ref 18–48)
RELATIVE MONOCYTE (OHS): 8.8 % (ref 4–15)
RELATIVE NEUTROPHIL (OHS): 56.5 % (ref 38–73)
SODIUM SERPL-SCNC: 134 MMOL/L (ref 136–145)
T4 FREE SERPL-MCNC: 1.02 NG/DL (ref 0.71–1.51)
TRIGL SERPL-MCNC: 123 MG/DL (ref 30–150)
TSH SERPL-ACNC: 2.72 UIU/ML (ref 0.4–4)
VIT B12 SERPL-MCNC: 406 PG/ML (ref 210–950)
WBC # BLD AUTO: 5.81 K/UL (ref 3.9–12.7)

## 2025-07-08 PROCEDURE — 84443 ASSAY THYROID STIM HORMONE: CPT | Mod: HCNC

## 2025-07-08 PROCEDURE — 80061 LIPID PANEL: CPT | Mod: HCNC

## 2025-07-08 PROCEDURE — 93793 ANTICOAG MGMT PT WARFARIN: CPT | Mod: S$GLB,,,

## 2025-07-08 PROCEDURE — 82040 ASSAY OF SERUM ALBUMIN: CPT | Mod: HCNC

## 2025-07-08 PROCEDURE — 82570 ASSAY OF URINE CREATININE: CPT | Mod: HCNC

## 2025-07-08 PROCEDURE — 83036 HEMOGLOBIN GLYCOSYLATED A1C: CPT | Mod: HCNC

## 2025-07-08 PROCEDURE — 84439 ASSAY OF FREE THYROXINE: CPT | Mod: HCNC

## 2025-07-08 PROCEDURE — 82607 VITAMIN B-12: CPT | Mod: HCNC

## 2025-07-08 PROCEDURE — 36415 COLL VENOUS BLD VENIPUNCTURE: CPT | Mod: HCNC

## 2025-07-08 PROCEDURE — 85025 COMPLETE CBC W/AUTO DIFF WBC: CPT | Mod: HCNC

## 2025-07-08 PROCEDURE — 85610 PROTHROMBIN TIME: CPT | Mod: HCNC

## 2025-07-08 PROCEDURE — 82306 VITAMIN D 25 HYDROXY: CPT | Mod: HCNC

## 2025-07-08 NOTE — PROGRESS NOTES
INR at goal. Medications and chart reviewed. No changes noted to necessitate adjustment of warfarin or follow-up plan. See calendar.  If patient does not miss doses, INR remains in range.  Continue warfarin 5 mg QD x 7.5 mg on MWF.  Repeat INR in 3 weeks 7/31/25 with other [place] appointment.  Pt contacted and advised.

## 2025-07-15 ENCOUNTER — PATIENT OUTREACH (OUTPATIENT)
Dept: ADMINISTRATIVE | Facility: HOSPITAL | Age: 60
End: 2025-07-15
Payer: MEDICARE

## 2025-07-15 DIAGNOSIS — R80.9 TYPE 2 DIABETES MELLITUS WITH DIABETIC MICROALBUMINURIA, WITH LONG-TERM CURRENT USE OF INSULIN: Primary | ICD-10-CM

## 2025-07-15 DIAGNOSIS — E11.29 TYPE 2 DIABETES MELLITUS WITH DIABETIC MICROALBUMINURIA, WITH LONG-TERM CURRENT USE OF INSULIN: Primary | ICD-10-CM

## 2025-07-15 DIAGNOSIS — Z79.4 TYPE 2 DIABETES MELLITUS WITH DIABETIC MICROALBUMINURIA, WITH LONG-TERM CURRENT USE OF INSULIN: Primary | ICD-10-CM

## 2025-07-31 ENCOUNTER — LAB VISIT (OUTPATIENT)
Dept: LAB | Facility: HOSPITAL | Age: 60
End: 2025-07-31
Attending: INTERNAL MEDICINE
Payer: MEDICARE

## 2025-07-31 ENCOUNTER — ANTI-COAG VISIT (OUTPATIENT)
Dept: CARDIOLOGY | Facility: CLINIC | Age: 60
End: 2025-07-31
Payer: MEDICARE

## 2025-07-31 ENCOUNTER — OFFICE VISIT (OUTPATIENT)
Dept: DIABETES | Facility: CLINIC | Age: 60
End: 2025-07-31
Payer: MEDICARE

## 2025-07-31 VITALS
SYSTOLIC BLOOD PRESSURE: 132 MMHG | HEIGHT: 78 IN | DIASTOLIC BLOOD PRESSURE: 80 MMHG | WEIGHT: 192.69 LBS | HEART RATE: 68 BPM | BODY MASS INDEX: 22.29 KG/M2

## 2025-07-31 DIAGNOSIS — E11.42 DIABETIC POLYNEUROPATHY ASSOCIATED WITH TYPE 2 DIABETES MELLITUS: ICD-10-CM

## 2025-07-31 DIAGNOSIS — E11.59 HYPERTENSION ASSOCIATED WITH DIABETES: ICD-10-CM

## 2025-07-31 DIAGNOSIS — R80.9 TYPE 2 DIABETES MELLITUS WITH DIABETIC MICROALBUMINURIA, WITH LONG-TERM CURRENT USE OF INSULIN: Primary | ICD-10-CM

## 2025-07-31 DIAGNOSIS — E11.29 TYPE 2 DIABETES MELLITUS WITH DIABETIC MICROALBUMINURIA, WITH LONG-TERM CURRENT USE OF INSULIN: Primary | ICD-10-CM

## 2025-07-31 DIAGNOSIS — E11.69 HYPERLIPIDEMIA DUE TO TYPE 2 DIABETES MELLITUS: ICD-10-CM

## 2025-07-31 DIAGNOSIS — I48.0 PAF (PAROXYSMAL ATRIAL FIBRILLATION): ICD-10-CM

## 2025-07-31 DIAGNOSIS — I48.91 ATRIAL FIBRILLATION, UNSPECIFIED TYPE: ICD-10-CM

## 2025-07-31 DIAGNOSIS — Z79.4 TYPE 2 DIABETES MELLITUS WITH STAGE 3B CHRONIC KIDNEY DISEASE, WITH LONG-TERM CURRENT USE OF INSULIN: ICD-10-CM

## 2025-07-31 DIAGNOSIS — Z79.4 TYPE 2 DIABETES MELLITUS WITH DIABETIC MICROALBUMINURIA, WITH LONG-TERM CURRENT USE OF INSULIN: Primary | ICD-10-CM

## 2025-07-31 DIAGNOSIS — Z79.01 LONG TERM (CURRENT) USE OF ANTICOAGULANTS: ICD-10-CM

## 2025-07-31 DIAGNOSIS — I15.2 HYPERTENSION ASSOCIATED WITH DIABETES: ICD-10-CM

## 2025-07-31 DIAGNOSIS — E78.5 HYPERLIPIDEMIA DUE TO TYPE 2 DIABETES MELLITUS: ICD-10-CM

## 2025-07-31 DIAGNOSIS — I48.0 PAF (PAROXYSMAL ATRIAL FIBRILLATION): Primary | ICD-10-CM

## 2025-07-31 DIAGNOSIS — E11.22 TYPE 2 DIABETES MELLITUS WITH STAGE 3B CHRONIC KIDNEY DISEASE, WITH LONG-TERM CURRENT USE OF INSULIN: ICD-10-CM

## 2025-07-31 DIAGNOSIS — N18.32 TYPE 2 DIABETES MELLITUS WITH STAGE 3B CHRONIC KIDNEY DISEASE, WITH LONG-TERM CURRENT USE OF INSULIN: ICD-10-CM

## 2025-07-31 LAB
GLUCOSE SERPL-MCNC: 204 MG/DL (ref 70–110)
INR PPP: 1.6 (ref 0.8–1.2)
PROTHROMBIN TIME: 16.8 SECONDS (ref 9–12.5)

## 2025-07-31 PROCEDURE — 82962 GLUCOSE BLOOD TEST: CPT | Mod: HCNC,S$GLB,, | Performed by: NURSE PRACTITIONER

## 2025-07-31 PROCEDURE — 99999 PR PBB SHADOW E&M-EST. PATIENT-LVL IV: CPT | Mod: PBBFAC,HCNC,, | Performed by: NURSE PRACTITIONER

## 2025-07-31 PROCEDURE — 85610 PROTHROMBIN TIME: CPT | Mod: HCNC

## 2025-07-31 PROCEDURE — 36415 COLL VENOUS BLD VENIPUNCTURE: CPT | Mod: HCNC

## 2025-07-31 RX ORDER — INSULIN GLARGINE 100 [IU]/ML
36 INJECTION, SOLUTION SUBCUTANEOUS DAILY
Start: 2025-07-31 | End: 2025-12-28

## 2025-07-31 NOTE — PROGRESS NOTES
Patient ID: Papa Pope is a 60 y.o. male.  Patient's current PCP is Prachi Castano MD.   Collaborating Physician: THIERRY Darling MD    Chief Complaint: Diabetes Mellitus Consultation   HPI  Papa Pope is a 60 y.o. Black or  male presenting for a follow up for diabetes  Patient has been diagnosed with type 2 diabetes since 8/2021 .    Pertinent to decision making is/are the following comorbidities:  Microalbuminuria, PAF, HTN, HLD   Complications related to diabetes: nephropathy  Recent diabetes related hospitalizations: None   Personal history of pancreatitis: denies  Family history of pancreatic cancer in first-degree relative: denies  Family history of MTC/MEN/endocrine tumors: denies       Previous diabetes education: None recent   Current diet: BF - 2 servings grits, eggs; chicken (fried or baked) + salad; evening meal: fried fish sometimes baked fish + salad + fruit   Pasta on Sundays   Mostly Water, occasional regular soda   Activity level: no set regimen but active around house and yard, non-sedentary.   Occupation: ---       CURRENT DM MEDICATIONS:   Diabetes Medications              empagliflozin (JARDIANCE) 25 mg tablet Take 1 tablet (25 mg total) by mouth every morning.    insulin aspart U-100 (NOVOLOG FLEXPEN U-100 INSULIN) 100 unit/mL (3 mL) InPn pen Inject into the skin three times a day before each meal per sliding scale. If -200: 2 units; -250: 4 units; -300: 6 units; -350: 8 units; BG >350: 10 unit    Has been taking 4 units ac meals     insulin glargine U-100, Lantus, (LANTUS SOLOSTAR U-100 INSULIN) 100 unit/mL (3 mL) InPn pen Inject 35 Units into the skin once daily.    Taking 36 units     metFORMIN (GLUCOPHAGE-XR) 500 MG ER 24hr tablet Take 1 tablet (500 mg total) by mouth 2 (two) times daily with meals.     Past failed treatment(s) include:   Trulicity -- cost     His blood sugar in the clinic today was:   Lab Results   Component Value Date    POCGLU 204  (A) 2025       Blood glucose testing is performed regularly. Patient is testing 2 times per day.   Meter/CGM: ---   Any episodes of hypoglycemia? No   Glucose trends:   Fastin, 130, 174  1-2 PM: 170's unless having dessert or sweet with meal, then 200's       Papa Pope presents to clinic today to discuss diabetes management.   At LOV, referral placed to [facility] for Jardiance, but patient states he was able to get cost down at pharmacy. He has restarted Jardiance without reported issues or SE.   Orders sent for Dexcom G7 but too costly. We will re-run cost of Stacy 2+ to see if more affordable.   He is monitoring BG 2 times per day with morning/fasting ranging from  unless having overnight snack, then increased to low to mid 200s in the morning. He is also checking after lunch with numbers mostly in the 170s but sometimes up to low 200's if heavier meal or dessert.       Labs --   A1c improved to 10.2% (prev 11.6)  eGFR >60, + microalbuminuria -- on ACEI   Vit D - recommend 1000 IU daily     LDL - 89 -- he has restarted Crestor and Zetia consistently every day. Rec repeat lipids in 6 mos.       Of note, Papa Pope is managed by the following specialists:   Cardiology -- PAF   Smoking cessation       Statin: Taking  ACE/ARB: Taking    Labs reviewed and are noted below.    Screening or Prevention Patient's value Goal Complete/Controlled?   HgA1C Testing and Control   Lab Results   Component Value Date    HGBA1C 10.2 (H) 2025      Annually/Less than 8% No   Lipid profile : 2025 Annually Yes   LDL control Lab Results   Component Value Date    LDLCALC 89.4 2025    Annually/Less than 100 mg/dl  Yes   Nephropathy screening Lab Results   Component Value Date    MICALBCREAT 228.6 (H) 2025     Lab Results   Component Value Date    PROTEINUA Negative 2024    Annually Yes   Blood pressure BP Readings from Last 1 Encounters:   25 132/80    Less than 140/90 No   Dilated  "retinal exam : 02/18/2025 Annually Yes    Foot exam   : 07/31/2025 Annually No       Wt Readings from Last 3 Encounters:   07/31/25 0828 87.4 kg (192 lb 10.9 oz)   07/03/25 0857 88.9 kg (195 lb 15.8 oz)   06/30/25 0816 90.6 kg (199 lb 11.8 oz)         Lab Results   Component Value Date    FREET4 1.02 07/08/2025    TSH 2.718 07/08/2025    [id number] 406 07/08/2025    CALCIUM 8.6 (L) 07/08/2025     No results found for: "CPEPTIDE"  No results found for: "GLUTAMICACID"  Glucose   Date Value Ref Range Status   07/08/2025 408 (H) 70 - 110 mg/dL Final   08/26/2024 150 (H) 70 - 110 mg/dL Final     Anion Gap   Date Value Ref Range Status   07/08/2025 10 8 - 16 mmol/L Final     eGFR if    Date Value Ref Range Status   02/07/2022 >60 >60 mL/min/1.73 m^2 Final     eGFR if non    Date Value Ref Range Status   02/07/2022 56 (A) >60 mL/min/1.73 m^2 Final     Comment:     Calculation used to obtain the estimated glomerular filtration  rate (eGFR) is the CKD-EPI equation.              Review of patient's allergies indicates:  No Known Allergies  Social History[1]  Past Medical History:   Diagnosis Date    Acute renal failure 1/25/2022    Arthritis     Atrial fibrillation with RVR 1/25/2022    Back pain     Hyperlipidemia     Hypertension     Pneumonia     Polyneuropathy     Tobacco dependence     Type 2 diabetes mellitus without complication, without long-term current use of insulin 8/4/2021    Uncomplicated alcohol dependence 5/9/2023       Review of Systems   Constitutional:  Positive for weight loss. Negative for diaphoresis and malaise/fatigue.   Eyes:  Negative for blurred vision.   Respiratory:  Negative for shortness of breath.    Cardiovascular:  Negative for chest pain and leg swelling.   Gastrointestinal:  Negative for abdominal pain, constipation, diarrhea, heartburn, nausea and vomiting.   Genitourinary:  Negative for dysuria, frequency and urgency.   Musculoskeletal:  Negative for falls. "   Skin:  Negative for rash.   Neurological:  Negative for dizziness and headaches.   Endo/Heme/Allergies:  Negative for polydipsia.   Psychiatric/Behavioral:  The patient is not nervous/anxious.          Physical Exam  Constitutional:       Appearance: Normal appearance. He is normal weight.   HENT:      Head: Normocephalic and atraumatic.   Eyes:      Pupils: Pupils are equal, round, and reactive to light.   Cardiovascular:      Rate and Rhythm: Normal rate.      Pulses: Normal pulses.   Pulmonary:      Breath sounds: Normal breath sounds.   Abdominal:      General: Abdomen is flat. Bowel sounds are normal.      Palpations: Abdomen is soft.   Musculoskeletal:      Cervical back: Neck supple.      Right lower leg: No edema.      Left lower leg: No edema.   Feet:      Comments: Left great toe amputation (accidental several years ago)   Skin:     General: Skin is warm and dry.   Neurological:      General: No focal deficit present.      Mental Status: He is alert and oriented to person, place, and time. Mental status is at baseline.      Gait: Gait abnormal.   Psychiatric:         Mood and Affect: Mood normal.         Behavior: Behavior normal.         Thought Content: Thought content normal.         Judgment: Judgment normal.             Assessment and Plan:   Diagnoses and all orders for this visit:    Type 2 diabetes mellitus with diabetic microalbuminuria, with long-term current use of insulin  -     POCT Glucose, Hand-Held Device    Type 2 diabetes mellitus with stage 3b chronic kidney disease, with long-term current use of insulin  -     insulin glargine U-100, Lantus, (LANTUS SOLOSTAR U-100 INSULIN) 100 unit/mL (3 mL) InPn pen; Inject 36 Units into the skin once daily.    Hypertension associated with diabetes    Hyperlipidemia due to type 2 diabetes mellitus    Diabetic polyneuropathy associated with type 2 diabetes mellitus    PAF (paroxysmal atrial fibrillation)             Treatment plan for today's visit:    - Lantus 36 units every morning -- let me know if fasting numbers in the 70's or if consistently >130 so adjustment can be made.   - Novolog 5 units 10-15 minutes before main meals containing carbohydrates   Administer 6 units before heavier meals or if you will have a sweet/dessert with your meal    Administer 3 units before snacks (fruit)  - Jardiance 25 mg daily   - Continue Crestor and Zetia every day and will recheck cholesterol in about 6 months to see if numbers have improved     Will try to re-run cost of Stacy 2+. Let me know if this is affordable and we will set you up with us to get started  Plan for CGM Pro placement at NOV if unable to get Stacy to monitor BG trends       Patient education:   - Carbohydrates: Limit to 30-45 grams with each meal. Never eat carbs by themselves - always add protein. Make snacks low carb or non-carb only.    - Blood sugar goals:   Fastin-130 mg/dl  2 hours after meal:  mg/dl  Before bed: 100-150 mg/dl    - Carry glucose tablets/soft peppermints/regular juice or Coke product with you at all times to treat a low blood sugar episode (less than 70).  If your blood sugar is between 50-70, Chew 4 tablets or drink 1/2 cup of juice or regular Coke product.   If your blood sugar is below 50, double the treatment.   Re-check blood sugar in 15 minutes. If still low, repeat this.   Always call the clinic to give an update for any low blood sugar episodes.    - Exercise: Goal is 150 minutes per week, which is about 30 minutes/day, 5 days/week. Start slowly and increase as tolerated.        Follow up: 6 weeks     Visit today included increased complexity associated with the care of the episodic problems addressed and managing the longitudinal care of the patient due to the serious and/or complex managed problem(s)       Lauren O. Landry, FNP-C [facility]              [1]   Social History  Socioeconomic History    Marital status:    Tobacco Use     Smoking status: Some Days     Current packs/day: 1.00     Average packs/day: 1.3 packs/day for 42.6 years (54.1 ttl pk-yrs)     Types: Cigarettes     Start date: 1983    Smokeless tobacco: Never    Tobacco comments:     10 CPD   Substance and Sexual Activity    Alcohol use: Yes     Alcohol/week: 12.0 standard drinks of alcohol     Types: 12 Cans of beer per week     Comment: weekends only    Drug use: No    Sexual activity: Yes     Social Drivers of Health     Financial Resource Strain: Medium Risk (5/9/2023)    Overall Financial Resource Strain (CARDIA)     Difficulty of Paying Living Expenses: Somewhat hard   Food Insecurity: No Food Insecurity (1/10/2025)    Hunger Vital Sign     Worried About Running Out of Food in the Last Year: Never true     Ran Out of Food in the Last Year: Never true   Transportation Needs: No Transportation Needs (1/10/2025)    TRANSPORTATION NEEDS     Transportation : No   Physical Activity: Inactive (5/9/2023)    Exercise Vital Sign     Days of Exercise per Week: 0 days     Minutes of Exercise per Session: 0 min   Stress: No Stress Concern Present (5/9/2023)    [employer] [place] [employer] - Occupational Stress Questionnaire     Feeling of Stress : Only a little   Housing Stability: Low Risk  (5/9/2023)    Housing Stability Vital Sign     Unable to Pay for Housing in the Last Year: No     Number of Places Lived in the Last Year: 1     Unstable Housing in the Last Year: No

## 2025-07-31 NOTE — PROGRESS NOTES
INR is below therapeutic goal at 1.6.  Lab collected.  Patient reports a missed dose 2 days ago (5 mg).  No s/s reported.  Confirms current warfarin dose.  Reiterated on the importance of taking warfarin as directed to prevent future risk factors - voices understanding.  Will boost today's dose to 10 mg (11.8%), then resume current dose of 7.5 mg every Mon, Wed, Fri; and 5 mg all other days.  INR recheck in 2 weeks ([facility] lab) - 8/14.  ER for any s/s of clotting/stroke.  Patient confirms understanding.

## 2025-07-31 NOTE — PATIENT INSTRUCTIONS
Treatment plan for today's visit:   - Lantus 36 units every morning   - Novolog 5 units 10-15 minutes before main meals containing carbohydrates   Administer 6 units before heavier meals or if you will have a sweet/dessert with your meal    Administer 3 units before snacks (fruit)  - Jardiance 25 mg daily     - Continue Crestor and Zetia every day and will recheck cholesterol in about 6 months to see if numbers have improved     Will try to re-run cost of Stacy 2+. Let me know if this is affordable and we will set you up with us to get started    Let me know if any low blood sugars (in the 70's) or if fasting blood sugars stay >130 over the next week, and I will adjust Lantus dose       Patient education:   - Carbohydrates: Limit to 30-45 grams with each meal. Never eat carbs by themselves - always add protein. Make snacks low carb or non-carb only.    - Blood sugar goals:   Fastin-130 mg/dl  2 hours after meal:  mg/dl  Before bed: 100-150 mg/dl    - Carry glucose tablets/soft peppermints/regular juice or Coke product with you at all times to treat a low blood sugar episode (less than 70).  If your blood sugar is between 50-70, Chew 4 tablets or drink 1/2 cup of juice or regular Coke product.   If your blood sugar is below 50, double the treatment.   Re-check blood sugar in 15 minutes. If still low, repeat this.   Always call the clinic to give an update for any low blood sugar episodes.    - Exercise: Goal is 150 minutes per week, which is about 30 minutes/day, 5 days/week. Start slowly and increase as tolerated.

## 2025-08-12 ENCOUNTER — PATIENT OUTREACH (OUTPATIENT)
Dept: ADMINISTRATIVE | Facility: HOSPITAL | Age: 60
End: 2025-08-12
Payer: MEDICARE

## 2025-08-15 ENCOUNTER — LAB VISIT (OUTPATIENT)
Dept: LAB | Facility: HOSPITAL | Age: 60
End: 2025-08-15
Attending: INTERNAL MEDICINE
Payer: MEDICARE

## 2025-08-15 ENCOUNTER — ANTI-COAG VISIT (OUTPATIENT)
Dept: CARDIOLOGY | Facility: CLINIC | Age: 60
End: 2025-08-15
Payer: MEDICARE

## 2025-08-15 DIAGNOSIS — I48.0 PAF (PAROXYSMAL ATRIAL FIBRILLATION): Primary | ICD-10-CM

## 2025-08-15 DIAGNOSIS — I48.91 ATRIAL FIBRILLATION, UNSPECIFIED TYPE: ICD-10-CM

## 2025-08-15 DIAGNOSIS — Z79.01 LONG TERM (CURRENT) USE OF ANTICOAGULANTS: ICD-10-CM

## 2025-08-15 LAB
INR PPP: 2 (ref 0.8–1.2)
PROTHROMBIN TIME: 20.9 SECONDS (ref 9–12.5)

## 2025-08-15 PROCEDURE — 36415 COLL VENOUS BLD VENIPUNCTURE: CPT | Mod: HCNC

## 2025-08-15 PROCEDURE — 85610 PROTHROMBIN TIME: CPT | Mod: HCNC

## 2025-09-05 ENCOUNTER — TELEPHONE (OUTPATIENT)
Dept: CARDIOLOGY | Facility: CLINIC | Age: 60
End: 2025-09-05
Payer: MEDICARE